# Patient Record
Sex: MALE | Race: WHITE | NOT HISPANIC OR LATINO | Employment: OTHER | URBAN - METROPOLITAN AREA
[De-identification: names, ages, dates, MRNs, and addresses within clinical notes are randomized per-mention and may not be internally consistent; named-entity substitution may affect disease eponyms.]

---

## 2017-03-08 ENCOUNTER — TRANSCRIBE ORDERS (OUTPATIENT)
Dept: ADMINISTRATIVE | Facility: HOSPITAL | Age: 72
End: 2017-03-08

## 2017-03-08 DIAGNOSIS — I20.9 ANGINA, CLASS I (HCC): Primary | ICD-10-CM

## 2017-03-15 ENCOUNTER — HOSPITAL ENCOUNTER (OUTPATIENT)
Dept: CARDIOLOGY | Facility: HOSPITAL | Age: 72
Discharge: HOME | End: 2017-03-15
Attending: FAMILY MEDICINE

## 2017-03-15 ENCOUNTER — HOSPITAL ENCOUNTER (OUTPATIENT)
Dept: NUCLEAR MEDICINE | Facility: HOSPITAL | Age: 72
Discharge: HOME | End: 2017-03-15
Attending: FAMILY MEDICINE

## 2017-03-15 VITALS
HEART RATE: 67 BPM | DIASTOLIC BLOOD PRESSURE: 72 MMHG | WEIGHT: 247 LBS | SYSTOLIC BLOOD PRESSURE: 143 MMHG | HEIGHT: 70 IN | RESPIRATION RATE: 18 BRPM | OXYGEN SATURATION: 99 % | BODY MASS INDEX: 35.36 KG/M2

## 2017-03-15 DIAGNOSIS — I20.9 ANGINA, CLASS I (HCC): ICD-10-CM

## 2017-03-15 LAB
BH CV NUCLEAR PRIOR STUDY: 3
BH CV STRESS BP STAGE 1: NORMAL
BH CV STRESS BP STAGE 2: NORMAL
BH CV STRESS BP STAGE 3: NORMAL
BH CV STRESS BP STAGE 4: NORMAL
BH CV STRESS COMMENTS STAGE 1: NORMAL
BH CV STRESS COMMENTS STAGE 2: NORMAL
BH CV STRESS DOSE REGADENOSON STAGE 1: 0.4
BH CV STRESS DURATION MIN STAGE 1: 0
BH CV STRESS DURATION MIN STAGE 2: 4
BH CV STRESS DURATION SEC STAGE 1: 15
BH CV STRESS DURATION SEC STAGE 2: 0
BH CV STRESS HR STAGE 1: 84
BH CV STRESS HR STAGE 2: 88
BH CV STRESS HR STAGE 3: 79
BH CV STRESS HR STAGE 4: 80
BH CV STRESS O2 STAGE 1: 95
BH CV STRESS O2 STAGE 2: 95
BH CV STRESS PROTOCOL 1: NORMAL
BH CV STRESS RECOVERY BP: NORMAL MMHG
BH CV STRESS RECOVERY HR: 80 BPM
BH CV STRESS STAGE 1: 1
BH CV STRESS STAGE 2: 2
BH CV STRESS STAGE 3: 3
BH CV STRESS STAGE 4: 4
LV EF NUC BP: 63 %
MAXIMAL PREDICTED HEART RATE: 149 BPM
PERCENT MAX PREDICTED HR: 61.74 %
STRESS BASELINE BP: NORMAL MMHG
STRESS BASELINE HR: 67 BPM
STRESS O2 SAT REST: 99 %
STRESS PERCENT HR: 73 %
STRESS POST ESTIMATED WORKLOAD: 1 METS
STRESS POST EXERCISE DUR SEC: 30 SEC
STRESS POST PEAK BP: NORMAL MMHG
STRESS POST PEAK HR: 92 BPM
STRESS TARGET HR: 127 BPM

## 2017-03-15 PROCEDURE — 93017 CV STRESS TEST TRACING ONLY: CPT

## 2017-03-15 PROCEDURE — A9500 TC99M SESTAMIBI: HCPCS | Performed by: FAMILY MEDICINE

## 2017-03-15 PROCEDURE — 93016 CV STRESS TEST SUPVJ ONLY: CPT | Performed by: INTERNAL MEDICINE

## 2017-03-15 PROCEDURE — 0 TECHNETIUM SESTAMIBI: Performed by: FAMILY MEDICINE

## 2017-03-15 PROCEDURE — 78452 HT MUSCLE IMAGE SPECT MULT: CPT

## 2017-03-15 PROCEDURE — 78452 HT MUSCLE IMAGE SPECT MULT: CPT | Performed by: INTERNAL MEDICINE

## 2017-03-15 PROCEDURE — 93018 CV STRESS TEST I&R ONLY: CPT | Performed by: INTERNAL MEDICINE

## 2017-03-15 PROCEDURE — 25010000002 REGADENOSON 0.4 MG/5ML SOLUTION: Performed by: FAMILY MEDICINE

## 2017-03-15 RX ADMIN — Medication 1 DOSE: at 09:45

## 2017-03-15 RX ADMIN — Medication 1 DOSE: at 11:15

## 2017-03-15 RX ADMIN — REGADENOSON 0.4 MG: 0.08 INJECTION, SOLUTION INTRAVENOUS at 11:15

## 2017-05-09 ENCOUNTER — OFFICE VISIT (OUTPATIENT)
Dept: GASTROENTEROLOGY | Facility: CLINIC | Age: 72
End: 2017-05-09

## 2017-05-09 VITALS
OXYGEN SATURATION: 93 % | HEIGHT: 70 IN | TEMPERATURE: 98 F | BODY MASS INDEX: 34.27 KG/M2 | DIASTOLIC BLOOD PRESSURE: 70 MMHG | WEIGHT: 239.4 LBS | HEART RATE: 69 BPM | SYSTOLIC BLOOD PRESSURE: 136 MMHG

## 2017-05-09 DIAGNOSIS — K21.00 GASTROESOPHAGEAL REFLUX DISEASE WITH ESOPHAGITIS: Primary | ICD-10-CM

## 2017-05-09 PROCEDURE — 99203 OFFICE O/P NEW LOW 30 MIN: CPT | Performed by: INTERNAL MEDICINE

## 2017-05-19 ENCOUNTER — ANESTHESIA EVENT (OUTPATIENT)
Dept: PERIOP | Facility: HOSPITAL | Age: 72
End: 2017-05-19

## 2017-05-22 ENCOUNTER — ANESTHESIA (OUTPATIENT)
Dept: PERIOP | Facility: HOSPITAL | Age: 72
End: 2017-05-22

## 2017-05-22 ENCOUNTER — HOSPITAL ENCOUNTER (OUTPATIENT)
Facility: HOSPITAL | Age: 72
Setting detail: HOSPITAL OUTPATIENT SURGERY
Discharge: HOME | End: 2017-05-22
Attending: INTERNAL MEDICINE | Admitting: INTERNAL MEDICINE

## 2017-05-22 VITALS
WEIGHT: 237.8 LBS | OXYGEN SATURATION: 97 % | DIASTOLIC BLOOD PRESSURE: 72 MMHG | TEMPERATURE: 98.7 F | HEART RATE: 79 BPM | RESPIRATION RATE: 18 BRPM | BODY MASS INDEX: 34.04 KG/M2 | HEIGHT: 70 IN | SYSTOLIC BLOOD PRESSURE: 129 MMHG

## 2017-05-22 DIAGNOSIS — K21.00 GASTROESOPHAGEAL REFLUX DISEASE WITH ESOPHAGITIS: ICD-10-CM

## 2017-05-22 LAB
GLUCOSE BLDC GLUCOMTR-MCNC: 137 MG/DL (ref 70–130)
POTASSIUM BLD-SCNC: 3.7 MMOL/L (ref 3.5–5.2)

## 2017-05-22 PROCEDURE — 43239 EGD BIOPSY SINGLE/MULTIPLE: CPT | Performed by: INTERNAL MEDICINE

## 2017-05-22 PROCEDURE — 25010000002 PROPOFOL 10 MG/ML EMULSION: Performed by: NURSE ANESTHETIST, CERTIFIED REGISTERED

## 2017-05-22 PROCEDURE — 82962 GLUCOSE BLOOD TEST: CPT

## 2017-05-22 PROCEDURE — 84132 ASSAY OF SERUM POTASSIUM: CPT | Performed by: NURSE ANESTHETIST, CERTIFIED REGISTERED

## 2017-05-22 RX ORDER — LIDOCAINE HYDROCHLORIDE 10 MG/ML
0.5 INJECTION, SOLUTION EPIDURAL; INFILTRATION; INTRACAUDAL; PERINEURAL ONCE AS NEEDED
Status: DISCONTINUED | OUTPATIENT
Start: 2017-05-22 | End: 2017-05-22 | Stop reason: HOSPADM

## 2017-05-22 RX ORDER — LIDOCAINE HYDROCHLORIDE 20 MG/ML
INJECTION, SOLUTION INFILTRATION; PERINEURAL AS NEEDED
Status: DISCONTINUED | OUTPATIENT
Start: 2017-05-22 | End: 2017-05-22 | Stop reason: SURG

## 2017-05-22 RX ORDER — PROPOFOL 10 MG/ML
VIAL (ML) INTRAVENOUS AS NEEDED
Status: DISCONTINUED | OUTPATIENT
Start: 2017-05-22 | End: 2017-05-22 | Stop reason: SURG

## 2017-05-22 RX ORDER — SODIUM CHLORIDE 0.9 % (FLUSH) 0.9 %
1-10 SYRINGE (ML) INJECTION AS NEEDED
Status: DISCONTINUED | OUTPATIENT
Start: 2017-05-22 | End: 2017-05-22 | Stop reason: HOSPADM

## 2017-05-22 RX ORDER — SODIUM CHLORIDE, SODIUM LACTATE, POTASSIUM CHLORIDE, CALCIUM CHLORIDE 600; 310; 30; 20 MG/100ML; MG/100ML; MG/100ML; MG/100ML
9 INJECTION, SOLUTION INTRAVENOUS CONTINUOUS
Status: DISCONTINUED | OUTPATIENT
Start: 2017-05-22 | End: 2017-05-22 | Stop reason: HOSPADM

## 2017-05-22 RX ADMIN — PROPOFOL 50 MG: 10 INJECTION, EMULSION INTRAVENOUS at 09:22

## 2017-05-22 RX ADMIN — PROPOFOL 30 MG: 10 INJECTION, EMULSION INTRAVENOUS at 09:28

## 2017-05-22 RX ADMIN — LIDOCAINE HYDROCHLORIDE 60 MG: 20 INJECTION, SOLUTION INFILTRATION; PERINEURAL at 09:22

## 2017-05-22 RX ADMIN — SODIUM CHLORIDE, POTASSIUM CHLORIDE, SODIUM LACTATE AND CALCIUM CHLORIDE 9 ML/HR: 600; 310; 30; 20 INJECTION, SOLUTION INTRAVENOUS at 08:45

## 2017-05-22 RX ADMIN — PROPOFOL 40 MG: 10 INJECTION, EMULSION INTRAVENOUS at 09:24

## 2017-05-22 RX ADMIN — SODIUM CHLORIDE, POTASSIUM CHLORIDE, SODIUM LACTATE AND CALCIUM CHLORIDE: 600; 310; 30; 20 INJECTION, SOLUTION INTRAVENOUS at 09:17

## 2017-05-26 LAB
LAB AP CASE REPORT: NORMAL
Lab: NORMAL
PATH REPORT.ADDENDUM SPEC: NORMAL
PATH REPORT.FINAL DX SPEC: NORMAL

## 2017-06-05 ENCOUNTER — TELEPHONE (OUTPATIENT)
Dept: GASTROENTEROLOGY | Facility: CLINIC | Age: 72
End: 2017-06-05

## 2017-06-07 RX ORDER — PANTOPRAZOLE SODIUM 40 MG/1
40 TABLET, DELAYED RELEASE ORAL DAILY
Qty: 90 TABLET | Refills: 3 | Status: SHIPPED | OUTPATIENT
Start: 2017-06-07 | End: 2018-06-17 | Stop reason: SDUPTHER

## 2017-06-07 NOTE — TELEPHONE ENCOUNTER
See what she has tried in the past.  We can do Protonix 40 mg daily.  A big issue with her will be diet and modifications that we talked about in the office.  The other option for her would be to continue Nexium at 40 mg end start over-the-counter om  eprazole once daily at night.  Just make sure she has a follow-up appointment also thinks

## 2018-06-14 RX ORDER — PANTOPRAZOLE SODIUM 40 MG/1
TABLET, DELAYED RELEASE ORAL
Qty: 90 TABLET | Refills: 2 | OUTPATIENT
Start: 2018-06-14

## 2018-06-18 RX ORDER — PANTOPRAZOLE SODIUM 40 MG/1
TABLET, DELAYED RELEASE ORAL
Qty: 90 TABLET | Refills: 2 | Status: SHIPPED | OUTPATIENT
Start: 2018-06-18 | End: 2019-03-13 | Stop reason: SDUPTHER

## 2019-03-13 RX ORDER — PANTOPRAZOLE SODIUM 40 MG/1
TABLET, DELAYED RELEASE ORAL
Qty: 90 TABLET | Refills: 1 | Status: SHIPPED | OUTPATIENT
Start: 2019-03-13 | End: 2022-10-26

## 2019-04-17 ENCOUNTER — TRANSCRIBE ORDERS (OUTPATIENT)
Dept: ADMINISTRATIVE | Facility: HOSPITAL | Age: 74
End: 2019-04-17

## 2019-04-17 DIAGNOSIS — I69.393 ATAXIA DUE TO RECENT CEREBROVASCULAR ACCIDENT (CVA): Primary | ICD-10-CM

## 2019-04-26 ENCOUNTER — HOSPITAL ENCOUNTER (OUTPATIENT)
Dept: MRI IMAGING | Facility: HOSPITAL | Age: 74
Discharge: HOME | End: 2019-04-26
Admitting: FAMILY MEDICINE

## 2019-04-26 DIAGNOSIS — I69.393 ATAXIA DUE TO RECENT CEREBROVASCULAR ACCIDENT (CVA): ICD-10-CM

## 2019-04-26 PROCEDURE — 70551 MRI BRAIN STEM W/O DYE: CPT

## 2019-04-29 ENCOUNTER — APPOINTMENT (OUTPATIENT)
Dept: MRI IMAGING | Facility: HOSPITAL | Age: 74
End: 2019-04-29

## 2019-07-17 ENCOUNTER — HOSPITAL ENCOUNTER (OUTPATIENT)
Dept: GENERAL RADIOLOGY | Facility: HOSPITAL | Age: 74
Discharge: HOME | End: 2019-07-17
Admitting: FAMILY MEDICINE

## 2019-07-17 ENCOUNTER — TRANSCRIBE ORDERS (OUTPATIENT)
Dept: ADMINISTRATIVE | Facility: HOSPITAL | Age: 74
End: 2019-07-17

## 2019-07-17 DIAGNOSIS — M51.36 DDD (DEGENERATIVE DISC DISEASE), LUMBAR: Primary | ICD-10-CM

## 2019-07-17 PROCEDURE — 72100 X-RAY EXAM L-S SPINE 2/3 VWS: CPT

## 2019-10-10 ENCOUNTER — TRANSCRIBE ORDERS (OUTPATIENT)
Dept: ADMINISTRATIVE | Facility: HOSPITAL | Age: 74
End: 2019-10-10

## 2019-10-10 DIAGNOSIS — R31.9 HEMATURIA, UNSPECIFIED TYPE: Primary | ICD-10-CM

## 2019-10-18 ENCOUNTER — HOSPITAL ENCOUNTER (OUTPATIENT)
Dept: ULTRASOUND IMAGING | Facility: HOSPITAL | Age: 74
Discharge: HOME | End: 2019-10-18
Admitting: FAMILY MEDICINE

## 2019-10-18 DIAGNOSIS — R31.9 HEMATURIA, UNSPECIFIED TYPE: ICD-10-CM

## 2019-10-18 PROCEDURE — 76775 US EXAM ABDO BACK WALL LIM: CPT

## 2020-02-12 ENCOUNTER — TRANSCRIBE ORDERS (OUTPATIENT)
Dept: ADMINISTRATIVE | Facility: HOSPITAL | Age: 75
End: 2020-02-12

## 2020-02-12 DIAGNOSIS — R00.2 PALPITATIONS: Primary | ICD-10-CM

## 2020-02-24 ENCOUNTER — APPOINTMENT (OUTPATIENT)
Dept: CARDIOLOGY | Facility: HOSPITAL | Age: 75
End: 2020-02-24

## 2020-12-11 ENCOUNTER — HOSPITAL ENCOUNTER (OUTPATIENT)
Dept: GENERAL RADIOLOGY | Facility: HOSPITAL | Age: 75
Discharge: HOME | End: 2020-12-11
Admitting: FAMILY MEDICINE

## 2020-12-11 ENCOUNTER — TRANSCRIBE ORDERS (OUTPATIENT)
Dept: ADMINISTRATIVE | Facility: HOSPITAL | Age: 75
End: 2020-12-11

## 2020-12-11 DIAGNOSIS — J42 CHRONIC BRONCHITIS, UNSPECIFIED CHRONIC BRONCHITIS TYPE (HCC): Primary | ICD-10-CM

## 2020-12-11 DIAGNOSIS — J42 CHRONIC BRONCHITIS, UNSPECIFIED CHRONIC BRONCHITIS TYPE (HCC): ICD-10-CM

## 2020-12-11 PROCEDURE — 71046 X-RAY EXAM CHEST 2 VIEWS: CPT

## 2021-01-19 ENCOUNTER — TRANSCRIBE ORDERS (OUTPATIENT)
Dept: ADMINISTRATIVE | Facility: HOSPITAL | Age: 76
End: 2021-01-19

## 2021-01-19 DIAGNOSIS — R41.3 MEMORY LOSS: Primary | ICD-10-CM

## 2021-01-26 ENCOUNTER — HOSPITAL ENCOUNTER (OUTPATIENT)
Dept: MRI IMAGING | Facility: HOSPITAL | Age: 76
Discharge: HOME | End: 2021-01-26
Admitting: PSYCHIATRY & NEUROLOGY

## 2021-01-26 DIAGNOSIS — R41.3 MEMORY LOSS: ICD-10-CM

## 2021-01-26 PROCEDURE — 70551 MRI BRAIN STEM W/O DYE: CPT

## 2021-02-01 ENCOUNTER — TRANSCRIBE ORDERS (OUTPATIENT)
Dept: ADMINISTRATIVE | Facility: HOSPITAL | Age: 76
End: 2021-02-01

## 2021-02-01 DIAGNOSIS — I72.9 ANEURYSM OF UNSPECIFIED SITE (HCC): Primary | ICD-10-CM

## 2021-02-05 ENCOUNTER — HOSPITAL ENCOUNTER (OUTPATIENT)
Dept: MRI IMAGING | Facility: HOSPITAL | Age: 76
End: 2021-02-05

## 2021-03-23 ENCOUNTER — TELEPHONE (OUTPATIENT)
Dept: GASTROENTEROLOGY | Facility: CLINIC | Age: 76
End: 2021-03-23

## 2021-03-25 ENCOUNTER — PREP FOR SURGERY (OUTPATIENT)
Dept: OTHER | Facility: HOSPITAL | Age: 76
End: 2021-03-25

## 2021-03-25 DIAGNOSIS — Z12.11 ENCOUNTER FOR SCREENING FOR MALIGNANT NEOPLASM OF COLON: Primary | ICD-10-CM

## 2021-03-26 NOTE — TELEPHONE ENCOUNTER
CALLED AND SPOKE WITH WIFE.  SCHEDULED AT [place] ON 07/28/2021 AT 12PM - ARRIVE 11AM.  E-MAIL INSTRUCTIONS [email or web address] TODAY.    COVID TEST ON 07/26/2021, WILL CALL WITH TIME.  NEED TO SELF QUARANTINE UNTIL AFTER PROCEDURE.  SHE UNDERSTANDS.

## 2021-03-29 PROBLEM — Z12.11 ENCOUNTER FOR SCREENING FOR MALIGNANT NEOPLASM OF COLON: Status: ACTIVE | Noted: 2021-03-29

## 2021-06-15 ENCOUNTER — HOSPITAL ENCOUNTER (OUTPATIENT)
Dept: MRI IMAGING | Facility: HOSPITAL | Age: 76
Discharge: HOME | End: 2021-06-15
Admitting: PSYCHIATRY & NEUROLOGY

## 2021-06-15 DIAGNOSIS — I72.9 ANEURYSM OF UNSPECIFIED SITE (HCC): ICD-10-CM

## 2021-06-15 PROCEDURE — 70544 MR ANGIOGRAPHY HEAD W/O DYE: CPT

## 2021-07-26 ENCOUNTER — TELEPHONE (OUTPATIENT)
Dept: GASTROENTEROLOGY | Facility: CLINIC | Age: 76
End: 2021-07-26

## 2021-07-26 NOTE — TELEPHONE ENCOUNTER
Wife called and left message on my voice mail to reschedule his colonoscopy on 07/28/2021. Please call.

## 2021-07-26 NOTE — TELEPHONE ENCOUNTER
Return call from wife.  Rescheduled to [place] on 11/10/2021 at 12pm - arrive 11am.  She corrected her copy of instructions.

## 2021-09-02 ENCOUNTER — APPOINTMENT (OUTPATIENT)
Dept: CT IMAGING | Facility: HOSPITAL | Age: 76
End: 2021-09-02

## 2021-09-02 ENCOUNTER — APPOINTMENT (OUTPATIENT)
Dept: GENERAL RADIOLOGY | Facility: HOSPITAL | Age: 76
End: 2021-09-02

## 2021-09-02 ENCOUNTER — HOSPITAL ENCOUNTER (INPATIENT)
Facility: HOSPITAL | Age: 76
LOS: 14 days | Discharge: NURSING, REHAB OR LONG-TERM CARE | End: 2021-09-16
Attending: EMERGENCY MEDICINE | Admitting: FAMILY MEDICINE

## 2021-09-02 DIAGNOSIS — R53.1 WEAKNESS: ICD-10-CM

## 2021-09-02 DIAGNOSIS — U07.1 COVID-19 VIRUS INFECTION: Primary | ICD-10-CM

## 2021-09-02 DIAGNOSIS — S00.83XA CONTUSION OF FACE, INITIAL ENCOUNTER: ICD-10-CM

## 2021-09-02 DIAGNOSIS — R77.8 ELEVATED TROPONIN: ICD-10-CM

## 2021-09-02 LAB
ALBUMIN SERPL-MCNC: 3.3 G/DL (ref 3.5–5.2)
ALBUMIN/GLOB SERPL: 1.3 G/DL
ALP SERPL-CCNC: 77 U/L (ref 39–117)
ALT SERPL W P-5'-P-CCNC: 34 U/L (ref 1–41)
ANION GAP SERPL CALCULATED.3IONS-SCNC: 10.6 MMOL/L (ref 5–15)
AST SERPL-CCNC: 41 U/L (ref 1–40)
BACTERIA UR QL AUTO: ABNORMAL /HPF
BASOPHILS # BLD AUTO: 0.01 10*3/MM3 (ref 0–0.2)
BASOPHILS NFR BLD AUTO: 0.2 % (ref 0–1.5)
BILIRUB SERPL-MCNC: 0.7 MG/DL (ref 0–1.2)
BILIRUB UR QL STRIP: ABNORMAL
BUN SERPL-MCNC: 16 MG/DL (ref 8–23)
BUN/CREAT SERPL: 22.5 (ref 7–25)
CALCIUM SPEC-SCNC: 8.5 MG/DL (ref 8.6–10.5)
CHLORIDE SERPL-SCNC: 101 MMOL/L (ref 98–107)
CK SERPL-CCNC: 765 U/L (ref 20–200)
CLARITY UR: ABNORMAL
CO2 SERPL-SCNC: 27.4 MMOL/L (ref 22–29)
COLOR UR: ABNORMAL
CREAT SERPL-MCNC: 0.71 MG/DL (ref 0.76–1.27)
D DIMER PPP FEU-MCNC: 1.14 MCGFEU/ML (ref 0–0.46)
D-LACTATE SERPL-SCNC: 2 MMOL/L (ref 0.5–2)
DEPRECATED RDW RBC AUTO: 47.7 FL (ref 37–54)
EOSINOPHIL # BLD AUTO: 0 10*3/MM3 (ref 0–0.4)
EOSINOPHIL NFR BLD AUTO: 0 % (ref 0.3–6.2)
ERYTHROCYTE [DISTWIDTH] IN BLOOD BY AUTOMATED COUNT: 14.4 % (ref 12.3–15.4)
FERRITIN SERPL-MCNC: 1110 NG/ML (ref 30–400)
FLUAV RNA RESP QL NAA+PROBE: NOT DETECTED
FLUBV RNA RESP QL NAA+PROBE: NOT DETECTED
GFR SERPL CREATININE-BSD FRML MDRD: 108 ML/MIN/1.73
GLOBULIN UR ELPH-MCNC: 2.6 GM/DL
GLUCOSE BLDC GLUCOMTR-MCNC: 147 MG/DL (ref 70–130)
GLUCOSE BLDC GLUCOMTR-MCNC: 201 MG/DL (ref 70–130)
GLUCOSE SERPL-MCNC: 206 MG/DL (ref 65–99)
GLUCOSE UR STRIP-MCNC: ABNORMAL MG/DL
HCT VFR BLD AUTO: 37.5 % (ref 37.5–51)
HGB BLD-MCNC: 12.6 G/DL (ref 13–17.7)
HGB UR QL STRIP.AUTO: ABNORMAL
HOLD SPECIMEN: NORMAL
HOLD SPECIMEN: NORMAL
HYALINE CASTS UR QL AUTO: ABNORMAL /LPF
IMM GRANULOCYTES # BLD AUTO: 0.08 10*3/MM3 (ref 0–0.05)
IMM GRANULOCYTES NFR BLD AUTO: 1.8 % (ref 0–0.5)
KETONES UR QL STRIP: ABNORMAL
LEUKOCYTE ESTERASE UR QL STRIP.AUTO: NEGATIVE
LYMPHOCYTES # BLD AUTO: 0.61 10*3/MM3 (ref 0.7–3.1)
LYMPHOCYTES NFR BLD AUTO: 13.8 % (ref 19.6–45.3)
MAGNESIUM SERPL-MCNC: 1.4 MG/DL (ref 1.6–2.4)
MCH RBC QN AUTO: 30.5 PG (ref 26.6–33)
MCHC RBC AUTO-ENTMCNC: 33.6 G/DL (ref 31.5–35.7)
MCV RBC AUTO: 90.8 FL (ref 79–97)
MONOCYTES # BLD AUTO: 0.48 10*3/MM3 (ref 0.1–0.9)
MONOCYTES NFR BLD AUTO: 10.9 % (ref 5–12)
MUCOUS THREADS URNS QL MICRO: ABNORMAL /HPF
NEUTROPHILS NFR BLD AUTO: 3.24 10*3/MM3 (ref 1.7–7)
NEUTROPHILS NFR BLD AUTO: 73.3 % (ref 42.7–76)
NITRITE UR QL STRIP: NEGATIVE
NRBC BLD AUTO-RTO: 0 /100 WBC (ref 0–0.2)
NT-PROBNP SERPL-MCNC: 2448 PG/ML (ref 0–1800)
PH UR STRIP.AUTO: 6 [PH] (ref 4.5–8)
PLATELET # BLD AUTO: 166 10*3/MM3 (ref 140–450)
PMV BLD AUTO: 9.5 FL (ref 6–12)
POTASSIUM SERPL-SCNC: 3 MMOL/L (ref 3.5–5.2)
PROCALCITONIN SERPL-MCNC: 0.08 NG/ML (ref 0–0.25)
PROT SERPL-MCNC: 5.9 G/DL (ref 6–8.5)
PROT UR QL STRIP: ABNORMAL
RBC # BLD AUTO: 4.13 10*6/MM3 (ref 4.14–5.8)
RBC # UR: ABNORMAL /HPF
REF LAB TEST METHOD: ABNORMAL
SARS-COV-2 RNA RESP QL NAA+PROBE: DETECTED
SODIUM SERPL-SCNC: 139 MMOL/L (ref 136–145)
SP GR UR STRIP: 1.02 (ref 1–1.03)
SQUAMOUS #/AREA URNS HPF: ABNORMAL /HPF
TROPONIN T SERPL-MCNC: 0.12 NG/ML (ref 0–0.03)
UROBILINOGEN UR QL STRIP: ABNORMAL
WBC # BLD AUTO: 4.42 10*3/MM3 (ref 3.4–10.8)
WBC UR QL AUTO: ABNORMAL /HPF
WHOLE BLOOD HOLD SPECIMEN: NORMAL
WHOLE BLOOD HOLD SPECIMEN: NORMAL

## 2021-09-02 PROCEDURE — 25010000002 FUROSEMIDE PER 20 MG

## 2021-09-02 PROCEDURE — 82962 GLUCOSE BLOOD TEST: CPT

## 2021-09-02 PROCEDURE — 81001 URINALYSIS AUTO W/SCOPE: CPT | Performed by: EMERGENCY MEDICINE

## 2021-09-02 PROCEDURE — 71045 X-RAY EXAM CHEST 1 VIEW: CPT

## 2021-09-02 PROCEDURE — 70450 CT HEAD/BRAIN W/O DYE: CPT

## 2021-09-02 PROCEDURE — 85025 COMPLETE CBC W/AUTO DIFF WBC: CPT | Performed by: EMERGENCY MEDICINE

## 2021-09-02 PROCEDURE — 94799 UNLISTED PULMONARY SVC/PX: CPT

## 2021-09-02 PROCEDURE — 0 IOPAMIDOL PER 1 ML: Performed by: EMERGENCY MEDICINE

## 2021-09-02 PROCEDURE — 84484 ASSAY OF TROPONIN QUANT: CPT | Performed by: EMERGENCY MEDICINE

## 2021-09-02 PROCEDURE — 71275 CT ANGIOGRAPHY CHEST: CPT

## 2021-09-02 PROCEDURE — 82728 ASSAY OF FERRITIN: CPT | Performed by: FAMILY MEDICINE

## 2021-09-02 PROCEDURE — 25010000002 HYDRALAZINE PER 20 MG: Performed by: FAMILY MEDICINE

## 2021-09-02 PROCEDURE — 25010000002 ENOXAPARIN PER 10 MG: Performed by: FAMILY MEDICINE

## 2021-09-02 PROCEDURE — 83605 ASSAY OF LACTIC ACID: CPT | Performed by: EMERGENCY MEDICINE

## 2021-09-02 PROCEDURE — 93010 ELECTROCARDIOGRAM REPORT: CPT | Performed by: INTERNAL MEDICINE

## 2021-09-02 PROCEDURE — 80053 COMPREHEN METABOLIC PANEL: CPT | Performed by: EMERGENCY MEDICINE

## 2021-09-02 PROCEDURE — 99285 EMERGENCY DEPT VISIT HI MDM: CPT

## 2021-09-02 PROCEDURE — 87636 SARSCOV2 & INF A&B AMP PRB: CPT | Performed by: EMERGENCY MEDICINE

## 2021-09-02 PROCEDURE — 93005 ELECTROCARDIOGRAM TRACING: CPT | Performed by: EMERGENCY MEDICINE

## 2021-09-02 PROCEDURE — 84145 PROCALCITONIN (PCT): CPT | Performed by: EMERGENCY MEDICINE

## 2021-09-02 PROCEDURE — 83880 ASSAY OF NATRIURETIC PEPTIDE: CPT | Performed by: EMERGENCY MEDICINE

## 2021-09-02 PROCEDURE — 87040 BLOOD CULTURE FOR BACTERIA: CPT | Performed by: EMERGENCY MEDICINE

## 2021-09-02 PROCEDURE — 83735 ASSAY OF MAGNESIUM: CPT | Performed by: EMERGENCY MEDICINE

## 2021-09-02 PROCEDURE — 85379 FIBRIN DEGRADATION QUANT: CPT | Performed by: FAMILY MEDICINE

## 2021-09-02 PROCEDURE — 25010000002 HALOPERIDOL LACTATE PER 5 MG

## 2021-09-02 PROCEDURE — 82550 ASSAY OF CK (CPK): CPT | Performed by: EMERGENCY MEDICINE

## 2021-09-02 PROCEDURE — 99285 EMERGENCY DEPT VISIT HI MDM: CPT | Performed by: EMERGENCY MEDICINE

## 2021-09-02 PROCEDURE — 94640 AIRWAY INHALATION TREATMENT: CPT

## 2021-09-02 RX ORDER — HYDRALAZINE HYDROCHLORIDE 20 MG/ML
10 INJECTION INTRAMUSCULAR; INTRAVENOUS EVERY 4 HOURS PRN
Status: DISCONTINUED | OUTPATIENT
Start: 2021-09-02 | End: 2021-09-16 | Stop reason: HOSPADM

## 2021-09-02 RX ORDER — ATORVASTATIN CALCIUM 40 MG/1
40 TABLET, FILM COATED ORAL DAILY
Status: DISCONTINUED | OUTPATIENT
Start: 2021-09-02 | End: 2021-09-16 | Stop reason: HOSPADM

## 2021-09-02 RX ORDER — ONDANSETRON 2 MG/ML
4 INJECTION INTRAMUSCULAR; INTRAVENOUS EVERY 6 HOURS PRN
Status: DISCONTINUED | OUTPATIENT
Start: 2021-09-02 | End: 2021-09-16 | Stop reason: HOSPADM

## 2021-09-02 RX ORDER — POTASSIUM CHLORIDE 1.5 G/1.77G
40 POWDER, FOR SOLUTION ORAL ONCE
Status: COMPLETED | OUTPATIENT
Start: 2021-09-02 | End: 2021-09-02

## 2021-09-02 RX ORDER — ASPIRIN 81 MG/1
81 TABLET ORAL DAILY
Status: DISCONTINUED | OUTPATIENT
Start: 2021-09-02 | End: 2021-09-16 | Stop reason: HOSPADM

## 2021-09-02 RX ORDER — SODIUM CHLORIDE 9 MG/ML
75 INJECTION, SOLUTION INTRAVENOUS CONTINUOUS
Status: DISCONTINUED | OUTPATIENT
Start: 2021-09-02 | End: 2021-09-03

## 2021-09-02 RX ORDER — HALOPERIDOL 5 MG/ML
1 INJECTION INTRAMUSCULAR EVERY 4 HOURS PRN
Status: DISCONTINUED | OUTPATIENT
Start: 2021-09-02 | End: 2021-09-16 | Stop reason: HOSPADM

## 2021-09-02 RX ORDER — HALOPERIDOL 5 MG/ML
INJECTION INTRAMUSCULAR
Status: COMPLETED
Start: 2021-09-02 | End: 2021-09-02

## 2021-09-02 RX ORDER — ALBUTEROL SULFATE 90 UG/1
2 AEROSOL, METERED RESPIRATORY (INHALATION)
Status: DISCONTINUED | OUTPATIENT
Start: 2021-09-02 | End: 2021-09-02

## 2021-09-02 RX ORDER — SODIUM CHLORIDE 0.9 % (FLUSH) 0.9 %
10 SYRINGE (ML) INJECTION AS NEEDED
Status: DISCONTINUED | OUTPATIENT
Start: 2021-09-02 | End: 2021-09-16 | Stop reason: HOSPADM

## 2021-09-02 RX ORDER — ACETAMINOPHEN 500 MG
1000 TABLET ORAL EVERY 8 HOURS PRN
Status: DISCONTINUED | OUTPATIENT
Start: 2021-09-02 | End: 2021-09-02

## 2021-09-02 RX ORDER — POLYETHYLENE GLYCOL 3350 17 G/17G
17 POWDER, FOR SOLUTION ORAL DAILY PRN
Status: DISCONTINUED | OUTPATIENT
Start: 2021-09-02 | End: 2021-09-16 | Stop reason: HOSPADM

## 2021-09-02 RX ORDER — HALOPERIDOL 5 MG/ML
1 INJECTION INTRAMUSCULAR AS NEEDED
Status: DISCONTINUED | OUTPATIENT
Start: 2021-09-02 | End: 2021-09-02

## 2021-09-02 RX ORDER — FUROSEMIDE 10 MG/ML
INJECTION INTRAMUSCULAR; INTRAVENOUS
Status: COMPLETED
Start: 2021-09-02 | End: 2021-09-02

## 2021-09-02 RX ORDER — BISACODYL 10 MG
10 SUPPOSITORY, RECTAL RECTAL DAILY PRN
Status: DISCONTINUED | OUTPATIENT
Start: 2021-09-02 | End: 2021-09-16 | Stop reason: HOSPADM

## 2021-09-02 RX ORDER — PANTOPRAZOLE SODIUM 40 MG/1
40 TABLET, DELAYED RELEASE ORAL DAILY
Status: DISCONTINUED | OUTPATIENT
Start: 2021-09-02 | End: 2021-09-16 | Stop reason: HOSPADM

## 2021-09-02 RX ORDER — BISACODYL 5 MG/1
5 TABLET, DELAYED RELEASE ORAL DAILY PRN
Status: DISCONTINUED | OUTPATIENT
Start: 2021-09-02 | End: 2021-09-16 | Stop reason: HOSPADM

## 2021-09-02 RX ORDER — SODIUM CHLORIDE 9 MG/ML
125 INJECTION, SOLUTION INTRAVENOUS CONTINUOUS
Status: DISCONTINUED | OUTPATIENT
Start: 2021-09-02 | End: 2021-09-02

## 2021-09-02 RX ORDER — ACETAMINOPHEN 325 MG/1
650 TABLET ORAL EVERY 8 HOURS PRN
Status: DISCONTINUED | OUTPATIENT
Start: 2021-09-02 | End: 2021-09-16 | Stop reason: HOSPADM

## 2021-09-02 RX ORDER — NEBIVOLOL 5 MG/1
10 TABLET ORAL DAILY
Status: DISCONTINUED | OUTPATIENT
Start: 2021-09-02 | End: 2021-09-16 | Stop reason: HOSPADM

## 2021-09-02 RX ORDER — FUROSEMIDE 10 MG/ML
40 INJECTION INTRAMUSCULAR; INTRAVENOUS ONCE
Status: DISCONTINUED | OUTPATIENT
Start: 2021-09-02 | End: 2021-09-02

## 2021-09-02 RX ORDER — AMOXICILLIN 250 MG
2 CAPSULE ORAL 2 TIMES DAILY
Status: DISCONTINUED | OUTPATIENT
Start: 2021-09-02 | End: 2021-09-16 | Stop reason: HOSPADM

## 2021-09-02 RX ORDER — ONDANSETRON 4 MG/1
4 TABLET, FILM COATED ORAL EVERY 6 HOURS PRN
Status: DISCONTINUED | OUTPATIENT
Start: 2021-09-02 | End: 2021-09-16 | Stop reason: HOSPADM

## 2021-09-02 RX ORDER — LIRAGLUTIDE 6 MG/ML
1.8 INJECTION SUBCUTANEOUS DAILY
COMMUNITY
End: 2022-10-26

## 2021-09-02 RX ORDER — POTASSIUM CHLORIDE 1.5 G/1.77G
40 POWDER, FOR SOLUTION ORAL ONCE
Status: DISCONTINUED | OUTPATIENT
Start: 2021-09-02 | End: 2021-09-02

## 2021-09-02 RX ORDER — METOCLOPRAMIDE 10 MG/1
10 TABLET ORAL
Status: DISCONTINUED | OUTPATIENT
Start: 2021-09-03 | End: 2021-09-02

## 2021-09-02 RX ORDER — ALBUTEROL SULFATE 90 UG/1
2 AEROSOL, METERED RESPIRATORY (INHALATION)
Status: DISCONTINUED | OUTPATIENT
Start: 2021-09-02 | End: 2021-09-03

## 2021-09-02 RX ORDER — LISINOPRIL 20 MG/1
20 TABLET ORAL EVERY 12 HOURS SCHEDULED
Status: DISCONTINUED | OUTPATIENT
Start: 2021-09-02 | End: 2021-09-16 | Stop reason: HOSPADM

## 2021-09-02 RX ADMIN — NEBIVOLOL HYDROCHLORIDE 10 MG: 5 TABLET ORAL at 20:36

## 2021-09-02 RX ADMIN — SENNOSIDES AND DOCUSATE SODIUM 2 TABLET: 50; 8.6 TABLET ORAL at 20:36

## 2021-09-02 RX ADMIN — ALBUTEROL SULFATE 2 PUFF: 90 AEROSOL, METERED RESPIRATORY (INHALATION) at 16:06

## 2021-09-02 RX ADMIN — FUROSEMIDE 40 MG: 10 INJECTION INTRAMUSCULAR; INTRAVENOUS at 20:36

## 2021-09-02 RX ADMIN — LISINOPRIL 20 MG: 20 TABLET ORAL at 20:35

## 2021-09-02 RX ADMIN — CARBIDOPA AND LEVODOPA 1.5 TABLET: 25; 100 TABLET ORAL at 20:36

## 2021-09-02 RX ADMIN — HYDRALAZINE HYDROCHLORIDE 10 MG: 20 INJECTION INTRAMUSCULAR; INTRAVENOUS at 21:33

## 2021-09-02 RX ADMIN — HALOPERIDOL 1 MG: 5 INJECTION INTRAMUSCULAR at 18:48

## 2021-09-02 RX ADMIN — PANTOPRAZOLE SODIUM 40 MG: 40 TABLET, DELAYED RELEASE ORAL at 20:35

## 2021-09-02 RX ADMIN — ASPIRIN 81 MG: 81 TABLET, COATED ORAL at 20:36

## 2021-09-02 RX ADMIN — ALBUTEROL SULFATE 2 PUFF: 90 AEROSOL, METERED RESPIRATORY (INHALATION) at 20:19

## 2021-09-02 RX ADMIN — ATORVASTATIN CALCIUM 40 MG: 40 TABLET, FILM COATED ORAL at 20:35

## 2021-09-02 RX ADMIN — POTASSIUM CHLORIDE 40 MEQ: 1.5 POWDER, FOR SOLUTION ORAL at 20:35

## 2021-09-02 RX ADMIN — ENOXAPARIN SODIUM 40 MG: 40 INJECTION SUBCUTANEOUS at 20:37

## 2021-09-02 RX ADMIN — IOPAMIDOL 24 ML: 755 INJECTION, SOLUTION INTRAVENOUS at 19:38

## 2021-09-02 RX ADMIN — HALOPERIDOL LACTATE 1 MG: 5 INJECTION, SOLUTION INTRAMUSCULAR at 18:48

## 2021-09-02 RX ADMIN — ALBUTEROL SULFATE 2 PUFF: 90 AEROSOL, METERED RESPIRATORY (INHALATION) at 23:14

## 2021-09-02 RX ADMIN — IOPAMIDOL 100 ML: 755 INJECTION, SOLUTION INTRAVENOUS at 19:38

## 2021-09-02 RX ADMIN — SODIUM CHLORIDE 125 ML/HR: 9 INJECTION, SOLUTION INTRAVENOUS at 15:54

## 2021-09-02 NOTE — ED NOTES
Called lab to alert to new order for BNP.  Cathie,  verified     Radha Davila, RN  09/02/21 4557

## 2021-09-02 NOTE — ED PROVIDER NOTES
EMERGENCY DEPARTMENT ENCOUNTER      Room Number: 10/10      HPI:    Chief complaint: Weakness and possible fall    Location: Episode occurred at home     Quality/Severity: Moderate     Timing/Duration: Incident just occurred    Modifying Factors: Patient's wife tested positive for COVID-19 6 days ago    Associated Symptoms: Unknown    Narrative: Pt is a 75 y.o. male who presents to the emergency department by ambulance as noted above.  Sketchy history from EMS shows that they were dispatched on a lift assist and found the patient on the floor in his own feces.  EMS reports that the patient does have some dementia and history from the patient is suspect.  A review of the patient's medications and past medical history shows that he has known COPD, CHF, dementia, hypertension and diabetes.      PMD: Reno Nassar MD    REVIEW OF SYSTEMS  Review of Systems   Unable to perform ROS: Dementia       PAST MEDICAL HISTORY  Active Ambulatory Problems     Diagnosis Date Noted   • Vitamin D deficiency 01/20/2016   • Diabetes mellitus (CMS/HCC) 01/20/2016   • Back pain 01/20/2016   • Benign prostatic hyperplasia 01/20/2016   • Gastroesophageal reflux disease with esophagitis 01/20/2016   • Hyperlipidemia 01/20/2016   • Hypertension 01/20/2016   • Hypogonadism in male 01/20/2016   • Sebaceous cyst 04/13/2016   • COPD (chronic obstructive pulmonary disease) (CMS/HCC) 05/09/2016   • Encounter for screening for malignant neoplasm of colon 03/29/2021     Resolved Ambulatory Problems     Diagnosis Date Noted   • No Resolved Ambulatory Problems     Past Medical History:   Diagnosis Date   • Alzheimer disease (CMS/HCC)    • CHF (congestive heart failure) (CMS/HCC)    • Diabetes (CMS/HCC)    • GERD (gastroesophageal reflux disease)    • Hyperlipemia        PAST SURGICAL HISTORY  Past Surgical History:   Procedure Laterality Date   • ENDOSCOPY N/A 5/22/2017    Procedure: ESOPHAGOGASTRODUODENOSCOPY, biopsy;  Surgeon: Natalia  MD Cesar;  Location: [facility] OR;  Service:        FAMILY HISTORY  Family History   Problem Relation Age of Onset   • Heart defect Mother    • Diabetes Mother        SOCIAL HISTORY  Social History     Socioeconomic History   • Marital status:      Spouse name: Not on file   • Number of children: Not on file   • Years of education: Not on file   • Highest education level: Not on file   Tobacco Use   • Smoking status: Former Smoker   • Smokeless tobacco: Never Used   • Tobacco comment: quit 25 years ago   Substance and Sexual Activity   • Alcohol use: No   • Drug use: No   • Sexual activity: Defer       ALLERGIES  Penicillins    PHYSICAL EXAM  ED Triage Vitals   Temp Heart Rate Resp BP SpO2   -- 09/02/21 1354 -- 09/02/21 1345 09/02/21 1354    68  (!) 182/85 95 %      Temp src Heart Rate Source Patient Position BP Location FiO2 (%)   -- -- -- -- --              Physical Exam  Vitals and nursing note reviewed.   Constitutional:       Comments: The patient is an obese, chronically ill-appearing, 75-year-old male who is noted to have some mild tachypnea and using his abdominal muscles with respiration.   HENT:      Head: Normocephalic.      Comments: The patient has a small, fresh, contusion noted to the lateral aspect of his right supraorbital ridge.  There is mild associated swelling in the area that does include the upper eyelid.  Eyes:      Extraocular Movements: Extraocular movements intact.      Conjunctiva/sclera: Conjunctivae normal.      Pupils: Pupils are equal, round, and reactive to light.   Cardiovascular:      Rate and Rhythm: Normal rate and regular rhythm.      Comments: Heart sounds very distant secondary to obese body habitus.  No gross murmurs appreciated.  Pulmonary:      Comments: Mild respiratory distress as the patient has tachypnea and is using accessory respiratory muscles.  Auscultation of the lungs is suboptimal as the patient cannot sit up.  No gross rhonchi.  Abdominal:      Palpations:  Abdomen is soft.      Tenderness: There is no abdominal tenderness.   Musculoskeletal:         General: Normal range of motion.      Right lower leg: Edema (3+ to the knee) present.      Left lower leg: Edema (3+ to the knee) present.   Skin:     General: Skin is warm and dry.      Findings: Rash (The patient does have a very distinct, erythematous, malar rash.) present.   Neurological:      General: No focal deficit present.      Comments: Patient is able to correctly did know the day of the week, but is unable to tell month or date.  Oriented to person and place.  Patient is able to correctly inform me that his PMD is Dr. Nassar.  Very difficult to understand the patient due to a Covid facemask and the patient does seem to be mumbling also.   Psychiatric:      Comments: Unable to assess         LAB RESULTS  Results for orders placed or performed during the hospital encounter of 09/02/21   COVID-19 and FLU A/B PCR - Swab, Nasopharynx    Specimen: Nasopharynx; Swab   Result Value Ref Range    COVID19 Detected (C) Not Detected - Ref. Range    Influenza A PCR Not Detected Not Detected    Influenza B PCR Not Detected Not Detected   Comprehensive Metabolic Panel    Specimen: Blood   Result Value Ref Range    Glucose 206 (H) 65 - 99 mg/dL    BUN 16 8 - 23 mg/dL    Creatinine 0.71 (L) 0.76 - 1.27 mg/dL    Sodium 139 136 - 145 mmol/L    Potassium 3.0 (L) 3.5 - 5.2 mmol/L    Chloride 101 98 - 107 mmol/L    CO2 27.4 22.0 - 29.0 mmol/L    Calcium 8.5 (L) 8.6 - 10.5 mg/dL    Total Protein 5.9 (L) 6.0 - 8.5 g/dL    Albumin 3.30 (L) 3.50 - 5.20 g/dL    ALT (SGPT) 34 1 - 41 U/L    AST (SGOT) 41 (H) 1 - 40 U/L    Alkaline Phosphatase 77 39 - 117 U/L    Total Bilirubin 0.7 0.0 - 1.2 mg/dL    eGFR Non African Amer 108 >60 mL/min/1.73    Globulin 2.6 gm/dL    A/G Ratio 1.3 g/dL    BUN/Creatinine Ratio 22.5 7.0 - 25.0    Anion Gap 10.6 5.0 - 15.0 mmol/L   Troponin    Specimen: Blood   Result Value Ref Range    Troponin T 0.122  (C) 0.000 - 0.030 ng/mL   Magnesium    Specimen: Blood   Result Value Ref Range    Magnesium 1.4 (L) 1.6 - 2.4 mg/dL   Urinalysis With Microscopic If Indicated (No Culture) - Urine, Clean Catch    Specimen: Urine, Clean Catch   Result Value Ref Range    Color, UA Dark Yellow (A) Yellow, Straw    Appearance, UA Hazy (A) Clear    pH, UA 6.0 4.5 - 8.0    Specific Gravity, UA 1.025 1.003 - 1.030    Glucose,  mg/dL (Trace) (A) Negative    Ketones, UA 15 mg/dL (1+) (A) Negative    Bilirubin, UA Small (1+) (A) Negative    Blood, UA Small (1+) (A) Negative    Protein,  mg/dL (2+) (A) Negative    Leuk Esterase, UA Negative Negative    Nitrite, UA Negative Negative    Urobilinogen, UA 4.0 E.U./dL (A) 0.2 - 1.0 E.U./dL   CBC Auto Differential    Specimen: Blood   Result Value Ref Range    WBC 4.42 3.40 - 10.80 10*3/mm3    RBC 4.13 (L) 4.14 - 5.80 10*6/mm3    Hemoglobin 12.6 (L) 13.0 - 17.7 g/dL    Hematocrit 37.5 37.5 - 51.0 %    MCV 90.8 79.0 - 97.0 fL    MCH 30.5 26.6 - 33.0 pg    MCHC 33.6 31.5 - 35.7 g/dL    RDW 14.4 12.3 - 15.4 %    RDW-SD 47.7 37.0 - 54.0 fl    MPV 9.5 6.0 - 12.0 fL    Platelets 166 140 - 450 10*3/mm3    Neutrophil % 73.3 42.7 - 76.0 %    Lymphocyte % 13.8 (L) 19.6 - 45.3 %    Monocyte % 10.9 5.0 - 12.0 %    Eosinophil % 0.0 (L) 0.3 - 6.2 %    Basophil % 0.2 0.0 - 1.5 %    Immature Grans % 1.8 (H) 0.0 - 0.5 %    Neutrophils, Absolute 3.24 1.70 - 7.00 10*3/mm3    Lymphocytes, Absolute 0.61 (L) 0.70 - 3.10 10*3/mm3    Monocytes, Absolute 0.48 0.10 - 0.90 10*3/mm3    Eosinophils, Absolute 0.00 0.00 - 0.40 10*3/mm3    Basophils, Absolute 0.01 0.00 - 0.20 10*3/mm3    Immature Grans, Absolute 0.08 (H) 0.00 - 0.05 10*3/mm3    nRBC 0.0 0.0 - 0.2 /100 WBC   Lactic Acid, Plasma    Specimen: Blood   Result Value Ref Range    Lactate 2.0 0.5 - 2.0 mmol/L   Procalcitonin    Specimen: Blood   Result Value Ref Range    Procalcitonin 0.08 0.00 - 0.25 ng/mL   CK    Specimen: Blood   Result Value Ref Range     Creatine Kinase 765 (H) 20 - 200 U/L   BNP    Specimen: Blood   Result Value Ref Range    proBNP 2,448.0 (H) 0.0-1,800.0 pg/mL   Urinalysis, Microscopic Only - Urine, Clean Catch    Specimen: Urine, Clean Catch   Result Value Ref Range    RBC, UA 13-20 (A) None Seen /HPF    WBC, UA 3-5 (A) None Seen /HPF    Bacteria, UA 1+ (A) None Seen /HPF    Squamous Epithelial Cells, UA 0-2 None Seen, 0-2 /HPF    Hyaline Casts, UA 0-2 None Seen /LPF    Mucus, UA Moderate/2+ (A) None Seen, Trace /HPF    Methodology Manual Light Microscopy    D-dimer, Quantitative    Specimen: Blood   Result Value Ref Range    D-Dimer, Quantitative 1.14 (H) 0.00 - 0.46 MCGFEU/mL   POC Glucose Once    Specimen: Blood   Result Value Ref Range    Glucose 201 (H) 70 - 130 mg/dL   ECG 12 Lead   Result Value Ref Range    QT Interval 467 ms   Green Top (Gel)   Result Value Ref Range    Extra Tube Hold for add-ons.    Lavender Top   Result Value Ref Range    Extra Tube hold for add-on    Gold Top - SST   Result Value Ref Range    Extra Tube Hold for add-ons.    Light Blue Top   Result Value Ref Range    Extra Tube hold for add-on          I ordered the above labs and reviewed the results    RADIOLOGY  CT Head Without Contrast    Result Date: 9/2/2021  Narrative: CT HEAD, NONCONTRAST, 09/20/2021  HISTORY: Fall this morning with trauma to back of head  COMPARISON:    TECHNIQUE: CT examination of the head was performed without IV contrast. Radiation dose reduction techniques included automated exposure control or exposure modulation based on body size. Radiation audit for CT and nuclear cardiology exams in the last12 months: 0.    FINDINGS: The examination is negative. No evidence of intracranial hemorrhage, mass, mass effect, acute cerebral edema, hydrocephalus or additional abnormality.       Impression: Negative noncontrast head CT examination.  This report was finalized on 9/2/2021 3:54 PM by Dr. Vikash Martin MD.      XR Chest 1 View    Result Date:  9/2/2021  Narrative: AP PORTABLE CHEST, 09/02/2021  HISTORY: Shortness of air today  COMPARISON: 12/11/2020  TECHNIQUE: AP portable chest x-ray.  FINDINGS: There are low lung volumes and there may be minimal basilar atelectasis. Otherwise lungs are clear. Heart size is normal.      Impression: Low lung wires volumes minimal basilar atelectasis otherwise no active disease  This report was finalized on 9/2/2021 3:14 PM by Dr. Vikash Martin MD.        I ordered the above radiologic testing and reviewed the results    PROCEDURES  Procedures      PROGRESS AND CONSULTS  ED Course as of Sep 02 1601   Thu Sep 02, 2021   1427 Full PPE attire was worn during all face-to-face contact with this patient.    [ML]   1519 EKG tracing was contemporaneously reviewed at 1354 hrs. and showed a normal sinus rhythm with a rate of 68 bpm nonspecific intraventricular conduction delay present.  Poor R wave progression.  No ectopy.    [ML]   1533 Case and findings discussed with the patient's primary care physician, Dr. Nassar, who felt that the patient's current condition warrants admission to the hospital on an observation basis.  Dr. Nassar will evaluate the patient in the emergency department.  Still awaiting final radiology report on the patient's head CT.    [ML]   1600 Head CT negative and it is felt that the patient's mildly elevated troponin is probably secondary to his chronic congestive heart failure.    [ML]      ED Course User Index  [ML] Rubin Hope MD           MEDICAL DECISION MAKING  Results were reviewed/discussed with the patient and they were also made aware of online access. Pt also made aware that some labs, such as cultures, will not be resulted during ER visit and follow up with PMD is necessary.     MDM       DIAGNOSIS  Final diagnoses:   COVID-19 virus infection   Weakness   Elevated troponin   Contusion of face, initial encounter       Latest Documented Vital Signs:  As of 16:01 EDT  BP- 173/72 HR-  70 Temp- 98.3 °F (36.8 °C) (Oral) O2 sat- 95%    DISPOSITION  Patient admitted on observation basis       Medication List      ASK your doctor about these medications    Victoza 18 MG/3ML solution pen-injector injection  Generic drug: Liraglutide  Ask about: Which instructions should I use?                   Rubin Hope MD  09/02/21 [id number]       Rubin Hope MD  09/02/21 4511

## 2021-09-02 NOTE — ED NOTES
Pt stated he needed to urinate.  Assisted with urinal.  Pt o/p about 100 ml dark urine.     Radha Davila RN  09/02/21 [id number]

## 2021-09-02 NOTE — ED NOTES
EMS reported wife called for lift assist where pt was laying in floor for an hour.  Pt was sitting in feces.  Pt is able to speak a little.  EMS stated wife told them pt has beginning of alzheimers.  Wife reportedly tested positive for covid 8/27/21      Radha Davila, RN  09/02/21 9210

## 2021-09-02 NOTE — H&P
HISTORY AND PHYSICAL      Patient Care Team:  Reno Nassar MD as PCP - General (Family Medicine)    CHIEF COMPLAINT: Generalized weakness and confusion    HISTORY OF PRESENT ILLNESS:    75-year-old white male with history of diabetes, Parkinson's, hypertension, hyperlipidemia, chronic lower extremity edema who was brought in the emergency room by EMS after a fall at home.  Patient could not get up and was found home with his own feces.  Wife apparently tested positive for Covid 6 days ago.  He is somewhat confused but able to give some history denies any fever chills no nausea vomiting.  States he is just weak and cannot get around at home.  Does not remember falling.        Past Medical History:   Diagnosis Date   • Alzheimer disease (CMS/HCC)    • CHF (congestive heart failure) (CMS/HCC)    • COPD (chronic obstructive pulmonary disease) (CMS/HCC)    • Diabetes (CMS/HCC)    • GERD (gastroesophageal reflux disease)    • Hyperlipemia    • Hypertension      Past Surgical History:   Procedure Laterality Date   • ENDOSCOPY N/A 5/22/2017    Procedure: ESOPHAGOGASTRODUODENOSCOPY, biopsy;  Surgeon: Natalia Guerrero MD;  Location: [facility];  Service:      Family History   Problem Relation Age of Onset   • Heart defect Mother    • Diabetes Mother      Social History     Tobacco Use   • Smoking status: Former Smoker   • Smokeless tobacco: Never Used   • Tobacco comment: quit 25 years ago   Substance Use Topics   • Alcohol use: No   • Drug use: No     (Not in a hospital admission)    Allergies:  Penicillins     Review of Systems   Reason unable to perform ROS: Parts of this review of system may be under reliable because patient is somewhat confused.   Constitutional: Positive for activity change, appetite change and fatigue.   HENT: Negative for congestion.    Respiratory: Positive for shortness of breath. Negative for cough, chest tightness and wheezing.    Cardiovascular: Positive for leg swelling. Negative  "for chest pain.   Gastrointestinal: Negative for abdominal distention, abdominal pain, diarrhea, nausea and vomiting.   Endocrine: Negative for polyphagia and polyuria.   Genitourinary: Negative for frequency.   Musculoskeletal: Positive for gait problem.   Skin: Negative for rash.   Neurological: Positive for tremors and weakness. Negative for light-headedness.   Hematological: Does not bruise/bleed easily.   Psychiatric/Behavioral: Positive for confusion. Negative for agitation and behavioral problems.       Vital Signs  Temp:  [98.3 °F (36.8 °C)] 98.3 °F (36.8 °C)  Heart Rate:  [67-70] 70  Resp:  [28] 28  BP: (173-182)/(72-85) 173/72  Oxygen Therapy  SpO2: 94 %}  Body mass index is 34.49 kg/m².  Flowsheet Rows      First Filed Value   Admission Height  182.9 cm (72\") Documented at 09/02/2021 1411   Admission Weight  115 kg (254 lb 4.8 oz) Documented at 09/02/2021 1502                 Physical Exam  Vitals and nursing note reviewed.   Constitutional:       General: He is not in acute distress.     Appearance: He is well-developed. He is morbidly obese. He is ill-appearing. He is not toxic-appearing or diaphoretic.   HENT:      Head: Normocephalic.   Eyes:      Conjunctiva/sclera: Conjunctivae normal.   Neck:      Thyroid: No thyromegaly.      Vascular: No JVD.   Cardiovascular:      Rate and Rhythm: Normal rate and regular rhythm.      Heart sounds: Normal heart sounds. No murmur heard.     Pulmonary:      Effort: Tachypnea and accessory muscle usage present. No respiratory distress.      Breath sounds: Examination of the right-lower field reveals decreased breath sounds. Examination of the left-lower field reveals decreased breath sounds. Decreased breath sounds present. No wheezing or rales.   Abdominal:      General: Abdomen is protuberant. Bowel sounds are normal. There is no distension.      Palpations: Abdomen is soft.      Tenderness: There is no abdominal tenderness. There is no guarding. "   Musculoskeletal:      Right lower le+ Edema present.      Left lower le+ Edema present.   Skin:     General: Skin is warm and dry.      Findings: No rash.   Neurological:      General: No focal deficit present.      Mental Status: He is alert. He is confused.      Motor: Motor function is intact.      Deep Tendon Reflexes: Reflexes are normal and symmetric.      Comments: Oriented to place and person          Debilities/Disabilities Identified: Generalized weakness    Emotional Behavior: Anxious    Results Review:    I reviewed the patient's new clinical results.  Lab Results (most recent)     Procedure Component Value Units Date/Time    Blood Culture - Blood, Arm, Left [[id number]] Collected: 21 151    Specimen: Blood from Arm, Left Updated: 21 151    BNP [[id number]] Collected: 21 151    Specimen: Blood Updated: 21 151    Urinalysis, Microscopic Only - Urine, Clean Catch [[id number]]  (Abnormal) Collected: 21 1359    Specimen: Urine, Clean Catch Updated: 21 1516     RBC, UA 13-20 /HPF      WBC, UA 3-5 /HPF      Bacteria, UA 1+ /HPF      Squamous Epithelial Cells, UA 0-2 /HPF      Hyaline Casts, UA 0-2 /LPF      Mucus, UA Moderate/2+ /HPF      Methodology Manual Light Microscopy    [place] Draw [[id number]] Collected: 21 1353    Specimen: Blood Updated: 21 1501    Narrative:      The following orders were created for panel order [place] Draw.  Procedure                               Abnormality         Status                     ---------                               -----------         ------                     Green Top (Gel)[[id number]]                                  Final result               Lavender Top[[id number]]                                     Final result               Gold Top - SST[[id number]]                                   Final result               Light Blue Top[[id number]]                                   Final result                  Please view results for these tests on the individual orders.    Green Top (Gel) [[id number]] Collected: 09/02/21 1353    Specimen: Blood Updated: 09/02/21 1501     Extra Tube Hold for add-ons.     Comment: Auto resulted.       Gold Top - SST [[id number]] Collected: 09/02/21 1353    Specimen: Blood Updated: 09/02/21 1501     Extra Tube Hold for add-ons.     Comment: Auto resulted.       Lavender Top [[id number]] Collected: 09/02/21 1353    Specimen: Blood Updated: 09/02/21 1501     Extra Tube hold for add-on     Comment: Auto resulted       Light Blue Top [[id number]] Collected: 09/02/21 1353    Specimen: Blood Updated: 09/02/21 1501     Extra Tube hold for add-on     Comment: Auto resulted       Urinalysis With Microscopic If Indicated (No Culture) - Urine, Clean Catch [[id number]]  (Abnormal) Collected: 09/02/21 1359    Specimen: Urine, Clean Catch Updated: 09/02/21 1449     Color, UA Dark Yellow     Appearance, UA Hazy     pH, UA 6.0     Specific Gravity, UA 1.025     Glucose,  mg/dL (Trace)     Ketones, UA 15 mg/dL (1+)     Bilirubin, UA Small (1+)     Blood, UA Small (1+)     Protein,  mg/dL (2+)     Leuk Esterase, UA Negative     Nitrite, UA Negative     Urobilinogen, UA 4.0 E.U./dL    Procalcitonin [[id number]]  (Normal) Collected: 09/02/21 1353    Specimen: Blood Updated: 09/02/21 1448     Procalcitonin 0.08 ng/mL     Narrative:      Results may be falsely decreased if patient taking Biotin.     CK [[id number]]  (Abnormal) Collected: 09/02/21 1353    Specimen: Blood Updated: 09/02/21 1435     Creatine Kinase 765 U/L     Lactic Acid, Plasma [[id number]]  (Normal) Collected: 09/02/21 1353    Specimen: Blood Updated: 09/02/21 1423     Lactate 2.0 mmol/L     Troponin [[id number]]  (Abnormal) Collected: 09/02/21 1353    Specimen: Blood Updated: 09/02/21 1423     Troponin T 0.122 ng/mL     Narrative:      Troponin T Reference Range:  <= 0.03 ng/mL-   Negative for AMI  >0.03 ng/mL-     Abnormal for  myocardial necrosis.  Clinicians would have to utilize clinical acumen, EKG, Troponin and serial changes to determine if it is an Acute Myocardial Infarction or myocardial injury due to an underlying chronic condition.       Results may be falsely decreased if patient taking Biotin.      COVID-19 and FLU A/B PCR - Swab, Nasopharynx [[id number]]  (Abnormal) Collected: 09/02/21 1341    Specimen: Swab from Nasopharynx Updated: 09/02/21 1421     COVID19 Detected     Influenza A PCR Not Detected     Influenza B PCR Not Detected    Narrative:      Fact sheet for providers: https://www.fda.gov/media/192222/download    Fact sheet for patients: https://www.fda.gov/media/043682/download    Test performed by PCR.  Influenza A and Influenza B negative results should be considered presumptive in samples that have a positive SARS-CoV-2 result.    Competitive inhibition studies showed that SARS-CoV-2 virus, when present at concentrations above 3.6E+04 copies/mL, can inhibit the detection and amplification of influenza A and influenza B virus RNA if present at or below 1.8E+02 copies/mL or 4.9E+02 copies/mL, respectively, and may lead to false negative influenza virus results. If co-infection with influenza A or influenza B virus is suspected in samples with a positive SARS-CoV-2 result, the sample should be re-tested with another FDA cleared, approved, or authorized influenza test, if influenza virus detection would change clinical management.    Blood Culture - Blood, Arm, Left [[id number]] Collected: 09/02/21 1350    Specimen: Blood from Arm, Left Updated: 09/02/21 1418    Comprehensive Metabolic Panel [[id number]]  (Abnormal) Collected: 09/02/21 1353    Specimen: Blood Updated: 09/02/21 1416     Glucose 206 mg/dL      BUN 16 mg/dL      Creatinine 0.71 mg/dL      Sodium 139 mmol/L      Potassium 3.0 mmol/L      Chloride 101 mmol/L      CO2 27.4 mmol/L      Calcium 8.5 mg/dL      Total Protein 5.9 g/dL      Albumin 3.30 g/dL       ALT (SGPT) 34 U/L      AST (SGOT) 41 U/L      Alkaline Phosphatase 77 U/L      Total Bilirubin 0.7 mg/dL      eGFR Non African Amer 108 mL/min/1.73      Globulin 2.6 gm/dL      A/G Ratio 1.3 g/dL      BUN/Creatinine Ratio 22.5     Anion Gap 10.6 mmol/L     Narrative:      GFR Normal >60  Chronic Kidney Disease <60  Kidney Failure <15      Magnesium [[id number]]  (Abnormal) Collected: 09/02/21 1353    Specimen: Blood Updated: 09/02/21 1416     Magnesium 1.4 mg/dL     CBC & Differential [[id number]]  (Abnormal) Collected: 09/02/21 1353    Specimen: Blood Updated: 09/02/21 1401    Narrative:      The following orders were created for panel order CBC & Differential.  Procedure                               Abnormality         Status                     ---------                               -----------         ------                     CBC Auto Differential[[id number]]        Abnormal            Final result                 Please view results for these tests on the individual orders.    CBC Auto Differential [[id number]]  (Abnormal) Collected: 09/02/21 1353    Specimen: Blood Updated: 09/02/21 1401     WBC 4.42 10*3/mm3      RBC 4.13 10*6/mm3      Hemoglobin 12.6 g/dL      Hematocrit 37.5 %      MCV 90.8 fL      MCH 30.5 pg      MCHC 33.6 g/dL      RDW 14.4 %      RDW-SD 47.7 fl      MPV 9.5 fL      Platelets 166 10*3/mm3      Neutrophil % 73.3 %      Lymphocyte % 13.8 %      Monocyte % 10.9 %      Eosinophil % 0.0 %      Basophil % 0.2 %      Immature Grans % 1.8 %      Neutrophils, Absolute 3.24 10*3/mm3      Lymphocytes, Absolute 0.61 10*3/mm3      Monocytes, Absolute 0.48 10*3/mm3      Eosinophils, Absolute 0.00 10*3/mm3      Basophils, Absolute 0.01 10*3/mm3      Immature Grans, Absolute 0.08 10*3/mm3      nRBC 0.0 /100 WBC     POC Glucose Once [[id number]]  (Abnormal) Collected: 09/02/21 1338    Specimen: Blood Updated: 09/02/21 1355     Glucose 201 mg/dL      Comment: Meter: [id number] : [id number] Giovanni  Radha DOMINGUEZ (Validator)             Imaging Results (Most Recent)     Procedure Component Value Units Date/Time    CT Head Without Contrast [[id number]] Resulted: 09/02/21 1540     Updated: 09/02/21 1542    XR Chest 1 View [[id number]] Collected: 09/02/21 1513     Updated: 09/02/21 1516    Narrative:      AP PORTABLE CHEST, 09/02/2021     HISTORY:  Shortness of air today     COMPARISON:  12/11/2020     TECHNIQUE:  AP portable chest x-ray.     FINDINGS:  There are low lung volumes and there may be minimal basilar atelectasis.  Otherwise lungs are clear. Heart size is normal.       Impression:      Low lung wires volumes minimal basilar atelectasis otherwise no active  disease     This report was finalized on 9/2/2021 3:14 PM by Dr. Vikash Martin MD.           reviewed    ECG/EMG Results (most recent)     Procedure Component Value Units Date/Time    ECG 12 Lead [[id number]] Collected: 09/02/21 1348     Updated: 09/02/21 1352     QT Interval 467 ms     Narrative:      HEART RATE= 68  bpm  RR Interval= 880  ms  AK Interval= 211  ms  P Horizontal Axis= 8  deg  P Front Axis= 52  deg  QRSD Interval= 130  ms  QT Interval= 467  ms  QRS Axis= -14  deg  T Wave Axis= -6  deg  - ABNORMAL ECG -  Sinus rhythm  Nonspecific intraventricular conduction delay  Lateral infarct, age indeterminate  Electronically Signed By:   Date and Time of Study: 2021-09-02 13:48:28        reviewed    Assessment/Plan       1.  COVID-19 pneumonia generalized weakness symptomatic care is not hypoxic significantly tachypneic will monitor his respiratory status carefully.  We will get a D-dimer and if elevated will get a CT angiogram chest rule out occult pulmonary embolus.  get PT and OT to evaluate the patient for generalized weakness    2.  Metabolic encephalopathy patient clearly is not at baseline.  He is very confused we will continue to monitor supportive care    3.  Mild rhabdomyolysis secondary to fall at home IV fluids and monitor    4.  Hypertension  patient blood pressure significant elevated.he has had his home medicines will treat with IV hydralazine resume home medicines.      5.  Adult-onset diabetes mellitus Accu-Chek sliding scale insulin    6.  COPD nothing acute.  He is tachypneic but no wheezing has good breath sounds but are diminished we will continue to monitor as needed albuterol    7.  Parkinson's disease nothing acute continue with home medications    8.  Moderate lower extremity dependent edema secondary to venous stasis at baseline elevation supportive care wraps    9.  Hypokalemia oral potassium has been ordered    10.  DVT prophylaxis Lovenox      I discussed the patients findings and my recommendations with patient and nursing staff    Reno Nassar MD  09/02/21  15:46 EDT      Much of this encounter note is an electronic transcription/translation of spoken language to printed text using Dragon Software

## 2021-09-02 NOTE — ED NOTES
"Pt appeared agitated, picking at cords and lines, pulling dressings, trying to get out of bed. \"I want a hot dog.  Put it in the microwave\".   Pt no longer oriented to situation and place.  Out call to Dr Nassar for orders.     Radha Davila, RN  09/02/21 8893    "

## 2021-09-02 NOTE — ED NOTES
"It was observed that pt had what appeared to be feces on feet, legs, perineum and back.  Provided bed bath, change into pt gown, change of linens.  Provided clean brief and chux.  Pt appeared able to turn self from side to side but appeared unable to assist in other ways.  Pt was able to say he had been falling lately and fell today and could not get up.  Evidence of generalized bruising in various stages of healing and skin tears on HANNAH elbows with aging dressings. Removed drgs.  Pt also appeared to have purulent exudate HANNAH eyes.  Pt stated his wife has COVID and had been \"coughing her head off\".     Radha Davila RN  09/02/21 1477    "

## 2021-09-02 NOTE — ED NOTES
Out call to Dr Nassar per verbal order to let him know pt has positive dimer.  New orders received     Radha Davila RN  09/02/21 0651

## 2021-09-02 NOTE — ED NOTES
Pt appears increasingly confused and agitated.  He appears to have taken off his blankets, pulled off his ekg leads and scooted down in bed.  Replaced these things and arranged pt more comfortably in bed.  Wrapped dressings and brought pt warm blanket     Radha Davila RN  09/02/21 1901

## 2021-09-03 ENCOUNTER — APPOINTMENT (OUTPATIENT)
Dept: GENERAL RADIOLOGY | Facility: HOSPITAL | Age: 76
End: 2021-09-03

## 2021-09-03 LAB
ALBUMIN SERPL-MCNC: 3.1 G/DL (ref 3.5–5.2)
ALBUMIN/GLOB SERPL: 1.1 G/DL
ALP SERPL-CCNC: 73 U/L (ref 39–117)
ALT SERPL W P-5'-P-CCNC: 7 U/L (ref 1–41)
ANION GAP SERPL CALCULATED.3IONS-SCNC: 10.8 MMOL/L (ref 5–15)
ARTERIAL PATENCY WRIST A: POSITIVE
AST SERPL-CCNC: 46 U/L (ref 1–40)
ATMOSPHERIC PRESS: 740 MMHG
BASE EXCESS BLDA CALC-SCNC: 6 MMOL/L (ref 0–2)
BASOPHILS # BLD AUTO: 0.02 10*3/MM3 (ref 0–0.2)
BASOPHILS NFR BLD AUTO: 0.4 % (ref 0–1.5)
BDY SITE: ABNORMAL
BILIRUB SERPL-MCNC: 0.7 MG/DL (ref 0–1.2)
BODY TEMPERATURE: 37 C
BUN SERPL-MCNC: 10 MG/DL (ref 8–23)
BUN/CREAT SERPL: 18.9 (ref 7–25)
CALCIUM SPEC-SCNC: 8.2 MG/DL (ref 8.6–10.5)
CHLORIDE SERPL-SCNC: 102 MMOL/L (ref 98–107)
CK SERPL-CCNC: 776 U/L (ref 20–200)
CO2 SERPL-SCNC: 26.2 MMOL/L (ref 22–29)
CREAT SERPL-MCNC: 0.53 MG/DL (ref 0.76–1.27)
CRP SERPL-MCNC: 33.31 MG/DL (ref 0–0.5)
D DIMER PPP FEU-MCNC: 0.67 MCGFEU/ML (ref 0–0.46)
DEPRECATED RDW RBC AUTO: 49.5 FL (ref 37–54)
EOSINOPHIL # BLD AUTO: 0.01 10*3/MM3 (ref 0–0.4)
EOSINOPHIL NFR BLD AUTO: 0.2 % (ref 0.3–6.2)
ERYTHROCYTE [DISTWIDTH] IN BLOOD BY AUTOMATED COUNT: 14.5 % (ref 12.3–15.4)
ERYTHROCYTE [SEDIMENTATION RATE] IN BLOOD: 24 MM/HR (ref 0–20)
FERRITIN SERPL-MCNC: 1117 NG/ML (ref 30–400)
GFR SERPL CREATININE-BSD FRML MDRD: >150 ML/MIN/1.73
GLOBULIN UR ELPH-MCNC: 2.8 GM/DL
GLUCOSE BLDC GLUCOMTR-MCNC: 157 MG/DL (ref 70–130)
GLUCOSE BLDC GLUCOMTR-MCNC: 189 MG/DL (ref 70–130)
GLUCOSE BLDC GLUCOMTR-MCNC: 201 MG/DL (ref 70–130)
GLUCOSE BLDC GLUCOMTR-MCNC: 245 MG/DL (ref 70–130)
GLUCOSE SERPL-MCNC: 170 MG/DL (ref 65–99)
HCO3 BLDA-SCNC: 28.6 MMOL/L (ref 20–26)
HCT VFR BLD AUTO: 39.9 % (ref 37.5–51)
HGB BLD-MCNC: 12.6 G/DL (ref 13–17.7)
HGB BLDA-MCNC: 12.3 G/DL (ref 14–18)
IMM GRANULOCYTES # BLD AUTO: 0.08 10*3/MM3 (ref 0–0.05)
IMM GRANULOCYTES NFR BLD AUTO: 1.5 % (ref 0–0.5)
LDH SERPL-CCNC: 409 U/L (ref 135–225)
LYMPHOCYTES # BLD AUTO: 0.82 10*3/MM3 (ref 0.7–3.1)
LYMPHOCYTES NFR BLD AUTO: 15.3 % (ref 19.6–45.3)
Lab: ABNORMAL
MCH RBC QN AUTO: 29.5 PG (ref 26.6–33)
MCHC RBC AUTO-ENTMCNC: 31.6 G/DL (ref 31.5–35.7)
MCV RBC AUTO: 93.4 FL (ref 79–97)
MODALITY: ABNORMAL
MONOCYTES # BLD AUTO: 0.52 10*3/MM3 (ref 0.1–0.9)
MONOCYTES NFR BLD AUTO: 9.7 % (ref 5–12)
NEUTROPHILS NFR BLD AUTO: 3.91 10*3/MM3 (ref 1.7–7)
NEUTROPHILS NFR BLD AUTO: 72.9 % (ref 42.7–76)
NRBC BLD AUTO-RTO: 0 /100 WBC (ref 0–0.2)
PCO2 BLDA: 33.9 MM HG (ref 35–45)
PCO2 TEMP ADJ BLD: 33.9 MM HG (ref 35–45)
PH BLDA: 7.54 PH UNITS (ref 7.35–7.45)
PH, TEMP CORRECTED: 7.54 PH UNITS (ref 7.35–7.45)
PLATELET # BLD AUTO: 173 10*3/MM3 (ref 140–450)
PMV BLD AUTO: 9.5 FL (ref 6–12)
PO2 BLDA: 60.7 MM HG (ref 83–108)
PO2 TEMP ADJ BLD: 60.7 MM HG (ref 83–108)
POTASSIUM SERPL-SCNC: 2.8 MMOL/L (ref 3.5–5.2)
PROT SERPL-MCNC: 5.9 G/DL (ref 6–8.5)
QT INTERVAL: 467 MS
RBC # BLD AUTO: 4.27 10*6/MM3 (ref 4.14–5.8)
SAO2 % BLDCOA: 93.2 % (ref 94–99)
SODIUM SERPL-SCNC: 139 MMOL/L (ref 136–145)
VENTILATOR MODE: ABNORMAL
WBC # BLD AUTO: 5.36 10*3/MM3 (ref 3.4–10.8)

## 2021-09-03 PROCEDURE — 25010000002 HALOPERIDOL LACTATE PER 5 MG: Performed by: FAMILY MEDICINE

## 2021-09-03 PROCEDURE — 80053 COMPREHEN METABOLIC PANEL: CPT | Performed by: FAMILY MEDICINE

## 2021-09-03 PROCEDURE — 82728 ASSAY OF FERRITIN: CPT | Performed by: FAMILY MEDICINE

## 2021-09-03 PROCEDURE — 97161 PT EVAL LOW COMPLEX 20 MIN: CPT

## 2021-09-03 PROCEDURE — 25010000002 HYDRALAZINE PER 20 MG: Performed by: FAMILY MEDICINE

## 2021-09-03 PROCEDURE — 85652 RBC SED RATE AUTOMATED: CPT | Performed by: FAMILY MEDICINE

## 2021-09-03 PROCEDURE — 63710000001 INSULIN ASPART PER 5 UNITS: Performed by: FAMILY MEDICINE

## 2021-09-03 PROCEDURE — 85379 FIBRIN DEGRADATION QUANT: CPT | Performed by: FAMILY MEDICINE

## 2021-09-03 PROCEDURE — 83615 LACTATE (LD) (LDH) ENZYME: CPT | Performed by: FAMILY MEDICINE

## 2021-09-03 PROCEDURE — 94799 UNLISTED PULMONARY SVC/PX: CPT

## 2021-09-03 PROCEDURE — 36600 WITHDRAWAL OF ARTERIAL BLOOD: CPT

## 2021-09-03 PROCEDURE — 82550 ASSAY OF CK (CPK): CPT | Performed by: FAMILY MEDICINE

## 2021-09-03 PROCEDURE — 82962 GLUCOSE BLOOD TEST: CPT

## 2021-09-03 PROCEDURE — 86140 C-REACTIVE PROTEIN: CPT | Performed by: FAMILY MEDICINE

## 2021-09-03 PROCEDURE — 71045 X-RAY EXAM CHEST 1 VIEW: CPT

## 2021-09-03 PROCEDURE — 82803 BLOOD GASES ANY COMBINATION: CPT

## 2021-09-03 PROCEDURE — 85025 COMPLETE CBC W/AUTO DIFF WBC: CPT | Performed by: FAMILY MEDICINE

## 2021-09-03 PROCEDURE — 25010000002 ENOXAPARIN PER 10 MG: Performed by: FAMILY MEDICINE

## 2021-09-03 PROCEDURE — 97166 OT EVAL MOD COMPLEX 45 MIN: CPT

## 2021-09-03 RX ORDER — ALBUTEROL SULFATE 90 UG/1
2 AEROSOL, METERED RESPIRATORY (INHALATION) EVERY 4 HOURS PRN
Status: DISCONTINUED | OUTPATIENT
Start: 2021-09-03 | End: 2021-09-16 | Stop reason: HOSPADM

## 2021-09-03 RX ORDER — ALBUTEROL SULFATE 90 UG/1
2 AEROSOL, METERED RESPIRATORY (INHALATION)
Status: DISCONTINUED | OUTPATIENT
Start: 2021-09-03 | End: 2021-09-09

## 2021-09-03 RX ORDER — POTASSIUM CHLORIDE 20 MEQ/1
40 TABLET, EXTENDED RELEASE ORAL ONCE
Status: COMPLETED | OUTPATIENT
Start: 2021-09-03 | End: 2021-09-03

## 2021-09-03 RX ORDER — HALOPERIDOL 5 MG/ML
1 INJECTION INTRAMUSCULAR ONCE
Status: DISCONTINUED | OUTPATIENT
Start: 2021-09-03 | End: 2021-09-16 | Stop reason: HOSPADM

## 2021-09-03 RX ADMIN — PANTOPRAZOLE SODIUM 40 MG: 40 TABLET, DELAYED RELEASE ORAL at 10:30

## 2021-09-03 RX ADMIN — ATORVASTATIN CALCIUM 40 MG: 40 TABLET, FILM COATED ORAL at 10:30

## 2021-09-03 RX ADMIN — ALBUTEROL SULFATE 2 PUFF: 90 AEROSOL, METERED RESPIRATORY (INHALATION) at 03:21

## 2021-09-03 RX ADMIN — LISINOPRIL 20 MG: 20 TABLET ORAL at 10:40

## 2021-09-03 RX ADMIN — ENOXAPARIN SODIUM 40 MG: 40 INJECTION SUBCUTANEOUS at 20:45

## 2021-09-03 RX ADMIN — ASPIRIN 81 MG: 81 TABLET, COATED ORAL at 10:30

## 2021-09-03 RX ADMIN — POTASSIUM CHLORIDE 40 MEQ: 1500 TABLET, EXTENDED RELEASE ORAL at 12:56

## 2021-09-03 RX ADMIN — INSULIN ASPART 4 UNITS: 100 INJECTION, SOLUTION INTRAVENOUS; SUBCUTANEOUS at 17:18

## 2021-09-03 RX ADMIN — ALBUTEROL SULFATE 2 PUFF: 90 AEROSOL, METERED RESPIRATORY (INHALATION) at 17:30

## 2021-09-03 RX ADMIN — CARBIDOPA AND LEVODOPA 1.5 TABLET: 25; 100 TABLET ORAL at 20:44

## 2021-09-03 RX ADMIN — LISINOPRIL 20 MG: 20 TABLET ORAL at 20:44

## 2021-09-03 RX ADMIN — CARBIDOPA AND LEVODOPA 1.5 TABLET: 25; 100 TABLET ORAL at 15:01

## 2021-09-03 RX ADMIN — INSULIN ASPART 2 UNITS: 100 INJECTION, SOLUTION INTRAVENOUS; SUBCUTANEOUS at 10:32

## 2021-09-03 RX ADMIN — HYDRALAZINE HYDROCHLORIDE 10 MG: 20 INJECTION INTRAMUSCULAR; INTRAVENOUS at 01:42

## 2021-09-03 RX ADMIN — NEBIVOLOL HYDROCHLORIDE 10 MG: 5 TABLET ORAL at 10:30

## 2021-09-03 RX ADMIN — INSULIN ASPART 4 UNITS: 100 INJECTION, SOLUTION INTRAVENOUS; SUBCUTANEOUS at 12:50

## 2021-09-03 RX ADMIN — ALBUTEROL SULFATE 2 PUFF: 90 AEROSOL, METERED RESPIRATORY (INHALATION) at 13:59

## 2021-09-03 RX ADMIN — ALBUTEROL SULFATE 2 PUFF: 90 AEROSOL, METERED RESPIRATORY (INHALATION) at 08:34

## 2021-09-03 RX ADMIN — SENNOSIDES AND DOCUSATE SODIUM 2 TABLET: 50; 8.6 TABLET ORAL at 10:30

## 2021-09-03 RX ADMIN — HALOPERIDOL LACTATE 1 MG: 5 INJECTION, SOLUTION INTRAMUSCULAR at 20:46

## 2021-09-03 RX ADMIN — HYDRALAZINE HYDROCHLORIDE 10 MG: 20 INJECTION INTRAMUSCULAR; INTRAVENOUS at 11:18

## 2021-09-03 RX ADMIN — CARBIDOPA AND LEVODOPA 1.5 TABLET: 25; 100 TABLET ORAL at 10:30

## 2021-09-03 RX ADMIN — ALBUTEROL SULFATE 2 PUFF: 90 AEROSOL, METERED RESPIRATORY (INHALATION) at 20:43

## 2021-09-03 RX ADMIN — POTASSIUM CHLORIDE 40 MEQ: 1500 TABLET, EXTENDED RELEASE ORAL at 17:18

## 2021-09-03 NOTE — CASE MANAGEMENT/SOCIAL WORK
Continued Stay Note  MIA Beyer     Patient Name: Nash So  MRN: [id number]  Today's Date: 9/3/2021    Admit Date: 9/2/2021    Discharge Plan     Row Name 09/03/21 1534       Plan    Plan Comments  I attempted to see the patient at bedside.  I was wearing appropriate PPE per protocol.  The patient was laying in bed with his eyes closed, mumbling.  I then attempted to contact the patients wife with no answer.  I left a message for her to return my call.  There are no other contacts listed for the patient. CM will continue to follow.        Discharge Codes    No documentation.             Montserrat Harris RN

## 2021-09-03 NOTE — PLAN OF CARE
Goal Outcome Evaluation:  Plan of Care Reviewed With: patient           Outcome Summary: OT evaluation. Pt is confused and has difficutly following commands to assist with bed mobilty and transfer activity. Pt did state that his spouse assisted with all adl activity, yet he was a poor historian and his actual prior functional status with adl/mobility is difficult to determine. Pt was dependent x 2  for bed mobility. He initially requried max assist to maintain static sitting balance at EOB, but it did improve to CGA after a few minutes with verbal/tactile cues. Pt required mod/max assist x 2 to stand from EOB (with bed surface elevated). Pt required assistance to place his hands on the walker for him to help support himself. Pt was dependent to get back into bed. Pt's spouse is currently ill and unable to provide care/support for the patient. Pt needs significant assistance for basic self care tasks due to his cognitive status and physical limitations. Anticipate need for SNF/LTC. Will follow in OT to increase safety with transfers/balance to allow for caregiver support for adl's.

## 2021-09-03 NOTE — PLAN OF CARE
Goal Outcome Evaluation:  Plan of Care Reviewed With: patient           Outcome Summary: PT Evaluation Complete: Patient is dependent x 2 for supine to/from sit transfer as well as scooting in bed. Patient performs sit to/from stand transfers with mod A x 2 and takes 4-5 side steps along EOB with mod/max A x 2 with use of FWW. Patient with significant L lateral lean in static sitting and in standing. Patient initially requires max A x 2 to maintain sitting balance however able to progress to CGA. Unable to initiate forward gait tranining or perform stand pivot due to significnat L lateral lean noted in standing. Patient with baseline dementia, oriented to person only. Limited history and prior level obtained due to cognition. Patient follows approximately 25% of all one step commands. Patient would benefit from physical therapy to maximize independence and safety with functional mobility. At this time patient would not be safe to return home. Recommend SNF with potentional to transition to LTC.

## 2021-09-03 NOTE — THERAPY EVALUATION
Patient Name: Nash So  : 1945    MRN: [id number]                              Today's Date: 9/3/2021       Admit Date: 2021    Visit Dx:     ICD-10-CM ICD-9-CM   1. COVID-19 virus infection  U07.1 079.89   2. Weakness  R53.1 780.79   3. Elevated troponin  R77.8 790.6   4. Contusion of face, initial encounter  S00.83XA 920     Patient Active Problem List   Diagnosis   • Vitamin D deficiency   • Diabetes mellitus (CMS/HCC)   • Back pain   • Benign prostatic hyperplasia   • Gastroesophageal reflux disease with esophagitis   • Hyperlipidemia   • Hypertension   • Hypogonadism in male   • Sebaceous cyst   • COPD (chronic obstructive pulmonary disease) (CMS/HCC)   • Encounter for screening for malignant neoplasm of colon   • COVID-19 virus infection     Past Medical History:   Diagnosis Date   • Alzheimer disease (CMS/HCC)    • CHF (congestive heart failure) (CMS/HCC)    • COPD (chronic obstructive pulmonary disease) (CMS/HCC)    • Diabetes (CMS/HCC)    • GERD (gastroesophageal reflux disease)    • Hyperlipemia    • Hypertension      Past Surgical History:   Procedure Laterality Date   • ENDOSCOPY N/A 2017    Procedure: ESOPHAGOGASTRODUODENOSCOPY, biopsy;  Surgeon: Natalia Guerrero MD;  Location: [facility];  Service:      General Information     Row Name 21 1020          Physical Therapy Time and Intention    Document Type  evaluation  -BP     Mode of Treatment  physical therapy  -BP     Row Name 21 1020          General Information    Patient Profile Reviewed  yes Patient found in feces, brought to ED. Patient diagnosed with Covid PNA. Patient with baseline dementia and Parkinson's.  -BP     Prior Level of Function  -- Pt's spouse provides care to pt. Limited history due to cognition. Pt reports spouse assists with ADL's. He reports he ambulates with a walker. Spouse currently has COVID and has been unable to provide care as she normally does from what patient reports.  -BP      "Existing Precautions/Restrictions  fall cognition  -BP     Barriers to Rehab  cognitive status;previous functional deficit  -BP     Row Name 09/03/21 1020          Living Environment    Lives With  spouse  -BP     Row Name 09/03/21 1020          Home Main Entrance    Number of Stairs, Main Entrance  -- Patient unable to recall due to cognition  -BP     Row Name 09/03/21 1020          Stairs Within Home, Primary    Stairs, Within Home, Primary  unknown  -BP     Row Name 09/03/21 1020          Cognition    Orientation Status (Cognition)  oriented to;person Patient reports he is an \"apartment building.\" Does not know month or year.  -BP     Row Name 09/03/21 1020          Safety Issues, Functional Mobility    Safety Issues Affecting Function (Mobility)  awareness of need for assistance;insight into deficits/self-awareness;problem-solving;ability to follow commands;at risk behavior observed;sequencing abilities;judgment;positioning of assistive device significant cues required for safety throughout  -BP     Impairments Affecting Function (Mobility)  balance  -BP     Comment, Safety Issues/Impairments (Mobility)  --  -BP       User Key  (r) = Recorded By, (t) = Taken By, (c) = Cosigned By    Initials Name Provider Type    BP Solomon Stack, PT Physical Therapist        Mobility     Row Name 09/03/21 1101          Bed Mobility    Bed Mobility  supine-sit;sit-supine;scooting/bridging  -BP     Scooting/Bridging [place] (Bed Mobility)  dependent (less than 25% patient effort);2 person assist  -BP     Supine-Sit [place] (Bed Mobility)  dependent (less than 25% patient effort);2 person assist  -BP     Sit-Supine [place] (Bed Mobility)  dependent (less than 25% patient effort);2 person assist  -BP     Assistive Device (Bed Mobility)  bed rails;draw sheet;head of bed elevated  -BP     Comment (Bed Mobility)  Patient requires step by step cues for sequencing and is not able to follow cues. Patient is dependent x " 2 for all bed mobility.  -     Row Name 09/03/21 1101          Transfers    Comment (Transfers)  Patient requires cues and assist for hand placement. Patient requires elevated bed surface. Patient requires mod A x 2 for sit to stand transfers. Unable to safely perform stand pivot transfer due to left lateral lean  -     Row Name 09/03/21 1101          Sit-Stand Transfer    Sit-Stand [place] (Transfers)  moderate assist (50% patient effort);2 person assist  -     Assistive Device (Sit-Stand Transfers)  walker, front-wheeled  -     Row Name 09/03/21 1101          Gait/Stairs (Locomotion)    Comment (Gait/Stairs)  Unable to perform forward gait. Patient took 4-5 side steps along EOB with walker with mod/max A x 2. Patient demonstrates significant L lateral lean. Unable to correct with cues.  -       User Key  (r) = Recorded By, (t) = Taken By, (c) = Cosigned By    Initials Name Provider Type    Solomon Arce, PT Physical Therapist        Obj/Interventions     Row Name 09/03/21 1126          Range of Motion Comprehensive    Comment, General Range of Motion  Unable to formerly assess. Patient does not follow commands consistently. No contractions or ROM limitations noted with mobility in B LE's  -     Row Name 09/03/21 1126          Strength Comprehensive (MMT)    Comment, General Manual Muscle Testing (MMT) Assessment  Unable to formerly assess  -     Row Name 09/03/21 1027          Balance    Comment, Balance  Patient initially requires max A for static sitting balance with significant L lateral lean. Able to progress to CGA. Patient requires varying amounts of assist for static standing balance from mod to max A with significant L lateral lean  -     Row Name 09/03/21 1126          Sensory Assessment (Somatosensory)    Sensory Assessment (Somatosensory)  -- unknown. Unable to assess accurately and patient unable to answer questions regarding sensation due to cognition  -       User Key   (r) = Recorded By, (t) = Taken By, (c) = Cosigned By    Initials Name Provider Type    Solomon Arce PT Physical Therapist        Goals/Plan     Row Name 09/03/21 1133          Bed Mobility Goal 1 (PT)    Activity/Assistive Device (Bed Mobility Goal 1, PT)  bed mobility activities, all  -BP     [place] Level/Cues Needed (Bed Mobility Goal 1, PT)  moderate assist (50-74% patient effort)  -BP     Time Frame (Bed Mobility Goal 1, PT)  5 days  -BP     Progress/Outcomes (Bed Mobility Goal 1, PT)  goal ongoing  -BP     Row Name 09/03/21 1133          Transfer Goal 1 (PT)    Activity/Assistive Device (Transfer Goal 1, PT)  sit-to-stand/stand-to-sit;bed-to-chair/chair-to-bed;walker, rolling  -BP     [place] Level/Cues Needed (Transfer Goal 1, PT)  moderate assist (50-74% patient effort)  -BP     Time Frame (Transfer Goal 1, PT)  5 days  -BP     Progress/Outcome (Transfer Goal 1, PT)  goal ongoing  -BP     Row Name 09/03/21 1133          Gait Training Goal 1 (PT)    Activity/Assistive Device (Gait Training Goal 1, PT)  gait (walking locomotion);walker, rolling  -BP     [place] Level (Gait Training Goal 1, PT)  moderate assist (50-74% patient effort)  -BP     Time Frame (Gait Training Goal 1, PT)  5 days  -BP     Progress/Outcome (Gait Training Goal 1, PT)  goal ongoing  -BP       User Key  (r) = Recorded By, (t) = Taken By, (c) = Cosigned By    Initials Name Provider Type    Solomon Arce PT Physical Therapist        Clinical Impression     Row Name 09/03/21 1128          Pain    Additional Documentation  Pain Scale: Numbers Pre/Post-Treatment (Group)  -BP     [facility] Name 09/03/21 1128          Pain Scale: Numbers Pre/Post-Treatment    Pre/Posttreatment Pain Comment  Patient complains of some L ankle patient with passive movement of L LE during bed mobility. No other complaints or reports of pain throughout evaluation  -BP     Pain Intervention(s)  Repositioned  -BP     Row Name 09/03/21 1126           Plan of Care Review    Plan of Care Reviewed With  patient  -BP     Outcome Summary  PT Evaluation Complete: Patient is dependent x 2 for supine to/from sit transfer as well as scooting in bed. Patient performs sit to/from stand transfers with mod A x 2 and takes 4-5 side steps along EOB with mod/max A x 2 with use of FWW. Patient with significant L lateral lean in static sitting and in standing. Patient initially requires max A x 2 to maintain sitting balance however able to progress to CGA. Unable to initiate forward gait tranining or perform stand pivot due to significnat L lateral lean noted in standing. Patient with baseline dementia, oriented to person only. Limited history and prior level obtained due to cognition. Patient follows approximately 25% of all one step commands. Patient would benefit from physical therapy to maximize independence and safety with functional mobility. At this time patient would not be safe to return home. Recommend SNF with potentional to transition to LTC.  -BP     Row Name 09/03/21 1128          Therapy Assessment/Plan (PT)    Rehab Potential (PT)  fair, will monitor progress closely  -BP     Criteria for Skilled Interventions Met (PT)  yes;skilled treatment is necessary  -BP     Predicted Duration of Therapy Intervention (PT)  5 days  -BP     Row Name 09/03/21 1128          Positioning and Restraints    Pre-Treatment Position  in bed  -BP     Post Treatment Position  bed  -BP     In Bed  notified nsg;supine;call light within reach;encouraged to call for assist;exit alarm on  -BP       User Key  (r) = Recorded By, (t) = Taken By, (c) = Cosigned By    Initials Name Provider Type    BP Solomon Stack, PT Physical Therapist        Outcome Measures     Row Name 09/03/21 1138          How much help from another person do you currently need...    Turning from your back to your side while in flat bed without using bedrails?  1  -BP     Moving from lying on back to sitting on the  side of a flat bed without bedrails?  1  -BP     Moving to and from a bed to a chair (including a wheelchair)?  1  -BP     Standing up from a chair using your arms (e.g., wheelchair, bedside chair)?  1  -BP     Climbing 3-5 steps with a railing?  1  -BP     To walk in hospital room?  1  -BP     AM-PAC 6 Clicks Score (PT)  6  -BP     Row Name 09/03/21 1138          Functional Assessment    Outcome Measure Options  AM-PAC 6 Clicks Basic Mobility (PT)  -BP       User Key  (r) = Recorded By, (t) = Taken By, (c) = Cosigned By    Initials Name Provider Type    BP Solomon Stack, PT Physical Therapist                       Physical Therapy Education                 Title: PT OT SLP Therapies (In Progress)     Topic: Physical Therapy (In Progress)     Point: Mobility training (In Progress)     Learning Progress Summary           Patient Acceptance, E,TB, NR by BP at 9/3/2021 1139                   Point: Home exercise program (Not Started)     Learner Progress:  Not documented in this visit.                      User Key     Initials Effective Dates Name Provider Type Discipline    BP 06/16/21 -  Solomon Stack, PT Physical Therapist PT              PT Recommendation and Plan  Planned Therapy Interventions (PT): bed mobility training, gait training, patient/family education, strengthening, transfer training  Plan of Care Reviewed With: patient  Outcome Summary: PT Evaluation Complete: Patient is dependent x 2 for supine to/from sit transfer as well as scooting in bed. Patient performs sit to/from stand transfers with mod A x 2 and takes 4-5 side steps along EOB with mod/max A x 2 with use of FWW. Patient with significant L lateral lean in static sitting and in standing. Patient initially requires max A x 2 to maintain sitting balance however able to progress to CGA. Unable to initiate forward gait tranining or perform stand pivot due to significnat L lateral lean noted in standing. Patient with baseline dementia,  oriented to person only. Limited history and prior level obtained due to cognition. Patient follows approximately 25% of all one step commands. Patient would benefit from physical therapy to maximize independence and safety with functional mobility. At this time patient would not be safe to return home. Recommend SNF with potentional to transition to LTC.     Time Calculation:   PT Charges     Row Name 09/03/21 1140             Time Calculation    Start Time  0905  -BP      PT Received On  09/03/21  -BP      PT - Next Appointment  09/04/21  -BP        User Key  (r) = Recorded By, (t) = Taken By, (c) = Cosigned By    Initials Name Provider Type    BP Solomon Stack, PT Physical Therapist        Therapy Charges for Today     Code Description Service Date Service Provider Modifiers Qty    52582195064 HC PT EVAL LOW COMPLEXITY 3 9/3/2021 Solomon Stack, PT GP 1          PT G-Codes  Outcome Measure Options: AM-PAC 6 Clicks Basic Mobility (PT)  AM-PAC 6 Clicks Score (PT): 6    Solomon Stack PT  9/3/2021

## 2021-09-03 NOTE — PROGRESS NOTES
"Daily Progress Note:      Chief complaint: Follow-up of COVID-19 infection, metabolic encephalopathy, rhabdomyolysis, hyperkalemia, diabetes, hypertension, Parkinson's disease    Subjective: No overnight events noted.  Patient continues to be very confused his blood pressure significant elevated throughout the day yesterday to be given several doses of IV hydralazine is trending down now.  Maintaining sats on room air.  He is no longer tachypneic     LOS: 1 day     Vital Signs  Temp:  [98.3 °F (36.8 °C)-98.6 °F (37 °C)] 98.6 °F (37 °C)  Heart Rate:  [] 67  Resp:  [18-40] 18  BP: (136-203)/() 165/78  Oxygen Therapy  SpO2: 94 %  Pulse Oximetry Type: Continuous  Device (Oxygen Therapy): room air}  Body mass index is 34.26 kg/m².  Flowsheet Rows      First Filed Value   Admission Height  182.9 cm (72\") Documented at 09/02/2021 1411   Admission Weight  115 kg (254 lb 4.8 oz) Documented at 09/02/2021 1502                   Documented weights    09/02/21 1502 09/02/21 2241   Weight: 115 kg (254 lb 4.8 oz) 108 kg (238 lb 12.8 oz)           Patient Vitals for the past 24 hrs:   BP Temp Temp src Pulse Resp SpO2 Height Weight   09/03/21 0623 165/78 -- -- 67 -- -- -- --   09/03/21 0536 165/78 98.6 °F (37 °C) Oral 67 18 94 % -- --   09/03/21 0324 -- -- -- 72 28 94 % -- --   09/03/21 0321 -- -- -- 68 28 94 % -- --   09/03/21 0311 136/73 98.6 °F (37 °C) Oral 112 19 92 % -- --   09/03/21 0142 (!) 197/78 -- -- 74 -- -- -- --   09/02/21 2318 -- -- -- 74 (!) 30 94 % -- --   09/02/21 2314 -- -- -- 74 (!) 30 94 % -- --   09/02/21 2241 (!) 197/78 98.4 °F (36.9 °C) Oral 84 18 95 % 177.8 cm (70\") 108 kg (238 lb 12.8 oz)   09/02/21 2130 (!) 198/101 -- -- 74 -- 90 % -- --   09/02/21 2100 (!) 187/94 -- -- 73 24 91 % -- --   09/02/21 2054 (!) 203/97 -- -- 70 (!) 36 92 % -- --   09/02/21 2032 (!) 201/107 -- -- 80 -- 92 % -- --   09/02/21 2023 -- -- -- 76 (!) 40 92 % -- --   09/02/21 2019 -- -- -- 75 (!) 40 93 % -- --   09/02/21 2000 " "(!) 147/117 -- -- 72 -- 95 % -- --   09/02/21 1906 -- -- -- 70 -- 95 % -- --   09/02/21 1900 (!) 197/98 -- -- 73 -- -- -- --   09/02/21 1830 (!) 178/120 -- -- -- -- -- -- --   09/02/21 1800 (!) 177/111 -- -- 74 -- (!) 84 % -- --   09/02/21 1754 -- -- -- 70 -- 92 % -- --   09/02/21 1736 -- -- -- 65 -- 94 % -- --   09/02/21 1730 158/69 -- -- -- -- -- -- --   09/02/21 1700 (!) 152/102 -- -- 70 -- 94 % -- --   09/02/21 1630 (!) 161/104 -- -- 70 -- 93 % -- --   09/02/21 1615 -- -- -- 70 (!) 32 95 % -- --   09/02/21 1607 -- -- -- 72 -- 96 % -- --   09/02/21 1606 -- -- -- 72 (!) 36 95 % -- --   09/02/21 1600 (!) 190/77 -- -- 69 -- 95 % -- --   09/02/21 1557 -- -- -- 70 -- 95 % -- --   09/02/21 1520 -- -- -- 70 -- 94 % -- --   09/02/21 1512 -- -- -- 70 -- 94 % -- --   09/02/21 1502 -- 98.3 °F (36.8 °C) Oral -- -- -- -- 115 kg (254 lb 4.8 oz)   09/02/21 1500 173/72 -- -- 70 -- -- -- --   09/02/21 1430 180/80 -- -- 67 -- -- -- --   09/02/21 1415 -- -- -- -- 28 -- -- --   09/02/21 1411 -- -- -- -- -- -- 182.9 cm (72\") --   09/02/21 1409 -- -- -- -- -- 98 % -- --   09/02/21 1407 -- -- -- 67 -- 94 % -- --   09/02/21 1406 -- -- -- 67 -- 94 % -- --   09/02/21 1400 175/77 -- -- -- -- 95 % -- --   09/02/21 1354 -- -- -- 68 -- 95 % -- --   09/02/21 1345 (!) 182/85 -- -- -- -- -- -- --       108 kg (238 lb 12.8 oz)    Intake/Output                 09/02/21 0701 - 09/03/21 0700     [id number] Total              Intake    Total Intake -- -- --       Output    Urine  100  -- 100    Total Output 100 -- 100           Intake/Output Summary (Last 24 hours) at 9/3/2021 0729  Last data filed at 9/2/2021 1900  Gross per 24 hour   Intake --   Output 100 ml   Net -100 ml        Intake/Output Summary (Last 24 hours) at 9/3/2021 0729  Last data filed at 9/2/2021 1900  Gross per 24 hour   Intake --   Output 100 ml   Net -100 ml        Review of Systems   Unable to perform ROS: Mental status change       Physical Exam  Vitals and nursing " note reviewed.   Constitutional:       General: He is not in acute distress.     Appearance: He is well-developed. He is morbidly obese.   HENT:      Head: Normocephalic.   Eyes:      Conjunctiva/sclera: Conjunctivae normal.   Neck:      Thyroid: No thyromegaly.      Vascular: No JVD.   Cardiovascular:      Rate and Rhythm: Normal rate and regular rhythm.      Heart sounds: Normal heart sounds. No murmur heard.     Pulmonary:      Effort: Pulmonary effort is normal. No tachypnea, accessory muscle usage or respiratory distress.      Breath sounds: Examination of the right-lower field reveals decreased breath sounds. Examination of the left-lower field reveals decreased breath sounds. Decreased breath sounds present. No wheezing or rales.   Abdominal:      General: Bowel sounds are normal. There is no distension.      Palpations: Abdomen is soft.      Tenderness: There is no abdominal tenderness. There is no guarding.   Musculoskeletal:      Right lower le+ Pitting Edema present.      Left lower le+ Pitting Edema present.   Skin:     General: Skin is warm and dry.      Findings: No rash.   Neurological:      General: No focal deficit present.      Mental Status: He is alert. He is disoriented and confused.      Comments: Oriented to person and place only         Medication Review:   I have reviewed the patient's current medication list  Scheduled Meds:albuterol sulfate HFA, 2 puff, Inhalation, Q4H - RT  aspirin, 81 mg, Oral, Daily  atorvastatin, 40 mg, Oral, Daily  carbidopa-levodopa, 1.5 tablet, Oral, TID  enoxaparin, 40 mg, Subcutaneous, Q24H  insulin aspart, 0-9 Units, Subcutaneous, TID AC  lisinopril, 20 mg, Oral, Q12H  nebivolol, 10 mg, Oral, Daily  pantoprazole, 40 mg, Oral, Daily  senna-docusate sodium, 2 tablet, Oral, BID      Continuous Infusions:sodium chloride, 75 mL/hr, Last Rate: 75 mL/hr (21)      PRN Meds:.•  acetaminophen  •  senna-docusate sodium **AND** polyethylene glycol **AND**  bisacodyl **AND** bisacodyl  •  haloperidol lactate  •  hydrALAZINE  •  ondansetron **OR** ondansetron  •  sodium chloride      Labs:  Results from last 7 days   Lab Units 09/03/21  0629 09/02/21  1353   WBC 10*3/mm3 5.36 4.42   HEMOGLOBIN g/dL 12.6* 12.6*   HEMATOCRIT % 39.9 37.5   PLATELETS 10*3/mm3 173 166     Results from last 7 days   Lab Units 09/02/21  1353   SODIUM mmol/L 139   POTASSIUM mmol/L 3.0*   CHLORIDE mmol/L 101   CO2 mmol/L 27.4   BUN mg/dL 16   CREATININE mg/dL 0.71*   CALCIUM mg/dL 8.5*   BILIRUBIN mg/dL 0.7   ALK PHOS U/L 77   ALT (SGPT) U/L 34   AST (SGOT) U/L 41*   GLUCOSE mg/dL 206*     Results from last 7 days   Lab Units 09/02/21  1353   MAGNESIUM mg/dL 1.4*       Results from last 7 days   Lab Units 09/03/21  0629 09/02/21  1353   AST (SGOT) U/L  --  41*   ALT (SGPT) U/L  --  34   PROCALCITONIN ng/mL  --  0.08   LACTATE mmol/L  --  2.0   FERRITIN ng/mL 1,117.00* 1,110.00*   D DIMER QUANT MCGFEU/mL  --  1.14*   PLATELETS 10*3/mm3 173 166         Lab Results (last 24 hours)     Procedure Component Value Units Date/Time    Ferritin [[id number]]  (Abnormal) Collected: 09/03/21 0629    Specimen: Blood Updated: 09/03/21 0716     Ferritin 1,117.00 ng/mL     Narrative:      Results may be falsely decreased if patient taking Biotin.      CK [[id number]]  (Abnormal) Collected: 09/03/21 0629    Specimen: Blood Updated: 09/03/21 0704     Creatine Kinase 776 U/L     Sedimentation Rate [[id number]]  (Abnormal) Collected: 09/03/21 0629    Specimen: Blood Updated: 09/03/21 0654     Sed Rate 24 mm/hr     CBC & Differential [[id number]]  (Abnormal) Collected: 09/03/21 0629    Specimen: Blood Updated: 09/03/21 0640    Narrative:      The following orders were created for panel order CBC & Differential.  Procedure                               Abnormality         Status                     ---------                               -----------         ------                     CBC Auto Differential[[id number]]         Abnormal            Final result                 Please view results for these tests on the individual orders.    CBC Auto Differential [[id number]]  (Abnormal) Collected: 09/03/21 0629    Specimen: Blood Updated: 09/03/21 0640     WBC 5.36 10*3/mm3      RBC 4.27 10*6/mm3      Hemoglobin 12.6 g/dL      Hematocrit 39.9 %      MCV 93.4 fL      MCH 29.5 pg      MCHC 31.6 g/dL      RDW 14.5 %      RDW-SD 49.5 fl      MPV 9.5 fL      Platelets 173 10*3/mm3      Neutrophil % 72.9 %      Lymphocyte % 15.3 %      Monocyte % 9.7 %      Eosinophil % 0.2 %      Basophil % 0.4 %      Immature Grans % 1.5 %      Neutrophils, Absolute 3.91 10*3/mm3      Lymphocytes, Absolute 0.82 10*3/mm3      Monocytes, Absolute 0.52 10*3/mm3      Eosinophils, Absolute 0.01 10*3/mm3      Basophils, Absolute 0.02 10*3/mm3      Immature Grans, Absolute 0.08 10*3/mm3      nRBC 0.0 /100 WBC     Comprehensive Metabolic Panel [[id number]] Collected: 09/03/21 0629    Specimen: Blood Updated: 09/03/21 0629    C-reactive Protein [[id number]] Collected: 09/03/21 0628    Specimen: Blood Updated: 09/03/21 0629    Lactate Dehydrogenase [[id number]] Collected: 09/03/21 0629    Specimen: Blood Updated: 09/03/21 0629    D-dimer, Quantitative [[id number]] Collected: 09/03/21 0628    Specimen: Blood Updated: 09/03/21 0629    POC Glucose Once [[id number]]  (Abnormal) Collected: 09/02/21 1728    Specimen: Blood Updated: 09/02/21 1738     Glucose 147 mg/dL      Comment: Meter: [id number] : [id number] Giovanni Garcia RN (Validator)       Ferritin [[id number]]  (Abnormal) Collected: 09/02/21 1353    Specimen: Blood Updated: 09/02/21 1613     Ferritin 1,110.00 ng/mL     Narrative:      Results may be falsely decreased if patient taking Biotin.      D-dimer, Quantitative [[id number]]  (Abnormal) Collected: 09/02/21 1353    Specimen: Blood Updated: 09/02/21 1559     D-Dimer, Quantitative 1.14 MCGFEU/mL     Narrative:      Can be elevated in, but is not diagnostic for deep  vein thrombosis (DVT) or pulmonary embolis (PE).  It is also elevated in other medical conditions.  Clinical correlation is required.  The negative cut-off value for the D-Dimer is 0.50 mcg FEU/mL for DVT and PE.      BNP [[id number]]  (Abnormal) Collected: 09/02/21 1512    Specimen: Blood Updated: 09/02/21 1547     proBNP 2,448.0 pg/mL     Narrative:      Among patients with dyspnea, NT-proBNP is highly sensitive for the detection of acute congestive heart failure. In addition NT-proBNP of <300 pg/ml effectively rules out acute congestive heart failure with 99% negative predictive value.    Results may be falsely decreased if patient taking Biotin.      Blood Culture - Blood, Arm, Left [[id number]] Collected: 09/02/21 1512    Specimen: Blood from Arm, Left Updated: 09/02/21 1519    Urinalysis, Microscopic Only - Urine, Clean Catch [[id number]]  (Abnormal) Collected: 09/02/21 1359    Specimen: Urine, Clean Catch Updated: 09/02/21 1516     RBC, UA 13-20 /HPF      WBC, UA 3-5 /HPF      Bacteria, UA 1+ /HPF      Squamous Epithelial Cells, UA 0-2 /HPF      Hyaline Casts, UA 0-2 /LPF      Mucus, UA Moderate/2+ /HPF      Methodology Manual Light Microscopy    [place] Draw [[id number]] Collected: 09/02/21 1353    Specimen: Blood Updated: 09/02/21 1501    Narrative:      The following orders were created for panel order [place] Draw.  Procedure                               Abnormality         Status                     ---------                               -----------         ------                     Green Top (Gel)[[id number]]                                  Final result               Lavender Top[[id number]]                                     Final result               Gold Top - SST[[id number]]                                   Final result               Light Blue Top[[id number]]                                   Final result                 Please view results for these tests on the individual orders.    Green Top (Gel)  [[id number]] Collected: 09/02/21 1353    Specimen: Blood Updated: 09/02/21 1501     Extra Tube Hold for add-ons.     Comment: Auto resulted.       Gold Top - SST [[id number]] Collected: 09/02/21 1353    Specimen: Blood Updated: 09/02/21 1501     Extra Tube Hold for add-ons.     Comment: Auto resulted.       Lavender Top [[id number]] Collected: 09/02/21 1353    Specimen: Blood Updated: 09/02/21 1501     Extra Tube hold for add-on     Comment: Auto resulted       Light Blue Top [[id number]] Collected: 09/02/21 1353    Specimen: Blood Updated: 09/02/21 1501     Extra Tube hold for add-on     Comment: Auto resulted       Urinalysis With Microscopic If Indicated (No Culture) - Urine, Clean Catch [[id number]]  (Abnormal) Collected: 09/02/21 1359    Specimen: Urine, Clean Catch Updated: 09/02/21 1449     Color, UA Dark Yellow     Appearance, UA Hazy     pH, UA 6.0     Specific Gravity, UA 1.025     Glucose,  mg/dL (Trace)     Ketones, UA 15 mg/dL (1+)     Bilirubin, UA Small (1+)     Blood, UA Small (1+)     Protein,  mg/dL (2+)     Leuk Esterase, UA Negative     Nitrite, UA Negative     Urobilinogen, UA 4.0 E.U./dL    Procalcitonin [[id number]]  (Normal) Collected: 09/02/21 1353    Specimen: Blood Updated: 09/02/21 1448     Procalcitonin 0.08 ng/mL     Narrative:      Results may be falsely decreased if patient taking Biotin.     CK [[id number]]  (Abnormal) Collected: 09/02/21 1353    Specimen: Blood Updated: 09/02/21 1435     Creatine Kinase 765 U/L     Lactic Acid, Plasma [[id number]]  (Normal) Collected: 09/02/21 1353    Specimen: Blood Updated: 09/02/21 1423     Lactate 2.0 mmol/L     Troponin [[id number]]  (Abnormal) Collected: 09/02/21 1353    Specimen: Blood Updated: 09/02/21 1423     Troponin T 0.122 ng/mL     Narrative:      Troponin T Reference Range:  <= 0.03 ng/mL-   Negative for AMI  >0.03 ng/mL-     Abnormal for myocardial necrosis.  Clinicians would have to utilize clinical acumen, EKG, Troponin and  serial changes to determine if it is an Acute Myocardial Infarction or myocardial injury due to an underlying chronic condition.       Results may be falsely decreased if patient taking Biotin.      COVID-19 and FLU A/B PCR - Swab, Nasopharynx [[id number]]  (Abnormal) Collected: 09/02/21 1341    Specimen: Swab from Nasopharynx Updated: 09/02/21 1421     COVID19 Detected     Influenza A PCR Not Detected     Influenza B PCR Not Detected    Narrative:      Fact sheet for providers: https://www.fda.gov/media/090031/download    Fact sheet for patients: https://www.fda.gov/media/079486/download    Test performed by PCR.  Influenza A and Influenza B negative results should be considered presumptive in samples that have a positive SARS-CoV-2 result.    Competitive inhibition studies showed that SARS-CoV-2 virus, when present at concentrations above 3.6E+04 copies/mL, can inhibit the detection and amplification of influenza A and influenza B virus RNA if present at or below 1.8E+02 copies/mL or 4.9E+02 copies/mL, respectively, and may lead to false negative influenza virus results. If co-infection with influenza A or influenza B virus is suspected in samples with a positive SARS-CoV-2 result, the sample should be re-tested with another FDA cleared, approved, or authorized influenza test, if influenza virus detection would change clinical management.    Blood Culture - Blood, Arm, Left [[id number]] Collected: 09/02/21 1350    Specimen: Blood from Arm, Left Updated: 09/02/21 1418    Comprehensive Metabolic Panel [[id number]]  (Abnormal) Collected: 09/02/21 1353    Specimen: Blood Updated: 09/02/21 1416     Glucose 206 mg/dL      BUN 16 mg/dL      Creatinine 0.71 mg/dL      Sodium 139 mmol/L      Potassium 3.0 mmol/L      Chloride 101 mmol/L      CO2 27.4 mmol/L      Calcium 8.5 mg/dL      Total Protein 5.9 g/dL      Albumin 3.30 g/dL      ALT (SGPT) 34 U/L      AST (SGOT) 41 U/L      Alkaline Phosphatase 77 U/L      Total  Bilirubin 0.7 mg/dL      eGFR Non African Amer 108 mL/min/1.73      Globulin 2.6 gm/dL      A/G Ratio 1.3 g/dL      BUN/Creatinine Ratio 22.5     Anion Gap 10.6 mmol/L     Narrative:      GFR Normal >60  Chronic Kidney Disease <60  Kidney Failure <15      Magnesium [[id number]]  (Abnormal) Collected: 09/02/21 1353    Specimen: Blood Updated: 09/02/21 1416     Magnesium 1.4 mg/dL     CBC & Differential [[id number]]  (Abnormal) Collected: 09/02/21 1353    Specimen: Blood Updated: 09/02/21 1401    Narrative:      The following orders were created for panel order CBC & Differential.  Procedure                               Abnormality         Status                     ---------                               -----------         ------                     CBC Auto Differential[[id number]]        Abnormal            Final result                 Please view results for these tests on the individual orders.    CBC Auto Differential [[id number]]  (Abnormal) Collected: 09/02/21 1353    Specimen: Blood Updated: 09/02/21 1401     WBC 4.42 10*3/mm3      RBC 4.13 10*6/mm3      Hemoglobin 12.6 g/dL      Hematocrit 37.5 %      MCV 90.8 fL      MCH 30.5 pg      MCHC 33.6 g/dL      RDW 14.4 %      RDW-SD 47.7 fl      MPV 9.5 fL      Platelets 166 10*3/mm3      Neutrophil % 73.3 %      Lymphocyte % 13.8 %      Monocyte % 10.9 %      Eosinophil % 0.0 %      Basophil % 0.2 %      Immature Grans % 1.8 %      Neutrophils, Absolute 3.24 10*3/mm3      Lymphocytes, Absolute 0.61 10*3/mm3      Monocytes, Absolute 0.48 10*3/mm3      Eosinophils, Absolute 0.00 10*3/mm3      Basophils, Absolute 0.01 10*3/mm3      Immature Grans, Absolute 0.08 10*3/mm3      nRBC 0.0 /100 WBC     POC Glucose Once [[id number]]  (Abnormal) Collected: 09/02/21 1338    Specimen: Blood Updated: 09/02/21 1355     Glucose 201 mg/dL      Comment: Meter: [id number] : [id number] Giovanni Garcia RN (Validator)           Results from last 7 days   Lab Units 09/03/21  5849  09/02/21  1353   CK TOTAL U/L 776* 765*   TROPONIN T ng/mL  --  0.122*   D DIMER QUANT MCGFEU/mL  --  1.14*         Results from last 7 days   Lab Units 09/02/21  1512   PROBNP pg/mL 2,448.0*         Results from last 7 days   Lab Units 09/02/21  1353   MAGNESIUM mg/dL 1.4*                 Glucose   Date/Time Value Ref Range Status   09/02/2021 1728 147 (H) 70 - 130 mg/dL Final     Comment:     Meter: [id number] : [id number] Giovanni Garcia RN (Validator)   09/02/2021 1338 201 (H) 70 - 130 mg/dL Final     Comment:     Meter: [id number] : [id number] Giovanni Garcia RN (Validator)     Results from last 7 days   Lab Units 09/02/21  1353   PROCALCITONIN ng/mL 0.08   LACTATE mmol/L 2.0         Results from last 7 days   Lab Units 09/02/21  1359   NITRITE UA  Negative   WBC UA /HPF 3-5*   BACTERIA UA /HPF 1+*   SQUAM EPITHEL UA /HPF 0-2     Results from last 7 days   Lab Units 09/02/21  1341   INFLUENZA B PCR  Not Detected   INFLUENZA A PCR  Not Detected         Radiology:  Imaging Results (Last 24 Hours)     Procedure Component Value Units Date/Time    CT Angiogram Chest With & Without Contrast [[id number]] Collected: 09/02/21 2039     Updated: 09/02/21 2041    Narrative:      CT CHEST WITH CONTRAST, PE PROTOCOL, 9/2/2021    HISTORY:  75-year-old male admitted to the ED after being found down on floor today. Weakness. Positive COVID-19 testing. Elevated d-dimer.    TECHNIQUE:  CT examination of the chest with IV contrast. CTA MIP multiplanar pulmonary artery images were reformatted with 3-D postprocessing. Radiation dose reduction techniques included automated exposure control. Radiation audit for CT and nuclear cardiology  exams in the last 12 months: 1.    FINDINGS:  No pulmonary embolism is demonstrated. Thoracic aorta is normal in caliber with no aneurysm or dissection. Mild cardiomegaly. No pericardial effusion. Coronary artery atheromatous calcification.    Mild infiltrate and atelectasis limited to the  posterior lung bases. Currently, there is no scattered pulmonary infiltrates typical for COVID-19 pneumonitis. Note: CT may be negative in the earliest stages of COVID-19.    No pleural effusion. No suspicious mass or adenopathy within the chest. No hiatal hernia or thoracic esophageal dilatation. Limited upper abdominal images show no active disease.      Impression:      1.  No evidence of pulmonary embolism or other acute vascular abnormality within the chest.  2.  Minimal infiltrate and atelectasis in the dependent posterior lung bases. See above.    Signer Name: Ronnie Rosales MD   Signed: 9/2/2021 8:39 PM   Workstation Name: [id number]    [facility]    CT Head Without Contrast [[id number]] Collected: 09/02/21 1553     Updated: 09/02/21 1556    Narrative:      CT HEAD, NONCONTRAST, 09/20/2021     HISTORY:  Fall this morning with trauma to back of head     COMPARISON:           TECHNIQUE:  CT examination of the head was performed without IV contrast. Radiation  dose reduction techniques included automated exposure control or  exposure modulation based on body size. Radiation audit for CT and  nuclear cardiology exams in the last12 months: 0.           FINDINGS:  The examination is negative. No evidence of intracranial hemorrhage,  mass, mass effect, acute cerebral edema, hydrocephalus or additional  abnormality.          Impression:      Negative noncontrast head CT examination.     This report was finalized on 9/2/2021 3:54 PM by Dr. Vikash Martin MD.       XR Chest 1 View [[id number]] Collected: 09/02/21 1513     Updated: 09/02/21 1516    Narrative:      AP PORTABLE CHEST, 09/02/2021     HISTORY:  Shortness of air today     COMPARISON:  12/11/2020     TECHNIQUE:  AP portable chest x-ray.     FINDINGS:  There are low lung volumes and there may be minimal basilar atelectasis.  Otherwise lungs are clear. Heart size is normal.       Impression:      Low lung wires volumes minimal basilar  atelectasis otherwise no active  disease     This report was finalized on 9/2/2021 3:14 PM by Dr. Vikash Martin MD.             Cardiology:  ECG/EMG Results (last 24 hours)     Procedure Component Value Units Date/Time    ECG 12 Lead [[id number]] Collected: 09/02/21 1348     Updated: 09/02/21 1352     QT Interval 467 ms     Narrative:      HEART RATE= 68  bpm  RR Interval= 880  ms  PA Interval= 211  ms  P Horizontal Axis= 8  deg  P Front Axis= 52  deg  QRSD Interval= 130  ms  QT Interval= 467  ms  QRS Axis= -14  deg  T Wave Axis= -6  deg  - ABNORMAL ECG -  Sinus rhythm  Nonspecific intraventricular conduction delay  Lateral infarct, age indeterminate  Electronically Signed By:   Date and Time of Study: 2021-09-02 13:48:28          I have reviewed recent labs results and consult notes.  Parts of this note may have been copied and pasted but patient was examined and interviewed by me today    Assessment and Plan:    1.  COVID-19 pneumonia generalized weakness continue supportive care PT OT evaluate the patient.  Tachypnea is improved not hypoxic    2.  Metabolic encephalopathy likely due to COVID-19 continue supportive care     3.  Mild rhabdomyolysis table supportive care fluids CPK still is elevated this morning     4.  Hypertension uncontrolled improving continue with present management IV hydralazine and home medications     5.  Adult-onset diabetes mellitus Accu-Chek sliding scale insulin     6.  COPD nothing acute.  He is tachypneic but no wheezing has good breath sounds but are diminished we will continue to monitor as needed albuterol     7.  Parkinson's disease nothing acute continue with home medications     8.  Moderate lower extremity dependent edema secondary to venous stasis at baseline elevation supportive care wraps     9.  Hypokalemia oral potassium has been ordered     10.  DVT prophylaxis Lovenox    Much of this encounter note is an electronic transcription/translation of spoken language to printed text  using Dragon Software

## 2021-09-03 NOTE — THERAPY EVALUATION
Patient Name: Nash So  : 1945    MRN: [id number]                              Today's Date: 9/3/2021       Admit Date: 2021    Visit Dx:     ICD-10-CM ICD-9-CM   1. COVID-19 virus infection  U07.1 079.89   2. Weakness  R53.1 780.79   3. Elevated troponin  R77.8 790.6   4. Contusion of face, initial encounter  S00.83XA 920     Patient Active Problem List   Diagnosis   • Vitamin D deficiency   • Diabetes mellitus (CMS/HCC)   • Back pain   • Benign prostatic hyperplasia   • Gastroesophageal reflux disease with esophagitis   • Hyperlipidemia   • Hypertension   • Hypogonadism in male   • Sebaceous cyst   • COPD (chronic obstructive pulmonary disease) (CMS/HCC)   • Encounter for screening for malignant neoplasm of colon   • COVID-19 virus infection     Past Medical History:   Diagnosis Date   • Alzheimer disease (CMS/HCC)    • CHF (congestive heart failure) (CMS/HCC)    • COPD (chronic obstructive pulmonary disease) (CMS/HCC)    • Diabetes (CMS/HCC)    • GERD (gastroesophageal reflux disease)    • Hyperlipemia    • Hypertension      Past Surgical History:   Procedure Laterality Date   • ENDOSCOPY N/A 2017    Procedure: ESOPHAGOGASTRODUODENOSCOPY, biopsy;  Surgeon: Natalia Guerrero MD;  Location: [facility];  Service:      General Information     Row Name 21 1323          OT Time and Intention    Document Type  evaluation  -SD     Mode of Treatment  occupational therapy  -SD     Row Name 21 1323          General Information    Patient Profile Reviewed  yes Patient fell at home and was covered in feces, brought to ED by EMS. Patient diagnosed with Covid PNA. Patient with baseline dementia and Parkinson's.  -SD     Prior Level of Function  -- per pt, spouse provided assistance for daily tasks. Pt is confused PLOF difficult to determine. Spouse is currenlty in hosptial due to Covid and is unable to provide care for pt.  -SD     Existing Precautions/Restrictions  fall confusion  -SD      Barriers to Rehab  cognitive status;previous functional deficit  -SD     Row Name 09/03/21 1323          Occupational Profile    Reason for Services/Referral (Occupational Profile)  decreased adl function  -SD     Successful Occupations (Occupational Profile)  pt normally receives assist  with adl's from spouse. Actual plof difficult to determine due to pt's cogntive status.  -SD     Environmental Supports and Barriers (Occupational Profile)  lives with spouse  -SD     Row Name 09/03/21 1323          Living Environment    Lives With  spouse  -SD     Row Name 09/03/21 1323          Home Main Entrance    Number of Stairs, Main Entrance  -- pt unable to provide information regarding home environment  -SD     Row Name 09/03/21 1323          Cognition    Orientation Status (Cognition)  oriented to;person  -SD     Row Name 09/03/21 1323          Safety Issues, Functional Mobility    Safety Issues Affecting Function (Mobility)  ability to follow commands;awareness of need for assistance;insight into deficits/self-awareness;judgment;sequencing abilities  -SD     Impairments Affecting Function (Mobility)  balance;cognition  -SD       User Key  (r) = Recorded By, (t) = Taken By, (c) = Cosigned By    Initials Name Provider Type    SD Wade Pastrana, OTR Occupational Therapist          Mobility/ADL's     Row Name 09/03/21 1328          Bed Mobility    Bed Mobility  sit-supine  -SD     Scooting/Bridging [place] (Bed Mobility)  dependent (less than 25% patient effort);2 person assist  -SD     Supine-Sit [place] (Bed Mobility)  dependent (less than 25% patient effort);2 person assist;verbal cues;nonverbal cues (demo/gesture)  -SD     Sit-Supine [place] (Bed Mobility)  dependent (less than 25% patient effort);2 person assist;verbal cues;nonverbal cues (demo/gesture)  -SD     Assistive Device (Bed Mobility)  bed rails;draw sheet;head of bed elevated  -SD     Comment (Bed Mobility)  Pt dependent for bed mobility. Pt  has difficulty following  -[facility] Name 09/03/21 1328          Transfers    Sit-Stand [place] (Transfers)  moderate assist (50% patient effort);2 person assist  -SD     Row Name 09/03/21 1328          Sit-Stand Transfer    Assistive Device (Sit-Stand Transfers)  walker, front-wheeled  -[facility] Name 09/03/21 1328          Functional Mobility    Functional Mobility- Comment  pt required mod/max A to maintain static standing balance and to take 2-3 side steps along EOB.  -SD     Row Name 09/03/21 1328          Activities of Daily Living    BADL Assessment/Intervention  -- pt need max assist for adl's  -SD       User Key  (r) = Recorded By, (t) = Taken By, (c) = Cosigned By    Initials Name Provider Type    Wade Wilkerson OTR Occupational Therapist        Obj/Interventions     Row Name 09/03/21 1333          Sensory Assessment (Somatosensory)    Sensory Assessment (Somatosensory)  -- unable to assess due to cognition  -SD     Row Name 09/03/21 1333          Range of Motion Comprehensive    Comment, General Range of Motion  Pt did not follow directions for rom/mmt. RUE rom was limited secondary to IV board.  -SD     Row Name 09/03/21 1333          Balance    Comment, Balance  Pt intially required max assist to maintain static sitting balance at EOB secondary to left lateral lean. Balance did improve to CGA after verbal/tactile cues. Pt requried mod/max assist for standing balance.  -SD       User Key  (r) = Recorded By, (t) = Taken By, (c) = Cosigned By    Initials Name Provider Type    Wade Wilkerson OTR Occupational Therapist        Goals/Plan     Row Name 09/03/21 1344          Transfer Goal 1 (OT)    Activity/Assistive Device (Transfer Goal 1, OT)  commode, 3-in-1;walker, rolling  -SD     [place] Level/Cues Needed (Transfer Goal 1, OT)  minimum assist (75% or more patient effort);tactile cues required;verbal cues required  -SD     Time Frame (Transfer Goal 1, OT)  by discharge  -SD      Strategies/Barriers (Transfers Goal 1, OT)  cognitive status  -SD     Progress/Outcome (Transfer Goal 1, OT)  other (see comments) new goal  -SD     Row Name 09/03/21 1344          ROM Goal 1 (OT)    ROM Goal 1 (OT)  Pt to perform BUE HEP with verbal/tactile cues to improve functional strength/activity tolerance for basic adl's/transfer activity.  -SD     Time Frame (ROM Goal 1, OT)  by discharge  -SD     Progress/Outcome (ROM Goal 1, OT)  other (see comments) new goal  -SD     Row Name 09/03/21 1344          Problem Specific Goal 1 (OT)    Problem Specific Goal 1 (OT)  Pt to maintain static standing balance with min assist using a walker for support to allow for caregiver assist with adl activity.  -SD     Time Frame (Problem Specific Goal 1, OT)  by discharge  -SD     Progress/Outcome (Problem Specific Goal 1, OT)  other (see comments) new goal  -SD     Row Name 09/03/21 1343          Therapy Assessment/Plan (OT)    Planned Therapy Interventions (OT)  transfer/mobility retraining;strengthening exercise;activity tolerance training;BADL retraining  -SD       User Key  (r) = Recorded By, (t) = Taken By, (c) = Cosigned By    Initials Name Provider Type    SD Wade Pastrana, OTR Occupational Therapist        Clinical Impression     Row Name 09/03/21 6174          Pain Assessment    Additional Documentation  -- no pain reported  -SD     Row Name 09/03/21 2631          Pain Scale: Numbers Pre/Post-Treatment    Pre/Posttreatment Pain Comment  Pt c/o ankle pain when being assited with bed mobility. Pt with no other c/o pain. Pt did not c/o ankle pain after he was repositioned in bed at end of evaluation.  -SD     Pain Intervention(s)  Repositioned  -SD     Row Name 09/03/21 3975          Plan of Care Review    Plan of Care Reviewed With  patient  -SD     Outcome Summary  OT evaluation. Pt is confused and has difficutly following commands to assist with bed mobilty and transfer activity. Pt did state that his spouse  assisted with all adl activity, yet he was a poor historian and his actual prior functional status with adl/mobility is difficult to determine. Pt was dependent x 2  for bed mobility. He initially requried max assist to maintain static sitting balance at EOB, but it did improve to CGA after a few minutes with verbal/tactile cues. Pt required mod/max assist x 2 to stand from EOB (with bed surface elevated). Pt requried assistance to place his hands on the walker for him to help support himself. Pt was dependent to get back into bed. Pt's spouse is currently ill and unable to provide care/support for the patient. Pt needs significant assistance for basic self care tasks due to his cognitive status and physical limitations. Anticipate need for SNF/LTC. Will follow in OT to increase safety with transfers/balance to allow for caregiver support for adl's.  -SD     Row Name 09/03/21 8716          Therapy Assessment/Plan (OT)    Rehab Potential (OT)  fair, will monitor progress closely  -SD     Criteria for Skilled Therapeutic Interventions Met (OT)  yes;skilled treatment is necessary  -SD     Therapy Frequency (OT)  3 times/wk  -SD     Predicted Duration of Therapy Intervention (OT)  anticipate discharge in 3-4 days from acute care  -SD     Row Name 09/03/21 5143          Therapy Plan Review/Discharge Plan (OT)    Anticipated Discharge Disposition (OT)  extended care facility;skilled nursing facility  -SD     Row Name 09/03/21 3768          Positioning and Restraints    Pre-Treatment Position  in bed  -SD     Post Treatment Position  bed  -SD     In Bed  notified nsg;supine;call light within reach;encouraged to call for assist;exit alarm on;heels elevated  -SD       User Key  (r) = Recorded By, (t) = Taken By, (c) = Cosigned By    Initials Name Provider Type    SD Wade Pastrana, OTR Occupational Therapist        Outcome Measures     Row Name 09/03/21 5153          How much help from another is currently needed...     Putting on and taking off regular lower body clothing?  1  -SD     Bathing (including washing, rinsing, and drying)  1  -SD     Toileting (which includes using toilet bed pan or urinal)  1  -SD     Putting on and taking off regular upper body clothing  1  -SD     Taking care of personal grooming (such as brushing teeth)  1  -SD     Eating meals  2  -SD     AM-PAC 6 Clicks Score (OT)  7  -SD     Row Name 09/03/21 1138          How much help from another person do you currently need...    Turning from your back to your side while in flat bed without using bedrails?  1  -BP     Moving from lying on back to sitting on the side of a flat bed without bedrails?  1  -BP     Moving to and from a bed to a chair (including a wheelchair)?  1  -BP     Standing up from a chair using your arms (e.g., wheelchair, bedside chair)?  1  -BP     Climbing 3-5 steps with a railing?  1  -BP     To walk in hospital room?  1  -BP     AM-PAC 6 Clicks Score (PT)  6  -BP     Row Name 09/03/21 1347 09/03/21 1138       Functional Assessment    Outcome Measure Options  AM-PAC 6 Clicks Daily Activity (OT)  -SD  AM-PAC 6 Clicks Basic Mobility (PT)  -BP      User Key  (r) = Recorded By, (t) = Taken By, (c) = Cosigned By    Initials Name Provider Type    Wade Wilkerson OTR Occupational Therapist    BP Solomon Stack, PT Physical Therapist          Occupational Therapy Education                 Title: PT OT SLP Therapies (In Progress)     Topic: Occupational Therapy (In Progress)     Point: ADL training (In Progress)     Description:   Instruct learner(s) on proper safety adaptation and remediation techniques during self care or transfers.   Instruct in proper use of assistive devices.              Learning Progress Summary           Patient Acceptance, E, NL by SD at 9/3/2021 1348    Comment: Education regarding OT services, benefits of activity and safety wtih bed mobility/transfers. Pt is confused (no evidence of learning).                    Point: Home exercise program (Not Started)     Description:   Instruct learner(s) on appropriate technique for monitoring, assisting and/or progressing therapeutic exercises/activities.              Learner Progress:  Not documented in this visit.                      User Key     Initials Effective Dates Name Provider Type Discipline    SD 06/16/21 -  Wade Pastrana, OTR Occupational Therapist OT              OT Recommendation and Plan  Planned Therapy Interventions (OT): transfer/mobility retraining, strengthening exercise, activity tolerance training, BADL retraining  Therapy Frequency (OT): 3 times/wk  Plan of Care Review  Plan of Care Reviewed With: patient  Outcome Summary: OT evaluation. Pt is confused and has difficutly following commands to assist with bed mobilty and transfer activity. Pt did state that his spouse assisted with all adl activity, yet he was a poor historian and his actual prior functional status with adl/mobility is difficult to determine. Pt was dependent x 2  for bed mobility. He initially requried max assist to maintain static sitting balance at EOB, but it did improve to CGA after a few minutes with verbal/tactile cues. Pt required mod/max assist x 2 to stand from EOB (with bed surface elevated). Pt requried assistance to place his hands on the walker for him to help support himself. Pt was dependent to get back into bed. Pt's spouse is currently ill and unable to provide care/support for the patient. Pt needs significant assistance for basic self care tasks due to his cognitive status and physical limitations. Anticipate need for SNF/LTC. Will follow in OT to increase safety with transfers/balance to allow for caregiver support for adl's.     Time Calculation:   Time Calculation- OT     Row Name 09/03/21 1322             Time Calculation- OT    OT Start Time  0855  -SD         Untimed Charges    OT Eval/Re-eval Minutes  40  -SD         Total Minutes    Untimed Charges Total  Minutes  40  -SD       Total Minutes  40  -SD        User Key  (r) = Recorded By, (t) = Taken By, (c) = Cosigned By    Initials Name Provider Type    Wade Wilkerson OTR Occupational Therapist        Therapy Charges for Today     Code Description Service Date Service Provider Modifiers Qty    66016084102  OT EVAL MOD COMPLEXITY 3 9/3/2021 Wade Pastrana OTR GO 1               DENIA Lui  9/3/2021

## 2021-09-03 NOTE — NURSING NOTE
Dr. Nassar called for update on patients condition and to inform staff that he had input orders for patient. See chart for details.

## 2021-09-03 NOTE — PLAN OF CARE
Goal Outcome Evaluation:  Plan of Care Reviewed With: patient      PT WILL RECOVER FROM COVID -19 INFECTION

## 2021-09-04 LAB
ALBUMIN SERPL-MCNC: 3.2 G/DL (ref 3.5–5.2)
ALBUMIN/GLOB SERPL: 1 G/DL
ALP SERPL-CCNC: 73 U/L (ref 39–117)
ALT SERPL W P-5'-P-CCNC: 35 U/L (ref 1–41)
ANION GAP SERPL CALCULATED.3IONS-SCNC: 15.1 MMOL/L (ref 5–15)
ARTERIAL PATENCY WRIST A: POSITIVE
AST SERPL-CCNC: 45 U/L (ref 1–40)
ATMOSPHERIC PRESS: 738 MMHG
BASE EXCESS BLDA CALC-SCNC: 3.9 MMOL/L (ref 0–2)
BDY SITE: ABNORMAL
BILIRUB SERPL-MCNC: 0.9 MG/DL (ref 0–1.2)
BODY TEMPERATURE: 37 C
BUN SERPL-MCNC: 13 MG/DL (ref 8–23)
BUN/CREAT SERPL: 21 (ref 7–25)
CALCIUM SPEC-SCNC: 8.8 MG/DL (ref 8.6–10.5)
CHLORIDE SERPL-SCNC: 104 MMOL/L (ref 98–107)
CO2 SERPL-SCNC: 22.9 MMOL/L (ref 22–29)
CREAT SERPL-MCNC: 0.62 MG/DL (ref 0.76–1.27)
CRP SERPL-MCNC: 12.76 MG/DL (ref 0–0.5)
GAS FLOW AIRWAY: 1 LPM
GFR SERPL CREATININE-BSD FRML MDRD: 126 ML/MIN/1.73
GLOBULIN UR ELPH-MCNC: 3.2 GM/DL
GLUCOSE BLDC GLUCOMTR-MCNC: 196 MG/DL (ref 70–130)
GLUCOSE BLDC GLUCOMTR-MCNC: 205 MG/DL (ref 70–130)
GLUCOSE BLDC GLUCOMTR-MCNC: 208 MG/DL (ref 70–130)
GLUCOSE BLDC GLUCOMTR-MCNC: 225 MG/DL (ref 70–130)
GLUCOSE SERPL-MCNC: 195 MG/DL (ref 65–99)
HBA1C MFR BLD: 5.5 % (ref 4.8–5.6)
HCO3 BLDA-SCNC: 25.7 MMOL/L (ref 20–26)
HGB BLDA-MCNC: 13 G/DL (ref 14–18)
Lab: ABNORMAL
MAGNESIUM SERPL-MCNC: 1.7 MG/DL (ref 1.6–2.4)
MODALITY: ABNORMAL
PCO2 BLDA: 29.7 MM HG (ref 35–45)
PCO2 TEMP ADJ BLD: 29.7 MM HG (ref 35–45)
PH BLDA: 7.55 PH UNITS (ref 7.35–7.45)
PH, TEMP CORRECTED: 7.55 PH UNITS (ref 7.35–7.45)
PO2 BLDA: 72.4 MM HG (ref 83–108)
PO2 TEMP ADJ BLD: 72.4 MM HG (ref 83–108)
POTASSIUM SERPL-SCNC: 3.4 MMOL/L (ref 3.5–5.2)
PROCALCITONIN SERPL-MCNC: 0.08 NG/ML (ref 0–0.25)
PROT SERPL-MCNC: 6.4 G/DL (ref 6–8.5)
SAO2 % BLDCOA: 96 % (ref 94–99)
SODIUM SERPL-SCNC: 142 MMOL/L (ref 136–145)
TROPONIN T SERPL-MCNC: 0.03 NG/ML (ref 0–0.03)
VENTILATOR MODE: ABNORMAL

## 2021-09-04 PROCEDURE — 86140 C-REACTIVE PROTEIN: CPT | Performed by: FAMILY MEDICINE

## 2021-09-04 PROCEDURE — 80053 COMPREHEN METABOLIC PANEL: CPT | Performed by: NURSE PRACTITIONER

## 2021-09-04 PROCEDURE — 82962 GLUCOSE BLOOD TEST: CPT

## 2021-09-04 PROCEDURE — 83735 ASSAY OF MAGNESIUM: CPT | Performed by: NURSE PRACTITIONER

## 2021-09-04 PROCEDURE — 25010000002 ENOXAPARIN PER 10 MG: Performed by: FAMILY MEDICINE

## 2021-09-04 PROCEDURE — 97110 THERAPEUTIC EXERCISES: CPT

## 2021-09-04 PROCEDURE — 82803 BLOOD GASES ANY COMBINATION: CPT

## 2021-09-04 PROCEDURE — 25010000002 HYDRALAZINE PER 20 MG: Performed by: FAMILY MEDICINE

## 2021-09-04 PROCEDURE — 84484 ASSAY OF TROPONIN QUANT: CPT | Performed by: NURSE PRACTITIONER

## 2021-09-04 PROCEDURE — 94799 UNLISTED PULMONARY SVC/PX: CPT

## 2021-09-04 PROCEDURE — 84145 PROCALCITONIN (PCT): CPT | Performed by: FAMILY MEDICINE

## 2021-09-04 PROCEDURE — 36600 WITHDRAWAL OF ARTERIAL BLOOD: CPT

## 2021-09-04 PROCEDURE — 83036 HEMOGLOBIN GLYCOSYLATED A1C: CPT | Performed by: NURSE PRACTITIONER

## 2021-09-04 PROCEDURE — 63710000001 INSULIN ASPART PER 5 UNITS: Performed by: FAMILY MEDICINE

## 2021-09-04 PROCEDURE — 99233 SBSQ HOSP IP/OBS HIGH 50: CPT | Performed by: NURSE PRACTITIONER

## 2021-09-04 PROCEDURE — 25010000002 DEXAMETHASONE PER 1 MG: Performed by: NURSE PRACTITIONER

## 2021-09-04 PROCEDURE — 25010000002 FUROSEMIDE PER 20 MG: Performed by: FAMILY MEDICINE

## 2021-09-04 RX ORDER — POTASSIUM CHLORIDE 1.5 G/1.77G
40 POWDER, FOR SOLUTION ORAL AS NEEDED
Status: DISCONTINUED | OUTPATIENT
Start: 2021-09-04 | End: 2021-09-05

## 2021-09-04 RX ORDER — GUAIFENESIN 600 MG/1
1200 TABLET, EXTENDED RELEASE ORAL 2 TIMES DAILY
Status: DISCONTINUED | OUTPATIENT
Start: 2021-09-04 | End: 2021-09-16 | Stop reason: HOSPADM

## 2021-09-04 RX ORDER — POTASSIUM CHLORIDE 20 MEQ/1
40 TABLET, EXTENDED RELEASE ORAL AS NEEDED
Status: DISCONTINUED | OUTPATIENT
Start: 2021-09-04 | End: 2021-09-05

## 2021-09-04 RX ORDER — MAGNESIUM SULFATE 1 G/100ML
1 INJECTION INTRAVENOUS AS NEEDED
Status: DISCONTINUED | OUTPATIENT
Start: 2021-09-04 | End: 2021-09-07

## 2021-09-04 RX ORDER — DEXAMETHASONE SODIUM PHOSPHATE 4 MG/ML
6 INJECTION, SOLUTION INTRA-ARTICULAR; INTRALESIONAL; INTRAMUSCULAR; INTRAVENOUS; SOFT TISSUE DAILY
Status: DISCONTINUED | OUTPATIENT
Start: 2021-09-04 | End: 2021-09-06

## 2021-09-04 RX ORDER — POTASSIUM CHLORIDE 7.45 MG/ML
10 INJECTION INTRAVENOUS
Status: DISCONTINUED | OUTPATIENT
Start: 2021-09-04 | End: 2021-09-05

## 2021-09-04 RX ORDER — FUROSEMIDE 10 MG/ML
40 INJECTION INTRAMUSCULAR; INTRAVENOUS ONCE
Status: COMPLETED | OUTPATIENT
Start: 2021-09-04 | End: 2021-09-04

## 2021-09-04 RX ORDER — MAGNESIUM SULFATE HEPTAHYDRATE 40 MG/ML
2 INJECTION, SOLUTION INTRAVENOUS AS NEEDED
Status: DISCONTINUED | OUTPATIENT
Start: 2021-09-04 | End: 2021-09-07

## 2021-09-04 RX ORDER — MAGNESIUM SULFATE HEPTAHYDRATE 40 MG/ML
4 INJECTION, SOLUTION INTRAVENOUS AS NEEDED
Status: DISCONTINUED | OUTPATIENT
Start: 2021-09-04 | End: 2021-09-07

## 2021-09-04 RX ADMIN — PANTOPRAZOLE SODIUM 40 MG: 40 TABLET, DELAYED RELEASE ORAL at 09:01

## 2021-09-04 RX ADMIN — ASPIRIN 81 MG: 81 TABLET, COATED ORAL at 09:01

## 2021-09-04 RX ADMIN — CARBIDOPA AND LEVODOPA 1.5 TABLET: 25; 100 TABLET ORAL at 18:18

## 2021-09-04 RX ADMIN — INSULIN ASPART 4 UNITS: 100 INJECTION, SOLUTION INTRAVENOUS; SUBCUTANEOUS at 12:26

## 2021-09-04 RX ADMIN — ATORVASTATIN CALCIUM 40 MG: 40 TABLET, FILM COATED ORAL at 09:01

## 2021-09-04 RX ADMIN — SENNOSIDES AND DOCUSATE SODIUM 2 TABLET: 50; 8.6 TABLET ORAL at 09:01

## 2021-09-04 RX ADMIN — ENOXAPARIN SODIUM 40 MG: 40 INJECTION SUBCUTANEOUS at 20:31

## 2021-09-04 RX ADMIN — INSULIN ASPART 2 UNITS: 100 INJECTION, SOLUTION INTRAVENOUS; SUBCUTANEOUS at 09:00

## 2021-09-04 RX ADMIN — LISINOPRIL 20 MG: 20 TABLET ORAL at 20:32

## 2021-09-04 RX ADMIN — CARBIDOPA AND LEVODOPA 1.5 TABLET: 25; 100 TABLET ORAL at 09:01

## 2021-09-04 RX ADMIN — REMDESIVIR 200 MG: 100 INJECTION, POWDER, LYOPHILIZED, FOR SOLUTION INTRAVENOUS at 10:32

## 2021-09-04 RX ADMIN — CARBIDOPA AND LEVODOPA 1.5 TABLET: 25; 100 TABLET ORAL at 20:32

## 2021-09-04 RX ADMIN — LISINOPRIL 20 MG: 20 TABLET ORAL at 09:00

## 2021-09-04 RX ADMIN — GUAIFENESIN 1200 MG: 600 TABLET, EXTENDED RELEASE ORAL at 10:32

## 2021-09-04 RX ADMIN — INSULIN ASPART 4 UNITS: 100 INJECTION, SOLUTION INTRAVENOUS; SUBCUTANEOUS at 18:18

## 2021-09-04 RX ADMIN — HYDRALAZINE HYDROCHLORIDE 10 MG: 20 INJECTION INTRAMUSCULAR; INTRAVENOUS at 18:34

## 2021-09-04 RX ADMIN — SENNOSIDES AND DOCUSATE SODIUM 2 TABLET: 50; 8.6 TABLET ORAL at 20:32

## 2021-09-04 RX ADMIN — NEBIVOLOL HYDROCHLORIDE 10 MG: 5 TABLET ORAL at 09:01

## 2021-09-04 RX ADMIN — FUROSEMIDE 40 MG: 10 INJECTION, SOLUTION INTRAMUSCULAR; INTRAVENOUS at 09:00

## 2021-09-04 RX ADMIN — ALBUTEROL SULFATE 2 PUFF: 90 AEROSOL, METERED RESPIRATORY (INHALATION) at 09:25

## 2021-09-04 RX ADMIN — HYDRALAZINE HYDROCHLORIDE 10 MG: 20 INJECTION INTRAMUSCULAR; INTRAVENOUS at 04:06

## 2021-09-04 RX ADMIN — ALBUTEROL SULFATE 2 PUFF: 90 AEROSOL, METERED RESPIRATORY (INHALATION) at 13:01

## 2021-09-04 RX ADMIN — GUAIFENESIN 1200 MG: 600 TABLET, EXTENDED RELEASE ORAL at 20:32

## 2021-09-04 RX ADMIN — DEXAMETHASONE SODIUM PHOSPHATE 6 MG: 4 INJECTION, SOLUTION INTRAMUSCULAR; INTRAVENOUS at 10:32

## 2021-09-04 RX ADMIN — ALBUTEROL SULFATE 2 PUFF: 90 AEROSOL, METERED RESPIRATORY (INHALATION) at 21:14

## 2021-09-04 RX ADMIN — ALBUTEROL SULFATE 2 PUFF: 90 AEROSOL, METERED RESPIRATORY (INHALATION) at 16:37

## 2021-09-04 NOTE — PLAN OF CARE
Goal Outcome Evaluation:  Plan of Care Reviewed With: patient        Progress: declining  Outcome Summary: PT:  Pt dependent x2 for all bed mobility including rolling for brief/linen change and supine to/from sit; held attempts for sit to stand transfers today due to pt unable to hold balance sitting EOB - max/dependent x2

## 2021-09-04 NOTE — THERAPY TREATMENT NOTE
Acute Care - Physical Therapy Treatment Note   Tori Simmons     Patient Name: Nash So  : 1945  MRN: [id number]  Today's Date: 2021      Visit Dx:     ICD-10-CM ICD-9-CM   1. COVID-19 virus infection  U07.1 079.89   2. Weakness  R53.1 780.79   3. Elevated troponin  R77.8 790.6   4. Contusion of face, initial encounter  S00.83XA 920     Patient Active Problem List   Diagnosis   • Vitamin D deficiency   • Diabetes mellitus (CMS/HCC)   • Back pain   • Benign prostatic hyperplasia   • Gastroesophageal reflux disease with esophagitis   • Hyperlipidemia   • Hypertension   • Hypogonadism in male   • Sebaceous cyst   • COPD (chronic obstructive pulmonary disease) (CMS/HCC)   • Encounter for screening for malignant neoplasm of colon   • COVID-19 virus infection     Past Medical History:   Diagnosis Date   • Alzheimer disease (CMS/HCC)    • CHF (congestive heart failure) (CMS/HCC)    • COPD (chronic obstructive pulmonary disease) (CMS/HCC)    • Diabetes (CMS/HCC)    • GERD (gastroesophageal reflux disease)    • Hyperlipemia    • Hypertension      Past Surgical History:   Procedure Laterality Date   • ENDOSCOPY N/A 2017    Procedure: ESOPHAGOGASTRODUODENOSCOPY, biopsy;  Surgeon: Natalia Guerrero MD;  Location: [facility];  Service:         PT Assessment (last 12 hours)      PT Evaluation and Treatment     Row Name 21 0942          Physical Therapy Time and Intention    Subjective Information  no complaints  -     Document Type  therapy note (daily note)  -     Mode of Treatment  physical therapy  -     Patient Effort  poor  -     Row Name 21          General Information    General Observations of Patient  Pt resting in bed - HOB elevated; requesting water  -     Existing Precautions/Restrictions  fall confusion  -     Row Name 2142          Cognition    Personal Safety Interventions  gait belt;nonskid shoes/slippers when out of bed;supervised activity  -     Row  Name 09/04/21 0942          Pain Scale: Word Pre/Post-Treatment    Pre/Posttreatment Pain Comment  No reports of pain  -     Row Name 09/04/21 0942          Bed Mobility    Supine-Sit [place] (Bed Mobility)  dependent (less than 25% patient effort);2 person assist  -     Sit-Supine [place] (Bed Mobility)  dependent (less than 25% patient effort);2 person assist  -     Assistive Device (Bed Mobility)  draw sheet  -     Comment (Bed Mobility)  Pt did not participate in transfer - assisted  nursing with brief and linen change - dependent for rolling in bed  -     Row Name 09/04/21 0942          Transfers    Comment (Transfers)  Sit to stand transfers deferred once pt unable to hold balance sitting EOB  -     Row Name             Wound 09/02/21 1452 Left elbow Skin Tear    Wound - Properties Group Placement Date: 09/02/21  -EF Placement Time: 1452  -EF Present on Hospital Admission: Y  -EF Side: Left  -EF Location: elbow  -EF Primary Wound Type: Skin tear  -EF    Retired Wound - Properties Group Date first assessed: 09/02/21  -EF Time first assessed: 1452  -EF Present on Hospital Admission: Y  -EF Side: Left  -EF Location: elbow  -EF Primary Wound Type: Skin tear  -EF    Row Name             Wound 09/02/21 1453 Right elbow Skin Tear    Wound - Properties Group Placement Date: 09/02/21  -EF Placement Time: 1453  -EF Present on Hospital Admission: Y  -EF Side: Right  -EF Location: elbow  -EF Primary Wound Type: Skin tear  -EF    Retired Wound - Properties Group Date first assessed: 09/02/21  -EF Time first assessed: 1453  -EF Present on Hospital Admission: Y  -EF Side: Right  -EF Location: elbow  -EF Primary Wound Type: Skin tear  -EF    Row Name 09/04/21 0942          Plan of Care Review    Plan of Care Reviewed With  patient  -     Progress  declining  -     Outcome Summary  PT:  Pt dependent x2 for all bed mobility including rolling for brief/linen change and supine to/from sit; held attempts  for sit to stand transfers today due to pt unable to hold balance sitting EOB - max/dependent x2  -MH     Row Name 09/04/21 0942          Positioning and Restraints    Pre-Treatment Position  in bed  -MH     Post Treatment Position  bed  -MH     In Bed  supine;with nsg;side rails up x2 nursing completing bath  -MH       User Key  (r) = Recorded By, (t) = Taken By, (c) = Cosigned By    Initials Name Provider Type     Carroll Lynch, KASSANDRA Physical Therapy Assistant    Radha Minor RN Registered Nurse        Physical Therapy Education                 Title: PT OT SLP Therapies (In Progress)     Topic: Physical Therapy (In Progress)     Point: Mobility training (In Progress)     Learning Progress Summary           Patient Acceptance, E,TB, NR by  at 9/3/2021 1139                   Point: Home exercise program (Not Started)     Learner Progress:  Not documented in this visit.                      User Key     Initials Effective Dates Name Provider Type [facility] 06/16/21 -  Solomon Stack, PT Physical Therapist PT              PT Recommendation and Plan     Plan of Care Reviewed With: patient  Progress: declining  Outcome Summary: PT:  Pt dependent x2 for all bed mobility including rolling for brief/linen change and supine to/from sit; held attempts for sit to stand transfers today due to pt unable to hold balance sitting EOB - max/dependent x2  Outcome Measures     Row Name 09/04/21 0942             How much help from another person do you currently need...    Turning from your back to your side while in flat bed without using bedrails?  1  -MH      Moving from lying on back to sitting on the side of a flat bed without bedrails?  1  -MH      Moving to and from a bed to a chair (including a wheelchair)?  1  -MH      Standing up from a chair using your arms (e.g., wheelchair, bedside chair)?  1  -MH      Climbing 3-5 steps with a railing?  1  -MH      To walk in hospital room?  1  -MH       AM-PAC 6 Clicks Score (PT)  6  -         Functional Assessment    Outcome Measure Options  AM-PAC 6 Clicks Basic Mobility (PT)  -        User Key  (r) = Recorded By, (t) = Taken By, (c) = Cosigned By    Initials Name Provider Type    Carroll Renteria PTA Physical Therapy Assistant           Time Calculation:   PT Charges     Row Name 09/04/21 1047             Time Calculation    Start Time  0942  -      Stop Time  0959  -      Time Calculation (min)  17 min  -        User Key  (r) = Recorded By, (t) = Taken By, (c) = Cosigned By    Initials Name Provider Type    Carroll Renteria PTA Physical Therapy Assistant        Therapy Charges for Today     Code Description Service Date Service Provider Modifiers Qty    06629332271 HC PT THER PROC EA 15 MIN 9/4/2021 Carroll Lynch PTA GP 1    95333494662 HC PT THER SUPP EA 15 MIN 9/4/2021 Carroll Lynch PTA GP 1          PT G-Codes  Outcome Measure Options: AM-PAC 6 Clicks Basic Mobility (PT)  AM-PAC 6 Clicks Score (PT): 6  AM-PAC 6 Clicks Score (OT): 7    Carroll Lynch PTA  9/4/2021

## 2021-09-04 NOTE — PROGRESS NOTES
Pt unable to do MDI's. Pt very tachypneic  RR 38-45. RN aware and states Dr Nassar is aware of increased RR.

## 2021-09-04 NOTE — CONSULTS
CONSULT NOTE    Patient Identification:  Nash So  75 y.o.  male  1945  [id number]            Requesting physician: Hospitalist    Reason for Consultation: Acute hypoxemic respiratory failure COVID-19 pneumonia    CC:     History of Present Illness:  75-year-old gentleman multiple medical problems including diabetes Parkinson's hyperlipidemia brought into the emergency room status post fall at home.  He was apparently positive for covert 6 days ago.  Currently transition to heated high flow requiring 35 L and 30% FiO2.  Patient poor historian currently resting comfortably.  He gets tachypneic with minimal activity.      Review of Systems  Unable to get with patient's current condition  Past Medical History:  Past Medical History:   Diagnosis Date   • Alzheimer disease (CMS/HCC)    • CHF (congestive heart failure) (CMS/HCC)    • COPD (chronic obstructive pulmonary disease) (CMS/HCC)    • Diabetes (CMS/HCC)    • GERD (gastroesophageal reflux disease)    • Hyperlipemia    • Hypertension        Past Surgical History:  Past Surgical History:   Procedure Laterality Date   • ENDOSCOPY N/A 5/22/2017    Procedure: ESOPHAGOGASTRODUODENOSCOPY, biopsy;  Surgeon: Natalia Guerrero MD;  Location: [facility];  Service:         Home Meds:  Medications Prior to Admission   Medication Sig Dispense Refill Last Dose   • carbidopa-levodopa (SINEMET)  MG per tablet Take 1.5 tablets by mouth 3 (Three) Times a Day.      • Liraglutide (Victoza) 18 MG/3ML solution pen-injector injection Inject 1.8 mg under the skin into the appropriate area as directed Daily.      • albuterol (VENTOLIN HFA) 108 (90 BASE) MCG/ACT inhaler Ventolin  (90 Base) MCG/ACT Inhalation Aerosol Solution; Patient Sig: Ventolin  (90 Base) MCG/ACT Inhalation Aerosol Solution INHALE PUFFS  PRN; 0; 01-Nov-2012; Active   Unknown at Unknown time   • aspirin 81 MG tablet Take  by mouth daily.   Unknown at Unknown time   • atorvastatin (LIPITOR) 40  MG tablet Take 1 tablet by mouth daily. 90 tablet 3 Unknown at Unknown time   • B-D ULTRAFINE III SHORT PEN 31G X 8 MM misc USE AS DIRECTED TWO TIMES A DAY AND AS NEEDED 100 each 10 Unknown at Unknown time   • furosemide (LASIX) 20 MG tablet Take 2 tablets po every morning (Patient taking differently: Take 40 mg by mouth Daily. Take 2 tablets po every morning) 180 tablet 1 Unknown at Unknown time   • glimepiride (AMARYL) 4 MG tablet Take 2 po daily (Patient taking differently: Take 4 mg by mouth 2 (Two) Times a Day. Take 2 po daily) 180 tablet 1 Unknown at Unknown time   • Glucose Blood (HEATHER CONTOUR TEST VI) Heather Contour Test In Vitro Strip; Patient Sig: Heather Contour Test In Vitro Strip test bid; 1; 6; 14-May-2013; Active   Unknown at Unknown time   • glucose blood test strip Heather Contour Test In Vitro Strip; Patient Sig: Heather Contour Test In Vitro Strip test bid; 1; 6; 14-May-2013; Active   Unknown at Unknown time   • Insulin Pen Needle (NOVOTWIST) 32G X 5 MM misc    Unknown at Unknown time   • Insulin Pen Needle 31G X 8 MM misc    Unknown at Unknown time   • lisinopril (PRINIVIL,ZESTRIL) 20 MG tablet Take 1 tablet by mouth daily. (Patient taking differently: Take 20 mg by mouth 2 (two) times a day.) 90 tablet 2 Unknown at Unknown time   • metoclopramide (REGLAN) 10 MG tablet Take i po tid 270 tablet 3 Unknown at Unknown time   • Misc Natural Products (NEURIVA PO) Take  by mouth.   Unknown at Unknown time   • nabumetone (RELAFEN) 500 MG tablet Take 1.5 tablets by mouth every 12 (twelve) hours. 180 tablet 3 Unknown at Unknown time   • nebivolol (BYSTOLIC) 10 MG tablet Take 1 tablet by mouth daily. 90 tablet 2 Unknown at Unknown time   • Nutritional Supplements (VITAMIN D BOOSTER PO) Take 1,250 mcg by mouth.   Unknown at Unknown time   • pantoprazole (PROTONIX) 40 MG EC tablet TAKE ONE TABLET BY MOUTH DAILY 90 tablet 1 Unknown at Unknown time   • pioglitazone-metFORMIN (ACTOPLUS MET)  MG per tablet Take  "one po bid 180 tablet 2 Unknown at Unknown time       Allergies:  Allergies   Allergen Reactions   • Penicillins        Social History:   Social History     Socioeconomic History   • Marital status:      Spouse name: Not on file   • Number of children: Not on file   • Years of education: Not on file   • Highest education level: Not on file   Tobacco Use   • Smoking status: Former Smoker   • Smokeless tobacco: Never Used   • Tobacco comment: quit 25 years ago   Substance and Sexual Activity   • Alcohol use: No   • Drug use: No   • Sexual activity: Defer       Family History:  Family History   Problem Relation Age of Onset   • Heart defect Mother    • Diabetes Mother        Physical Exam:  /73 (BP Location: Left arm, Patient Position: Lying)   Pulse 74   Temp 99 °F (37.2 °C) (Oral)   Resp (!) 32   Ht 177.8 cm (70\")   Wt 108 kg (238 lb 12.8 oz)   SpO2 95%   BMI 34.26 kg/m²  Body mass index is 34.26 kg/m². 95% 108 kg (238 lb 12.8 oz)  Physical Exam  Patient is examined using the personal protective equipment as per guidelines from infection control for this particular patient as enacted.  Hand hygiene was performed before and after patient interaction.  Well-developed normal body habitus  Eyes normal conjunctivae and pupils reactive to light  ENT Mallampati between 3 and 4 normal nasal exam  Neck midline trachea no thyromegaly  Chest diminished breath sounds tachypneic at times  CVS regular rate and rhythm 1-2+ lower extremity edema  Abdomen soft nontender no hepatosplenomegaly  CNS intact normal sensory exam  Skin no rashes no nodules  Psych oriented to time place and person normal memory  Musculoskeletal no cyanosis no clubbing normal range of motion        LABS:  Lab Results   Component Value Date    CALCIUM 8.8 09/04/2021     Results from last 7 days   Lab Units 09/04/21  0649 09/03/21  0629 09/03/21  0629 09/02/21  1512 09/02/21  1353 09/02/21  1353   MAGNESIUM mg/dL 1.7  --   --   --   --  1.4* "   SODIUM mmol/L 142  --  139  --   --  139   POTASSIUM mmol/L 3.4*  --  2.8*  --   --  3.0*   CHLORIDE mmol/L 104  --  102  --   --  101   CO2 mmol/L 22.9  --  26.2  --   --  27.4   BUN mg/dL 13  --  10  --   --  16   CREATININE mg/dL 0.62*  --  0.53*  --   --  0.71*   GLUCOSE mg/dL 195*   < > 170*  --    < > 206*   CALCIUM mg/dL 8.8  --  8.2*  --   --  8.5*   WBC 10*3/mm3  --   --  5.36  --   --  4.42   HEMOGLOBIN g/dL  --   --  12.6*  --   --  12.6*   PLATELETS 10*3/mm3  --   --  173  --   --  166   ALT (SGPT) U/L 35  --  7  --   --  34   AST (SGOT) U/L 45*  --  46*  --   --  41*   PROBNP pg/mL  --   --   --  2,448.0*  --   --    PROCALCITONIN ng/mL 0.08  --   --   --   --  0.08    < > = values in this interval not displayed.     Lab Results   Component Value Date    CKTOTAL 776 (H) 09/03/2021    TROPONINT 0.033 (C) 09/04/2021     Results from last 7 days   Lab Units 09/04/21  0649 09/03/21  0629 09/02/21  1353   CK TOTAL U/L  --  776* 765*   TROPONIN T ng/mL 0.033*  --  0.122*     Results from last 7 days   Lab Units 09/02/21  1512 09/02/21  1350   BLOODCX  No growth at 24 hours No growth at 24 hours     Results from last 7 days   Lab Units 09/04/21  0649 09/02/21  1353   PROCALCITONIN ng/mL 0.08 0.08   LACTATE mmol/L  --  2.0     Results from last 7 days   Lab Units 09/04/21  0450 09/03/21  1720   PH, ARTERIAL pH units 7.547* 7.535*   PCO2, ARTERIAL mm Hg 29.7* 33.9*   PO2 ART mm Hg 72.4* 60.7*   O2 SATURATION ART % 96.0 93.2*   FLOW RATE lpm 1.0  --    MODALITY  Nasal Cannula Room Air     Results from last 7 days   Lab Units 09/02/21  1341   INFLUENZA B PCR  Not Detected         Results from last 7 days   Lab Units 09/02/21  1512 09/02/21  1350   BLOODCX  No growth at 24 hours No growth at 24 hours     Lab Results   Component Value Date    TSH 2.260 05/02/2016     Estimated Creatinine Clearance: 98.2 mL/min (A) (by C-G formula based on SCr of 0.62 mg/dL (L)).  Results from last 7 days   Lab Units 09/02/21  2101    NITRITE UA  Negative   WBC UA /HPF 3-5*   BACTERIA UA /HPF 1+*   SQUAM EPITHEL UA /HPF 0-2        Imaging: I personally visualized the images of scans/x-rays performed within last 3 days.      Assessment:  Acu COVID-19 pneumonia  Acute hypoxemic respiratory failure  Metabolic encephalopathy  COPD  Parkinson's disease  Diabetes mellitus  Chronic nocturnal hypoxemia  Chronic lower extremity edema  Dementia          Recommendations:  Agree with current management with Decadron and remdesivir.  Oxygen to keep sats above 90%.  Wean down and may wean down to heated high flow to keep sats above 90%.  Mobilize and ambulate  Incentive spirometry  Weak and tachypneic at times with minimal activity.  Needs to be monitored closely.  Watch for aspiration.  Keep volume status on the dry side if possible  Discussed with hospitalist staff.  Continue to monitor clinical course closely.              Luis Zafar MD  9/4/2021  10:09 EDT      Much of this encounter note is an electronic transcription/translation of spoken language to printed text using Dragon Software.

## 2021-09-04 NOTE — CASE MANAGEMENT/SOCIAL WORK
Continued Stay Note  MIA Beyer     Patient Name: Nash So  MRN: [id number]  Today's Date: 9/4/2021    Admit Date: 9/2/2021    Discharge Plan     Row Name 09/04/21 1100       Plan    Plan Comments  Mr So's wife is a patient in ICU with Covid.  I spoke with her briefly about him and he is an Alzheimer's patient and she cares for him at home.  He does not drive and is not currently on oxygen at home.  He toll with need to be watched for oxygen need upon discharge. He is currently on HHF @ 30 L.  The plan for his discharge will very much depend upon his wife's course of care.  If he is discharged prior to her than STR may be necessary.  Will continue to follow        Discharge Codes    No documentation.             Inge Mary RN

## 2021-09-04 NOTE — PROGRESS NOTES
"SERVICE: [facility] HOSPITALIST    CONSULTANTS: Pulmonary    CHIEF COMPLAINT: Follow-up COVID-19 pneumonia    SUBJECTIVE: Patient reports he is feeling better despite breathing 40-50 times per minute on my exam.Staff notes confusion overnight, anxiety. On HHF now. No other new concerns, asking for water. Appears to be immobile in bed, weak.    OBJECTIVE:    /73 (BP Location: Left arm, Patient Position: Lying)   Pulse 74   Temp 99 °F (37.2 °C) (Oral)   Resp (!) 36   Ht 177.8 cm (70\")   Wt 108 kg (238 lb 12.8 oz)   SpO2 95%   BMI 34.26 kg/m²     MEDS/LABS REVIEWED AND ORDERED    albuterol sulfate HFA, 2 puff, Inhalation, 4x Daily - RT  aspirin, 81 mg, Oral, Daily  atorvastatin, 40 mg, Oral, Daily  carbidopa-levodopa, 1.5 tablet, Oral, TID  dexamethasone, 6 mg, Intravenous, Daily  enoxaparin, 40 mg, Subcutaneous, Q24H  guaiFENesin, 1,200 mg, Oral, BID  haloperidol lactate, 1 mg, Intravenous, Once  insulin aspart, 0-9 Units, Subcutaneous, TID AC  lisinopril, 20 mg, Oral, Q12H  nebivolol, 10 mg, Oral, Daily  pantoprazole, 40 mg, Oral, Daily  remdesivir, 200 mg, Intravenous, Q24H   Followed by  [START ON 9/5/2021] remdesivir, 100 mg, Intravenous, Q24H  senna-docusate sodium, 2 tablet, Oral, BID      Physical Exam  Vitals reviewed.   Constitutional:       General: He is in acute distress.      Appearance: He is obese. He is ill-appearing.      Comments: Elderly, appears older than stated age   HENT:      Head: Normocephalic and atraumatic.      Mouth/Throat:      Mouth: Mucous membranes are moist.      Comments: edentulous  Eyes:      Extraocular Movements: Extraocular movements intact.      Pupils: Pupils are equal, round, and reactive to light.   Cardiovascular:      Rate and Rhythm: Normal rate and regular rhythm.   Pulmonary:      Comments: Tachypneic, shallow breathing, diminished  Abdominal:      General: Abdomen is flat. Bowel sounds are normal. There is no distension.      Tenderness: " There is no abdominal tenderness. There is no guarding.   Musculoskeletal:         General: Swelling present.      Comments: 1+ bilateral extremity edema   Skin:     General: Skin is warm and dry.      Capillary Refill: Capillary refill takes less than 2 seconds.      Comments: Anterior shins bilaterally darkened   Neurological:      General: No focal deficit present.      Mental Status: He is alert.      Comments: Oriented to self, thinks he is in [place], states he is not    Psychiatric:      Comments: Calm       LAB/DIAGNOSTICS:    Lab Results (last 24 hours)     Procedure Component Value Units Date/Time    Troponin [[id number]]  (Abnormal) Collected: 09/04/21 0649    Specimen: Blood Updated: 09/04/21 0820     Troponin T 0.033 ng/mL     Narrative:      Troponin T Reference Range:  <= 0.03 ng/mL-   Negative for AMI  >0.03 ng/mL-     Abnormal for myocardial necrosis.  Clinicians would have to utilize clinical acumen, EKG, Troponin and serial changes to determine if it is an Acute Myocardial Infarction or myocardial injury due to an underlying chronic condition.       Results may be falsely decreased if patient taking Biotin.      POC Glucose Once [[id number]]  (Abnormal) Collected: 09/04/21 0747    Specimen: Blood Updated: 09/04/21 0754     Glucose 196 mg/dL      Comment: Meter: [id number] : [id number] Valdemar Chingica JENNIFER       Procalcitonin [[id number]]  (Normal) Collected: 09/04/21 0649    Specimen: Blood Updated: 09/04/21 0737     Procalcitonin 0.08 ng/mL     Narrative:      Results may be falsely decreased if patient taking Biotin.     C-reactive Protein [[id number]] Collected: 09/04/21 0604    Specimen: Blood Updated: 09/04/21 0704    Blood Gas, Arterial - [[id number]]  (Abnormal) Collected: 09/04/21 0450    Specimen: Arterial Blood Updated: 09/04/21 0456     Site Left Radial     Andrae's Test Positive     pH, Arterial 7.547 pH units      Comment: 83 Value above reference range        pCO2, Arterial  29.7 mm Hg      Comment: 84 Value below reference range        pO2, Arterial 72.4 mm Hg      Comment: 84 Value below reference range        HCO3, Arterial 25.7 mmol/L      Base Excess, Arterial 3.9 mmol/L      Comment: 83 Value above reference range        O2 Saturation, Arterial 96.0 %      Hemoglobin, Blood Gas 13.0 g/dL      Comment: 84 Value below reference range        Temperature 37.0 C      Barometric Pressure for Blood Gas 738 mmHg      Modality Nasal Cannula     Flow Rate 1.0 lpm      Ventilator Mode NA     Collected by [id number]     Comment: Meter: [id number]     :  [id number]        pCO2, Temperature Corrected 29.7 mm Hg      pH, Temp Corrected 7.547 pH Units      pO2, Temperature Corrected 72.4 mm Hg     POC Glucose Once [[id number]]  (Abnormal) Collected: 09/03/21 2104    Specimen: Blood Updated: 09/03/21 2110     Glucose 157 mg/dL      Comment: Meter: [id number] : [id number] Jose G Ying VICTORINO       C-reactive Protein [[id number]]  (Abnormal) Collected: 09/03/21 0628    Specimen: Blood Updated: 09/03/21 1814     C-Reactive Protein 33.31 mg/dL     Blood Gas, Arterial - [[id number]]  (Abnormal) Collected: 09/03/21 1720    Specimen: Arterial Blood Updated: 09/03/21 1728     Site Left Radial     Andrae's Test Positive     pH, Arterial 7.535 pH units      Comment: 83 Value above reference range        pCO2, Arterial 33.9 mm Hg      Comment: 84 Value below reference range        pO2, Arterial 60.7 mm Hg      Comment: 84 Value below reference range        HCO3, Arterial 28.6 mmol/L      Comment: 83 Value above reference range        Base Excess, Arterial 6.0 mmol/L      Comment: 83 Value above reference range        O2 Saturation, Arterial 93.2 %      Comment: 84 Value below reference range        Hemoglobin, Blood Gas 12.3 g/dL      Comment: 84 Value below reference range        Temperature 37.0 C      Barometric Pressure for Blood Gas 740 mmHg      Modality Room Air     Ventilator Mode NA      Collected by [id number]     Comment: Meter: [id number]     :  [id number]        pCO2, Temperature Corrected 33.9 mm Hg      pH, Temp Corrected 7.535 pH Units      pO2, Temperature Corrected 60.7 mm Hg     POC Glucose Once [[id number]]  (Abnormal) Collected: 09/03/21 1636    Specimen: Blood Updated: 09/03/21 1643     Glucose 201 mg/dL      Comment: Meter: [id number] : [id number] Cornelius Mendoza CNKOLTON       Blood Culture - Blood, Arm, Left [[id number]] Collected: 09/02/21 1512    Specimen: Blood from Arm, Left Updated: 09/03/21 1530     Blood Culture No growth at 24 hours    Blood Culture - Blood, Arm, Left [[id number]] Collected: 09/02/21 1350    Specimen: Blood from Arm, Left Updated: 09/03/21 1431     Blood Culture No growth at 24 hours        ECG 12 Lead   Final Result   HEART RATE= 68  bpm   RR Interval= 880  ms   ME Interval= 211  ms   P Horizontal Axis= 8  deg   P Front Axis= 52  deg   QRSD Interval= 130  ms   QT Interval= 467  ms   QRS Axis= -14  deg   T Wave Axis= -6  deg   - ABNORMAL ECG -   Poor quality tracing repeat   Electronically Signed By: Venu Maurice ([facility]) 03-Sep-2021 17:00:18   Date and Time of Study: 2021-09-02 13:48:28          CT Head Without Contrast    Result Date: 9/2/2021  Negative noncontrast head CT examination.  This report was finalized on 9/2/2021 3:54 PM by Dr. Vikash Martin MD.      CT Angiogram Chest With & Without Contrast    Result Date: 9/2/2021  1.  No evidence of pulmonary embolism or other acute vascular abnormality within the chest. 2.  Minimal infiltrate and atelectasis in the dependent posterior lung bases. See above. Signer Name: Ronnie Rosales MD  Signed: 9/2/2021 8:39 PM  Workstation Name: [email or web address]  [facility]    XR Chest 1 View    Result Date: 9/3/2021  Exam is significantly limited by low lung volumes. There appears to be bibasilar opacities, and it is uncertain if these may represent potential pneumonia or be artifactual.  "Signer Name: Ada Abebe MD  Signed: 9/3/2021 5:41 PM  Workstation Name: [id number]  [facility]    XR Chest 1 View    Result Date: 9/2/2021  Low lung wires volumes minimal basilar atelectasis otherwise no active disease  This report was finalized on 9/2/2021 3:14 PM by Dr. Vikash Martin MD.      ASSESSMENT/PLAN:  AHR F secondary to acute COVID-19 pneumonia:Pulmonary following  COPD:   Chronic nocturnal hypoxia:  Chronic LE edema:  Increased to heated high flow overnight  Procal negative, trend inflammatory markers  Received a dose of IV Lasix this morning, monitoring I&O, daily weight    Add remdesivir and Decadron  May qualify for baricitinib with increased oxygen requirement  Continue albuterol, add mucinex, acapella  Increase percent of heated high flow now  High risk for decompensation    Elevated troponin, r/o NSTEMI, 2/2 illness:   0.122 on admit, now 0.033  Recheck in a.m., check EKG in a.m.    S/P fall at home:   , unfortunately cannot give fluid as time    Electrolyte imbalance: Check magnesium, add replacement protocols    DM2 in obese: Check A1c  Continue low-dose SSI with Accu-Cheks AC/at bedtime    HTN:  BP elevated, continue lisinopril 20 mg every 12 hours, Bystolic 10 mg daily, aspirin 81 mg daily, as needed hydralazine    Acute metabolic encephalopathy on chronic Parkinson's dementia:  Secondary to illness, continue home Sinemet    HLD: Continue home Lipitor    GERD: Continue home Protonix, no current acute issues    Weakness/deconditioning:  PT/OT evaluation reveals need for possible SNF at discharge    Patient states he does not want to be intubated but is agreeable to cardiac resuscitation. With his confusion, this should be d/w wife/family    PLAN FOR DISPOSITION: TBD    ANTOINETTE Underwood  Hospitalist, [facility]  09/04/21  07:53 EDT    \"Dictated utilizing Dragon dictation\"    "

## 2021-09-04 NOTE — PLAN OF CARE
Goal Outcome Evaluation:  Plan of Care Reviewed With: patient        Progress: improving  Outcome Summary: vss. pt on heated hi flow o2. pt continuously removing his o2 and his o2 monitor from forehead. onl bites for dinner, however took in good amount of po fluids. alert to self. pt removed one iv. denies pain. no other complaints at this time.

## 2021-09-04 NOTE — NURSING NOTE
Patient remains tachypneic, MD notified. ABG's ordered. Patient placed on heated high flow o2 per RT.

## 2021-09-05 LAB
ALBUMIN SERPL-MCNC: 2.8 G/DL (ref 3.5–5.2)
ALBUMIN/GLOB SERPL: 0.9 G/DL
ALP SERPL-CCNC: 68 U/L (ref 39–117)
ALT SERPL W P-5'-P-CCNC: 11 U/L (ref 1–41)
ANION GAP SERPL CALCULATED.3IONS-SCNC: 9.4 MMOL/L (ref 5–15)
AST SERPL-CCNC: 34 U/L (ref 1–40)
BASOPHILS # BLD AUTO: 0.02 10*3/MM3 (ref 0–0.2)
BASOPHILS NFR BLD AUTO: 0.3 % (ref 0–1.5)
BILIRUB SERPL-MCNC: 0.9 MG/DL (ref 0–1.2)
BUN SERPL-MCNC: 17 MG/DL (ref 8–23)
BUN/CREAT SERPL: 32.1 (ref 7–25)
CALCIUM SPEC-SCNC: 8.7 MG/DL (ref 8.6–10.5)
CHLORIDE SERPL-SCNC: 105 MMOL/L (ref 98–107)
CO2 SERPL-SCNC: 27.6 MMOL/L (ref 22–29)
CREAT SERPL-MCNC: 0.53 MG/DL (ref 0.76–1.27)
CRP SERPL-MCNC: 14.44 MG/DL (ref 0–0.5)
D DIMER PPP FEU-MCNC: 0.75 MCGFEU/ML (ref 0–0.46)
DEPRECATED RDW RBC AUTO: 47.9 FL (ref 37–54)
EOSINOPHIL # BLD AUTO: 0 10*3/MM3 (ref 0–0.4)
EOSINOPHIL NFR BLD AUTO: 0 % (ref 0.3–6.2)
ERYTHROCYTE [DISTWIDTH] IN BLOOD BY AUTOMATED COUNT: 14.6 % (ref 12.3–15.4)
ERYTHROCYTE [SEDIMENTATION RATE] IN BLOOD: 50 MM/HR (ref 0–20)
FERRITIN SERPL-MCNC: 1649 NG/ML (ref 30–400)
GFR SERPL CREATININE-BSD FRML MDRD: >150 ML/MIN/1.73
GLOBULIN UR ELPH-MCNC: 3.2 GM/DL
GLUCOSE BLDC GLUCOMTR-MCNC: 209 MG/DL (ref 70–130)
GLUCOSE BLDC GLUCOMTR-MCNC: 230 MG/DL (ref 70–130)
GLUCOSE BLDC GLUCOMTR-MCNC: 273 MG/DL (ref 70–130)
GLUCOSE BLDC GLUCOMTR-MCNC: 289 MG/DL (ref 70–130)
GLUCOSE SERPL-MCNC: 237 MG/DL (ref 65–99)
HCT VFR BLD AUTO: 38.4 % (ref 37.5–51)
HGB BLD-MCNC: 13 G/DL (ref 13–17.7)
IMM GRANULOCYTES # BLD AUTO: 0.14 10*3/MM3 (ref 0–0.05)
IMM GRANULOCYTES NFR BLD AUTO: 1.8 % (ref 0–0.5)
LDH SERPL-CCNC: 433 U/L (ref 135–225)
LYMPHOCYTES # BLD AUTO: 0.52 10*3/MM3 (ref 0.7–3.1)
LYMPHOCYTES NFR BLD AUTO: 6.5 % (ref 19.6–45.3)
MCH RBC QN AUTO: 30.5 PG (ref 26.6–33)
MCHC RBC AUTO-ENTMCNC: 33.9 G/DL (ref 31.5–35.7)
MCV RBC AUTO: 90.1 FL (ref 79–97)
MONOCYTES # BLD AUTO: 0.49 10*3/MM3 (ref 0.1–0.9)
MONOCYTES NFR BLD AUTO: 6.2 % (ref 5–12)
NEUTROPHILS NFR BLD AUTO: 6.77 10*3/MM3 (ref 1.7–7)
NEUTROPHILS NFR BLD AUTO: 85.2 % (ref 42.7–76)
NRBC BLD AUTO-RTO: 0 /100 WBC (ref 0–0.2)
PLATELET # BLD AUTO: 202 10*3/MM3 (ref 140–450)
PMV BLD AUTO: 9.6 FL (ref 6–12)
POTASSIUM SERPL-SCNC: 3.3 MMOL/L (ref 3.5–5.2)
PROT SERPL-MCNC: 6 G/DL (ref 6–8.5)
RBC # BLD AUTO: 4.26 10*6/MM3 (ref 4.14–5.8)
SODIUM SERPL-SCNC: 142 MMOL/L (ref 136–145)
TROPONIN T SERPL-MCNC: 0.02 NG/ML (ref 0–0.03)
WBC # BLD AUTO: 7.94 10*3/MM3 (ref 3.4–10.8)

## 2021-09-05 PROCEDURE — 25010000002 HALOPERIDOL LACTATE PER 5 MG: Performed by: FAMILY MEDICINE

## 2021-09-05 PROCEDURE — 94799 UNLISTED PULMONARY SVC/PX: CPT

## 2021-09-05 PROCEDURE — 84484 ASSAY OF TROPONIN QUANT: CPT | Performed by: NURSE PRACTITIONER

## 2021-09-05 PROCEDURE — 85652 RBC SED RATE AUTOMATED: CPT | Performed by: FAMILY MEDICINE

## 2021-09-05 PROCEDURE — 85025 COMPLETE CBC W/AUTO DIFF WBC: CPT | Performed by: FAMILY MEDICINE

## 2021-09-05 PROCEDURE — 80053 COMPREHEN METABOLIC PANEL: CPT | Performed by: FAMILY MEDICINE

## 2021-09-05 PROCEDURE — 82962 GLUCOSE BLOOD TEST: CPT

## 2021-09-05 PROCEDURE — 93010 ELECTROCARDIOGRAM REPORT: CPT | Performed by: INTERNAL MEDICINE

## 2021-09-05 PROCEDURE — 25010000002 HYDRALAZINE PER 20 MG: Performed by: FAMILY MEDICINE

## 2021-09-05 PROCEDURE — 86140 C-REACTIVE PROTEIN: CPT | Performed by: FAMILY MEDICINE

## 2021-09-05 PROCEDURE — 25010000002 DEXAMETHASONE PER 1 MG: Performed by: NURSE PRACTITIONER

## 2021-09-05 PROCEDURE — 93005 ELECTROCARDIOGRAM TRACING: CPT | Performed by: NURSE PRACTITIONER

## 2021-09-05 PROCEDURE — 83615 LACTATE (LD) (LDH) ENZYME: CPT | Performed by: FAMILY MEDICINE

## 2021-09-05 PROCEDURE — 85379 FIBRIN DEGRADATION QUANT: CPT | Performed by: FAMILY MEDICINE

## 2021-09-05 PROCEDURE — 82728 ASSAY OF FERRITIN: CPT | Performed by: FAMILY MEDICINE

## 2021-09-05 PROCEDURE — XW033E5 INTRODUCTION OF REMDESIVIR ANTI-INFECTIVE INTO PERIPHERAL VEIN, PERCUTANEOUS APPROACH, NEW TECHNOLOGY GROUP 5: ICD-10-PCS | Performed by: FAMILY MEDICINE

## 2021-09-05 PROCEDURE — 25010000002 ENOXAPARIN PER 10 MG: Performed by: FAMILY MEDICINE

## 2021-09-05 PROCEDURE — 63710000001 INSULIN ASPART PER 5 UNITS: Performed by: FAMILY MEDICINE

## 2021-09-05 RX ORDER — POTASSIUM CHLORIDE 1.5 G/1.77G
40 POWDER, FOR SOLUTION ORAL ONCE
Status: COMPLETED | OUTPATIENT
Start: 2021-09-05 | End: 2021-09-05

## 2021-09-05 RX ORDER — POTASSIUM CHLORIDE 750 MG/1
10 TABLET, FILM COATED, EXTENDED RELEASE ORAL DAILY
Status: DISCONTINUED | OUTPATIENT
Start: 2021-09-05 | End: 2021-09-09

## 2021-09-05 RX ADMIN — DEXAMETHASONE SODIUM PHOSPHATE 6 MG: 4 INJECTION, SOLUTION INTRAMUSCULAR; INTRAVENOUS at 08:38

## 2021-09-05 RX ADMIN — SENNOSIDES AND DOCUSATE SODIUM 2 TABLET: 50; 8.6 TABLET ORAL at 08:38

## 2021-09-05 RX ADMIN — REMDESIVIR 100 MG: 100 INJECTION, POWDER, LYOPHILIZED, FOR SOLUTION INTRAVENOUS at 08:39

## 2021-09-05 RX ADMIN — HALOPERIDOL LACTATE 1 MG: 5 INJECTION, SOLUTION INTRAMUSCULAR at 18:37

## 2021-09-05 RX ADMIN — PANTOPRAZOLE SODIUM 40 MG: 40 TABLET, DELAYED RELEASE ORAL at 08:38

## 2021-09-05 RX ADMIN — ENOXAPARIN SODIUM 40 MG: 40 INJECTION SUBCUTANEOUS at 20:36

## 2021-09-05 RX ADMIN — SENNOSIDES AND DOCUSATE SODIUM 2 TABLET: 50; 8.6 TABLET ORAL at 20:35

## 2021-09-05 RX ADMIN — CARBIDOPA AND LEVODOPA 1.5 TABLET: 25; 100 TABLET ORAL at 16:49

## 2021-09-05 RX ADMIN — CARBIDOPA AND LEVODOPA 1.5 TABLET: 25; 100 TABLET ORAL at 20:35

## 2021-09-05 RX ADMIN — LISINOPRIL 20 MG: 20 TABLET ORAL at 20:35

## 2021-09-05 RX ADMIN — LISINOPRIL 20 MG: 20 TABLET ORAL at 08:37

## 2021-09-05 RX ADMIN — CARBIDOPA AND LEVODOPA 1.5 TABLET: 25; 100 TABLET ORAL at 08:37

## 2021-09-05 RX ADMIN — HYDRALAZINE HYDROCHLORIDE 10 MG: 20 INJECTION INTRAMUSCULAR; INTRAVENOUS at 16:49

## 2021-09-05 RX ADMIN — ASPIRIN 81 MG: 81 TABLET, COATED ORAL at 08:37

## 2021-09-05 RX ADMIN — ALBUTEROL SULFATE 2 PUFF: 90 AEROSOL, METERED RESPIRATORY (INHALATION) at 17:52

## 2021-09-05 RX ADMIN — ALBUTEROL SULFATE 2 PUFF: 90 AEROSOL, METERED RESPIRATORY (INHALATION) at 13:35

## 2021-09-05 RX ADMIN — INSULIN ASPART 4 UNITS: 100 INJECTION, SOLUTION INTRAVENOUS; SUBCUTANEOUS at 08:38

## 2021-09-05 RX ADMIN — HYDRALAZINE HYDROCHLORIDE 10 MG: 20 INJECTION INTRAMUSCULAR; INTRAVENOUS at 23:11

## 2021-09-05 RX ADMIN — GUAIFENESIN 1200 MG: 600 TABLET, EXTENDED RELEASE ORAL at 20:36

## 2021-09-05 RX ADMIN — INSULIN ASPART 4 UNITS: 100 INJECTION, SOLUTION INTRAVENOUS; SUBCUTANEOUS at 13:54

## 2021-09-05 RX ADMIN — GUAIFENESIN 1200 MG: 600 TABLET, EXTENDED RELEASE ORAL at 08:37

## 2021-09-05 RX ADMIN — INSULIN ASPART 6 UNITS: 100 INJECTION, SOLUTION INTRAVENOUS; SUBCUTANEOUS at 18:08

## 2021-09-05 RX ADMIN — POTASSIUM CHLORIDE 40 MEQ: 1.5 POWDER, FOR SOLUTION ORAL at 13:55

## 2021-09-05 RX ADMIN — ALBUTEROL SULFATE 2 PUFF: 90 AEROSOL, METERED RESPIRATORY (INHALATION) at 19:31

## 2021-09-05 RX ADMIN — SODIUM CHLORIDE, PRESERVATIVE FREE 10 ML: 5 INJECTION INTRAVENOUS at 08:38

## 2021-09-05 RX ADMIN — ATORVASTATIN CALCIUM 40 MG: 40 TABLET, FILM COATED ORAL at 08:38

## 2021-09-05 RX ADMIN — NEBIVOLOL HYDROCHLORIDE 10 MG: 5 TABLET ORAL at 08:37

## 2021-09-05 NOTE — PLAN OF CARE
Goal Outcome Evaluation:  Plan of Care Reviewed With: patient        Progress: improving  Outcome Summary: vss. Pt on room air. pt continuously removing his o2 and his o2 monitor from forehead. Pt pulled out his iv and continues to remove all monitoring devices. Dr. Nassar notified. Pt only took a few bites for dinner, however took in good amount of po fluids. alert to self. Haldol given after new iv access gained. denies pain. no other complaints at this time. Bed alarm in place.

## 2021-09-05 NOTE — PROGRESS NOTES
"Daily Progress Note:      Chief complaint: Follow-up of COVID-19 infection, metabolic encephalopathy, rhabdomyolysis, hyperkalemia, diabetes, hypertension, Parkinson's disease    Subjective: No overnight events noted.  He continues to be confused but is awake alert follows commands this morning.  He became very tachypneic and hypoxic 24 hours ago started on remdesivir and Decadron and was on heated high flow and now was transitioned back to room air     LOS: 3 days     Vital Signs  Temp:  [97.4 °F (36.3 °C)-98.9 °F (37.2 °C)] 98 °F (36.7 °C)  Heart Rate:  [57-64] 57  Resp:  [22-40] 24  BP: (142-185)/(65-80) 170/77  Oxygen Therapy  SpO2: 94 %  Pulse Oximetry Type: Continuous  Device (Oxygen Therapy): room air  $ High Flow Nasal Cannula Set-Up: yes  Flow (L/min): 40  Oxygen Concentration (%): 30  ETCO2 (mmHg): 27 mmHg}  Body mass index is 34.26 kg/m².  Flowsheet Rows      First Filed Value   Admission Height  182.9 cm (72\") Documented at 09/02/2021 1411   Admission Weight  115 kg (254 lb 4.8 oz) Documented at 09/02/2021 1502                   Documented weights    09/02/21 1502 09/02/21 2241   Weight: 115 kg (254 lb 4.8 oz) 108 kg (238 lb 12.8 oz)           Patient Vitals for the past 24 hrs:   BP Temp Temp src Pulse Resp SpO2   09/05/21 1018 170/77 98 °F (36.7 °C) Oral 57 24 94 %   09/05/21 0837 (!) 185/75 -- -- 60 -- --   09/05/21 0520 164/72 97.4 °F (36.3 °C) Oral 62 22 94 %   09/04/21 2118 -- -- -- 59 24 94 %   09/04/21 2114 -- -- -- 59 24 96 %   09/04/21 2035 142/80 97.9 °F (36.6 °C) Axillary 62 24 94 %   09/04/21 1830 178/65 -- -- -- -- --   09/04/21 1646 179/71 98.9 °F (37.2 °C) Axillary 62 28 95 %   09/04/21 1637 -- -- -- 62 (!) 40 94 %   09/04/21 1428 164/65 98.3 °F (36.8 °C) Oral 64 (!) 30 95 %   09/04/21 1305 -- -- -- 61 (!) 32 --   09/04/21 1301 -- -- -- 61 (!) 32 94 %       108 kg (238 lb 12.8 oz)    Intake/Output                             09/03/21 0701 - 09/04/21 0700 09/04/21 0701 - 09/05/21 0700 " 21 0701 - 21 0700     0732-3680 1100-5933 Total 7265-0041 1625-1873 Total 6942-8244 0470-7305 Total                    Intake    P.O.  410  -- 410  1200  -- 1200  480  -- 480    Total Intake 410 -- 410 1200 -- 1200 480 -- 480       Output    Total Output -- -- -- -- -- -- -- -- --           Intake/Output Summary (Last 24 hours) at 2021 1128  Last data filed at 2021 1017  Gross per 24 hour   Intake 1200 ml   Output --   Net 1200 ml        Intake/Output Summary (Last 24 hours) at 2021 1128  Last data filed at 2021 1017  Gross per 24 hour   Intake 1200 ml   Output --   Net 1200 ml        Review of Systems   Unable to perform ROS: Mental status change       Physical Exam  Vitals and nursing note reviewed.   Constitutional:       General: He is not in acute distress.     Appearance: He is well-developed. He is morbidly obese.   HENT:      Head: Normocephalic.   Eyes:      Conjunctiva/sclera: Conjunctivae normal.   Neck:      Thyroid: No thyromegaly.      Vascular: No JVD.   Cardiovascular:      Rate and Rhythm: Normal rate and regular rhythm.      Heart sounds: Normal heart sounds. No murmur heard.     Pulmonary:      Effort: Pulmonary effort is normal. No tachypnea, accessory muscle usage or respiratory distress.      Breath sounds: Examination of the right-lower field reveals decreased breath sounds. Examination of the left-lower field reveals decreased breath sounds. Decreased breath sounds present. No wheezing or rales.   Abdominal:      General: Bowel sounds are normal. There is no distension.      Palpations: Abdomen is soft.      Tenderness: There is no abdominal tenderness. There is no guarding.   Musculoskeletal:      Right lower le+ Pitting Edema present.      Left lower le+ Pitting Edema present.   Skin:     General: Skin is warm and dry.      Findings: No rash.   Neurological:      General: No focal deficit present.      Mental Status: He is alert. He is disoriented and  confused.      Comments: Oriented to person and place only         Medication Review:   I have reviewed the patient's current medication list  Scheduled Meds:albuterol sulfate HFA, 2 puff, Inhalation, 4x Daily - RT  aspirin, 81 mg, Oral, Daily  atorvastatin, 40 mg, Oral, Daily  carbidopa-levodopa, 1.5 tablet, Oral, TID  dexamethasone, 6 mg, Intravenous, Daily  enoxaparin, 40 mg, Subcutaneous, Q24H  guaiFENesin, 1,200 mg, Oral, BID  haloperidol lactate, 1 mg, Intravenous, Once  insulin aspart, 0-9 Units, Subcutaneous, TID AC  lisinopril, 20 mg, Oral, Q12H  nebivolol, 10 mg, Oral, Daily  pantoprazole, 40 mg, Oral, Daily  remdesivir, 100 mg, Intravenous, Q24H  senna-docusate sodium, 2 tablet, Oral, BID      Continuous Infusions:   PRN Meds:.•  acetaminophen  •  albuterol sulfate HFA  •  senna-docusate sodium **AND** polyethylene glycol **AND** bisacodyl **AND** bisacodyl  •  haloperidol lactate  •  hydrALAZINE  •  magnesium sulfate **OR** magnesium sulfate in D5W 1g/100mL (PREMIX) **OR** magnesium sulfate  •  ondansetron **OR** ondansetron  •  potassium chloride **OR** potassium chloride **OR** potassium chloride  •  sodium chloride      Labs:  Results from last 7 days   Lab Units 09/05/21  0705 09/03/21  0629 09/02/21  1353   WBC 10*3/mm3 7.94 5.36 4.42   HEMOGLOBIN g/dL 13.0 12.6* 12.6*   HEMATOCRIT % 38.4 39.9 37.5   PLATELETS 10*3/mm3 202 173 166     Results from last 7 days   Lab Units 09/05/21  0705 09/04/21  0649 09/03/21  0629 09/02/21  1353   SODIUM mmol/L 142 142 139 139   POTASSIUM mmol/L 3.3* 3.4* 2.8* 3.0*   CHLORIDE mmol/L 105 104 102 101   CO2 mmol/L 27.6 22.9 26.2 27.4   BUN mg/dL 17 13 10 16   CREATININE mg/dL 0.53* 0.62* 0.53* 0.71*   CALCIUM mg/dL 8.7 8.8 8.2* 8.5*   BILIRUBIN mg/dL 0.9 0.9 0.7 0.7   ALK PHOS U/L 68 73 73 77   ALT (SGPT) U/L 11 35 7 34   AST (SGOT) U/L 34 45* 46* 41*   GLUCOSE mg/dL 237* 195* 170* 206*     Results from last 7 days   Lab Units 09/04/21  0649 09/02/21  1353   MAGNESIUM  mg/dL 1.7 1.4*       Results from last 7 days   Lab Units 09/05/21  0705 09/04/21  0649 09/04/21  0604 09/03/21  0629 09/03/21  0628 09/02/21  1353 09/02/21  1353   LDH U/L 433*  --   --  409*  --   --   --    AST (SGOT) U/L 34 45*  --  46*  --    < > 41*   ALT (SGPT) U/L 11 35  --  7  --    < > 34   PROCALCITONIN ng/mL  --  0.08  --   --   --   --  0.08   LACTATE mmol/L  --   --   --   --   --   --  2.0   FERRITIN ng/mL 1,649.00*  --   --  1,117.00*  --   --  1,110.00*   D DIMER QUANT MCGFEU/mL 0.75*  --   --   --  0.67*  --  1.14*   CRP mg/dL  --   --  12.76*  --  33.31*  --   --    PLATELETS 10*3/mm3 202  --   --  173  --   --  166    < > = values in this interval not displayed.         Lab Results (last 24 hours)     Procedure Component Value Units Date/Time    Ferritin [[id number]]  (Abnormal) Collected: 09/05/21 0705    Specimen: Blood Updated: 09/05/21 0831     Ferritin 1,649.00 ng/mL     Narrative:      Results may be falsely decreased if patient taking Biotin.      POC Glucose Once [[id number]]  (Abnormal) Collected: 09/05/21 0742    Specimen: Blood Updated: 09/05/21 0748     Glucose 209 mg/dL      Comment: Meter: [id number] : [id number] Valdemar Luisana JENNIFER       Sedimentation Rate [[id number]]  (Abnormal) Collected: 09/05/21 0705    Specimen: Blood Updated: 09/05/21 0743     Sed Rate 50 mm/hr     Comprehensive Metabolic Panel [[id number]]  (Abnormal) Collected: 09/05/21 0705    Specimen: Blood Updated: 09/05/21 0734     Glucose 237 mg/dL      BUN 17 mg/dL      Creatinine 0.53 mg/dL      Sodium 142 mmol/L      Potassium 3.3 mmol/L      Comment: Slight hemolysis detected by analyzer. Results may be affected.        Chloride 105 mmol/L      CO2 27.6 mmol/L      Calcium 8.7 mg/dL      Total Protein 6.0 g/dL      Albumin 2.80 g/dL      ALT (SGPT) 11 U/L      AST (SGOT) 34 U/L      Alkaline Phosphatase 68 U/L      Total Bilirubin 0.9 mg/dL      eGFR Non African Amer >150 mL/min/1.73      Globulin 3.2 gm/dL       A/G Ratio 0.9 g/dL      BUN/Creatinine Ratio 32.1     Anion Gap 9.4 mmol/L     Narrative:      GFR Normal >60  Chronic Kidney Disease <60  Kidney Failure <15      Lactate Dehydrogenase [[id number]]  (Abnormal) Collected: 09/05/21 0705    Specimen: Blood Updated: 09/05/21 0734      U/L      Comment: Specimen hemolyzed.  Results may be affected.       Troponin [[id number]]  (Normal) Collected: 09/05/21 0705    Specimen: Blood Updated: 09/05/21 0730     Troponin T 0.022 ng/mL     Narrative:      Troponin T Reference Range:  <= 0.03 ng/mL-   Negative for AMI  >0.03 ng/mL-     Abnormal for myocardial necrosis.  Clinicians would have to utilize clinical acumen, EKG, Troponin and serial changes to determine if it is an Acute Myocardial Infarction or myocardial injury due to an underlying chronic condition.       Results may be falsely decreased if patient taking Biotin.      D-dimer, Quantitative [[id number]]  (Abnormal) Collected: 09/05/21 0705    Specimen: Blood Updated: 09/05/21 0729     D-Dimer, Quantitative 0.75 MCGFEU/mL     Narrative:      Can be elevated in, but is not diagnostic for deep vein thrombosis (DVT) or pulmonary embolis (PE).  It is also elevated in other medical conditions.  Clinical correlation is required.  The negative cut-off value for the D-Dimer is 0.50 mcg FEU/mL for DVT and PE.      CBC & Differential [[id number]]  (Abnormal) Collected: 09/05/21 0705    Specimen: Blood Updated: 09/05/21 0722    Narrative:      The following orders were created for panel order CBC & Differential.  Procedure                               Abnormality         Status                     ---------                               -----------         ------                     CBC Auto Differential[[id number]]        Abnormal            Final result                 Please view results for these tests on the individual orders.    CBC Auto Differential [[id number]]  (Abnormal) Collected: 09/05/21 0705    Specimen: Blood  Updated: 09/05/21 0722     WBC 7.94 10*3/mm3      RBC 4.26 10*6/mm3      Hemoglobin 13.0 g/dL      Hematocrit 38.4 %      MCV 90.1 fL      MCH 30.5 pg      MCHC 33.9 g/dL      RDW 14.6 %      RDW-SD 47.9 fl      MPV 9.6 fL      Platelets 202 10*3/mm3      Neutrophil % 85.2 %      Lymphocyte % 6.5 %      Monocyte % 6.2 %      Eosinophil % 0.0 %      Basophil % 0.3 %      Immature Grans % 1.8 %      Neutrophils, Absolute 6.77 10*3/mm3      Lymphocytes, Absolute 0.52 10*3/mm3      Monocytes, Absolute 0.49 10*3/mm3      Eosinophils, Absolute 0.00 10*3/mm3      Basophils, Absolute 0.02 10*3/mm3      Immature Grans, Absolute 0.14 10*3/mm3      nRBC 0.0 /100 WBC     C-reactive Protein [[id number]] Collected: 09/05/21 0705    Specimen: Blood Updated: 09/05/21 0708    POC Glucose Once [[id number]]  (Abnormal) Collected: 09/04/21 2211    Specimen: Blood Updated: 09/04/21 2217     Glucose 225 mg/dL      Comment: Meter: [id number] : [id number] Jose Johnston [facility]       POC Glucose Once [[id number]]  (Abnormal) Collected: 09/04/21 1636    Specimen: Blood Updated: 09/04/21 1642     Glucose 208 mg/dL      Comment: Meter: [id number] : [id number] Valdemar Lieberman CNA       Blood Culture - Blood, Arm, Left [[id number]] Collected: 09/02/21 1512    Specimen: Blood from Arm, Left Updated: 09/04/21 1532     Blood Culture No growth at 2 days        Results from last 7 days   Lab Units 09/05/21  0705 09/04/21  0649 09/03/21  0629 09/03/21  0628 09/02/21  1353   CK TOTAL U/L  --   --  776*  --  765*   TROPONIN T ng/mL 0.022 0.033*  --   --  0.122*   D DIMER QUANT MCGFEU/mL 0.75*  --   --  0.67* 1.14*         Results from last 7 days   Lab Units 09/02/21  1512   PROBNP pg/mL 2,448.0*         Results from last 7 days   Lab Units 09/04/21  0649 09/02/21  1353   MAGNESIUM mg/dL 1.7 1.4*         Results from last 7 days   Lab Units 09/04/21  0450 09/03/21  1720   PH, ARTERIAL pH units 7.547* 7.535*   PCO2, ARTERIAL mm Hg 29.7* 33.9*   PO2 ART mm  Hg 72.4* 60.7*   HCO3 ART mmol/L 25.7 28.6*     Results from last 7 days   Lab Units 09/04/21  0952   HEMOGLOBIN A1C % 5.50     Glucose   Date/Time Value Ref Range Status   09/05/2021 0742 209 (H) 70 - 130 mg/dL Final     Comment:     Meter: [id number] : [id number] Valdemar Lieberman CNA   09/04/2021 2211 225 (H) 70 - 130 mg/dL Final     Comment:     Meter: [id number] : [id number] Jose Preston PCA   09/04/2021 1636 208 (H) 70 - 130 mg/dL Final     Comment:     Meter: [id number] : [id number] Valdemar Lieberman CNA   09/04/2021 1108 205 (H) 70 - 130 mg/dL Final     Comment:     Meter: [id number] : [id number] Valdemar Lieberman CNA   09/04/2021 0747 196 (H) 70 - 130 mg/dL Final     Comment:     Meter: [id number] : [id number] Valdemar Lieberman CNA   09/03/2021 2104 157 (H) 70 - 130 mg/dL Final     Comment:     Meter: [id number] : [id number] Jose G Alvarengai NA   09/03/2021 1636 201 (H) 70 - 130 mg/dL Final     Comment:     Meter: [id number] : [id number] Cornelius Mendoza CNA   09/03/2021 1223 245 (H) 70 - 130 mg/dL Final     Comment:     Meter: [id number] : [id number] Cornelius Mendoza CNA     Results from last 7 days   Lab Units 09/04/21  0649 09/02/21  1353   PROCALCITONIN ng/mL 0.08 0.08   LACTATE mmol/L  --  2.0     Results from last 7 days   Lab Units 09/02/21  1512 09/02/21  1350   BLOODCX  No growth at 2 days No growth at 2 days     Results from last 7 days   Lab Units 09/02/21  1359   NITRITE UA  Negative   WBC UA /HPF 3-5*   BACTERIA UA /HPF 1+*   SQUAM EPITHEL UA /HPF 0-2     Results from last 7 days   Lab Units 09/02/21  1341   INFLUENZA B PCR  Not Detected   INFLUENZA A PCR  Not Detected         Radiology:  Imaging Results (Last 24 Hours)     ** No results found for the last 24 hours. **          Cardiology:  ECG/EMG Results (last 24 hours)     Procedure Component Value Units Date/Time    ECG 12 Lead [[id number]] Collected: 09/02/21 1348     Updated: 09/02/21 1352     QT Interval 467 ms      Narrative:      HEART RATE= 68  bpm  RR Interval= 880  ms  OK Interval= 211  ms  P Horizontal Axis= 8  deg  P Front Axis= 52  deg  QRSD Interval= 130  ms  QT Interval= 467  ms  QRS Axis= -14  deg  T Wave Axis= -6  deg  - ABNORMAL ECG -  Sinus rhythm  Nonspecific intraventricular conduction delay  Lateral infarct, age indeterminate  Electronically Signed By:   Date and Time of Study: 2021-09-02 13:48:28          I have reviewed recent labs results and consult notes.  Parts of this note may have been copied and pasted but patient was examined and interviewed by me today    Assessment and Plan:    1.  COVID-19 pneumonia generalized weakness .  Remdesivir and Decadron initiated on 9 /4 continue supportive care    2.  Metabolic encephalopathy likely due to COVID-19 slowly improving     3.  Mild rhabdomyolysis table supportive.  Reports he could not fluids because of fluid overload we will continue to monitor    4.  Hypertension improved but still under control we will adjust medications     5.  Adult-onset diabetes mellitus Accu-Chek sliding scale insulin     6.    Acute hypoxic respiratory failure with tachypnea resolved back on room air     7.  Parkinson's disease nothing acute continue with home medications     8.  Moderate lower extremity dependent edema secondary to venous stasis at baseline      9.  Hypokalemia oral potassium has been ordered     10.    COPD no evidence of exacerbation continue with as needed albuterol    11.  Elevated troponin today 0.22 no evidence of acute cardiac injury    12.  DVT prophylaxis Lovenox  Much of this encounter note is an electronic transcription/translation of spoken language to printed text using Dragon Software

## 2021-09-05 NOTE — PROGRESS NOTES
"      [facility]         Dr Smith Sayied   LOS: 3 days   Patient Care Team:  Reno Nassar MD as PCP - General (Family Medicine)    Chief Complaint: Acute COVID-19 pneumonia acute hypoxemic respiratory failure COPD Parkinson's dementia    Interval History: Patient has been transitioned to room air and is tolerating well.  Still remains weak    REVIEW OF SYSTEMS:   Unreliable    Ventilator/Non-Invasive Ventilation Settings (From admission, onward)    None            Vital Signs  Temp:  [97.4 °F (36.3 °C)-98.9 °F (37.2 °C)] 98 °F (36.7 °C)  Heart Rate:  [57-64] 57  Resp:  [22-40] 24  BP: (142-185)/(65-80) 170/77    Intake/Output Summary (Last 24 hours) at 9/5/2021 1023  Last data filed at 9/5/2021 1017  Gross per 24 hour   Intake 1200 ml   Output --   Net 1200 ml     Flowsheet Rows      First Filed Value   Admission Height  182.9 cm (72\") Documented at 09/02/2021 1411   Admission Weight  115 kg (254 lb 4.8 oz) Documented at 09/02/2021 1502          Physical Exam:  Patient is examined using the personal protective equipment as per guidelines from infection control for this particular patient as enacted.  Hand hygiene was performed before and after patient interaction.   General Appearance:    Alert, cooperative, in no acute distress.  Following simple commands  Neck midline trachea, no thyromegaly   Lungs:    Diminished breath sounds with rhonchi in the bases    Heart:    Regular rhythm and normal rate, normal S1 and S2, no            murmur, no gallop, no rub, no click   Chest Wall:    No abnormalities observed   Abdomen:     Normal bowel sounds, no masses, no organomegaly, soft        nontender, nondistended, no guarding, no rebound                tenderness   Extremities:   Moves all extremities well, no edema, no cyanosis, no             redness  CNS no focal neurological deficits normal sensory exam  Skin no rashes no nodules  Musculoskeletal no cyanosis no clubbing normal range of " motion     Results Review:        Results from last 7 days   Lab Units 09/05/21  0705 09/04/21  0649 09/04/21  0649 09/03/21  0629 09/03/21  0629   SODIUM mmol/L 142  --  142  --  139   POTASSIUM mmol/L 3.3*  --  3.4*  --  2.8*   CHLORIDE mmol/L 105  --  104  --  102   CO2 mmol/L 27.6  --  22.9  --  26.2   BUN mg/dL 17  --  13  --  10   CREATININE mg/dL 0.53*  --  0.62*  --  0.53*   GLUCOSE mg/dL 237*   < > 195*   < > 170*   CALCIUM mg/dL 8.7  --  8.8  --  8.2*    < > = values in this interval not displayed.     Results from last 7 days   Lab Units 09/05/21  0705 09/04/21  0649 09/03/21  0629 09/02/21  1353   CK TOTAL U/L  --   --  776* 765*   TROPONIN T ng/mL 0.022 0.033*  --  0.122*     Results from last 7 days   Lab Units 09/05/21  0705 09/03/21  0629 09/02/21  1353   WBC 10*3/mm3 7.94 5.36 4.42   HEMOGLOBIN g/dL 13.0 12.6* 12.6*   HEMATOCRIT % 38.4 39.9 37.5   PLATELETS 10*3/mm3 202 173 166             Results from last 7 days   Lab Units 09/04/21  0649   MAGNESIUM mg/dL 1.7         Results from last 7 days   Lab Units 09/04/21  0450   PH, ARTERIAL pH units 7.547*   PO2 ART mm Hg 72.4*   PCO2, ARTERIAL mm Hg 29.7*   HCO3 ART mmol/L 25.7       I reviewed the patient's new clinical results.  I personally viewed and interpreted the patient's chest x-ray.        Medication Review:   albuterol sulfate HFA, 2 puff, Inhalation, 4x Daily - RT  aspirin, 81 mg, Oral, Daily  atorvastatin, 40 mg, Oral, Daily  carbidopa-levodopa, 1.5 tablet, Oral, TID  dexamethasone, 6 mg, Intravenous, Daily  enoxaparin, 40 mg, Subcutaneous, Q24H  guaiFENesin, 1,200 mg, Oral, BID  haloperidol lactate, 1 mg, Intravenous, Once  insulin aspart, 0-9 Units, Subcutaneous, TID AC  lisinopril, 20 mg, Oral, Q12H  nebivolol, 10 mg, Oral, Daily  pantoprazole, 40 mg, Oral, Daily  remdesivir, 100 mg, Intravenous, Q24H  senna-docusate sodium, 2 tablet, Oral, BID             ASSESSMENT:   Acu COVID-19 pneumonia  Acute hypoxemic respiratory  failure  Metabolic encephalopathy  COPD  Parkinson's disease  Diabetes mellitus  Chronic nocturnal hypoxemia  Chronic lower extremity edema  Dementia    PLAN:  Patient has been transitioned to room air and tolerating well.  Continue Decadron 6 mg daily and complete remdesivir for 5 days  Ambulate and mobilize  Keep volume status on the dry side  Watch for aspiration  May need some rehab down the road for weakness  Nothing further to add  We will sign off.      Luis Zafar MD  09/05/21  10:23 EDT

## 2021-09-06 LAB
ALBUMIN SERPL-MCNC: 3 G/DL (ref 3.5–5.2)
ALBUMIN/GLOB SERPL: 1 G/DL
ALP SERPL-CCNC: 72 U/L (ref 39–117)
ALT SERPL W P-5'-P-CCNC: 20 U/L (ref 1–41)
ANION GAP SERPL CALCULATED.3IONS-SCNC: 12.2 MMOL/L (ref 5–15)
AST SERPL-CCNC: 30 U/L (ref 1–40)
BASOPHILS # BLD AUTO: 0.02 10*3/MM3 (ref 0–0.2)
BASOPHILS NFR BLD AUTO: 0.2 % (ref 0–1.5)
BILIRUB SERPL-MCNC: 1 MG/DL (ref 0–1.2)
BUN SERPL-MCNC: 18 MG/DL (ref 8–23)
BUN/CREAT SERPL: 36 (ref 7–25)
CALCIUM SPEC-SCNC: 8.7 MG/DL (ref 8.6–10.5)
CHLORIDE SERPL-SCNC: 106 MMOL/L (ref 98–107)
CO2 SERPL-SCNC: 23.8 MMOL/L (ref 22–29)
CREAT SERPL-MCNC: 0.5 MG/DL (ref 0.76–1.27)
CRP SERPL-MCNC: 7.76 MG/DL (ref 0–0.5)
DEPRECATED RDW RBC AUTO: 48.9 FL (ref 37–54)
EOSINOPHIL # BLD AUTO: 0 10*3/MM3 (ref 0–0.4)
EOSINOPHIL NFR BLD AUTO: 0 % (ref 0.3–6.2)
ERYTHROCYTE [DISTWIDTH] IN BLOOD BY AUTOMATED COUNT: 14.6 % (ref 12.3–15.4)
ERYTHROCYTE [SEDIMENTATION RATE] IN BLOOD: 36 MM/HR (ref 0–20)
FERRITIN SERPL-MCNC: 1984 NG/ML (ref 30–400)
GFR SERPL CREATININE-BSD FRML MDRD: >150 ML/MIN/1.73
GLOBULIN UR ELPH-MCNC: 2.9 GM/DL
GLUCOSE BLDC GLUCOMTR-MCNC: 233 MG/DL (ref 70–130)
GLUCOSE BLDC GLUCOMTR-MCNC: 246 MG/DL (ref 70–130)
GLUCOSE BLDC GLUCOMTR-MCNC: 284 MG/DL (ref 70–130)
GLUCOSE SERPL-MCNC: 274 MG/DL (ref 65–99)
HCT VFR BLD AUTO: 42.5 % (ref 37.5–51)
HGB BLD-MCNC: 13.7 G/DL (ref 13–17.7)
IMM GRANULOCYTES # BLD AUTO: 0.17 10*3/MM3 (ref 0–0.05)
IMM GRANULOCYTES NFR BLD AUTO: 1.7 % (ref 0–0.5)
LYMPHOCYTES # BLD AUTO: 0.77 10*3/MM3 (ref 0.7–3.1)
LYMPHOCYTES NFR BLD AUTO: 7.6 % (ref 19.6–45.3)
MCH RBC QN AUTO: 29.6 PG (ref 26.6–33)
MCHC RBC AUTO-ENTMCNC: 32.2 G/DL (ref 31.5–35.7)
MCV RBC AUTO: 91.8 FL (ref 79–97)
MONOCYTES # BLD AUTO: 0.7 10*3/MM3 (ref 0.1–0.9)
MONOCYTES NFR BLD AUTO: 6.9 % (ref 5–12)
NEUTROPHILS NFR BLD AUTO: 8.5 10*3/MM3 (ref 1.7–7)
NEUTROPHILS NFR BLD AUTO: 83.6 % (ref 42.7–76)
NRBC BLD AUTO-RTO: 0 /100 WBC (ref 0–0.2)
PLATELET # BLD AUTO: 269 10*3/MM3 (ref 140–450)
PMV BLD AUTO: 10 FL (ref 6–12)
POTASSIUM SERPL-SCNC: 4.3 MMOL/L (ref 3.5–5.2)
PROT SERPL-MCNC: 5.9 G/DL (ref 6–8.5)
QT INTERVAL: 473 MS
RBC # BLD AUTO: 4.63 10*6/MM3 (ref 4.14–5.8)
SODIUM SERPL-SCNC: 142 MMOL/L (ref 136–145)
WBC # BLD AUTO: 10.16 10*3/MM3 (ref 3.4–10.8)

## 2021-09-06 PROCEDURE — 85652 RBC SED RATE AUTOMATED: CPT | Performed by: FAMILY MEDICINE

## 2021-09-06 PROCEDURE — 94799 UNLISTED PULMONARY SVC/PX: CPT

## 2021-09-06 PROCEDURE — 97530 THERAPEUTIC ACTIVITIES: CPT

## 2021-09-06 PROCEDURE — 97110 THERAPEUTIC EXERCISES: CPT

## 2021-09-06 PROCEDURE — 82728 ASSAY OF FERRITIN: CPT | Performed by: FAMILY MEDICINE

## 2021-09-06 PROCEDURE — 86140 C-REACTIVE PROTEIN: CPT | Performed by: FAMILY MEDICINE

## 2021-09-06 PROCEDURE — 25010000002 DEXAMETHASONE PER 1 MG: Performed by: NURSE PRACTITIONER

## 2021-09-06 PROCEDURE — 82962 GLUCOSE BLOOD TEST: CPT

## 2021-09-06 PROCEDURE — 80053 COMPREHEN METABOLIC PANEL: CPT | Performed by: FAMILY MEDICINE

## 2021-09-06 PROCEDURE — 25010000002 HALOPERIDOL LACTATE PER 5 MG: Performed by: FAMILY MEDICINE

## 2021-09-06 PROCEDURE — 85025 COMPLETE CBC W/AUTO DIFF WBC: CPT | Performed by: FAMILY MEDICINE

## 2021-09-06 PROCEDURE — 63710000001 INSULIN ASPART PER 5 UNITS: Performed by: FAMILY MEDICINE

## 2021-09-06 PROCEDURE — 25010000002 ENOXAPARIN PER 10 MG: Performed by: FAMILY MEDICINE

## 2021-09-06 RX ADMIN — GUAIFENESIN 1200 MG: 600 TABLET, EXTENDED RELEASE ORAL at 20:38

## 2021-09-06 RX ADMIN — REMDESIVIR 100 MG: 100 INJECTION, POWDER, LYOPHILIZED, FOR SOLUTION INTRAVENOUS at 09:11

## 2021-09-06 RX ADMIN — CARBIDOPA AND LEVODOPA 1.5 TABLET: 25; 100 TABLET ORAL at 17:44

## 2021-09-06 RX ADMIN — ALBUTEROL SULFATE 2 PUFF: 90 AEROSOL, METERED RESPIRATORY (INHALATION) at 10:49

## 2021-09-06 RX ADMIN — CARBIDOPA AND LEVODOPA 1.5 TABLET: 25; 100 TABLET ORAL at 09:06

## 2021-09-06 RX ADMIN — INSULIN ASPART 6 UNITS: 100 INJECTION, SOLUTION INTRAVENOUS; SUBCUTANEOUS at 12:39

## 2021-09-06 RX ADMIN — ASPIRIN 81 MG: 81 TABLET, COATED ORAL at 09:06

## 2021-09-06 RX ADMIN — SENNOSIDES AND DOCUSATE SODIUM 2 TABLET: 50; 8.6 TABLET ORAL at 20:38

## 2021-09-06 RX ADMIN — INSULIN ASPART 4 UNITS: 100 INJECTION, SOLUTION INTRAVENOUS; SUBCUTANEOUS at 17:45

## 2021-09-06 RX ADMIN — HALOPERIDOL LACTATE 1 MG: 5 INJECTION, SOLUTION INTRAMUSCULAR at 00:06

## 2021-09-06 RX ADMIN — INSULIN ASPART 4 UNITS: 100 INJECTION, SOLUTION INTRAVENOUS; SUBCUTANEOUS at 09:05

## 2021-09-06 RX ADMIN — ENOXAPARIN SODIUM 40 MG: 40 INJECTION SUBCUTANEOUS at 20:38

## 2021-09-06 RX ADMIN — LISINOPRIL 20 MG: 20 TABLET ORAL at 20:38

## 2021-09-06 RX ADMIN — HALOPERIDOL LACTATE 1 MG: 5 INJECTION, SOLUTION INTRAMUSCULAR at 22:13

## 2021-09-06 RX ADMIN — ALBUTEROL SULFATE 2 PUFF: 90 AEROSOL, METERED RESPIRATORY (INHALATION) at 19:08

## 2021-09-06 RX ADMIN — POTASSIUM CHLORIDE 10 MEQ: 750 TABLET, EXTENDED RELEASE ORAL at 09:06

## 2021-09-06 RX ADMIN — ATORVASTATIN CALCIUM 40 MG: 40 TABLET, FILM COATED ORAL at 09:06

## 2021-09-06 RX ADMIN — HALOPERIDOL LACTATE 1 MG: 5 INJECTION, SOLUTION INTRAMUSCULAR at 04:37

## 2021-09-06 RX ADMIN — DEXAMETHASONE SODIUM PHOSPHATE 6 MG: 4 INJECTION, SOLUTION INTRAMUSCULAR; INTRAVENOUS at 09:05

## 2021-09-06 RX ADMIN — NEBIVOLOL HYDROCHLORIDE 10 MG: 5 TABLET ORAL at 09:06

## 2021-09-06 RX ADMIN — LISINOPRIL 20 MG: 20 TABLET ORAL at 09:06

## 2021-09-06 RX ADMIN — PANTOPRAZOLE SODIUM 40 MG: 40 TABLET, DELAYED RELEASE ORAL at 09:06

## 2021-09-06 RX ADMIN — SENNOSIDES AND DOCUSATE SODIUM 2 TABLET: 50; 8.6 TABLET ORAL at 09:06

## 2021-09-06 RX ADMIN — ALBUTEROL SULFATE 2 PUFF: 90 AEROSOL, METERED RESPIRATORY (INHALATION) at 08:30

## 2021-09-06 RX ADMIN — GUAIFENESIN 1200 MG: 600 TABLET, EXTENDED RELEASE ORAL at 09:06

## 2021-09-06 RX ADMIN — ALBUTEROL SULFATE 2 PUFF: 90 AEROSOL, METERED RESPIRATORY (INHALATION) at 16:24

## 2021-09-06 RX ADMIN — CARBIDOPA AND LEVODOPA 1.5 TABLET: 25; 100 TABLET ORAL at 20:38

## 2021-09-06 NOTE — PROGRESS NOTES
"Daily Progress Note:      Chief complaint: Follow-up of COVID-19 infection, metabolic encephalopathy, rhabdomyolysis, hyperkalemia, diabetes, hypertension, Parkinson's disease    Subjective: Remains on room air.  Nurses and staff report periods of agitation confusion pulling out IVs and being disoriented.  During my exam he is awake alert he knows who I am he is oriented to person and place cooperative to the exam not agitated.       LOS: 4 days     Vital Signs  Temp:  [97.5 °F (36.4 °C)-98.5 °F (36.9 °C)] 98.4 °F (36.9 °C)  Heart Rate:  [64-73] 69  Resp:  [16-28] 16  BP: (141-187)/(63-82) 157/70  Oxygen Therapy  SpO2: 94 %  Pulse Oximetry Type: Continuous  Device (Oxygen Therapy): room air  $ High Flow Nasal Cannula Set-Up: yes  Flow (L/min): 40  Oxygen Concentration (%): 30  ETCO2 (mmHg): 27 mmHg  Probe Placed On (Pulse Ox): Left:, finger}  Body mass index is 33.95 kg/m².  Flowsheet Rows      First Filed Value   Admission Height  182.9 cm (72\") Documented at 09/02/2021 1411   Admission Weight  115 kg (254 lb 4.8 oz) Documented at 09/02/2021 1502                   Documented weights    09/02/21 1502 09/02/21 2241 09/06/21 1208   Weight: 115 kg (254 lb 4.8 oz) 108 kg (238 lb 12.8 oz) 107 kg (236 lb 9.6 oz)           Patient Vitals for the past 24 hrs:   BP Temp Temp src Pulse Resp SpO2 Weight   09/06/21 1208 157/70 98.4 °F (36.9 °C) Oral 69 16 94 % 107 kg (236 lb 9.6 oz)   09/06/21 1052 -- -- -- 69 16 94 % --   09/06/21 1049 -- -- -- 69 16 93 % --   09/06/21 0700 141/63 98.5 °F (36.9 °C) Oral 70 20 92 % --   09/05/21 2253 (!) 187/82 97.5 °F (36.4 °C) Oral 67 16 96 % --   09/05/21 2031 177/78 97.7 °F (36.5 °C) Oral 73 20 93 % --   09/05/21 1934 -- -- -- 73 20 -- --   09/05/21 1931 -- -- -- 72 20 -- --   09/05/21 1929 -- -- -- -- -- 93 % --   09/05/21 1756 -- -- -- 71 20 -- --   09/05/21 1752 -- -- -- 68 20 94 % --   09/05/21 1512 168/74 97.6 °F (36.4 °C) Oral 65 22 94 % --   09/05/21 1342 -- -- -- 64 28 -- -- "   09/05/21 1338 -- -- -- 64 28 92 % --       107 kg (236 lb 9.6 oz)    Intake/Output                             09/04/21 0701 - 09/05/21 0700 09/05/21 0701 - 09/06/21 0700 09/06/21 0701 - 09/07/21 0700     5280-4523 7126-0465 Total 2956-9025 9723-9458 Total 8561-6955 4298-3258 Total                    Intake    P.O.  1200  -- 1200  1320  -- 1320  360  -- 360    Total Intake 1200 -- 1200 1320 -- 1320 360 -- 360       Output    Urine  --  -- --  --  200 200  150  -- 150    Total Output -- -- -- -- 200 200 150 -- 150           Intake/Output Summary (Last 24 hours) at 9/6/2021 1252  Last data filed at 9/6/2021 1208  Gross per 24 hour   Intake 1200 ml   Output 350 ml   Net 850 ml        Intake/Output Summary (Last 24 hours) at 9/6/2021 1252  Last data filed at 9/6/2021 1208  Gross per 24 hour   Intake 1200 ml   Output 350 ml   Net 850 ml        Review of Systems   Unable to perform ROS: Mental status change       Physical Exam  Vitals and nursing note reviewed.   Constitutional:       General: He is not in acute distress.     Appearance: He is well-developed. He is morbidly obese.   HENT:      Head: Normocephalic.   Eyes:      Conjunctiva/sclera: Conjunctivae normal.   Neck:      Thyroid: No thyromegaly.      Vascular: No JVD.   Cardiovascular:      Rate and Rhythm: Normal rate and regular rhythm.      Heart sounds: Normal heart sounds. No murmur heard.     Pulmonary:      Effort: Pulmonary effort is normal. No tachypnea, accessory muscle usage or respiratory distress.      Breath sounds: Examination of the right-lower field reveals decreased breath sounds. Examination of the left-lower field reveals decreased breath sounds. Decreased breath sounds present. No wheezing or rales.   Abdominal:      General: Bowel sounds are normal. There is no distension.      Palpations: Abdomen is soft.      Tenderness: There is no abdominal tenderness. There is no guarding.   Musculoskeletal:      Right lower leg: No edema.      Left  lower leg: No edema.   Skin:     General: Skin is warm and dry.      Findings: No rash.   Neurological:      General: No focal deficit present.      Mental Status: He is alert. He is disoriented and confused.      Comments: Oriented to person and place only cooperative exam follows commands         Medication Review:   I have reviewed the patient's current medication list  Scheduled Meds:albuterol sulfate HFA, 2 puff, Inhalation, 4x Daily - RT  aspirin, 81 mg, Oral, Daily  atorvastatin, 40 mg, Oral, Daily  carbidopa-levodopa, 1.5 tablet, Oral, TID  dexamethasone, 6 mg, Oral, Daily With Breakfast  enoxaparin, 40 mg, Subcutaneous, Q24H  guaiFENesin, 1,200 mg, Oral, BID  haloperidol lactate, 1 mg, Intravenous, Once  insulin aspart, 0-9 Units, Subcutaneous, TID AC  lisinopril, 20 mg, Oral, Q12H  nebivolol, 10 mg, Oral, Daily  pantoprazole, 40 mg, Oral, Daily  potassium chloride, 10 mEq, Oral, Daily  senna-docusate sodium, 2 tablet, Oral, BID      Continuous Infusions:   PRN Meds:.•  acetaminophen  •  albuterol sulfate HFA  •  senna-docusate sodium **AND** polyethylene glycol **AND** bisacodyl **AND** bisacodyl  •  haloperidol lactate  •  hydrALAZINE  •  magnesium sulfate **OR** magnesium sulfate in D5W 1g/100mL (PREMIX) **OR** magnesium sulfate  •  ondansetron **OR** ondansetron  •  sodium chloride      Labs:  Results from last 7 days   Lab Units 09/06/21  0640 09/05/21  0705 09/03/21  0629 09/02/21  1353 09/02/21  1353   WBC 10*3/mm3 10.16 7.94 5.36  --  4.42   HEMOGLOBIN g/dL 13.7 13.0 12.6*   < > 12.6*   HEMATOCRIT % 42.5 38.4 39.9  --  37.5   PLATELETS 10*3/mm3 269 202 173  --  166    < > = values in this interval not displayed.     Results from last 7 days   Lab Units 09/06/21  0640 09/05/21  0705 09/04/21  0649 09/03/21  0629 09/02/21  1353   SODIUM mmol/L 142 142 142 139 139   POTASSIUM mmol/L 4.3 3.3* 3.4* 2.8* 3.0*   CHLORIDE mmol/L 106 105 104 102 101   CO2 mmol/L 23.8 27.6 22.9 26.2 27.4   BUN mg/dL 18 17 13  10 16   CREATININE mg/dL 0.50* 0.53* 0.62* 0.53* 0.71*   CALCIUM mg/dL 8.7 8.7 8.8 8.2* 8.5*   BILIRUBIN mg/dL 1.0 0.9 0.9 0.7 0.7   ALK PHOS U/L 72 68 73 73 77   ALT (SGPT) U/L 20 11 35 7 34   AST (SGOT) U/L 30 34 45* 46* 41*   GLUCOSE mg/dL 274* 237* 195* 170* 206*     Results from last 7 days   Lab Units 09/04/21  0649 09/02/21  1353   MAGNESIUM mg/dL 1.7 1.4*       Results from last 7 days   Lab Units 09/06/21  0640 09/05/21  0705 09/04/21  0649 09/04/21  0604 09/03/21  0629 09/03/21  0629 09/03/21  0628 09/03/21  0628 09/02/21  1353 09/02/21  1353   LDH U/L  --  433*  --   --   --  409*  --   --   --   --    AST (SGOT) U/L 30 34 45*  --    < > 46*  --   --    < > 41*   ALT (SGPT) U/L 20 11 35  --    < > 7  --   --    < > 34   PROCALCITONIN ng/mL  --   --  0.08  --   --   --   --   --   --  0.08   LACTATE mmol/L  --   --   --   --   --   --   --   --   --  2.0   FERRITIN ng/mL 1,984.00* 1,649.00*  --   --   --  1,117.00*  --   --    < > 1,110.00*   D DIMER QUANT MCGFEU/mL  --  0.75*  --   --   --   --   --  0.67*  --  1.14*   CRP mg/dL 7.76* 14.44*  --  12.76*  --   --    < > 33.31*  --   --    PLATELETS 10*3/mm3 269 202  --   --   --  173  --   --    < > 166    < > = values in this interval not displayed.         Lab Results (last 24 hours)     Procedure Component Value Units Date/Time    POC Glucose Once [[id number]]  (Abnormal) Collected: 09/06/21 1213    Specimen: Blood Updated: 09/06/21 1221     Glucose 284 mg/dL      Comment: Meter: [id number] : [id number] Jaskaran GLEASON       C-reactive Protein [[id number]]  (Abnormal) Collected: 09/06/21 0640    Specimen: Blood Updated: 09/06/21 1218     C-Reactive Protein 7.76 mg/dL     POC Glucose Once [[id number]]  (Abnormal) Collected: 09/06/21 0803    Specimen: Blood Updated: 09/06/21 0808     Glucose 246 mg/dL      Comment: Meter: [id number] : [id number] Jaskaran GLEASON       Ferritin [[id number]]  (Abnormal) Collected: 09/06/21 0640     Specimen: Blood Updated: 09/06/21 0732     Ferritin 1,984.00 ng/mL     Narrative:      Results may be falsely decreased if patient taking Biotin.      Comprehensive Metabolic Panel [[id number]]  (Abnormal) Collected: 09/06/21 0640    Specimen: Blood Updated: 09/06/21 0722     Glucose 274 mg/dL      BUN 18 mg/dL      Creatinine 0.50 mg/dL      Sodium 142 mmol/L      Potassium 4.3 mmol/L      Chloride 106 mmol/L      CO2 23.8 mmol/L      Calcium 8.7 mg/dL      Total Protein 5.9 g/dL      Albumin 3.00 g/dL      ALT (SGPT) 20 U/L      AST (SGOT) 30 U/L      Alkaline Phosphatase 72 U/L      Total Bilirubin 1.0 mg/dL      eGFR Non African Amer >150 mL/min/1.73      Globulin 2.9 gm/dL      A/G Ratio 1.0 g/dL      BUN/Creatinine Ratio 36.0     Anion Gap 12.2 mmol/L     Narrative:      GFR Normal >60  Chronic Kidney Disease <60  Kidney Failure <15      Sedimentation Rate [[id number]]  (Abnormal) Collected: 09/06/21 0640    Specimen: Blood Updated: 09/06/21 0717     Sed Rate 36 mm/hr     CBC & Differential [[id number]]  (Abnormal) Collected: 09/06/21 0640    Specimen: Blood Updated: 09/06/21 0648    Narrative:      The following orders were created for panel order CBC & Differential.  Procedure                               Abnormality         Status                     ---------                               -----------         ------                     CBC Auto Differential[[id number]]        Abnormal            Final result                 Please view results for these tests on the individual orders.    CBC Auto Differential [[id number]]  (Abnormal) Collected: 09/06/21 0640    Specimen: Blood Updated: 09/06/21 0648     WBC 10.16 10*3/mm3      RBC 4.63 10*6/mm3      Hemoglobin 13.7 g/dL      Hematocrit 42.5 %      MCV 91.8 fL      MCH 29.6 pg      MCHC 32.2 g/dL      RDW 14.6 %      RDW-SD 48.9 fl      MPV 10.0 fL      Platelets 269 10*3/mm3      Neutrophil % 83.6 %      Lymphocyte % 7.6 %      Monocyte % 6.9 %      Eosinophil  % 0.0 %      Basophil % 0.2 %      Immature Grans % 1.7 %      Neutrophils, Absolute 8.50 10*3/mm3      Lymphocytes, Absolute 0.77 10*3/mm3      Monocytes, Absolute 0.70 10*3/mm3      Eosinophils, Absolute 0.00 10*3/mm3      Basophils, Absolute 0.02 10*3/mm3      Immature Grans, Absolute 0.17 10*3/mm3      nRBC 0.0 /100 WBC     POC Glucose Once [[id number]]  (Abnormal) Collected: 09/05/21 2115    Specimen: Blood Updated: 09/05/21 2121     Glucose 273 mg/dL      Comment: Meter: [id number] : [id number] Elier Beatty RN Charge(Validator)           Results from last 7 days   Lab Units 09/05/21  0705 09/04/21  0649 09/03/21  0629 09/03/21  0628 09/02/21  1353   CK TOTAL U/L  --   --  776*  --  765*   TROPONIN T ng/mL 0.022 0.033*  --   --  0.122*   D DIMER QUANT MCGFEU/mL 0.75*  --   --  0.67* 1.14*         Results from last 7 days   Lab Units 09/02/21  1512   PROBNP pg/mL 2,448.0*         Results from last 7 days   Lab Units 09/04/21  0649 09/02/21  1353   MAGNESIUM mg/dL 1.7 1.4*         Results from last 7 days   Lab Units 09/04/21  0450 09/03/21  1720   PH, ARTERIAL pH units 7.547* 7.535*   PCO2, ARTERIAL mm Hg 29.7* 33.9*   PO2 ART mm Hg 72.4* 60.7*   HCO3 ART mmol/L 25.7 28.6*     Results from last 7 days   Lab Units 09/04/21  0952   HEMOGLOBIN A1C % 5.50     Glucose   Date/Time Value Ref Range Status   09/06/2021 1213 284 (H) 70 - 130 mg/dL Final     Comment:     Meter: [id number] : [id number] Jaskaran Thompsondie Doculogyat NA   09/06/2021 0803 246 (H) 70 - 130 mg/dL Final     Comment:     Meter: [id number] : [id number] Jaskaran Dianelys Float NA   09/05/2021 2115 273 (H) 70 - 130 mg/dL Final     Comment:     Meter: [id number] : [id number] Elier Beatty RN Charge(Validator)   09/05/2021 1642 289 (H) 70 - 130 mg/dL Final     Comment:     Meter: [id number] : [id number] Valdemar Lieberman CNA   09/05/2021 1159 230 (H) 70 - 130 mg/dL Final     Comment:     Meter: [id number] : [id number] Valdemar  Luisana CNA   09/05/2021 0742 209 (H) 70 - 130 mg/dL Final     Comment:     Meter: [id number] : [id number] Valdemar Lieberman CNA   09/04/2021 2211 225 (H) 70 - 130 mg/dL Final     Comment:     Meter: [id number] : [id number] Jose Johnston PCA   09/04/2021 1636 208 (H) 70 - 130 mg/dL Final     Comment:     Meter: [id number] : [id number] Valdemar STERNA     Results from last 7 days   Lab Units 09/04/21  0649 09/02/21  1353   PROCALCITONIN ng/mL 0.08 0.08   LACTATE mmol/L  --  2.0     Results from last 7 days   Lab Units 09/02/21  1512 09/02/21  1350   BLOODCX  No growth at 3 days No growth at 3 days     Results from last 7 days   Lab Units 09/02/21  1359   NITRITE UA  Negative   WBC UA /HPF 3-5*   BACTERIA UA /HPF 1+*   SQUAM EPITHEL UA /HPF 0-2     Results from last 7 days   Lab Units 09/02/21  1341   INFLUENZA B PCR  Not Detected   INFLUENZA A PCR  Not Detected         Radiology:  Imaging Results (Last 24 Hours)     ** No results found for the last 24 hours. **          Cardiology:  ECG/EMG Results (last 24 hours)     Procedure Component Value Units Date/Time    ECG 12 Lead [[id number]] Collected: 09/02/21 1348     Updated: 09/02/21 1352     QT Interval 467 ms     Narrative:      HEART RATE= 68  bpm  RR Interval= 880  ms  OR Interval= 211  ms  P Horizontal Axis= 8  deg  P Front Axis= 52  deg  QRSD Interval= 130  ms  QT Interval= 467  ms  QRS Axis= -14  deg  T Wave Axis= -6  deg  - ABNORMAL ECG -  Sinus rhythm  Nonspecific intraventricular conduction delay  Lateral infarct, age indeterminate  Electronically Signed By:   Date and Time of Study: 2021-09-02 13:48:28          I have reviewed recent labs results and consult notes.  Parts of this note may have been copied and pasted but patient was examined and interviewed by me today    Assessment and Plan:    1.  COVID-19 pneumonia generalized weakness .  Remdesivir and Decadron initiated on 9 /4.  Patient pulled out his IVs since is on room air.  Remdesivir  changed to p.o. Decadron.  His inflammatory markers have improved somewhat but ferritin significantly elevated    2.  Metabolic encephalopathy likely due to COVID-19 and periods of agitation and confusion continue to monitor     3.    Adult-onset diabetes mellitus with significant hyperglycemia on sliding scale insulin optimize glycemic control    4.  Hypertension improved but elevated at times due to patient agitation we will continue to monitor    5.    Acute hypoxic respiratory failure resolved     6.  Parkinson's disease nothing acute continue with home medications     7.    COPD no evidence of exacerbation continue with as needed albuterol    8.  DVT prophylaxis Lovenox    9.  Generalized weakness secondary to COVID-19 pneumonia comorbidities placement has been sent [facility] to nursing facility.  Hopefully be discharged home if patient continues to be stable    Much of this encounter note is an electronic transcription/translation of spoken language to printed text using Dragon Software

## 2021-09-06 NOTE — PLAN OF CARE
Goal Outcome Evaluation:  Plan of Care Reviewed With: patient        Progress: improving  Outcome Summary: Pt has improved, not requiring any O2 and remains on room air; pt remains confused thinking he is in [place] and not understanding why he is here in the hospital, pt had a fall out of bed this evening.

## 2021-09-06 NOTE — NURSING NOTE
Pts bed alarm sounded and pt was found kneeling on the floor beside his bed; pt stated that nothing was painful. PCP notified, no new orders.

## 2021-09-06 NOTE — PLAN OF CARE
Goal Outcome Evaluation:  Plan of Care Reviewed With: patient           Outcome Summary: OT: pt found in bed with B LEs off side of bed and pt had pulled IV out of L arm and placed on bedside table. notified RN. pt dependent x 2 for rolling in bed and push up towards HOB. educated and assist pt with bending B LEs to assist therapists with pushing towards self up towards HOB. pt unable to assist 2 cognition. pt encouraged to use call light and exit alarm in place.

## 2021-09-06 NOTE — PLAN OF CARE
Goal Outcome Evaluation:              Outcome Summary: Patient VSS, remains on RA. Patient with increased restlessness, Haldol administered during shift. Remains alert but disoriented. Bed alarm in place.

## 2021-09-06 NOTE — THERAPY TREATMENT NOTE
Acute Care - Occupational Therapy Treatment Note   Tori Simmons     Patient Name: Nash So  : 1945  MRN: [id number]  Today's Date: 2021             Admit Date: 2021       ICD-10-CM ICD-9-CM   1. COVID-19 virus infection  U07.1 079.89   2. Weakness  R53.1 780.79   3. Elevated troponin  R77.8 790.6   4. Contusion of face, initial encounter  S00.83XA 920     Patient Active Problem List   Diagnosis   • Vitamin D deficiency   • Diabetes mellitus (CMS/HCC)   • Back pain   • Benign prostatic hyperplasia   • Gastroesophageal reflux disease with esophagitis   • Hyperlipidemia   • Hypertension   • Hypogonadism in male   • Sebaceous cyst   • COPD (chronic obstructive pulmonary disease) (CMS/HCC)   • Encounter for screening for malignant neoplasm of colon   • COVID-19 virus infection     Past Medical History:   Diagnosis Date   • Alzheimer disease (CMS/HCC)    • CHF (congestive heart failure) (CMS/HCC)    • COPD (chronic obstructive pulmonary disease) (CMS/HCC)    • Diabetes (CMS/HCC)    • GERD (gastroesophageal reflux disease)    • Hyperlipemia    • Hypertension      Past Surgical History:   Procedure Laterality Date   • ENDOSCOPY N/A 2017    Procedure: ESOPHAGOGASTRODUODENOSCOPY, biopsy;  Surgeon: Natalia Guerrero MD;  Location: [facility];  Service:             OT ASSESSMENT FLOWSHEET (last 12 hours)      OT Evaluation and Treatment     Row Name 21 1113 21 1112 21 1110             OT Time and Intention    Subjective Information  --  --  no complaints  -JJ      Document Type  --  --  therapy note (daily note)  -JJ      Mode of Treatment  --  --  occupational therapy  -JJ      Comment  --  --  pt supine in bed, with B LEs off side of bed. pt had pulled IV out of arm and placed it on side table  -JJ         General Information    Patient Profile Reviewed  --  --  yes  -JJ      Existing Precautions/Restrictions  --  --  fall  -JJ      Barriers to Rehab  --  --  previous functional  deficit;cognitive status  -         Cognition    Personal Safety Interventions  --  --  gait belt;nonskid shoes/slippers when out of bed  -         Pain Assessment    Additional Documentation  --  --  -- no co pain  -         Bed Mobility    Rolling Left [place] (Bed Mobility)  --  dependent (less than 25% patient effort);2 person assist  -JJ  --      Rolling Right [place] (Bed Mobility)  --  dependent (less than 25% patient effort);2 person assist  -JJ  --      Scooting/Bridging [place] (Bed Mobility)  --  dependent (less than 25% patient effort);2 person assist  -JJ  --      Bed Mobility, Safety Issues  --  cognitive deficits limit understanding  -  --      Assistive Device (Bed Mobility)  --  bed rails;draw sheet;head of bed elevated  -  --      Comment (Bed Mobility)  --  pt educated and assist with bending B LEs to assist with pushing up towards HOB. pt unable to complete.   -JJ  --         Functional Mobility    Functional Mobility- Comment  --  unable to assess, confusion  -  --         Transfer Assessment/Treatment    Comment (Transfers)  --  unable to assess 2 confusion  -JJ  --         Wound 09/02/21 1452 Left elbow Skin Tear    Wound - Properties Group Placement Date: 09/02/21  -EF Placement Time: 1452  -EF Present on Hospital Admission: Y  -EF Side: Left  -EF Location: elbow  -EF Primary Wound Type: Skin tear  -EF    Retired Wound - Properties Group Date first assessed: 09/02/21  -EF Time first assessed: 1452  -EF Present on Hospital Admission: Y  -EF Side: Left  -EF Location: elbow  -EF Primary Wound Type: Skin tear  -EF       Wound 09/02/21 1453 Right elbow Skin Tear    Wound - Properties Group Placement Date: 09/02/21  -EF Placement Time: 1453  -EF Present on Hospital Admission: Y  -EF Side: Right  -EF Location: elbow  -EF Primary Wound Type: Skin tear  -EF    Retired Wound - Properties Group Date first assessed: 09/02/21  -EF Time first assessed: 1453  -EF Present on  Hospital Admission: Y  -EF Side: Right  -EF Location: elbow  -EF Primary Wound Type: Skin tear  -EF       Plan of Care Review    Plan of Care Reviewed With  patient  -LEWIS  --  --      Outcome Summary  OT: pt found in bed with B LEs off side of bed and pt had pulled IV out of L arm and placed on bedside table. notified RN. pt dependent x 2 for rolling in bed and push up towards HOB. educated and assist pt with bending B LEs to assist therapists with pushing towards self up towards HOB. pt unable to assist 2 cognition. pt encouraged to use call light and exit alarm in place.   -J  --  --         Positioning and Restraints    Pre-Treatment Position  in bed  -  --  --      Post Treatment Position  bed  -J  --  --      In Bed  supine;call light within reach;encouraged to call for assist;exit alarm on;notified nsg  -J  --  --         Progress Summary (OT)    Progress Toward Functional Goals (OT)  unable to show any progress toward functional goals  -J  --  --      Daily Progress Summary (OT)  cont poc  -  --  --        User Key  (r) = Recorded By, (t) = Taken By, (c) = Cosigned By    Initials Name Effective Dates    Luisana Yin, OTR 06/16/21 -     EF Radha Davila, RN 09/27/16 -            Occupational Therapy Education                 Title: PT OT SLP Therapies (In Progress)     Topic: Occupational Therapy (In Progress)     Point: ADL training (Done)     Description:   Instruct learner(s) on proper safety adaptation and remediation techniques during self care or transfers.   Instruct in proper use of assistive devices.              Learning Progress Summary           Patient Acceptance, E,TB, VU,NL by LEWIS at 9/6/2021 1211    Comment: pt educated on bed mobility and benefits of activity    Acceptance, E, NL by SD at 9/3/2021 1348    Comment: Education regarding OT services, benefits of activity and safety wtih bed mobility/transfers. Pt is confused (no evidence of learning).                    Point: Home exercise program (Not Started)     Description:   Instruct learner(s) on appropriate technique for monitoring, assisting and/or progressing therapeutic exercises/activities.              Learner Progress:  Not documented in this visit.                      User Key     Initials Effective Dates Name Provider Type Discipline    SD 06/16/21 -  Wade Pastrana, OTR Occupational Therapist OT    JJ 06/16/21 -  Luisana Peterson, OTR Occupational Therapist OT                  OT Recommendation and Plan     Progress Toward Functional Goals (OT): unable to show any progress toward functional goals  Plan of Care Review  Plan of Care Reviewed With: patient  Outcome Summary: OT: pt found in bed with B LEs off side of bed and pt had pulled IV out of L arm and placed on bedside table. notified RN. pt dependent x 2 for rolling in bed and push up towards HOB. educated and assist pt with bending B LEs to assist therapists with pushing towards self up towards HOB. pt unable to assist 2 cognition. pt encouraged to use call light and exit alarm in place.   Plan of Care Reviewed With: patient  Outcome Summary: OT: pt found in bed with B LEs off side of bed and pt had pulled IV out of L arm and placed on bedside table. notified RN. pt dependent x 2 for rolling in bed and push up towards HOB. educated and assist pt with bending B LEs to assist therapists with pushing towards self up towards HOB. pt unable to assist 2 cognition. pt encouraged to use call light and exit alarm in place.     Outcome Measures     Row Name 09/06/21 1109 09/04/21 0942          How much help from another person do you currently need...    Turning from your back to your side while in flat bed without using bedrails?  --  1  -MH     Moving from lying on back to sitting on the side of a flat bed without bedrails?  --  1  -MH     Moving to and from a bed to a chair (including a wheelchair)?  --  1  -MH     Standing up from a chair using your arms  (e.g., wheelchair, bedside chair)?  --  1  -MH     Climbing 3-5 steps with a railing?  --  1  -MH     To walk in hospital room?  --  1  -     AM-PAC 6 Clicks Score (PT)  --  6  -        How much help from another is currently needed...    Putting on and taking off regular lower body clothing?  1  -JJ  --     Bathing (including washing, rinsing, and drying)  1  -JJ  --     Toileting (which includes using toilet bed pan or urinal)  1  -JJ  --     Putting on and taking off regular upper body clothing  1  -JJ  --     Taking care of personal grooming (such as brushing teeth)  1  -JJ  --     Eating meals  1  -JJ  --     AM-PAC 6 Clicks Score (OT)  6  -JJ  --        Functional Assessment    Outcome Measure Options  --  AM-PAC 6 Clicks Basic Mobility (PT)  -       User Key  (r) = Recorded By, (t) = Taken By, (c) = Cosigned By    Initials Name Provider Type     Carroll Lynch, PTA Physical Therapy Assistant    Luisana Yin OTR Occupational Therapist          Time Calculation:   Time Calculation- OT     Row Name 09/06/21 1212             Time Calculation- OT    OT Start Time  1109  -        User Key  (r) = Recorded By, (t) = Taken By, (c) = Cosigned By    Initials Name Provider Type    Luisana Yin OTR Occupational Therapist        Therapy Charges for Today     Code Description Service Date Service Provider Modifiers Qty    [id number]  OT THERAPEUTIC ACT EA 15 MIN 9/6/2021 Luisana Peterson OTR GO 1               DENIA Marvin  9/6/2021

## 2021-09-07 LAB
ALBUMIN SERPL-MCNC: 2.9 G/DL (ref 3.5–5.2)
ALBUMIN/GLOB SERPL: 1 G/DL
ALP SERPL-CCNC: 72 U/L (ref 39–117)
ALT SERPL W P-5'-P-CCNC: 6 U/L (ref 1–41)
ANION GAP SERPL CALCULATED.3IONS-SCNC: 10.2 MMOL/L (ref 5–15)
AST SERPL-CCNC: 33 U/L (ref 1–40)
BACTERIA SPEC AEROBE CULT: NORMAL
BACTERIA SPEC AEROBE CULT: NORMAL
BASOPHILS # BLD AUTO: 0.01 10*3/MM3 (ref 0–0.2)
BASOPHILS NFR BLD AUTO: 0.1 % (ref 0–1.5)
BILIRUB SERPL-MCNC: 0.9 MG/DL (ref 0–1.2)
BUN SERPL-MCNC: 19 MG/DL (ref 8–23)
BUN/CREAT SERPL: 35.2 (ref 7–25)
CALCIUM SPEC-SCNC: 8.5 MG/DL (ref 8.6–10.5)
CHLORIDE SERPL-SCNC: 106 MMOL/L (ref 98–107)
CO2 SERPL-SCNC: 26.8 MMOL/L (ref 22–29)
CREAT SERPL-MCNC: 0.54 MG/DL (ref 0.76–1.27)
CRP SERPL-MCNC: 3.73 MG/DL (ref 0–0.5)
D DIMER PPP FEU-MCNC: 0.76 MCGFEU/ML (ref 0–0.46)
DEPRECATED RDW RBC AUTO: 48.6 FL (ref 37–54)
EOSINOPHIL # BLD AUTO: 0 10*3/MM3 (ref 0–0.4)
EOSINOPHIL NFR BLD AUTO: 0 % (ref 0.3–6.2)
ERYTHROCYTE [DISTWIDTH] IN BLOOD BY AUTOMATED COUNT: 14.3 % (ref 12.3–15.4)
ERYTHROCYTE [SEDIMENTATION RATE] IN BLOOD: 23 MM/HR (ref 0–20)
FERRITIN SERPL-MCNC: 1398 NG/ML (ref 30–400)
GFR SERPL CREATININE-BSD FRML MDRD: 148 ML/MIN/1.73
GLOBULIN UR ELPH-MCNC: 2.9 GM/DL
GLUCOSE BLDC GLUCOMTR-MCNC: 176 MG/DL (ref 70–130)
GLUCOSE BLDC GLUCOMTR-MCNC: 213 MG/DL (ref 70–130)
GLUCOSE BLDC GLUCOMTR-MCNC: 235 MG/DL (ref 70–130)
GLUCOSE BLDC GLUCOMTR-MCNC: 286 MG/DL (ref 70–130)
GLUCOSE SERPL-MCNC: 233 MG/DL (ref 65–99)
HCT VFR BLD AUTO: 40.4 % (ref 37.5–51)
HGB BLD-MCNC: 13.4 G/DL (ref 13–17.7)
IMM GRANULOCYTES # BLD AUTO: 0.13 10*3/MM3 (ref 0–0.05)
IMM GRANULOCYTES NFR BLD AUTO: 1.6 % (ref 0–0.5)
LDH SERPL-CCNC: 425 U/L (ref 135–225)
LYMPHOCYTES # BLD AUTO: 0.57 10*3/MM3 (ref 0.7–3.1)
LYMPHOCYTES NFR BLD AUTO: 7 % (ref 19.6–45.3)
MCH RBC QN AUTO: 30.2 PG (ref 26.6–33)
MCHC RBC AUTO-ENTMCNC: 33.2 G/DL (ref 31.5–35.7)
MCV RBC AUTO: 91.2 FL (ref 79–97)
MONOCYTES # BLD AUTO: 0.84 10*3/MM3 (ref 0.1–0.9)
MONOCYTES NFR BLD AUTO: 10.4 % (ref 5–12)
NEUTROPHILS NFR BLD AUTO: 6.54 10*3/MM3 (ref 1.7–7)
NEUTROPHILS NFR BLD AUTO: 80.9 % (ref 42.7–76)
PLATELET # BLD AUTO: 308 10*3/MM3 (ref 140–450)
PMV BLD AUTO: 9.7 FL (ref 6–12)
POTASSIUM SERPL-SCNC: 3.5 MMOL/L (ref 3.5–5.2)
PROT SERPL-MCNC: 5.8 G/DL (ref 6–8.5)
RBC # BLD AUTO: 4.43 10*6/MM3 (ref 4.14–5.8)
SODIUM SERPL-SCNC: 143 MMOL/L (ref 136–145)
WBC # BLD AUTO: 8.09 10*3/MM3 (ref 3.4–10.8)

## 2021-09-07 PROCEDURE — 85025 COMPLETE CBC W/AUTO DIFF WBC: CPT | Performed by: FAMILY MEDICINE

## 2021-09-07 PROCEDURE — 80053 COMPREHEN METABOLIC PANEL: CPT | Performed by: FAMILY MEDICINE

## 2021-09-07 PROCEDURE — 86140 C-REACTIVE PROTEIN: CPT | Performed by: FAMILY MEDICINE

## 2021-09-07 PROCEDURE — 82962 GLUCOSE BLOOD TEST: CPT

## 2021-09-07 PROCEDURE — 25010000002 ENOXAPARIN PER 10 MG: Performed by: FAMILY MEDICINE

## 2021-09-07 PROCEDURE — 82728 ASSAY OF FERRITIN: CPT | Performed by: FAMILY MEDICINE

## 2021-09-07 PROCEDURE — 94799 UNLISTED PULMONARY SVC/PX: CPT

## 2021-09-07 PROCEDURE — 63710000001 DEXAMETHASONE PER 0.25 MG: Performed by: FAMILY MEDICINE

## 2021-09-07 PROCEDURE — 85652 RBC SED RATE AUTOMATED: CPT | Performed by: FAMILY MEDICINE

## 2021-09-07 PROCEDURE — 63710000001 INSULIN ASPART PER 5 UNITS: Performed by: FAMILY MEDICINE

## 2021-09-07 PROCEDURE — 85379 FIBRIN DEGRADATION QUANT: CPT | Performed by: FAMILY MEDICINE

## 2021-09-07 PROCEDURE — 83615 LACTATE (LD) (LDH) ENZYME: CPT | Performed by: FAMILY MEDICINE

## 2021-09-07 RX ORDER — OLANZAPINE 5 MG/1
2.5 TABLET ORAL 2 TIMES DAILY
Status: DISCONTINUED | OUTPATIENT
Start: 2021-09-07 | End: 2021-09-10

## 2021-09-07 RX ORDER — FUROSEMIDE 40 MG/1
40 TABLET ORAL DAILY
Status: DISCONTINUED | OUTPATIENT
Start: 2021-09-07 | End: 2021-09-10

## 2021-09-07 RX ADMIN — GUAIFENESIN 1200 MG: 600 TABLET, EXTENDED RELEASE ORAL at 09:03

## 2021-09-07 RX ADMIN — CARBIDOPA AND LEVODOPA 1.5 TABLET: 25; 100 TABLET ORAL at 20:33

## 2021-09-07 RX ADMIN — PIOGLITAZONE: 30 TABLET ORAL at 12:48

## 2021-09-07 RX ADMIN — INSULIN ASPART 8 UNITS: 100 INJECTION, SOLUTION INTRAVENOUS; SUBCUTANEOUS at 16:52

## 2021-09-07 RX ADMIN — LISINOPRIL 20 MG: 20 TABLET ORAL at 20:32

## 2021-09-07 RX ADMIN — ALBUTEROL SULFATE 2 PUFF: 90 AEROSOL, METERED RESPIRATORY (INHALATION) at 09:23

## 2021-09-07 RX ADMIN — INSULIN ASPART 12 UNITS: 100 INJECTION, SOLUTION INTRAVENOUS; SUBCUTANEOUS at 12:44

## 2021-09-07 RX ADMIN — POTASSIUM CHLORIDE 10 MEQ: 750 TABLET, EXTENDED RELEASE ORAL at 09:03

## 2021-09-07 RX ADMIN — ALBUTEROL SULFATE 2 PUFF: 90 AEROSOL, METERED RESPIRATORY (INHALATION) at 11:46

## 2021-09-07 RX ADMIN — DEXAMETHASONE 6 MG: 4 TABLET ORAL at 09:03

## 2021-09-07 RX ADMIN — ATORVASTATIN CALCIUM 40 MG: 40 TABLET, FILM COATED ORAL at 09:03

## 2021-09-07 RX ADMIN — PANTOPRAZOLE SODIUM 40 MG: 40 TABLET, DELAYED RELEASE ORAL at 09:02

## 2021-09-07 RX ADMIN — INSULIN ASPART 8 UNITS: 100 INJECTION, SOLUTION INTRAVENOUS; SUBCUTANEOUS at 09:01

## 2021-09-07 RX ADMIN — CARBIDOPA AND LEVODOPA 1.5 TABLET: 25; 100 TABLET ORAL at 09:04

## 2021-09-07 RX ADMIN — ALBUTEROL SULFATE 2 PUFF: 90 AEROSOL, METERED RESPIRATORY (INHALATION) at 17:06

## 2021-09-07 RX ADMIN — SENNOSIDES AND DOCUSATE SODIUM 2 TABLET: 50; 8.6 TABLET ORAL at 09:04

## 2021-09-07 RX ADMIN — ENOXAPARIN SODIUM 40 MG: 40 INJECTION SUBCUTANEOUS at 20:33

## 2021-09-07 RX ADMIN — OLANZAPINE 2.5 MG: 5 TABLET, FILM COATED ORAL at 20:32

## 2021-09-07 RX ADMIN — LISINOPRIL 20 MG: 20 TABLET ORAL at 09:02

## 2021-09-07 RX ADMIN — FUROSEMIDE 40 MG: 40 TABLET ORAL at 09:02

## 2021-09-07 RX ADMIN — NEBIVOLOL HYDROCHLORIDE 10 MG: 5 TABLET ORAL at 09:02

## 2021-09-07 RX ADMIN — GUAIFENESIN 1200 MG: 600 TABLET, EXTENDED RELEASE ORAL at 20:33

## 2021-09-07 RX ADMIN — ASPIRIN 81 MG: 81 TABLET, COATED ORAL at 09:02

## 2021-09-07 RX ADMIN — OLANZAPINE 2.5 MG: 5 TABLET, FILM COATED ORAL at 09:03

## 2021-09-07 RX ADMIN — ALBUTEROL SULFATE 2 PUFF: 90 AEROSOL, METERED RESPIRATORY (INHALATION) at 21:28

## 2021-09-07 NOTE — SIGNIFICANT NOTE
09/07/21 1409   OTHER   Discipline physical therapist   Rehab Time/Intention   Session Not Performed   (pt unable to follow directions to actively participate.  refuses to participate in exercises, will follow up in AM.)

## 2021-09-07 NOTE — PROGRESS NOTES
Adult Nutrition  Assessment/PES    Patient Name:  Nash So  YOB: 1945  MRN: [id number]  Admit Date:  9/2/2021    Assessment Date:  9/7/2021    Recommend: consider nutrition support pt appears unable to meet needs at this time, however uncertain long term care goals for pt     Comments:  Clarify nutrition care goals re: feeding tube for nutrition, 0% intake x 5 meals plus 1 refusal in past 3 days  Noted po supplement could be added however at this time RN says pt is refusing everything by mouth.     COnsider order for Boost GLucose Control with meals for additional source of nutrition.  Will cont to follow      Reason for Assessment     Row Name 09/07/21 1616          Reason for Assessment    Reason For Assessment  follow-up protocol     Diagnosis  pulmonary disease;infection/sepsis COVI 19 PNA weakness met enceph hx DM COPD         Nutrition/Diet History     Row Name 09/07/21 1616          Nutrition/Diet History    Typical Food/Fluid Intake  Pt is total care/feed. His wife in ICU sick. Disoriented x 3. Spoke w RN who reports he refused lunch, only bites at times. Uncertain if pt will accept supplement.         Anthropometrics     Row Name 09/07/21 1617          Anthropometrics    Weight  -- 240.1#        Body Mass Index (BMI)    BMI Assessment  BMI 30-34.9: obesity grade I           Physical Findings     Row Name 09/07/21 1625          Physical Findings    Skin  other (see comments) elbow skin tears noted         Estimated/Assessed Needs     Row Name 09/07/21 1630          Estimated/Assessed Needs    Additional Documentation  Calorie Requirements (Group);Fluid Requirements (Group);Protein Requirements (Group)        Calorie Requirements    Estimated Calorie Need Method  [place]     Estimated Calorie Requirement Comment  1833 kcal ( mifflin no factor )        Protein Requirements    Est Protein Requirement Amount (gms/kg)  0.8 gm protein 87 gm        Fluid Requirements    Estimated  Fluid Requirement Method  RDA Method 1833 ml         Nutrition Prescription Ordered     Row Name 09/07/21 1633          Nutrition Prescription PO    Current PO Diet  Pureed     Common Modifiers  Cardiac;Consistent Carbohydrate         Evaluation of Received Nutrient/Fluid Intake     Row Name 09/07/21 1634          Fluid Intake Evaluation    Oral Fluid (mL)  1080 ave x 3 59%        PO Evaluation    % PO Intake  0-50%, ave 17% over 9 meals including 0% for 5 and refusal               Problem/Interventions:  Problem 1     Row Name 09/07/21 1636          Nutrition Diagnoses Problem 1    Problem 1  Inadequate Nutrient Intake     Etiology (related to)  Medical Diagnosis;Functional Diagnosis     Signs/Symptoms (evidenced by)  Report/Observation               Intervention Goal     Row Name 09/07/21 1637          Intervention Goal    PO  Increase intake;PO intake (%)     PO Intake %  50 %     Weight  No significant weight loss         Nutrition Intervention     Row Name 09/07/21 1637          Nutrition Intervention    RD/Tech Action  Follow Tx progress           Education/Evaluation     Row Name 09/07/21 1637          Education    Education  Education not appropriate at this time        Monitor/Evaluation    Monitor  Per protocol;I&O;PO intake;Pertinent labs           Electronically signed by:  Nan García RD  09/07/21 16:38 EDT

## 2021-09-07 NOTE — PROGRESS NOTES
"Daily Progress Note:      Chief complaint: Follow-up of COVID-19 infection, metabolic encephalopathy, rhabdomyolysis, hyperkalemia, diabetes, hypertension, Parkinson's disease    Subjective: Patient fell and was found on the floor last night next to his bed.  No apparent injury he is more confused this morning.  Still with periods of agitation gets worse as the evening progresses.     LOS: 5 days     Vital Signs  Temp:  [97.6 °F (36.4 °C)-98.6 °F (37 °C)] 98.6 °F (37 °C)  Heart Rate:  [67-75] 67  Resp:  [16-20] 20  BP: (157-181)/(66-78) 162/66  Oxygen Therapy  SpO2: 93 %  Pulse Oximetry Type: Continuous  Device (Oxygen Therapy): room air  $ High Flow Nasal Cannula Set-Up: yes  Flow (L/min): 40  Oxygen Concentration (%): 30  ETCO2 (mmHg): 27 mmHg  Probe Placed On (Pulse Ox): Left:, finger}  Body mass index is 34.45 kg/m².  Flowsheet Rows      First Filed Value   Admission Height  182.9 cm (72\") Documented at 09/02/2021 1411   Admission Weight  115 kg (254 lb 4.8 oz) Documented at 09/02/2021 1502                   Documented weights    09/02/21 1502 09/02/21 2241 09/06/21 1208 09/07/21 0514   Weight: 115 kg (254 lb 4.8 oz) 108 kg (238 lb 12.8 oz) 107 kg (236 lb 9.6 oz) 109 kg (240 lb 1.6 oz)           Patient Vitals for the past 24 hrs:   BP Temp Temp src Pulse Resp SpO2 Weight   09/07/21 0514 162/66 98.6 °F (37 °C) Oral 67 20 93 % 109 kg (240 lb 1.6 oz)   09/06/21 2220 178/77 97.8 °F (36.6 °C) Oral 73 18 94 % --   09/06/21 1912 -- -- -- 75 20 -- --   09/06/21 1908 -- -- -- 73 18 93 % --   09/06/21 1629 -- -- -- 70 20 94 % --   09/06/21 1624 -- -- -- 67 20 94 % --   09/06/21 1603 (!) 181/78 97.6 °F (36.4 °C) Axillary 67 18 95 % --   09/06/21 1208 157/70 98.4 °F (36.9 °C) Oral 69 16 94 % 107 kg (236 lb 9.6 oz)   09/06/21 1052 -- -- -- 69 16 94 % --   09/06/21 1049 -- -- -- 69 16 93 % --       109 kg (240 lb 1.6 oz)    Intake/Output                       09/05/21 0701 - 09/06/21 0700 09/06/21 0701 - 09/07/21 0700     " [id number] 2636-9449 Total 5756-1138 1223-0112 Total                 Intake    P.O.  1320  -- 1320  720  -- 720    Total Intake 1320 -- 1320 720 -- 720       Output    Urine  --  200 200  150  -- 150    Total Output -- 200 200 150 -- 150           Intake/Output Summary (Last 24 hours) at 9/7/2021 0724  Last data filed at 9/6/2021 1800  Gross per 24 hour   Intake 720 ml   Output 150 ml   Net 570 ml        Intake/Output Summary (Last 24 hours) at 9/7/2021 0724  Last data filed at 9/6/2021 1800  Gross per 24 hour   Intake 720 ml   Output 150 ml   Net 570 ml        Review of Systems   Unable to perform ROS: Mental status change       Physical Exam  Vitals and nursing note reviewed.   Constitutional:       General: He is not in acute distress.     Appearance: He is well-developed. He is morbidly obese.   HENT:      Head: Normocephalic.   Eyes:      Conjunctiva/sclera: Conjunctivae normal.   Neck:      Thyroid: No thyromegaly.      Vascular: No JVD.   Cardiovascular:      Rate and Rhythm: Normal rate and regular rhythm.      Heart sounds: Normal heart sounds. No murmur heard.     Pulmonary:      Effort: Pulmonary effort is normal. No tachypnea, accessory muscle usage or respiratory distress.      Breath sounds: Examination of the right-lower field reveals decreased breath sounds. Examination of the left-lower field reveals decreased breath sounds. Decreased breath sounds present. No wheezing or rales.   Abdominal:      General: Bowel sounds are normal. There is no distension.      Palpations: Abdomen is soft.      Tenderness: There is no abdominal tenderness. There is no guarding.   Musculoskeletal:      Right lower leg: No edema.      Left lower leg: No edema.   Skin:     General: Skin is warm and dry.      Findings: No rash.   Neurological:      General: No focal deficit present.      Mental Status: He is alert. He is disoriented and confused.      Comments: Oriented to person and place only cooperative exam follows  commands         Medication Review:   I have reviewed the patient's current medication list  Scheduled Meds:albuterol sulfate HFA, 2 puff, Inhalation, 4x Daily - RT  aspirin, 81 mg, Oral, Daily  atorvastatin, 40 mg, Oral, Daily  carbidopa-levodopa, 1.5 tablet, Oral, TID  dexamethasone, 6 mg, Oral, Daily With Breakfast  enoxaparin, 40 mg, Subcutaneous, Q24H  guaiFENesin, 1,200 mg, Oral, BID  haloperidol lactate, 1 mg, Intravenous, Once  insulin aspart, 0-9 Units, Subcutaneous, TID AC  lisinopril, 20 mg, Oral, Q12H  nebivolol, 10 mg, Oral, Daily  pantoprazole, 40 mg, Oral, Daily  potassium chloride, 10 mEq, Oral, Daily  senna-docusate sodium, 2 tablet, Oral, BID      Continuous Infusions:   PRN Meds:.•  acetaminophen  •  albuterol sulfate HFA  •  senna-docusate sodium **AND** polyethylene glycol **AND** bisacodyl **AND** bisacodyl  •  haloperidol lactate  •  hydrALAZINE  •  magnesium sulfate **OR** magnesium sulfate in D5W 1g/100mL (PREMIX) **OR** magnesium sulfate  •  ondansetron **OR** ondansetron  •  sodium chloride      Labs:  Results from last 7 days   Lab Units 09/07/21  0334 09/06/21  0640 09/05/21  0705 09/03/21  0629 09/03/21  0629 09/02/21  1353 09/02/21  1353   WBC 10*3/mm3 8.09 10.16 7.94  --  5.36  --  4.42   HEMOGLOBIN g/dL 13.4 13.7 13.0   < > 12.6*   < > 12.6*   HEMATOCRIT % 40.4 42.5 38.4  --  39.9  --  37.5   PLATELETS 10*3/mm3 308 269 202  --  173  --  166    < > = values in this interval not displayed.     Results from last 7 days   Lab Units 09/07/21  0334 09/06/21  0640 09/05/21  0705 09/04/21  0649 09/03/21  0629 09/02/21  1353   SODIUM mmol/L 143 142 142 142 139 139   POTASSIUM mmol/L 3.5 4.3 3.3* 3.4* 2.8* 3.0*   CHLORIDE mmol/L 106 106 105 104 102 101   CO2 mmol/L 26.8 23.8 27.6 22.9 26.2 27.4   BUN mg/dL 19 18 17 13 10 16   CREATININE mg/dL 0.54* 0.50* 0.53* 0.62* 0.53* 0.71*   CALCIUM mg/dL 8.5* 8.7 8.7 8.8 8.2* 8.5*   BILIRUBIN mg/dL 0.9 1.0 0.9 0.9 0.7 0.7   ALK PHOS U/L 72 72 68 73 73 77    ALT (SGPT) U/L 6 20 11 35 7 34   AST (SGOT) U/L 33 30 34 45* 46* 41*   GLUCOSE mg/dL 233* 274* 237* 195* 170* 206*     Results from last 7 days   Lab Units 09/04/21  0649 09/02/21  1353   MAGNESIUM mg/dL 1.7 1.4*       Results from last 7 days   Lab Units 09/07/21  0334 09/06/21  0640 09/05/21  0705 09/04/21  0649 09/04/21  0604 09/03/21  0629 09/03/21  0629 09/03/21  0628 09/03/21  0628 09/02/21  1353 09/02/21  1353   LDH U/L 425*  --  433*  --   --   --  409*  --   --   --   --    AST (SGOT) U/L 33 30 34 45*  --    < > 46*  --   --    < > 41*   ALT (SGPT) U/L 6 20 11 35  --    < > 7  --   --    < > 34   PROCALCITONIN ng/mL  --   --   --  0.08  --   --   --   --   --   --  0.08   LACTATE mmol/L  --   --   --   --   --   --   --   --   --   --  2.0   FERRITIN ng/mL 1,398.00* 1,984.00* 1,649.00*  --   --    < > 1,117.00*  --   --    < > 1,110.00*   D DIMER QUANT MCGFEU/mL 0.76*  --  0.75*  --   --   --   --   --  0.67*   < > 1.14*   CRP mg/dL  --  7.76* 14.44*  --  12.76*  --   --    < > 33.31*  --   --    PLATELETS 10*3/mm3 308 269 202  --   --    < > 173  --   --    < > 166    < > = values in this interval not displayed.         Lab Results (last 24 hours)     Procedure Component Value Units Date/Time    D-dimer, Quantitative [[id number]]  (Abnormal) Collected: 09/07/21 0334    Specimen: Blood Updated: 09/07/21 0503     D-Dimer, Quantitative 0.76 MCGFEU/mL     Narrative:      Can be elevated in, but is not diagnostic for deep vein thrombosis (DVT) or pulmonary embolis (PE).  It is also elevated in other medical conditions.  Clinical correlation is required.  The negative cut-off value for the D-Dimer is 0.50 mcg FEU/mL for DVT and PE.      Ferritin [[id number]]  (Abnormal) Collected: 09/07/21 0334    Specimen: Blood Updated: 09/07/21 0458     Ferritin 1,398.00 ng/mL     Narrative:      Results may be falsely decreased if patient taking Biotin.      Comprehensive Metabolic Panel [[id number]]  (Abnormal) Collected:  09/07/21 0334    Specimen: Blood Updated: 09/07/21 0442     Glucose 233 mg/dL      BUN 19 mg/dL      Creatinine 0.54 mg/dL      Sodium 143 mmol/L      Potassium 3.5 mmol/L      Chloride 106 mmol/L      CO2 26.8 mmol/L      Calcium 8.5 mg/dL      Total Protein 5.8 g/dL      Albumin 2.90 g/dL      ALT (SGPT) 6 U/L      AST (SGOT) 33 U/L      Alkaline Phosphatase 72 U/L      Total Bilirubin 0.9 mg/dL      eGFR Non African Amer 148 mL/min/1.73      Globulin 2.9 gm/dL      A/G Ratio 1.0 g/dL      BUN/Creatinine Ratio 35.2     Anion Gap 10.2 mmol/L     Narrative:      GFR Normal >60  Chronic Kidney Disease <60  Kidney Failure <15      Lactate Dehydrogenase [[id number]]  (Abnormal) Collected: 09/07/21 0334    Specimen: Blood Updated: 09/07/21 0442      U/L     Sedimentation Rate [[id number]]  (Abnormal) Collected: 09/07/21 0334    Specimen: Blood Updated: 09/07/21 0432     Sed Rate 23 mm/hr     CBC & Differential [[id number]]  (Abnormal) Collected: 09/07/21 0334    Specimen: Blood Updated: 09/07/21 0415    Narrative:      The following orders were created for panel order CBC & Differential.  Procedure                               Abnormality         Status                     ---------                               -----------         ------                     CBC Auto Differential[[id number]]        Abnormal            Final result                 Please view results for these tests on the individual orders.    CBC Auto Differential [[id number]]  (Abnormal) Collected: 09/07/21 0334    Specimen: Blood Updated: 09/07/21 0415     WBC 8.09 10*3/mm3      RBC 4.43 10*6/mm3      Hemoglobin 13.4 g/dL      Hematocrit 40.4 %      MCV 91.2 fL      MCH 30.2 pg      MCHC 33.2 g/dL      RDW 14.3 %      RDW-SD 48.6 fl      MPV 9.7 fL      Platelets 308 10*3/mm3      Neutrophil % 80.9 %      Lymphocyte % 7.0 %      Monocyte % 10.4 %      Eosinophil % 0.0 %      Basophil % 0.1 %      Immature Grans % 1.6 %      Neutrophils, Absolute  6.54 10*3/mm3      Lymphocytes, Absolute 0.57 10*3/mm3      Monocytes, Absolute 0.84 10*3/mm3      Eosinophils, Absolute 0.00 10*3/mm3      Basophils, Absolute 0.01 10*3/mm3      Immature Grans, Absolute 0.13 10*3/mm3     C-reactive Protein [[id number]] Collected: 09/07/21 0334    Specimen: Blood Updated: 09/07/21 0407    POC Glucose Once [[id number]]  (Abnormal) Collected: 09/06/21 1639    Specimen: Blood Updated: 09/06/21 1645     Glucose 233 mg/dL      Comment: Meter: [id number] : [id number] Toribio Delvalle LPN       Blood Culture - Blood, Arm, Left [[id number]] Collected: 09/02/21 1512    Specimen: Blood from Arm, Left Updated: 09/06/21 1532     Blood Culture No growth at 4 days    Blood Culture - Blood, Arm, Left [[id number]] Collected: 09/02/21 1350    Specimen: Blood from Arm, Left Updated: 09/06/21 1431     Blood Culture No growth at 4 days    POC Glucose Once [[id number]]  (Abnormal) Collected: 09/06/21 1213    Specimen: Blood Updated: 09/06/21 1221     Glucose 284 mg/dL      Comment: Meter: [id number] : [id number] Jaskaran GLEASON       C-reactive Protein [[id number]]  (Abnormal) Collected: 09/06/21 0640    Specimen: Blood Updated: 09/06/21 1218     C-Reactive Protein 7.76 mg/dL         Results from last 7 days   Lab Units 09/07/21  0334 09/05/21  0705 09/04/21  0649 09/03/21  0629 09/03/21  0628 09/02/21  1353   CK TOTAL U/L  --   --   --  776*  --  765*   TROPONIN T ng/mL  --  0.022 0.033*  --   --  0.122*   D DIMER QUANT MCGFEU/mL 0.76* 0.75*  --   --  0.67* 1.14*         Results from last 7 days   Lab Units 09/02/21  1512   PROBNP pg/mL 2,448.0*         Results from last 7 days   Lab Units 09/04/21  0649 09/02/21  1353   MAGNESIUM mg/dL 1.7 1.4*         Results from last 7 days   Lab Units 09/04/21  0450 09/03/21  1720   PH, ARTERIAL pH units 7.547* 7.535*   PCO2, ARTERIAL mm Hg 29.7* 33.9*   PO2 ART mm Hg 72.4* 60.7*   HCO3 ART mmol/L 25.7 28.6*     Results from last 7 days   Lab Units  09/04/21  0952   HEMOGLOBIN A1C % 5.50     Glucose   Date/Time Value Ref Range Status   09/06/2021 1639 233 (H) 70 - 130 mg/dL Final     Comment:     Meter: [id number] : [id number] Toribio Delvalle LPN   09/06/2021 1213 284 (H) 70 - 130 mg/dL Final     Comment:     Meter: [id number] : [id number] Jaskaran Ivory Float NA   09/06/2021 0803 246 (H) 70 - 130 mg/dL Final     Comment:     Meter: [id number] : [id number] Jaskaran vIory Float NA   09/05/2021 2115 273 (H) 70 - 130 mg/dL Final     Comment:     Meter: [id number] : [id number] Elier Beatty RN Charge(Validator)   09/05/2021 1642 289 (H) 70 - 130 mg/dL Final     Comment:     Meter: [id number] : [id number] Valdemar Lieberman CNA   09/05/2021 1159 230 (H) 70 - 130 mg/dL Final     Comment:     Meter: [id number] : [id number] Valdemar Lieberman CNA   09/05/2021 0742 209 (H) 70 - 130 mg/dL Final     Comment:     Meter: [id number] : [id number] Valdemar Lieberman CNA   09/04/2021 2211 225 (H) 70 - 130 mg/dL Final     Comment:     Meter: [id number] : [id number] Jose Johnston PCA     Results from last 7 days   Lab Units 09/04/21  0649 09/02/21  1353   PROCALCITONIN ng/mL 0.08 0.08   LACTATE mmol/L  --  2.0     Results from last 7 days   Lab Units 09/02/21  1512 09/02/21  1350   BLOODCX  No growth at 4 days No growth at 4 days     Results from last 7 days   Lab Units 09/02/21  1359   NITRITE UA  Negative   WBC UA /HPF 3-5*   BACTERIA UA /HPF 1+*   SQUAM EPITHEL UA /HPF 0-2     Results from last 7 days   Lab Units 09/02/21  1341   INFLUENZA B PCR  Not Detected   INFLUENZA A PCR  Not Detected         Radiology:  Imaging Results (Last 24 Hours)     ** No results found for the last 24 hours. **          Cardiology:  ECG/EMG Results (last 24 hours)     Procedure Component Value Units Date/Time    ECG 12 Lead [[id number]] Collected: 09/02/21 1348     Updated: 09/02/21 1352     QT Interval 467 ms     Narrative:      HEART RATE= 68  bpm  RR Interval= 880  ms  CT  Interval= 211  ms  P Horizontal Axis= 8  deg  P Front Axis= 52  deg  QRSD Interval= 130  ms  QT Interval= 467  ms  QRS Axis= -14  deg  T Wave Axis= -6  deg  - ABNORMAL ECG -  Sinus rhythm  Nonspecific intraventricular conduction delay  Lateral infarct, age indeterminate  Electronically Signed By:   Date and Time of Study: 2021-09-02 13:48:28          I have reviewed recent labs results and consult notes.  Parts of this note may have been copied and pasted but patient was examined and interviewed by me today    Assessment and Plan:    1.  COVID-19 pneumonia generalized weakness .  Remdesivir and Decadron initiated on 9 /4.  Patient pulled out his IVs since is on room air.  Remdesivir changed to p.o. Decadron.  His inflammatory markers have improved somewhat but ferritin significantly elevated    2.  Metabolic encephalopathy likely due to COVID-19 with severe sundowning will start him on low-dose Zyprexa    3.    Adult-onset diabetes mellitus continues to be hyperglycemic but improving continue with sliding scale insulin optimize glycemic control we will resume home medications    4.  Hypertension poor control with labile likely due to agitation at times we will continue to monitor adjust medications    5.    Acute hypoxic respiratory failure resolved     6.  Parkinson's disease nothing acute continue with home medications     7.    COPD no evidence of exacerbation continue with as needed albuterol    8.  DVT prophylaxis Lovenox    9.  Generalized weakness secondary to COVID-19 pneumonia comorbidities placement has been sent found [facility] to nursing facility.  Poor participation with therapies secondary to confusion discussed with physical therapy    Much of this encounter note is an electronic transcription/translation of spoken language to printed text using Dragon Software

## 2021-09-07 NOTE — PLAN OF CARE
Problem: Adult Inpatient Plan of Care  Goal: Plan of Care Review  Outcome: Ongoing, Progressing  Flowsheets (Taken 9/7/2021 0019)  Progress: improving  Plan of Care Reviewed With: patient   Goal Outcome Evaluation:  Plan of Care Reviewed With: patient        Progress: improving

## 2021-09-07 NOTE — DISCHARGE PLACEMENT REQUEST
"Nash So (75 y.o. Male)     Date of Birth Social Security Number Address Home Phone MRN    1945  [id number]  [facility] [address] [phone] [id number]    Catholic Marital Status          Congregational        Admission Date Admission Type Admitting Provider Attending Provider Department, Room/Bed    9/2/21 Emergency Reno Nassar MD Kemparajurs, Plavakeerthi, MD [facility] MED SURG, 1421/1    Discharge Date Discharge Disposition Discharge Destination                       Attending Provider: Reno Nassar MD    Allergies: Penicillin G, Penicillins    Isolation: Enh Drop/Con   Infection: COVID (confirmed) (09/02/21)   Code Status: CPR    Ht: 177.8 cm (70\")   Wt: 109 kg (240 lb 1.6 oz)    Admission Cmt: None   Principal Problem: None                Active Insurance as of 9/2/2021     Primary Coverage     Payor Plan Insurance Group Employer/Plan Group    ANTH MEDICARE REPLACEMENT ANTHEM MEDICARE ADVANTAGE [id number]     Payor Plan Address Payor Plan Phone Number Payor Plan Fax Number Effective Dates    [address] [phone]  6/1/2021 - None Entered    [facility] [id number]       Subscriber Name Subscriber Birth Date Member ID       SO,NASH GREENBERG 1945 [id number]                 Emergency Contacts      (Rel.) Home Phone Work Phone Mobile Phone    Fátima So (Spouse) [phone] -- --    Leo Leyva (Daughter) -- -- [phone]    Nash So Jr. (Son) [phone] --              "

## 2021-09-07 NOTE — PLAN OF CARE
Goal Outcome Evaluation:              Outcome Summary: Patient VSS, remains on room air. Patient confused, agitated and attempting to get out of bed without assistance. Haldol administered x1 this shift and bed alarm utilized. Patient incontinent, brief in place.

## 2021-09-08 LAB
ALBUMIN SERPL-MCNC: 2.6 G/DL (ref 3.5–5.2)
ALBUMIN/GLOB SERPL: 0.9 G/DL
ALP SERPL-CCNC: 67 U/L (ref 39–117)
ALT SERPL W P-5'-P-CCNC: 14 U/L (ref 1–41)
ANION GAP SERPL CALCULATED.3IONS-SCNC: 12.5 MMOL/L (ref 5–15)
AST SERPL-CCNC: 32 U/L (ref 1–40)
BASOPHILS # BLD AUTO: 0.05 10*3/MM3 (ref 0–0.2)
BASOPHILS NFR BLD AUTO: 0.7 % (ref 0–1.5)
BILIRUB SERPL-MCNC: 0.8 MG/DL (ref 0–1.2)
BUN SERPL-MCNC: 18 MG/DL (ref 8–23)
BUN/CREAT SERPL: 32.1 (ref 7–25)
CALCIUM SPEC-SCNC: 8.1 MG/DL (ref 8.6–10.5)
CHLORIDE SERPL-SCNC: 106 MMOL/L (ref 98–107)
CO2 SERPL-SCNC: 23.5 MMOL/L (ref 22–29)
CREAT SERPL-MCNC: 0.56 MG/DL (ref 0.76–1.27)
DEPRECATED RDW RBC AUTO: 49.7 FL (ref 37–54)
EOSINOPHIL # BLD AUTO: 0.02 10*3/MM3 (ref 0–0.4)
EOSINOPHIL NFR BLD AUTO: 0.3 % (ref 0.3–6.2)
ERYTHROCYTE [DISTWIDTH] IN BLOOD BY AUTOMATED COUNT: 14.5 % (ref 12.3–15.4)
ERYTHROCYTE [SEDIMENTATION RATE] IN BLOOD: 10 MM/HR (ref 0–20)
FERRITIN SERPL-MCNC: 940 NG/ML (ref 30–400)
GFR SERPL CREATININE-BSD FRML MDRD: 142 ML/MIN/1.73
GLOBULIN UR ELPH-MCNC: 2.9 GM/DL
GLUCOSE BLDC GLUCOMTR-MCNC: 157 MG/DL (ref 70–130)
GLUCOSE BLDC GLUCOMTR-MCNC: 213 MG/DL (ref 70–130)
GLUCOSE BLDC GLUCOMTR-MCNC: 256 MG/DL (ref 70–130)
GLUCOSE BLDC GLUCOMTR-MCNC: 329 MG/DL (ref 70–130)
GLUCOSE SERPL-MCNC: 156 MG/DL (ref 65–99)
HCT VFR BLD AUTO: 41.3 % (ref 37.5–51)
HGB BLD-MCNC: 13.3 G/DL (ref 13–17.7)
IMM GRANULOCYTES # BLD AUTO: 0.34 10*3/MM3 (ref 0–0.05)
IMM GRANULOCYTES NFR BLD AUTO: 4.6 % (ref 0–0.5)
LYMPHOCYTES # BLD AUTO: 0.97 10*3/MM3 (ref 0.7–3.1)
LYMPHOCYTES NFR BLD AUTO: 13.1 % (ref 19.6–45.3)
MCH RBC QN AUTO: 30.2 PG (ref 26.6–33)
MCHC RBC AUTO-ENTMCNC: 32.2 G/DL (ref 31.5–35.7)
MCV RBC AUTO: 93.7 FL (ref 79–97)
MONOCYTES # BLD AUTO: 0.69 10*3/MM3 (ref 0.1–0.9)
MONOCYTES NFR BLD AUTO: 9.3 % (ref 5–12)
NEUTROPHILS NFR BLD AUTO: 5.32 10*3/MM3 (ref 1.7–7)
NEUTROPHILS NFR BLD AUTO: 72 % (ref 42.7–76)
NRBC BLD AUTO-RTO: 0.5 /100 WBC (ref 0–0.2)
PLATELET # BLD AUTO: 241 10*3/MM3 (ref 140–450)
PMV BLD AUTO: 10.5 FL (ref 6–12)
POTASSIUM SERPL-SCNC: 3.6 MMOL/L (ref 3.5–5.2)
PROT SERPL-MCNC: 5.5 G/DL (ref 6–8.5)
RBC # BLD AUTO: 4.41 10*6/MM3 (ref 4.14–5.8)
SODIUM SERPL-SCNC: 142 MMOL/L (ref 136–145)
WBC # BLD AUTO: 7.39 10*3/MM3 (ref 3.4–10.8)

## 2021-09-08 PROCEDURE — 82962 GLUCOSE BLOOD TEST: CPT

## 2021-09-08 PROCEDURE — 97530 THERAPEUTIC ACTIVITIES: CPT

## 2021-09-08 PROCEDURE — 85652 RBC SED RATE AUTOMATED: CPT | Performed by: FAMILY MEDICINE

## 2021-09-08 PROCEDURE — 94799 UNLISTED PULMONARY SVC/PX: CPT

## 2021-09-08 PROCEDURE — 63710000001 INSULIN ASPART PER 5 UNITS: Performed by: FAMILY MEDICINE

## 2021-09-08 PROCEDURE — 82728 ASSAY OF FERRITIN: CPT | Performed by: FAMILY MEDICINE

## 2021-09-08 PROCEDURE — 85025 COMPLETE CBC W/AUTO DIFF WBC: CPT | Performed by: FAMILY MEDICINE

## 2021-09-08 PROCEDURE — 80053 COMPREHEN METABOLIC PANEL: CPT | Performed by: FAMILY MEDICINE

## 2021-09-08 PROCEDURE — 63710000001 DEXAMETHASONE PER 0.25 MG: Performed by: FAMILY MEDICINE

## 2021-09-08 PROCEDURE — 97110 THERAPEUTIC EXERCISES: CPT

## 2021-09-08 PROCEDURE — 25010000002 ENOXAPARIN PER 10 MG: Performed by: FAMILY MEDICINE

## 2021-09-08 RX ADMIN — LISINOPRIL 20 MG: 20 TABLET ORAL at 20:58

## 2021-09-08 RX ADMIN — NEBIVOLOL HYDROCHLORIDE 10 MG: 5 TABLET ORAL at 08:52

## 2021-09-08 RX ADMIN — ENOXAPARIN SODIUM 40 MG: 40 INJECTION SUBCUTANEOUS at 20:57

## 2021-09-08 RX ADMIN — OLANZAPINE 2.5 MG: 5 TABLET, FILM COATED ORAL at 20:57

## 2021-09-08 RX ADMIN — INSULIN ASPART 16 UNITS: 100 INJECTION, SOLUTION INTRAVENOUS; SUBCUTANEOUS at 18:15

## 2021-09-08 RX ADMIN — PANTOPRAZOLE SODIUM 40 MG: 40 TABLET, DELAYED RELEASE ORAL at 08:51

## 2021-09-08 RX ADMIN — ASPIRIN 81 MG: 81 TABLET, COATED ORAL at 08:51

## 2021-09-08 RX ADMIN — POTASSIUM CHLORIDE 10 MEQ: 750 TABLET, EXTENDED RELEASE ORAL at 08:52

## 2021-09-08 RX ADMIN — SENNOSIDES AND DOCUSATE SODIUM 2 TABLET: 50; 8.6 TABLET ORAL at 08:51

## 2021-09-08 RX ADMIN — ALBUTEROL SULFATE 2 PUFF: 90 AEROSOL, METERED RESPIRATORY (INHALATION) at 16:00

## 2021-09-08 RX ADMIN — CARBIDOPA AND LEVODOPA 1.5 TABLET: 25; 100 TABLET ORAL at 08:52

## 2021-09-08 RX ADMIN — INSULIN ASPART 4 UNITS: 100 INJECTION, SOLUTION INTRAVENOUS; SUBCUTANEOUS at 08:51

## 2021-09-08 RX ADMIN — LISINOPRIL 20 MG: 20 TABLET ORAL at 08:53

## 2021-09-08 RX ADMIN — GUAIFENESIN 1200 MG: 600 TABLET, EXTENDED RELEASE ORAL at 08:52

## 2021-09-08 RX ADMIN — FUROSEMIDE 40 MG: 40 TABLET ORAL at 08:51

## 2021-09-08 RX ADMIN — OLANZAPINE 2.5 MG: 5 TABLET, FILM COATED ORAL at 08:53

## 2021-09-08 RX ADMIN — CARBIDOPA AND LEVODOPA 1.5 TABLET: 25; 100 TABLET ORAL at 18:15

## 2021-09-08 RX ADMIN — INSULIN ASPART 12 UNITS: 100 INJECTION, SOLUTION INTRAVENOUS; SUBCUTANEOUS at 12:44

## 2021-09-08 RX ADMIN — ALBUTEROL SULFATE 2 PUFF: 90 AEROSOL, METERED RESPIRATORY (INHALATION) at 20:13

## 2021-09-08 RX ADMIN — DEXAMETHASONE 6 MG: 4 TABLET ORAL at 08:52

## 2021-09-08 RX ADMIN — ATORVASTATIN CALCIUM 40 MG: 40 TABLET, FILM COATED ORAL at 08:53

## 2021-09-08 RX ADMIN — GUAIFENESIN 1200 MG: 600 TABLET, EXTENDED RELEASE ORAL at 20:57

## 2021-09-08 RX ADMIN — CARBIDOPA AND LEVODOPA 1.5 TABLET: 25; 100 TABLET ORAL at 20:58

## 2021-09-08 RX ADMIN — SENNOSIDES AND DOCUSATE SODIUM 2 TABLET: 50; 8.6 TABLET ORAL at 20:57

## 2021-09-08 RX ADMIN — ALBUTEROL SULFATE 2 PUFF: 90 AEROSOL, METERED RESPIRATORY (INHALATION) at 12:34

## 2021-09-08 NOTE — THERAPY TREATMENT NOTE
Acute Care - Physical Therapy Treatment Note   Tori Simmons     Patient Name: Nash So  : 1945  MRN: [id number]  Today's Date: 2021      Visit Dx:     ICD-10-CM ICD-9-CM   1. COVID-19 virus infection  U07.1 079.89   2. Weakness  R53.1 780.79   3. Elevated troponin  R77.8 790.6   4. Contusion of face, initial encounter  S00.83XA 920     Patient Active Problem List   Diagnosis   • Vitamin D deficiency   • Diabetes mellitus (CMS/HCC)   • Back pain   • Benign prostatic hyperplasia   • Gastroesophageal reflux disease with esophagitis   • Hyperlipidemia   • Hypertension   • Hypogonadism in male   • Sebaceous cyst   • COPD (chronic obstructive pulmonary disease) (CMS/HCC)   • Encounter for screening for malignant neoplasm of colon   • COVID-19 virus infection     Past Medical History:   Diagnosis Date   • Alzheimer disease (CMS/HCC)    • CHF (congestive heart failure) (CMS/HCC)    • COPD (chronic obstructive pulmonary disease) (CMS/HCC)    • Diabetes (CMS/HCC)    • GERD (gastroesophageal reflux disease)    • Hyperlipemia    • Hypertension      Past Surgical History:   Procedure Laterality Date   • ENDOSCOPY N/A 2017    Procedure: ESOPHAGOGASTRODUODENOSCOPY, biopsy;  Surgeon: Natalia Guerrero MD;  Location: [facility];  Service:         PT Assessment (last 12 hours)      PT Evaluation and Treatment     Row Name 21 1045          Physical Therapy Time and Intention    Subjective Information  no complaints  -BP     Document Type  therapy note (daily note)  -BP     Mode of Treatment  physical therapy  -BP     Patient Effort  adequate  -BP     Symptoms Noted During/After Treatment  fatigue  -BP     Comment  Patient pleasantly confused. Follows approximately 50% of one step commands. Cognition significantly limited due to baseline cognition. Patient more alert today vs previous sessions.   -BP     Row Name 21 1045          General Information    Patient/Family/Caregiver Comments/Observations   "Patient supine in bed with HOB elevated. Patient agreeable to PT treatment. Exit alarm on.   -BP     Existing Precautions/Restrictions  fall  -BP     Limitations/Impairments  safety/cognitive  -BP     Risks Reviewed  patient:;LOB;increased discomfort  -BP     Benefits Reviewed  patient:;improve function;increase independence;increase strength  -BP     Barriers to Rehab  cognitive status;previous functional deficit Patient with significant dementia, parkinsons   -BP     Row Name 09/08/21 1045          Cognition    Follows Commands (Cognition)  50-74% accuracy;delayed response/completion;increased processing time needed;initiation impaired;physical/tactile prompts required;repetition of directions required;verbal cues/prompting required overall 50% one step commands   -BP     Personal Safety Interventions  gait belt;nonskid shoes/slippers when out of bed  -BP     Row Name 09/08/21 1045          Pain Scale: Word Pre/Post-Treatment    Pre/Posttreatment Pain Comment  Does not complain of pain   -BP     Row Name 09/08/21 1045          Bed Mobility    Supine-Sit [place] (Bed Mobility)  maximum assist (25% patient effort);2 person assist;verbal cues  -BP     Sit-Supine [place] (Bed Mobility)  dependent (less than 25% patient effort);2 person assist;verbal cues  -BP     Bed Mobility, Safety Issues  cognitive deficits limit understanding  -BP     Assistive Device (Bed Mobility)  bed rails;draw sheet;head of bed elevated  -BP     Comment (Bed Mobility)  Patient with significant difficulty motor planning supine to sit transfer. Patient resistive to passive movement of LE's toward EOB. When give broad command of \"sit up\" patient able to grab therapist hand and initiate movement of LE's toward EOB. Requires max A x 2 to complete transfer upright.   -BP     Row Name 09/08/21 1045          Transfers    Transfers  sit-stand transfer;stand-sit transfer  -BP     Comment (Transfers)  verbal cues for hand placement required. " Patient able to initiate transfer to stand however requires max A x 2 to complete. Requires assist to place hands on AD. Unable to safely attempt stand pivot transfer due to posterior lean. Patient with left lateral lean in standing.   -BP     Sit-Stand [place] (Transfers)  maximum assist (25% patient effort);2 person assist;verbal cues  -BP     Stand-Sit [place] (Transfers)  maximum assist (25% patient effort);2 person assist;verbal cues  -BP     Row Name 09/08/21 1045          Sit-Stand Transfer    Assistive Device (Sit-Stand Transfers)  walker, front-wheeled  -BP     Row Name 09/08/21 1045          Stand-Sit Transfer    Assistive Device (Stand-Sit Transfers)  walker, front-wheeled  -     Row Name 09/08/21 1045          Gait/Stairs (Locomotion)    Comment (Gait/Stairs)  Patient with posterior and left lateral lean in standing, unable to safely initiate forward gait. Patient able to take 5-6 side steps along EOB with max A x 2 with one seated rest break.   -     Row Name 09/08/21 1045          Safety Issues, Functional Mobility    Safety Issues Affecting Function (Mobility)  insight into deficits/self-awareness;awareness of need for assistance;judgment;positioning of assistive device;problem-solving;sequencing abilities  -BP     Impairments Affecting Function (Mobility)  motor planning;balance;cognition  -BP     Comment, Safety Issues/Impairments (Mobility)  Patient demonstrates significant difficulty with motor planning. Requires significant cues throughout. Cognition limiting progress.   -     Row Name 09/08/21 1045          Balance    Comment, Balance  Patient initially requires min A/mod A for static sitting, able to progress to CGA with cues.   -     Row Name             Wound 09/02/21 1452 Left elbow Skin Tear    Wound - Properties Group Placement Date: 09/02/21  -EF Placement Time: 1452  -EF Present on Hospital Admission: Y  -EF Side: Left  -EF Location: elbow  -EF Primary Wound Type: Skin  tear  -EF    Retired Wound - Properties Group Date first assessed: 09/02/21  -EF Time first assessed: 1452  -EF Present on Hospital Admission: Y  -EF Side: Left  -EF Location: elbow  -EF Primary Wound Type: Skin tear  -EF    Row Name             Wound 09/02/21 1453 Right elbow Skin Tear    Wound - Properties Group Placement Date: 09/02/21  -EF Placement Time: 1453  -EF Present on Hospital Admission: Y  -EF Side: Right  -EF Location: elbow  -EF Primary Wound Type: Skin tear  -EF    Retired Wound - Properties Group Date first assessed: 09/02/21  -EF Time first assessed: 1453  -EF Present on Hospital Admission: Y  -EF Side: Right  -EF Location: elbow  -EF Primary Wound Type: Skin tear  -EF    Row Name 09/08/21 1045          Plan of Care Review    Plan of Care Reviewed With  patient  -BP     Outcome Summary  PT: Patient performs supine to sit transfer with max A x 2, sit to supine transfer dependently x 2, sit to/from stand from elevated bed surface with max A x 2 and able to take 5-6 side steps along EOB with one seated break with max A x 2 with use of FWW. Patient demonstrates posterior and left lateral lean in standing.  Patient more alert today, continues to be oriented to person only. He does recognize he is in a hospital. Follows approximately 50% of one step commands with significant cues. Patient demonstrates significant difficulty with motor planning. Recommend use of donal lift for out of bed transfers. Continue to recommend discharge to SNF/ECF.   -BP     Row Name 09/08/21 1045          Positioning and Restraints    Pre-Treatment Position  in bed  -BP     Post Treatment Position  bed  -BP     In Bed  notified nsg;supine;call light within reach;encouraged to call for assist;exit alarm on  -BP     Row Name 09/08/21 1040          Progress Summary (PT)    Progress Toward Functional Goals (PT)  progress toward functional goals is fair  -BP     Barriers to Overall Progress (PT)  cognitive status   -BP     Row Name  09/08/21 1045          Therapy Plan Review/Discharge Plan (PT)    Therapy Plan Review (PT)  patient;risks/benefits reviewed;participants included  -BP       User Key  (r) = Recorded By, (t) = Taken By, (c) = Cosigned By    Initials Name Provider Type    BP Solomon Stack, PT Physical Therapist    Radha Minor RN Registered Nurse        Physical Therapy Education                 Title: PT OT SLP Therapies (In Progress)     Topic: Physical Therapy (In Progress)     Point: Mobility training (In Progress)     Learning Progress Summary           Patient Acceptance, E,TB, NR by  at 9/8/2021 1225    Acceptance, E,TB, VU,NL by J at 9/6/2021 1117    Acceptance, E,TB, NR by  at 9/3/2021 1139                   Point: Home exercise program (Not Started)     Learner Progress:  Not documented in this visit.                      User Key     Initials Effective Dates Name Provider Type Discipline     06/16/21 -  Solomon Stack, PT Physical Therapist PT    JLARRY 06/16/21 -  Maria Luisa Rdz PT Physical Therapist PT              PT Recommendation and Plan  Anticipated Discharge Disposition (PT): skilled nursing facility, extended care facility  Planned Therapy Interventions (PT): bed mobility training, gait training, patient/family education, strengthening, transfer training  Therapy Frequency (PT): 6 times/wk  Progress Summary (PT)  Progress Toward Functional Goals (PT): progress toward functional goals is fair  Barriers to Overall Progress (PT): cognitive status   Plan of Care Reviewed With: patient  Outcome Summary: PT: Patient performs supine to sit transfer with max A x 2, sit to supine transfer dependently x 2, sit to/from stand from elevated bed surface with max A x 2 and able to take 5-6 side steps along EOB with one seated break with max A x 2 with use of FWW. Patient demonstrates posterior and left lateral lean in standing.  Patient more alert today, continues to be oriented to person only. He does  recognize he is in a hospital. Follows approximately 50% of one step commands with significant cues. Patient demonstrates significant difficulty with motor planning. Recommend use of donal lift for out of bed transfers. Continue to recommend discharge to SNF/ECF.   Outcome Measures     Row Name 09/08/21 1045 09/06/21 1109          How much help from another person do you currently need...    Turning from your back to your side while in flat bed without using bedrails?  1  -BP  --     Moving from lying on back to sitting on the side of a flat bed without bedrails?  1  -BP  --     Moving to and from a bed to a chair (including a wheelchair)?  1  -BP  --     Standing up from a chair using your arms (e.g., wheelchair, bedside chair)?  1  -BP  --     Climbing 3-5 steps with a railing?  1  -BP  --     To walk in hospital room?  1  -BP  --     AM-PAC 6 Clicks Score (PT)  6  -BP  --        How much help from another is currently needed...    Putting on and taking off regular lower body clothing?  --  1  -JJ     Bathing (including washing, rinsing, and drying)  --  1  -JJ     Toileting (which includes using toilet bed pan or urinal)  --  1  -JJ     Putting on and taking off regular upper body clothing  --  1  -JJ     Taking care of personal grooming (such as brushing teeth)  --  1  -JJ     Eating meals  --  1  -JJ     AM-PAC 6 Clicks Score (OT)  --  6  -JJ        Functional Assessment    Outcome Measure Options  AM-PAC 6 Clicks Basic Mobility (PT)  -BP  --       User Key  (r) = Recorded By, (t) = Taken By, (c) = Cosigned By    Initials Name Provider Type    Luisana Yin, OTR Occupational Therapist    Solomon Arce, PT Physical Therapist           Time Calculation:   PT Charges     Row Name 09/08/21 1225             Time Calculation    Start Time  1045  -BP      Stop Time  1108  -BP      Time Calculation (min)  23 min  -BP      PT Received On  09/08/21  -BP      PT - Next Appointment  09/09/21  -BP          Timed Charges    22162 - PT Therapeutic Exercise Minutes  23  -BP         Total Minutes    Timed Charges Total Minutes  23  -BP       Total Minutes  23  -BP        User Key  (r) = Recorded By, (t) = Taken By, (c) = Cosigned By    Initials Name Provider Type    BP Solomon Stack, PT Physical Therapist        Therapy Charges for Today     Code Description Service Date Service Provider Modifiers Qty    [id number] HC PT THER PROC EA 15 MIN 9/8/2021 Solomon Stack PT GP 2          PT G-Codes  Outcome Measure Options: AM-PAC 6 Clicks Basic Mobility (PT)  AM-PAC 6 Clicks Score (PT): 6  AM-PAC 6 Clicks Score (OT): 6    Solomon Stack PT  9/8/2021

## 2021-09-08 NOTE — THERAPY TREATMENT NOTE
Acute Care - Occupational Therapy Treatment Note   Tori Simmons     Patient Name: Nash So  : 1945  MRN: [id number]  Today's Date: 2021             Admit Date: 2021       ICD-10-CM ICD-9-CM   1. COVID-19 virus infection  U07.1 079.89   2. Weakness  R53.1 780.79   3. Elevated troponin  R77.8 790.6   4. Contusion of face, initial encounter  S00.83XA 920     Patient Active Problem List   Diagnosis   • Vitamin D deficiency   • Diabetes mellitus (CMS/HCC)   • Back pain   • Benign prostatic hyperplasia   • Gastroesophageal reflux disease with esophagitis   • Hyperlipidemia   • Hypertension   • Hypogonadism in male   • Sebaceous cyst   • COPD (chronic obstructive pulmonary disease) (CMS/HCC)   • Encounter for screening for malignant neoplasm of colon   • COVID-19 virus infection     Past Medical History:   Diagnosis Date   • Alzheimer disease (CMS/HCC)    • CHF (congestive heart failure) (CMS/HCC)    • COPD (chronic obstructive pulmonary disease) (CMS/HCC)    • Diabetes (CMS/HCC)    • GERD (gastroesophageal reflux disease)    • Hyperlipemia    • Hypertension      Past Surgical History:   Procedure Laterality Date   • ENDOSCOPY N/A 2017    Procedure: ESOPHAGOGASTRODUODENOSCOPY, biopsy;  Surgeon: Natalia Guerrero MD;  Location: [facility];  Service:             OT ASSESSMENT FLOWSHEET (last 12 hours)      OT Evaluation and Treatment     Row Name 21 1046                   OT Time and Intention    Subjective Information  no complaints  -JJ        Document Type  therapy note (daily note)  -JJ        Mode of Treatment  occupational therapy  -JJ        Patient Effort  adequate  -JJ        Symptoms Noted During/After Treatment  fatigue  -JJ        Comment  pt is pleasantly confused, difficulty with motor planning, following directions  -JJ           General Information    Patient/Family/Caregiver Comments/Observations  pt supine in bed, agreed to treatment  -JJ        Existing  Precautions/Restrictions  fall  -J        Limitations/Impairments  safety/cognitive  -        Barriers to Rehab  cognitive status;previous functional deficit pt with Parkinsons and dementia  -           Cognition    Follows Commands (Cognition)  follows one-step commands;50-74% accuracy;delayed response/completion;increased processing time needed;initiation impaired;physical/tactile prompts required;repetition of directions required;verbal cues/prompting required  -        Personal Safety Interventions  gait belt;nonskid shoes/slippers when out of bed  -           Pain Scale: Word Pre/Post-Treatment    Pre/Posttreatment Pain Comment  no co pain  -           Bed Mobility    Supine-Sit [place] (Bed Mobility)  maximum assist (25% patient effort);2 person assist;verbal cues  -J        Sit-Supine [place] (Bed Mobility)  dependent (less than 25% patient effort);2 person assist;verbal cues  -        Bed Mobility, Safety Issues  cognitive deficits limit understanding  -        Assistive Device (Bed Mobility)  bed rails;draw sheet;head of bed elevated  -        Comment (Bed Mobility)  pt with motor planning difficulties causing increased time and assistance for bed mobility  -           Functional Mobility    Functional Mobility- Comment  pt able to take 3-4 side steps towards HOB x 2 trials with Rw and max assist x 2.   -           Transfer Assessment/Treatment    Transfers  sit-stand transfer;stand-sit transfer  -        Comment (Transfers)  pt required verbal cues for hand placement. pt difficulty with motor planning nad following directions  -           Transfers    Sit-Stand [place] (Transfers)  maximum assist (25% patient effort);2 person assist;verbal cues  -J        Stand-Sit [place] (Transfers)  maximum assist (25% patient effort);2 person assist;verbal cues  -           Sit-Stand Transfer    Assistive Device (Sit-Stand Transfers)  walker, front-wheeled  -            Stand-Sit Transfer    Assistive Device (Stand-Sit Transfers)  walker, front-wheeled  -J           Safety Issues, Functional Mobility    Safety Issues Affecting Function (Mobility)  insight into deficits/self-awareness;awareness of need for assistance;judgment;positioning of assistive device;problem-solving;sequencing abilities  -JJ        Impairments Affecting Function (Mobility)  motor planning;balance;cognition  -        Comment, Safety Issues/Impairments (Mobility)  pt with cognitive and motor planning deficits  -           Balance    Comment, Balance  pt required min-mod asist for sitting balance initially, able to transition to CGA. pt required max assist x 2 for static standing balance with RW.   -JJ           Wound 09/02/21 1452 Left elbow Skin Tear    Wound - Properties Group Placement Date: 09/02/21  -EF Placement Time: 1452  -EF Present on Hospital Admission: Y  -EF Side: Left  -EF Location: elbow  -EF Primary Wound Type: Skin tear  -EF    Retired Wound - Properties Group Date first assessed: 09/02/21  -EF Time first assessed: 1452  -EF Present on Hospital Admission: Y  -EF Side: Left  -EF Location: elbow  -EF Primary Wound Type: Skin tear  -EF       Wound 09/02/21 1453 Right elbow Skin Tear    Wound - Properties Group Placement Date: 09/02/21  -EF Placement Time: 1453  -EF Present on Hospital Admission: Y  -EF Side: Right  -EF Location: elbow  -EF Primary Wound Type: Skin tear  -EF    Retired Wound - Properties Group Date first assessed: 09/02/21  -EF Time first assessed: 1453  -EF Present on Hospital Admission: Y  -EF Side: Right  -EF Location: elbow  -EF Primary Wound Type: Skin tear  -EF       Plan of Care Review    Plan of Care Reviewed With  patient  -JJ        Outcome Summary  OT: pt required max assist x 2 for supine to sit transfers and dependent x 2 for sit to supine transfers. pt  initially required mod-max assist for static sitting balance able to transiton to CGA. pt required max assist x  2 for functional transfers from EOB with RW. pt with posterior and left sided lean in standing requiring max assist for static standing balance with RW. pt able to take 3-4 side steps towards HOB x 2 trials with max assist x 2 and RW. pt with signficant cognitive and motor planning difficulties which impede patients participation with functional activites. pt is more alert today, able to stae he is in the hospital and states he has to go to the bathroom. pt dependent for using urinal.   -JJ           Positioning and Restraints    Pre-Treatment Position  in bed  -JJ        Post Treatment Position  bed  -JJ        In Bed  notified nsg;supine;call light within reach;encouraged to call for assist;exit alarm on  -JJ           Progress Summary (OT)    Progress Toward Functional Goals (OT)  progress toward functional goals is gradual  -JJ        Daily Progress Summary (OT)  cont poc  -JJ          User Key  (r) = Recorded By, (t) = Taken By, (c) = Cosigned By    Initials Name Effective Dates    Luisana Yin, OTR 06/16/21 -     Radha Minor, RN 09/27/16 -            Occupational Therapy Education                 Title: PT OT SLP Therapies (In Progress)     Topic: Occupational Therapy (In Progress)     Point: ADL training (Done)     Description:   Instruct learner(s) on proper safety adaptation and remediation techniques during self care or transfers.   Instruct in proper use of assistive devices.              Learning Progress Summary           Patient Acceptance, E,TB, VU,NR by LEWIS at 9/8/2021 1300    Comment: pt educated on safety with functional transfers and balance    Acceptance, E,TB, VU,NL by LEWIS at 9/6/2021 1211    Comment: pt educated on bed mobility and benefits of activity    Acceptance, E, NL by SD at 9/3/2021 1348    Comment: Education regarding OT services, benefits of activity and safety wtih bed mobility/transfers. Pt is confused (no evidence of learning).                   Point:  Home exercise program (Not Started)     Description:   Instruct learner(s) on appropriate technique for monitoring, assisting and/or progressing therapeutic exercises/activities.              Learner Progress:  Not documented in this visit.                      User Key     Initials Effective Dates Name Provider Type Discipline    SD 06/16/21 -  Wade Pastrana, OTR Occupational Therapist OT    JJOSIAH 06/16/21 -  Luisana Peterson OTR Occupational Therapist OT                  OT Recommendation and Plan     Progress Toward Functional Goals (OT): progress toward functional goals is gradual  Plan of Care Review  Plan of Care Reviewed With: patient  Outcome Summary: OT: pt required max assist x 2 for supine to sit transfers and dependent x 2 for sit to supine transfers. pt  initially required mod-max assist for static sitting balance able to transiton to CGA. pt required max assist x 2 for functional transfers from EOB with RW. pt with posterior and left sided lean in standing requiring max assist for static standing balance with RW. pt able to take 3-4 side steps towards HOB x 2 trials with max assist x 2 and RW. pt with signficant cognitive and motor planning difficulties which impede patients participation with functional activites. pt is more alert today, able to stae he is in the hospital and states he has to go to the bathroom. pt dependent for using urinal.   Plan of Care Reviewed With: patient  Outcome Summary: OT: pt required max assist x 2 for supine to sit transfers and dependent x 2 for sit to supine transfers. pt  initially required mod-max assist for static sitting balance able to transiton to CGA. pt required max assist x 2 for functional transfers from EOB with RW. pt with posterior and left sided lean in standing requiring max assist for static standing balance with RW. pt able to take 3-4 side steps towards HOB x 2 trials with max assist x 2 and RW. pt with signficant cognitive and motor planning  difficulties which impede patients participation with functional activites. pt is more alert today, able to stae he is in the hospital and states he has to go to the bathroom. pt dependent for using urinal.     Outcome Measures     Row Name 09/08/21 1046 09/08/21 1045 09/06/21 1109       How much help from another person do you currently need...    Turning from your back to your side while in flat bed without using bedrails?  --  1  -BP  --    Moving from lying on back to sitting on the side of a flat bed without bedrails?  --  1  -BP  --    Moving to and from a bed to a chair (including a wheelchair)?  --  1  -BP  --    Standing up from a chair using your arms (e.g., wheelchair, bedside chair)?  --  1  -BP  --    Climbing 3-5 steps with a railing?  --  1  -BP  --    To walk in hospital room?  --  1  -BP  --    AM-PAC 6 Clicks Score (PT)  --  6  -BP  --       How much help from another is currently needed...    Putting on and taking off regular lower body clothing?  1  -JJ  --  1  -JJ    Bathing (including washing, rinsing, and drying)  1  -JJ  --  1  -JJ    Toileting (which includes using toilet bed pan or urinal)  1  -JJ  --  1  -JJ    Putting on and taking off regular upper body clothing  1  -JJ  --  1  -JJ    Taking care of personal grooming (such as brushing teeth)  1  -JJ  --  1  -JJ    Eating meals  1  -JJ  --  1  -JJ    AM-PAC 6 Clicks Score (OT)  6  -JJ  --  6  -JJ       Functional Assessment    Outcome Measure Options  --  AM-PAC 6 Clicks Basic Mobility (PT)  -BP  --      User Key  (r) = Recorded By, (t) = Taken By, (c) = Cosigned By    Initials Name Provider Type    Luisana Yin, OTR Occupational Therapist    Solomon Arce, PT Physical Therapist          Time Calculation:   Time Calculation- OT     Row Name 09/08/21 1300             Time Calculation- OT    OT Start Time  1045  -JJ      OT Stop Time  1108  -JJ      OT Time Calculation (min)  23 min  -JJ        User Key  (r) = Recorded  By, (t) = Taken By, (c) = Cosigned By    Initials Name Provider Type    Luisana Yin, DENIA Occupational Therapist        Therapy Charges for Today     Code Description Service Date Service Provider Modifiers Qty    [id number]  OT THERAPEUTIC ACT EA 15 MIN 9/8/2021 Luisana Peterson OTR GO 2               DENIA Marvin  9/8/2021

## 2021-09-08 NOTE — CASE MANAGEMENT/SOCIAL WORK
Continued Stay Note  MIA Beyer     Patient Name: Nash So  MRN: [id number]  Today's Date: 9/8/2021    Admit Date: 9/2/2021    Discharge Plan     Row Name 09/08/21 1619       Plan    Plan Comments  I spoke with the patients wife, Fátima, who is in the ICU.  I advised her of the plan to find a STR with a covid unit for her .  She is agreeable to that plan.  CM will continue to follow.         Discharge Codes    No documentation.             Montserrat Harris RN

## 2021-09-08 NOTE — CASE MANAGEMENT/SOCIAL WORK
Continued Stay Note  MIA Beyer     Patient Name: Nash So  MRN: [id number]  Today's Date: 9/8/2021    Admit Date: 9/2/2021    Discharge Plan     Row Name 09/08/21 1331       Plan    Plan Comments  I spoke with Hailey with [facility] and made a referral for STR for the patient.  Awaiting call back. CM will continue to follow.         Discharge Codes    No documentation.             Montserrat Harris RN

## 2021-09-08 NOTE — PROGRESS NOTES
"Daily Progress Note:      Chief complaint: Follow-up of COVID-19 infection, metabolic encephalopathy, rhabdomyolysis, hyperkalemia, diabetes, hypertension, Parkinson's disease    Subjective: No significant overnight events noted.  Patient was started on Zyprexa yesterday he did sleep well last night.  He is awake alert disoriented confused his oral intake has been negligible.  He refused participate with therapies yesterday     LOS: 6 days     Vital Signs  Temp:  [97.7 °F (36.5 °C)-98.1 °F (36.7 °C)] 97.8 °F (36.6 °C)  Heart Rate:  [55-70] 55  Resp:  [18-32] 22  BP: (116-178)/(66-79) 167/72  Oxygen Therapy  SpO2: 93 %  Pulse Oximetry Type: Continuous  Device (Oxygen Therapy): room air  $ High Flow Nasal Cannula Set-Up: yes  Flow (L/min): 40  Oxygen Concentration (%): 30  ETCO2 (mmHg): 27 mmHg  Probe Placed On (Pulse Ox): Left:, finger}  Body mass index is 34.45 kg/m².  Flowsheet Rows      First Filed Value   Admission Height  182.9 cm (72\") Documented at 09/02/2021 1411   Admission Weight  115 kg (254 lb 4.8 oz) Documented at 09/02/2021 1502                   Documented weights    09/02/21 1502 09/02/21 2241 09/06/21 1208 09/07/21 0514   Weight: 115 kg (254 lb 4.8 oz) 108 kg (238 lb 12.8 oz) 107 kg (236 lb 9.6 oz) 109 kg (240 lb 1.6 oz)           Patient Vitals for the past 24 hrs:   BP Temp Temp src Pulse Resp SpO2   09/08/21 0534 167/72 97.8 °F (36.6 °C) Oral 55 22 93 %   09/07/21 2135 -- -- -- 56 28 94 %   09/07/21 2128 -- -- -- 57 28 95 %   09/07/21 2020 150/76 98.1 °F (36.7 °C) Axillary 59 20 95 %   09/07/21 1709 -- -- -- 61 20 --   09/07/21 1706 -- -- -- 63 20 96 %   09/07/21 1604 178/79 97.8 °F (36.6 °C) Axillary 61 20 96 %   09/07/21 1150 -- -- -- 68 28 --   09/07/21 1146 -- -- -- 69 28 94 %   09/07/21 1122 116/66 97.7 °F (36.5 °C) Axillary 66 18 93 %   09/07/21 0927 -- -- -- 69 (!) 32 --   09/07/21 0923 -- -- -- 70 (!) 32 93 %   09/07/21 0902 -- -- -- 70 -- --       109 kg (240 lb 1.6 oz)    Intake/Output  "                      09/06/21 0701 - 09/07/21 0700 09/07/21 0701 - 09/08/21 0700     [id number] 6173-7585 Total 7596-2544 5704-2055 Total                 Intake    P.O.  720  -- 720  50  -- 50    Total Intake 720 -- 720 50 -- 50       Output    Urine  150  -- 150  --  -- --    Total Output 150 -- 150 -- -- --           Intake/Output Summary (Last 24 hours) at 9/8/2021 0711  Last data filed at 9/7/2021 0843  Gross per 24 hour   Intake 50 ml   Output --   Net 50 ml        Intake/Output Summary (Last 24 hours) at 9/8/2021 0711  Last data filed at 9/7/2021 0843  Gross per 24 hour   Intake 50 ml   Output --   Net 50 ml        Review of Systems   Unable to perform ROS: Mental status change       Physical Exam  Vitals and nursing note reviewed.   Constitutional:       General: He is not in acute distress.     Appearance: He is well-developed. He is morbidly obese.   HENT:      Head: Normocephalic.   Eyes:      Conjunctiva/sclera: Conjunctivae normal.   Neck:      Thyroid: No thyromegaly.      Vascular: No JVD.   Cardiovascular:      Rate and Rhythm: Normal rate and regular rhythm.      Heart sounds: Normal heart sounds. No murmur heard.     Pulmonary:      Effort: Pulmonary effort is normal. No tachypnea, accessory muscle usage or respiratory distress.      Breath sounds: Examination of the right-lower field reveals decreased breath sounds. Examination of the left-lower field reveals decreased breath sounds. Decreased breath sounds present. No wheezing or rales.   Abdominal:      General: Bowel sounds are normal. There is no distension.      Palpations: Abdomen is soft.      Tenderness: There is no abdominal tenderness. There is no guarding.   Musculoskeletal:      Right lower leg: No edema.      Left lower leg: No edema.   Skin:     General: Skin is warm and dry.      Findings: No rash.   Neurological:      General: No focal deficit present.      Mental Status: He is alert. He is disoriented and confused.      Comments:  Oriented to person    Psychiatric:         Cognition and Memory: Cognition is impaired. Memory is impaired. He exhibits impaired remote memory.         Medication Review:   I have reviewed the patient's current medication list  Scheduled Meds:albuterol sulfate HFA, 2 puff, Inhalation, 4x Daily - RT  aspirin, 81 mg, Oral, Daily  atorvastatin, 40 mg, Oral, Daily  carbidopa-levodopa, 1.5 tablet, Oral, TID  dexamethasone, 6 mg, Oral, Daily With Breakfast  enoxaparin, 40 mg, Subcutaneous, Q24H  furosemide, 40 mg, Oral, Daily  guaiFENesin, 1,200 mg, Oral, BID  haloperidol lactate, 1 mg, Intravenous, Once  insulin aspart, 0-24 Units, Subcutaneous, TID AC  lisinopril, 20 mg, Oral, Q12H  nebivolol, 10 mg, Oral, Daily  OLANZapine, 2.5 mg, Oral, BID  pantoprazole, 40 mg, Oral, Daily  potassium chloride, 10 mEq, Oral, Daily  senna-docusate sodium, 2 tablet, Oral, BID      Continuous Infusions:   PRN Meds:.•  acetaminophen  •  albuterol sulfate HFA  •  senna-docusate sodium **AND** polyethylene glycol **AND** bisacodyl **AND** bisacodyl  •  haloperidol lactate  •  hydrALAZINE  •  ondansetron **OR** ondansetron  •  sodium chloride      Labs:  Results from last 7 days   Lab Units 09/08/21 0441 09/07/21 0334 09/06/21  0640 09/05/21  0705 09/05/21  0705 09/03/21  0629 09/03/21  0629 09/02/21  1353 09/02/21  1353   WBC 10*3/mm3 7.39 8.09 10.16  --  7.94  --  5.36  --  4.42   HEMOGLOBIN g/dL 13.3 13.4 13.7   < > 13.0   < > 12.6*   < > 12.6*   HEMATOCRIT % 41.3 40.4 42.5  --  38.4  --  39.9  --  37.5   PLATELETS 10*3/mm3 241 308 269  --  202  --  173  --  166    < > = values in this interval not displayed.     Results from last 7 days   Lab Units 09/08/21 0441 09/07/21 0334 09/06/21  0640 09/05/21  0705 09/04/21  0649 09/03/21  0629 09/02/21  1353   SODIUM mmol/L 142 143 142 142 142 139 139   POTASSIUM mmol/L 3.6 3.5 4.3 3.3* 3.4* 2.8* 3.0*   CHLORIDE mmol/L 106 106 106 105 104 102 101   CO2 mmol/L 23.5 26.8 23.8 27.6 22.9 26.2  27.4   BUN mg/dL 18 19 18 17 13 10 16   CREATININE mg/dL 0.56* 0.54* 0.50* 0.53* 0.62* 0.53* 0.71*   CALCIUM mg/dL 8.1* 8.5* 8.7 8.7 8.8 8.2* 8.5*   BILIRUBIN mg/dL 0.8 0.9 1.0 0.9 0.9 0.7 0.7   ALK PHOS U/L 67 72 72 68 73 73 77   ALT (SGPT) U/L 14 6 20 11 35 7 34   AST (SGOT) U/L 32 33 30 34 45* 46* 41*   GLUCOSE mg/dL 156* 233* 274* 237* 195* 170* 206*     Results from last 7 days   Lab Units 09/04/21  0649 09/02/21  1353   MAGNESIUM mg/dL 1.7 1.4*       Results from last 7 days   Lab Units 09/08/21  0441 09/07/21  0334 09/06/21  0640 09/05/21  0705 09/05/21  0705 09/04/21  0649 09/04/21  0604 09/03/21  0629 09/03/21  0629 09/03/21  0628 09/02/21  1353 09/02/21  1353   LDH U/L  --  425*  --   --  433*  --   --   --  409*  --   --   --    AST (SGOT) U/L 32 33 30   < > 34 45*  --    < > 46*  --    < > 41*   ALT (SGPT) U/L 14 6 20   < > 11 35  --    < > 7  --    < > 34   PROCALCITONIN ng/mL  --   --   --   --   --  0.08  --   --   --   --   --  0.08   LACTATE mmol/L  --   --   --   --   --   --   --   --   --   --   --  2.0   FERRITIN ng/mL 940.00* 1,398.00* 1,984.00*   < > 1,649.00*  --   --    < > 1,117.00*  --    < > 1,110.00*   D DIMER QUANT MCGFEU/mL  --  0.76*  --   --  0.75*  --   --   --   --  0.67*   < > 1.14*   CRP mg/dL  --  3.73* 7.76*  --  14.44*  --    < >   < >  --  33.31*  --   --    PLATELETS 10*3/mm3 241 308 269   < > 202  --   --    < > 173  --    < > 166    < > = values in this interval not displayed.         Lab Results (last 24 hours)     Procedure Component Value Units Date/Time    Comprehensive Metabolic Panel [[id number]]  (Abnormal) Collected: 09/08/21 0441    Specimen: Blood Updated: 09/08/21 0609     Glucose 156 mg/dL      BUN 18 mg/dL      Creatinine 0.56 mg/dL      Sodium 142 mmol/L      Potassium 3.6 mmol/L      Comment: Slight hemolysis detected by analyzer. Results may be affected.        Chloride 106 mmol/L      CO2 23.5 mmol/L      Calcium 8.1 mg/dL      Total Protein 5.5 g/dL       Albumin 2.60 g/dL      ALT (SGPT) 14 U/L      AST (SGOT) 32 U/L      Comment: Slight hemolysis detected by analyzer. Results may be affected.        Alkaline Phosphatase 67 U/L      Total Bilirubin 0.8 mg/dL      eGFR Non African Amer 142 mL/min/1.73      Globulin 2.9 gm/dL      A/G Ratio 0.9 g/dL      BUN/Creatinine Ratio 32.1     Anion Gap 12.5 mmol/L     Narrative:      GFR Normal >60  Chronic Kidney Disease <60  Kidney Failure <15      Ferritin [[id number]]  (Abnormal) Collected: 09/08/21 0441    Specimen: Blood Updated: 09/08/21 0606     Ferritin 940.00 ng/mL     Narrative:      Results may be falsely decreased if patient taking Biotin.      Sedimentation Rate [[id number]]  (Normal) Collected: 09/08/21 0441    Specimen: Blood Updated: 09/08/21 0539     Sed Rate 10 mm/hr     CBC & Differential [[id number]]  (Abnormal) Collected: 09/08/21 0441    Specimen: Blood Updated: 09/08/21 0523    Narrative:      The following orders were created for panel order CBC & Differential.  Procedure                               Abnormality         Status                     ---------                               -----------         ------                     CBC Auto Differential[[id number]]        Abnormal            Final result                 Please view results for these tests on the individual orders.    CBC Auto Differential [[id number]]  (Abnormal) Collected: 09/08/21 0441    Specimen: Blood Updated: 09/08/21 0523     WBC 7.39 10*3/mm3      RBC 4.41 10*6/mm3      Hemoglobin 13.3 g/dL      Hematocrit 41.3 %      MCV 93.7 fL      MCH 30.2 pg      MCHC 32.2 g/dL      RDW 14.5 %      RDW-SD 49.7 fl      MPV 10.5 fL      Platelets 241 10*3/mm3      Neutrophil % 72.0 %      Lymphocyte % 13.1 %      Monocyte % 9.3 %      Eosinophil % 0.3 %      Basophil % 0.7 %      Immature Grans % 4.6 %      Neutrophils, Absolute 5.32 10*3/mm3      Lymphocytes, Absolute 0.97 10*3/mm3      Monocytes, Absolute 0.69 10*3/mm3      Eosinophils,  Absolute 0.02 10*3/mm3      Basophils, Absolute 0.05 10*3/mm3      Immature Grans, Absolute 0.34 10*3/mm3      nRBC 0.5 /100 WBC     POC Glucose Once [[id number]]  (Abnormal) Collected: 09/07/21 2017    Specimen: Blood Updated: 09/07/21 2023     Glucose 176 mg/dL      Comment: RN Notified R and V Meter: [id number] : [id number] Luli Hamlin PCA       POC Glucose Once [[id number]]  (Abnormal) Collected: 09/07/21 1605    Specimen: Blood Updated: 09/07/21 1614     Glucose 235 mg/dL      Comment: Meter: [id number] : [id number] Ender GLEASON       Blood Culture - Blood, Arm, Left [[id number]] Collected: 09/02/21 1512    Specimen: Blood from Arm, Left Updated: 09/07/21 1530     Blood Culture No growth at 5 days    Blood Culture - Blood, Arm, Left [[id number]] Collected: 09/02/21 1350    Specimen: Blood from Arm, Left Updated: 09/07/21 1431     Blood Culture No growth at 5 days    POC Glucose Once [[id number]]  (Abnormal) Collected: 09/07/21 1120    Specimen: Blood Updated: 09/07/21 1127     Glucose 286 mg/dL      Comment: Meter: [id number] : [id number] Ender GLEASON           Results from last 7 days   Lab Units 09/07/21  0334 09/05/21  0705 09/04/21  0649 09/03/21  0629 09/03/21  0628 09/02/21  1353   CK TOTAL U/L  --   --   --  776*  --  765*   TROPONIN T ng/mL  --  0.022 0.033*  --   --  0.122*   D DIMER QUANT MCGFEU/mL 0.76* 0.75*  --   --  0.67* 1.14*         Results from last 7 days   Lab Units 09/02/21  1512   PROBNP pg/mL 2,448.0*         Results from last 7 days   Lab Units 09/04/21  0649 09/02/21  1353   MAGNESIUM mg/dL 1.7 1.4*         Results from last 7 days   Lab Units 09/04/21  0450 09/03/21  1720   PH, ARTERIAL pH units 7.547* 7.535*   PCO2, ARTERIAL mm Hg 29.7* 33.9*   PO2 ART mm Hg 72.4* 60.7*   HCO3 ART mmol/L 25.7 28.6*     Results from last 7 days   Lab Units 09/04/21  0952   HEMOGLOBIN A1C % 5.50     Glucose   Date/Time Value Ref Range Status   09/07/2021 2017 176 (H) 70 - 130 mg/dL  Final     Comment:     RN Notified R and V Meter: [id number] : [id number] Luli Hamlin PCA   09/07/2021 1605 235 (H) 70 - 130 mg/dL Final     Comment:     Meter: [id number] : [id number] Ender Gong NA   09/07/2021 1120 286 (H) 70 - 130 mg/dL Final     Comment:     Meter: [id number] : [id number] Ender Gong NA   09/07/2021 0808 213 (H) 70 - 130 mg/dL Final     Comment:     Meter: [id number] : [id number] Ender Gong NA   09/06/2021 1639 233 (H) 70 - 130 mg/dL Final     Comment:     Meter: [id number] : [id number] Toribio Delvalle LPN   09/06/2021 1213 284 (H) 70 - 130 mg/dL Final     Comment:     Meter: [id number] : [id number] Jaskaran Ivory Float NA   09/06/2021 0803 246 (H) 70 - 130 mg/dL Final     Comment:     Meter: [id number] : [id number] Jaskaran Thompsondie Float NA   09/05/2021 2115 273 (H) 70 - 130 mg/dL Final     Comment:     Meter: [id number] : [id number] Elier Beatty RN Charge(Validator)     Results from last 7 days   Lab Units 09/04/21  0649 09/02/21  1353   PROCALCITONIN ng/mL 0.08 0.08   LACTATE mmol/L  --  2.0     Results from last 7 days   Lab Units 09/02/21  1512 09/02/21  1350   BLOODCX  No growth at 5 days No growth at 5 days     Results from last 7 days   Lab Units 09/02/21  1359   NITRITE UA  Negative   WBC UA /HPF 3-5*   BACTERIA UA /HPF 1+*   SQUAM EPITHEL UA /HPF 0-2     Results from last 7 days   Lab Units 09/02/21  1341   INFLUENZA B PCR  Not Detected   INFLUENZA A PCR  Not Detected         Radiology:  Imaging Results (Last 24 Hours)     ** No results found for the last 24 hours. **          Cardiology:  ECG/EMG Results (last 24 hours)     Procedure Component Value Units Date/Time    ECG 12 Lead [[id number]] Collected: 09/02/21 1348     Updated: 09/02/21 1352     QT Interval 467 ms     Narrative:      HEART RATE= 68  bpm  RR Interval= 880  ms  VA Interval= 211  ms  P Horizontal Axis= 8  deg  P Front Axis= 52  deg  QRSD Interval= 130  ms  QT Interval= 467   ms  QRS Axis= -14  deg  T Wave Axis= -6  deg  - ABNORMAL ECG -  Sinus rhythm  Nonspecific intraventricular conduction delay  Lateral infarct, age indeterminate  Electronically Signed By:   Date and Time of Study: 2021-09-02 13:48:28          I have reviewed recent labs results and consult notes.  Parts of this note may have been copied and pasted but patient was examined and interviewed by me today    Assessment and Plan:    1.  COVID-19 pneumonia generalized weakness .  Remdesivir and Decadron initiated on 9 /4.  Patient pulled out his IVs since is on room air.  Remdesivir changed to p.o. Decadron.  Continue to trend inflammatory markers    2.  Metabolic encephalopathy likely due to COVID-19 with severe sundowning started on Zyprexa on 9/6/2021 show some improvement still confused significantly he is not at baseline    3.    Adult-onset diabetes mellitus continues to be hyperglycemic but improving continue with sliding scale insulin optimize glycemic control poor oral intake continue sliding scale insulin    4.  Hypertension poor control with labile likely due to agitation at times we will continue to monitor adjust medications    5.    Acute hypoxic respiratory failure resolved     6.  Parkinson's disease nothing acute continue with home medications     7.    COPD no evidence of exacerbation continue with as needed albuterol    8.  DVT prophylaxis Lovenox    9.  Generalized weakness secondary to COVID-19 pneumonia comorbidities poor participation with therapy secondary to underlying encephalopathy discussed with wife is an ICU bed 1 about long-term goals    Much of this encounter note is an electronic transcription/translation of spoken language to printed text using Dragon Software

## 2021-09-08 NOTE — PLAN OF CARE
Goal Outcome Evaluation:  Plan of Care Reviewed With: patient           Outcome Summary: PT: Patient performs supine to sit transfer with max A x 2, sit to supine transfer dependently x 2, sit to/from stand from elevated bed surface with max A x 2 and able to take 5-6 side steps along EOB with one seated break with max A x 2 with use of FWW. Patient demonstrates posterior and left lateral lean in standing.  Patient more alert today, continues to be oriented to person only. He does recognize he is in a hospital. Follows approximately 50% of one step commands with significant cues. Patient demonstrates significant difficulty with motor planning. Recommend use of donal lift for out of bed transfers. Continue to recommend discharge to SNF/ECF.

## 2021-09-08 NOTE — PLAN OF CARE
Goal Outcome Evaluation:  Plan of Care Reviewed With: patient        Progress: improving  Outcome Summary: Pt more alert and cooperative today. Tolerating diet, eating better than yesterday. Frequent repositioning. Incontinent, brief in place.

## 2021-09-08 NOTE — PLAN OF CARE
Goal Outcome Evaluation:  Plan of Care Reviewed With: patient           Outcome Summary: OT: pt required max assist x 2 for supine to sit transfers and dependent x 2 for sit to supine transfers. pt  initially required mod-max assist for static sitting balance able to transiton to CGA. pt required max assist x 2 for functional transfers from EOB with RW. pt with posterior and left sided lean in standing requiring max assist for static standing balance with RW. pt able to take 3-4 side steps towards HOB x 2 trials with max assist x 2 and RW. pt with signficant cognitive and motor planning difficulties which impede patients participation with functional activites. pt is more alert today, able to stae he is in the hospital and states he has to go to the bathroom. pt dependent for using urinal.

## 2021-09-08 NOTE — PLAN OF CARE
Goal Outcome Evaluation:              Outcome Summary: Patient VSS. Alert but remains disoriented, however patient did sleep overnight. Bed alarm in place, resting quietly at this time.

## 2021-09-09 LAB
ALBUMIN SERPL-MCNC: 3 G/DL (ref 3.5–5.2)
ALBUMIN/GLOB SERPL: 1.1 G/DL
ALP SERPL-CCNC: 80 U/L (ref 39–117)
ALT SERPL W P-5'-P-CCNC: 9 U/L (ref 1–41)
ANION GAP SERPL CALCULATED.3IONS-SCNC: 8.8 MMOL/L (ref 5–15)
AST SERPL-CCNC: 26 U/L (ref 1–40)
BASOPHILS # BLD AUTO: 0.06 10*3/MM3 (ref 0–0.2)
BASOPHILS NFR BLD AUTO: 0.6 % (ref 0–1.5)
BILIRUB SERPL-MCNC: 0.8 MG/DL (ref 0–1.2)
BUN SERPL-MCNC: 22 MG/DL (ref 8–23)
BUN/CREAT SERPL: 35.5 (ref 7–25)
CALCIUM SPEC-SCNC: 8.5 MG/DL (ref 8.6–10.5)
CHLORIDE SERPL-SCNC: 107 MMOL/L (ref 98–107)
CO2 SERPL-SCNC: 30.2 MMOL/L (ref 22–29)
CREAT SERPL-MCNC: 0.62 MG/DL (ref 0.76–1.27)
D DIMER PPP FEU-MCNC: 1.34 MCGFEU/ML (ref 0–0.46)
DEPRECATED RDW RBC AUTO: 46.8 FL (ref 37–54)
EOSINOPHIL # BLD AUTO: 0.07 10*3/MM3 (ref 0–0.4)
EOSINOPHIL NFR BLD AUTO: 0.7 % (ref 0.3–6.2)
ERYTHROCYTE [DISTWIDTH] IN BLOOD BY AUTOMATED COUNT: 14.6 % (ref 12.3–15.4)
ERYTHROCYTE [SEDIMENTATION RATE] IN BLOOD: 12 MM/HR (ref 0–20)
FERRITIN SERPL-MCNC: 803 NG/ML (ref 30–400)
GFR SERPL CREATININE-BSD FRML MDRD: 126 ML/MIN/1.73
GLOBULIN UR ELPH-MCNC: 2.7 GM/DL
GLUCOSE BLDC GLUCOMTR-MCNC: 116 MG/DL (ref 70–130)
GLUCOSE BLDC GLUCOMTR-MCNC: 144 MG/DL (ref 70–130)
GLUCOSE BLDC GLUCOMTR-MCNC: 160 MG/DL (ref 70–130)
GLUCOSE BLDC GLUCOMTR-MCNC: 252 MG/DL (ref 70–130)
GLUCOSE SERPL-MCNC: 120 MG/DL (ref 65–99)
HCT VFR BLD AUTO: 41.2 % (ref 37.5–51)
HGB BLD-MCNC: 13.8 G/DL (ref 13–17.7)
IMM GRANULOCYTES # BLD AUTO: 0.61 10*3/MM3 (ref 0–0.05)
IMM GRANULOCYTES NFR BLD AUTO: 6.2 % (ref 0–0.5)
LDH SERPL-CCNC: 379 U/L (ref 135–225)
LYMPHOCYTES # BLD AUTO: 1.35 10*3/MM3 (ref 0.7–3.1)
LYMPHOCYTES NFR BLD AUTO: 13.7 % (ref 19.6–45.3)
MCH RBC QN AUTO: 29.7 PG (ref 26.6–33)
MCHC RBC AUTO-ENTMCNC: 33.5 G/DL (ref 31.5–35.7)
MCV RBC AUTO: 88.8 FL (ref 79–97)
MONOCYTES # BLD AUTO: 0.76 10*3/MM3 (ref 0.1–0.9)
MONOCYTES NFR BLD AUTO: 7.7 % (ref 5–12)
NEUTROPHILS NFR BLD AUTO: 7.03 10*3/MM3 (ref 1.7–7)
NEUTROPHILS NFR BLD AUTO: 71.1 % (ref 42.7–76)
NRBC BLD AUTO-RTO: 0.3 /100 WBC (ref 0–0.2)
PLAT MORPH BLD: NORMAL
PLATELET # BLD AUTO: 313 10*3/MM3 (ref 140–450)
PMV BLD AUTO: 9.7 FL (ref 6–12)
POTASSIUM SERPL-SCNC: 2.8 MMOL/L (ref 3.5–5.2)
PROT SERPL-MCNC: 5.7 G/DL (ref 6–8.5)
RBC # BLD AUTO: 4.64 10*6/MM3 (ref 4.14–5.8)
RBC MORPH BLD: NORMAL
SODIUM SERPL-SCNC: 146 MMOL/L (ref 136–145)
WBC # BLD AUTO: 9.88 10*3/MM3 (ref 3.4–10.8)
WBC MORPH BLD: NORMAL

## 2021-09-09 PROCEDURE — 94799 UNLISTED PULMONARY SVC/PX: CPT

## 2021-09-09 PROCEDURE — 63710000001 INSULIN ASPART PER 5 UNITS: Performed by: FAMILY MEDICINE

## 2021-09-09 PROCEDURE — 82728 ASSAY OF FERRITIN: CPT | Performed by: FAMILY MEDICINE

## 2021-09-09 PROCEDURE — 85652 RBC SED RATE AUTOMATED: CPT | Performed by: FAMILY MEDICINE

## 2021-09-09 PROCEDURE — 85379 FIBRIN DEGRADATION QUANT: CPT | Performed by: FAMILY MEDICINE

## 2021-09-09 PROCEDURE — 82962 GLUCOSE BLOOD TEST: CPT

## 2021-09-09 PROCEDURE — 80053 COMPREHEN METABOLIC PANEL: CPT | Performed by: FAMILY MEDICINE

## 2021-09-09 PROCEDURE — 83615 LACTATE (LD) (LDH) ENZYME: CPT | Performed by: FAMILY MEDICINE

## 2021-09-09 PROCEDURE — 97110 THERAPEUTIC EXERCISES: CPT

## 2021-09-09 PROCEDURE — 85007 BL SMEAR W/DIFF WBC COUNT: CPT | Performed by: FAMILY MEDICINE

## 2021-09-09 PROCEDURE — 85025 COMPLETE CBC W/AUTO DIFF WBC: CPT | Performed by: FAMILY MEDICINE

## 2021-09-09 PROCEDURE — 97530 THERAPEUTIC ACTIVITIES: CPT

## 2021-09-09 PROCEDURE — 25010000002 ENOXAPARIN PER 10 MG: Performed by: FAMILY MEDICINE

## 2021-09-09 RX ORDER — POTASSIUM CHLORIDE 20 MEQ/1
20 TABLET, EXTENDED RELEASE ORAL DAILY
Status: DISCONTINUED | OUTPATIENT
Start: 2021-09-09 | End: 2021-09-16 | Stop reason: HOSPADM

## 2021-09-09 RX ORDER — GLIPIZIDE 5 MG/1
5 TABLET ORAL
Refills: 1 | Status: DISCONTINUED | OUTPATIENT
Start: 2021-09-09 | End: 2021-09-16 | Stop reason: HOSPADM

## 2021-09-09 RX ORDER — POTASSIUM CHLORIDE 1.5 G/1.77G
40 POWDER, FOR SOLUTION ORAL
Status: COMPLETED | OUTPATIENT
Start: 2021-09-09 | End: 2021-09-09

## 2021-09-09 RX ADMIN — NEBIVOLOL HYDROCHLORIDE 10 MG: 5 TABLET ORAL at 09:15

## 2021-09-09 RX ADMIN — POTASSIUM CHLORIDE 40 MEQ: 1.5 POWDER, FOR SOLUTION ORAL at 09:15

## 2021-09-09 RX ADMIN — GUAIFENESIN 1200 MG: 600 TABLET, EXTENDED RELEASE ORAL at 09:14

## 2021-09-09 RX ADMIN — POTASSIUM CHLORIDE 20 MEQ: 1500 TABLET, EXTENDED RELEASE ORAL at 09:15

## 2021-09-09 RX ADMIN — INSULIN ASPART 12 UNITS: 100 INJECTION, SOLUTION INTRAVENOUS; SUBCUTANEOUS at 12:56

## 2021-09-09 RX ADMIN — OLANZAPINE 2.5 MG: 5 TABLET, FILM COATED ORAL at 09:14

## 2021-09-09 RX ADMIN — SENNOSIDES AND DOCUSATE SODIUM 2 TABLET: 50; 8.6 TABLET ORAL at 09:14

## 2021-09-09 RX ADMIN — PIOGLITAZONE: 30 TABLET ORAL at 09:13

## 2021-09-09 RX ADMIN — LISINOPRIL 20 MG: 20 TABLET ORAL at 21:34

## 2021-09-09 RX ADMIN — SODIUM CHLORIDE, PRESERVATIVE FREE 10 ML: 5 INJECTION INTRAVENOUS at 09:16

## 2021-09-09 RX ADMIN — CARBIDOPA AND LEVODOPA 1.5 TABLET: 25; 100 TABLET ORAL at 18:00

## 2021-09-09 RX ADMIN — ALBUTEROL SULFATE 2 PUFF: 90 AEROSOL, METERED RESPIRATORY (INHALATION) at 11:49

## 2021-09-09 RX ADMIN — ATORVASTATIN CALCIUM 40 MG: 40 TABLET, FILM COATED ORAL at 09:14

## 2021-09-09 RX ADMIN — CARBIDOPA AND LEVODOPA 1.5 TABLET: 25; 100 TABLET ORAL at 09:14

## 2021-09-09 RX ADMIN — DEXAMETHASONE 6 MG: 4 TABLET ORAL at 09:13

## 2021-09-09 RX ADMIN — FUROSEMIDE 40 MG: 40 TABLET ORAL at 09:15

## 2021-09-09 RX ADMIN — PIOGLITAZONE: 30 TABLET ORAL at 18:00

## 2021-09-09 RX ADMIN — POTASSIUM CHLORIDE 40 MEQ: 1.5 POWDER, FOR SOLUTION ORAL at 12:56

## 2021-09-09 RX ADMIN — ASPIRIN 81 MG: 81 TABLET, COATED ORAL at 09:13

## 2021-09-09 RX ADMIN — INSULIN ASPART 4 UNITS: 100 INJECTION, SOLUTION INTRAVENOUS; SUBCUTANEOUS at 18:00

## 2021-09-09 RX ADMIN — GLIPIZIDE 5 MG: 5 TABLET ORAL at 09:15

## 2021-09-09 RX ADMIN — CARBIDOPA AND LEVODOPA 1.5 TABLET: 25; 100 TABLET ORAL at 21:33

## 2021-09-09 RX ADMIN — OLANZAPINE 2.5 MG: 5 TABLET, FILM COATED ORAL at 21:34

## 2021-09-09 RX ADMIN — PANTOPRAZOLE SODIUM 40 MG: 40 TABLET, DELAYED RELEASE ORAL at 09:15

## 2021-09-09 RX ADMIN — LISINOPRIL 20 MG: 20 TABLET ORAL at 09:15

## 2021-09-09 RX ADMIN — ENOXAPARIN SODIUM 40 MG: 40 INJECTION SUBCUTANEOUS at 21:37

## 2021-09-09 NOTE — THERAPY TREATMENT NOTE
Acute Care - Occupational Therapy Treatment Note   Tori Simmons     Patient Name: Nash So  : 1945  MRN: [id number]  Today's Date: 2021             Admit Date: 2021       ICD-10-CM ICD-9-CM   1. COVID-19 virus infection  U07.1 079.89   2. Weakness  R53.1 780.79   3. Elevated troponin  R77.8 790.6   4. Contusion of face, initial encounter  S00.83XA 920     Patient Active Problem List   Diagnosis   • Vitamin D deficiency   • Diabetes mellitus (CMS/HCC)   • Back pain   • Benign prostatic hyperplasia   • Gastroesophageal reflux disease with esophagitis   • Hyperlipidemia   • Hypertension   • Hypogonadism in male   • Sebaceous cyst   • COPD (chronic obstructive pulmonary disease) (CMS/HCC)   • Encounter for screening for malignant neoplasm of colon   • COVID-19 virus infection     Past Medical History:   Diagnosis Date   • Alzheimer disease (CMS/HCC)    • CHF (congestive heart failure) (CMS/HCC)    • COPD (chronic obstructive pulmonary disease) (CMS/HCC)    • Diabetes (CMS/HCC)    • GERD (gastroesophageal reflux disease)    • Hyperlipemia    • Hypertension      Past Surgical History:   Procedure Laterality Date   • ENDOSCOPY N/A 2017    Procedure: ESOPHAGOGASTRODUODENOSCOPY, biopsy;  Surgeon: Natalia Guerrero MD;  Location: [facility];  Service:             OT ASSESSMENT FLOWSHEET (last 12 hours)      OT Evaluation and Treatment     Row Name 21 1046                   OT Time and Intention    Subjective Information  no complaints  -JJ        Document Type  therapy note (daily note)  -JJ        Mode of Treatment  occupational therapy  -JJ        Patient Effort  adequate  -JJ        Comment  pt found with no gown on sideways in bed with B LEs off of bed. pt confused and agitated, uncooperative with soiled brief.   -JJ           General Information    Patient Profile Reviewed  yes  -JJ        Patient/Family/Caregiver Comments/Observations  pt supine across bed, with no gown on with B LEs out  of bed over the bed rail.   -JJ        Existing Precautions/Restrictions  fall  -JJ        Limitations/Impairments  safety/cognitive  -JJ        Risks Reviewed  patient:;LOB  -JJ        Benefits Reviewed  patient:;improve function  -JJ        Barriers to Rehab  cognitive status;previous functional deficit  -JJ           Cognition    Affect/Mental Status (Cognitive)  confused  -JJ        Behavioral Issues (Cognitive)  uncooperative  -JJ        Follows Commands (Cognition)  follows one-step commands;50-74% accuracy;delayed response/completion;increased processing time needed;initiation impaired;physical/tactile prompts required;repetition of directions required;verbal cues/prompting required  -JJ        Personal Safety Interventions  gait belt;nonskid shoes/slippers when out of bed  -JJ           Pain Assessment    Additional Documentation  -- no co pain  -JJ           Bed Mobility    Rolling Left [place] (Bed Mobility)  dependent (less than 25% patient effort);2 person assist;verbal cues  -JJ        Rolling Right [place] (Bed Mobility)  dependent (less than 25% patient effort);2 person assist;verbal cues  -JJ        Scooting/Bridging [place] (Bed Mobility)  contact guard;verbal cues  -JJ        Supine-Sit [place] (Bed Mobility)  moderate assist (50% patient effort);2 person assist;verbal cues  -JJ        Sit-Supine [place] (Bed Mobility)  maximum assist (25% patient effort);2 person assist;verbal cues  -JJ        Bed Mobility, Safety Issues  cognitive deficits limit understanding  -JJ        Assistive Device (Bed Mobility)  bed rails;draw sheet;head of bed elevated  -JJ           Functional Mobility    Functional Mobility- Comment  unable to illicit side steps towards HOB depite cues and extended time  -JJ           Transfer Assessment/Treatment    Comment (Transfers)  pt required min assist x 2 for sit to stand transfers from EOB. pt stood multiple times impulsively wihtout walker despite  "cues from therapists. pt uncooperative, and confused attempting to ambulate acorss the room without assistive device, wanting to \"go downstairs. \" with extended time pt sat at EOB with min assist.   -JJ           Transfers    Sit-Stand [place] (Transfers)  minimum assist (75% patient effort);2 person assist;verbal cues  -JJ        Stand-Sit [place] (Transfers)  minimum assist (75% patient effort);2 person assist;verbal cues  -JJ           Sit-Stand Transfer    Assistive Device (Sit-Stand Transfers)  walker, front-wheeled  -JJ           Stand-Sit Transfer    Assistive Device (Stand-Sit Transfers)  walker, front-wheeled  -JJ           Safety Issues, Functional Mobility    Safety Issues Affecting Function (Mobility)  at risk behavior observed;awareness of need for assistance;impulsivity;insight into deficits/self-awareness;judgment;positioning of assistive device;problem-solving;safety precaution awareness;safety precautions follow-through/compliance;sequencing abilities  -        Comment, Safety Issues/Impairments (Mobility)  pt impulsive, inconsistent with following commands and confused throughout treatment session  -           Balance    Comment, Balance  pt required CGA-min assist for static standing balance with RW at EOB. pt required signficant cues and encouragement to remain standing and not to try and ambulate across the room  -JJ           Wound 09/02/21 1452 Left elbow Skin Tear    Wound - Properties Group Placement Date: 09/02/21  -EF Placement Time: 1452  -EF Present on Hospital Admission: Y  -EF Side: Left  -EF Location: elbow  -EF Primary Wound Type: Skin tear  -EF    Retired Wound - Properties Group Date first assessed: 09/02/21  -EF Time first assessed: 1452  -EF Present on Hospital Admission: Y  -EF Side: Left  -EF Location: elbow  -EF Primary Wound Type: Skin tear  -EF       Wound 09/02/21 1453 Right elbow Skin Tear    Wound - Properties Group Placement Date: 09/02/21  -EF Placement " Time: 1453  -EF Present on Hospital Admission: Y  -EF Side: Right  -EF Location: elbow  -EF Primary Wound Type: Skin tear  -EF    Retired Wound - Properties Group Date first assessed: 09/02/21  -EF Time first assessed: 1453  -EF Present on Hospital Admission: Y  -EF Side: Right  -EF Location: elbow  -EF Primary Wound Type: Skin tear  -EF       Plan of Care Review    Plan of Care Reviewed With  patient  -JJ        Outcome Summary  OT: pt found supine  sideways in bed with B LEs over bed rail. pt confused, agitated, uncooperative and brief was soiled, incontinent of bowel and bladder. pt required mod assist for supine to sit transfer. pt required min assist for sit to stand transfers. pt impulsively stood from bed several times without assistive device despite signficant ues and redirection from therapists. pt refused to sit back at EOB and required min assist x 2 for static standing balance. pt able to sit EOB given extended time when B LEs fatigued. pt unable to illicit side steps towards HOB however required CGA for scooting seated along EOB. pt  dependent for rolling in bed, toilet hygeine and brief management. pt with increased confusion requiring signficant assist and cues for safety today.   -JJ           Positioning and Restraints    Pre-Treatment Position  in bed  -JJ        Post Treatment Position  bed  -JJ        In Bed  supine;call light within reach;encouraged to call for assist;notified nsg;exit alarm on  -JJ           Progress Summary (OT)    Progress Toward Functional Goals (OT)  unable to show any progress toward functional goals  -JJ        Daily Progress Summary (OT)  cont poc  -JJ          User Key  (r) = Recorded By, (t) = Taken By, (c) = Cosigned By    Initials Name Effective Dates    Luisana Yin, OTR 06/16/21 -     EF Radha Davila, RN 09/27/16 -            Occupational Therapy Education                 Title: PT OT SLP Therapies (In Progress)     Topic: Occupational  Therapy (In Progress)     Point: ADL training (Done)     Description:   Instruct learner(s) on proper safety adaptation and remediation techniques during self care or transfers.   Instruct in proper use of assistive devices.              Learning Progress Summary           Patient Acceptance, E,TB, VU,NL by LEWIS at 9/9/2021 1408    Comment: pt educated on safety with transfers, bed mobility and balance    Acceptance, E,TB, VU,NR by LEWIS at 9/8/2021 1300    Comment: pt educated on safety with functional transfers and balance    Acceptance, E,TB, VU,NL by LEWIS at 9/6/2021 1211    Comment: pt educated on bed mobility and benefits of activity    Acceptance, E, NL by SD at 9/3/2021 1348    Comment: Education regarding OT services, benefits of activity and safety wtih bed mobility/transfers. Pt is confused (no evidence of learning).                   Point: Home exercise program (Not Started)     Description:   Instruct learner(s) on appropriate technique for monitoring, assisting and/or progressing therapeutic exercises/activities.              Learner Progress:  Not documented in this visit.                      User Key     Initials Effective Dates Name Provider Type Discipline    SD 06/16/21 -  Wade Pastrana, OTR Occupational Therapist OT    LEWIS 06/16/21 -  Luisana Peterson OTR Occupational Therapist OT                  OT Recommendation and Plan     Progress Toward Functional Goals (OT): unable to show any progress toward functional goals  Plan of Care Review  Plan of Care Reviewed With: patient  Outcome Summary: OT: pt found supine  sideways in bed with B LEs over bed rail. pt confused, agitated, uncooperative and brief was soiled, incontinent of bowel and bladder. pt required mod assist for supine to sit transfer. pt required min assist for sit to stand transfers. pt impulsively stood from bed several times without assistive device despite signficant ues and redirection from therapists. pt refused to sit back  at EOB and required min assist x 2 for static standing balance. pt able to sit EOB given extended time when B LEs fatigued. pt unable to illicit side steps towards HOB however required CGA for scooting seated along EOB. pt  dependent for rolling in bed, toilet hygeine and brief management. pt with increased confusion requiring signficant assist and cues for safety today.   Plan of Care Reviewed With: patient  Outcome Summary: OT: pt found supine  sideways in bed with B LEs over bed rail. pt confused, agitated, uncooperative and brief was soiled, incontinent of bowel and bladder. pt required mod assist for supine to sit transfer. pt required min assist for sit to stand transfers. pt impulsively stood from bed several times without assistive device despite signficant ues and redirection from therapists. pt refused to sit back at EOB and required min assist x 2 for static standing balance. pt able to sit EOB given extended time when B LEs fatigued. pt unable to illicit side steps towards HOB however required CGA for scooting seated along EOB. pt  dependent for rolling in bed, toilet hygeine and brief management. pt with increased confusion requiring signficant assist and cues for safety today.     Outcome Measures     Row Name 09/09/21 1200 09/09/21 1046 09/08/21 1046       How much help from another person do you currently need...    Turning from your back to your side while in flat bed without using bedrails?  1  -LH  --  --    Moving from lying on back to sitting on the side of a flat bed without bedrails?  2  -LH  --  --    Moving to and from a bed to a chair (including a wheelchair)?  1  -LH  --  --    Standing up from a chair using your arms (e.g., wheelchair, bedside chair)?  2  -LH  --  --    Climbing 3-5 steps with a railing?  1  -LH  --  --    To walk in hospital room?  1  -LH  --  --    AM-PAC 6 Clicks Score (PT)  8  -LH  --  --       How much help from another is currently needed...    Putting on and taking  off regular lower body clothing?  --  1  -JJ  1  -JJ    Bathing (including washing, rinsing, and drying)  --  1  -JJ  1  -JJ    Toileting (which includes using toilet bed pan or urinal)  --  1  -JJ  1  -JJ    Putting on and taking off regular upper body clothing  --  1  -JJ  1  -JJ    Taking care of personal grooming (such as brushing teeth)  --  1  -JJ  1  -JJ    Eating meals  --  1  -JJ  1  -JJ    AM-PAC 6 Clicks Score (OT)  --  6  -JJ  6  -JJ       Functional Assessment    Outcome Measure Options  AM-PAC 6 Clicks Basic Mobility (PT)  -  --  --    Row Name 09/08/21 1045             How much help from another person do you currently need...    Turning from your back to your side while in flat bed without using bedrails?  1  -BP      Moving from lying on back to sitting on the side of a flat bed without bedrails?  1  -BP      Moving to and from a bed to a chair (including a wheelchair)?  1  -BP      Standing up from a chair using your arms (e.g., wheelchair, bedside chair)?  1  -BP      Climbing 3-5 steps with a railing?  1  -BP      To walk in hospital room?  1  -BP      AM-PAC 6 Clicks Score (PT)  6  -BP         Functional Assessment    Outcome Measure Options  AM-PAC 6 Clicks Basic Mobility (PT)  -BP        User Key  (r) = Recorded By, (t) = Taken By, (c) = Cosigned By    Initials Name Provider Type     Catrina Graham, PT Physical Therapist    Luisana Yin OTR Occupational Therapist    Solomon Arce, PT Physical Therapist          Time Calculation:   Time Calculation- OT     Row Name 09/09/21 1409             Time Calculation- OT    OT Start Time  1045  -JJ        User Key  (r) = Recorded By, (t) = Taken By, (c) = Cosigned By    Initials Name Provider Type    Luisana Yin OTR Occupational Therapist        Therapy Charges for Today     Code Description Service Date Service Provider Modifiers Qty    15759078949  OT THERAPEUTIC ACT EA 15 MIN 9/8/2021 Luisana Peterson  [place], OTR GO 2    [id number]  OT THERAPEUTIC ACT EA 15 MIN 9/9/2021 Luisana Peterson OTR GO 3               DENIA Marvin  9/9/2021

## 2021-09-09 NOTE — PLAN OF CARE
Goal Outcome Evaluation:  Plan of Care Reviewed With: patient           Outcome Summary: OT: pt found supine  sideways in bed with B LEs over bed rail. pt confused, agitated, uncooperative and brief was soiled, incontinent of bowel and bladder. pt required mod assist for supine to sit transfer. pt required min assist for sit to stand transfers. pt impulsively stood from bed several times without assistive device despite signficant ues and redirection from therapists. pt refused to sit back at EOB and required min assist x 2 for static standing balance. pt able to sit EOB given extended time when B LEs fatigued. pt unable to illicit side steps towards HOB however required CGA for scooting seated along EOB. pt  dependent for rolling in bed, toilet hygeine and brief management. pt with increased confusion requiring signficant assist and cues for safety today.

## 2021-09-09 NOTE — NURSING NOTE
Patient being very uncooperative and mistrustful, grabbing at staff's arms as he is being changed. Episodes of incontinence continue, and patient is stripping off blankets and gown consistently.

## 2021-09-09 NOTE — PLAN OF CARE
"Goal Outcome Evaluation:  Plan of Care Reviewed With: patient        Progress: improving  Outcome Summary: Pt very quiet and oriented only to self. Takes meds crushed with pudding, \"Pt tried to refuse meds again and I told hime there was only one little bit left and he took it.\" Pt continues on RA tolerating well, will continue to monitor.  "

## 2021-09-09 NOTE — THERAPY TREATMENT NOTE
Acute Care - Physical Therapy Treatment Note   Tori Simmons     Patient Name: Nash So  : 1945  MRN: [id number]  Today's Date: 2021      Visit Dx:     ICD-10-CM ICD-9-CM   1. COVID-19 virus infection  U07.1 079.89   2. Weakness  R53.1 780.79   3. Elevated troponin  R77.8 790.6   4. Contusion of face, initial encounter  S00.83XA 920     Patient Active Problem List   Diagnosis   • Vitamin D deficiency   • Diabetes mellitus (CMS/HCC)   • Back pain   • Benign prostatic hyperplasia   • Gastroesophageal reflux disease with esophagitis   • Hyperlipidemia   • Hypertension   • Hypogonadism in male   • Sebaceous cyst   • COPD (chronic obstructive pulmonary disease) (CMS/HCC)   • Encounter for screening for malignant neoplasm of colon   • COVID-19 virus infection     Past Medical History:   Diagnosis Date   • Alzheimer disease (CMS/HCC)    • CHF (congestive heart failure) (CMS/HCC)    • COPD (chronic obstructive pulmonary disease) (CMS/HCC)    • Diabetes (CMS/HCC)    • GERD (gastroesophageal reflux disease)    • Hyperlipemia    • Hypertension      Past Surgical History:   Procedure Laterality Date   • ENDOSCOPY N/A 2017    Procedure: ESOPHAGOGASTRODUODENOSCOPY, biopsy;  Surgeon: Natalia Guerrero MD;  Location: [facility];  Service:         PT Assessment (last 12 hours)      PT Evaluation and Treatment     Row Name 21 1045          Physical Therapy Time and Intention    Subjective Information  no complaints  -     Document Type  therapy note (daily note)  -     Mode of Treatment  co-treatment;physical therapy  -     Patient Effort  adequate  -     Symptoms Noted During/After Treatment  other (see comments) confusion, incontinent bowel and bladder  -     Row Name 21 1045          General Information    Patient Profile Reviewed  yes  -     Patient Observations  poorly cooperative  -     Patient/Family/Caregiver Comments/Observations  Patient naked with both legs hanging over side  "rail upon PT/OT arrival.  Bed alarm in place and safety mats on floor, both sides of bed.  -     Existing Precautions/Restrictions  fall COVID precautions  -     Limitations/Impairments  safety/cognitive  -     Risks Reviewed  patient:;LOB  -     Benefits Reviewed  patient:;improve function  -     Barriers to Rehab  cognitive status  -     Row Name 09/09/21 1045          Pain Scale: Word Pre/Post-Treatment    Pre/Posttreatment Pain Comment  no c/o pain but patient did state one time that he wanted to sit \"to rest my back.\"    -     Row Name 09/09/21 1045          Bed Mobility    Rolling Left [place] (Bed Mobility)  dependent (less than 25% patient effort)  -     Rolling Right [place] (Bed Mobility)  dependent (less than 25% patient effort)  -     Scooting/Bridging [place] (Bed Mobility)  verbal cues;contact guard  -     Supine-Sit [place] (Bed Mobility)  moderate assist (50% patient effort);2 person assist  -     Sit-Supine [place] (Bed Mobility)  maximum assist (25% patient effort);2 person assist  -     Bed Mobility, Safety Issues  cognitive deficits limit understanding;decreased use of arms for pushing/pulling;decreased use of legs for bridging/pushing;impaired trunk control for bed mobility  -     Assistive Device (Bed Mobility)  bed rails;draw sheet;head of bed elevated  -     Comment (Bed Mobility)  Patient incontinent of bowel and bladder, dependent for all hygiene.  -     Row Name 09/09/21 1045          Transfers    Comment (Transfers)  Patient stood several times from edge of bed impulsively, despite requests to return to sit.  Patient stating he wanted to walk in room, however incontinent of bowel and bladder, then unable to take side steps towards head of bed.  Patient was able to perform seated scooting toward head of bed with CGA.  Patient stood 3-4 times, several minutes each time while therapists performed hygiene/changing of brief.  -     " Sit-Stand [place] (Transfers)  verbal cues;minimum assist (75% patient effort);2 person assist  -     Stand-Sit [place] (Transfers)  verbal cues;minimum assist (75% patient effort);2 person assist  -     Row Name 09/09/21 1045          Sit-Stand Transfer    Assistive Device (Sit-Stand Transfers)  walker, front-wheeled  -     Row Name 09/09/21 1045          Stand-Sit Transfer    Assistive Device (Stand-Sit Transfers)  walker, front-wheeled  -     Row Name 09/09/21 1045          Gait/Stairs (Locomotion)    Comment (Gait/Stairs)  unsafe to attempt any additional mobility at this time due to patient confusion, impulsivity, in ability to follow directions, poor safety awareness.  -     Row Name 09/09/21 1045          Safety Issues, Functional Mobility    Safety Issues Affecting Function (Mobility)  ability to follow commands;at risk behavior observed;awareness of need for assistance;insight into deficits/self-awareness;judgment;problem-solving  -     Row Name             Wound 09/02/21 1452 Left elbow Skin Tear    Wound - Properties Group Placement Date: 09/02/21  -EF Placement Time: 1452  -EF Present on Hospital Admission: Y  -EF Side: Left  -EF Location: elbow  -EF Primary Wound Type: Skin tear  -EF    Retired Wound - Properties Group Date first assessed: 09/02/21  -EF Time first assessed: 1452  -EF Present on Hospital Admission: Y  -EF Side: Left  -EF Location: elbow  -EF Primary Wound Type: Skin tear  -EF    Row Name             Wound 09/02/21 1453 Right elbow Skin Tear    Wound - Properties Group Placement Date: 09/02/21  -EF Placement Time: 1453  -EF Present on Hospital Admission: Y  -EF Side: Right  -EF Location: elbow  -EF Primary Wound Type: Skin tear  -EF    Retired Wound - Properties Group Date first assessed: 09/02/21  -EF Time first assessed: 1453  -EF Present on Hospital Admission: Y  -EF Side: Right  -EF Location: elbow  -EF Primary Wound Type: Skin tear  -EF    Row Name 09/09/21 1045           Plan of Care Review    Plan of Care Reviewed With  patient  -     Outcome Summary  PT:  Patient with increased confusion, agitation today.  Patient incontinent of bowel and bladder, impulsive with mobility, difficulty following one step commands.  Patient required mod A x 2 for supine to sit, he performed sit to stand with min A x 2, however was unable to take any sidesteps towards head of bed.  Patient required max A x 2 for sit to supine and was dependent for all rolling in bed.  Progress limited by impaired cognition.  Continue to recommend long term care at discharge.  -     Row Name 09/09/21 1045          Positioning and Restraints    Pre-Treatment Position  in bed  -     Post Treatment Position  bed  -     In Bed  notified nsg;call light within reach;encouraged to call for assist;exit alarm on;side rails up x3  -       User Key  (r) = Recorded By, (t) = Taken By, (c) = Cosigned By    Initials Name Provider Type     Catrina Graham, PT Physical Therapist    Radha Minor, RN Registered Nurse        Physical Therapy Education                 Title: PT OT SLP Therapies (In Progress)     Topic: Physical Therapy (In Progress)     Point: Mobility training (In Progress)     Learning Progress Summary           Patient Acceptance, E,TB, NR by  at 9/9/2021 1243    Acceptance, E,TB, NR by  at 9/8/2021 1225    Acceptance, E,TB, VU,NL by  at 9/6/2021 1117    Acceptance, E,TB, NR by  at 9/3/2021 1139                   Point: Home exercise program (Not Started)     Learner Progress:  Not documented in this visit.                      User Key     Initials Effective Dates Name Provider Type Discipline     06/16/21 -  Catrina Graham, PT Physical Therapist PT    BP 06/16/21 -  Solomon Stack PT Physical Therapist PT    J 06/16/21 -  Maria Luisa Rdz PT Physical Therapist PT              PT Recommendation and Plan     Plan of Care Reviewed With: patient  Outcome Summary: PT:   Patient with increased confusion, agitation today.  Patient incontinent of bowel and bladder, impulsive with mobility, difficulty following one step commands.  Patient required mod A x 2 for supine to sit, he performed sit to stand with min A x 2, however was unable to take any sidesteps towards head of bed.  Patient required max A x 2 for sit to supine and was dependent for all rolling in bed.  Progress limited by impaired cognition.  Continue to recommend long term care at discharge.  Outcome Measures     Row Name 09/09/21 1200 09/08/21 1046 09/08/21 1045       How much help from another person do you currently need...    Turning from your back to your side while in flat bed without using bedrails?  1  -  --  1  -BP    Moving from lying on back to sitting on the side of a flat bed without bedrails?  2  -  --  1  -BP    Moving to and from a bed to a chair (including a wheelchair)?  1  -  --  1  -BP    Standing up from a chair using your arms (e.g., wheelchair, bedside chair)?  2  -  --  1  -BP    Climbing 3-5 steps with a railing?  1  -  --  1  -BP    To walk in hospital room?  1  -  --  1  -BP    AM-PAC 6 Clicks Score (PT)  8  -  --  6  -BP       How much help from another is currently needed...    Putting on and taking off regular lower body clothing?  --  1  -JJ  --    Bathing (including washing, rinsing, and drying)  --  1  -JJ  --    Toileting (which includes using toilet bed pan or urinal)  --  1  -JJ  --    Putting on and taking off regular upper body clothing  --  1  -JJ  --    Taking care of personal grooming (such as brushing teeth)  --  1  -JJ  --    Eating meals  --  1  -JJ  --    AM-PAC 6 Clicks Score (OT)  --  6  -JJ  --       Functional Assessment    Outcome Measure Options  AM-PAC 6 Clicks Basic Mobility (PT)  -  --  AM-PAC 6 Clicks Basic Mobility (PT)  -BP      User Key  (r) = Recorded By, (t) = Taken By, (c) = Cosigned By    Initials Name Provider Type     Catrina Graham, PT  Physical Therapist    Luisana Yin, OTR Occupational Therapist    Solomon Arce, PT Physical Therapist           Time Calculation:   PT Charges     Row Name 09/09/21 1244             Time Calculation    Start Time  1045  -      Stop Time  1126  -      Time Calculation (min)  41 min  -      PT Received On  09/09/21  -      PT - Next Appointment  09/10/21  -         Timed Charges    36106 - PT Therapeutic Exercise Minutes  41  -         Total Minutes    Timed Charges Total Minutes  41  -       Total Minutes  41  -        User Key  (r) = Recorded By, (t) = Taken By, (c) = Cosigned By    Initials Name Provider Type     Catrina Graham, PT Physical Therapist        Therapy Charges for Today     Code Description Service Date Service Provider Modifiers Qty    [id number] HC PT THER PROC EA 15 MIN 9/9/2021 Catrina Graham, PT GP 3          PT G-Codes  Outcome Measure Options: AM-PAC 6 Clicks Basic Mobility (PT)  AM-PAC 6 Clicks Score (PT): 8  AM-PAC 6 Clicks Score (OT): 6    Catrina Graham PT  9/9/2021

## 2021-09-09 NOTE — PLAN OF CARE
Goal Outcome Evaluation:           Progress: improving  Outcome Summary: patient vss. wiggles around in bed, but does not attempt to stand- bed alarm in place. incontinent of bowel and bladder this shift. meds crushed and patient took them with a lot of encouragement.  poor appetite noted

## 2021-09-09 NOTE — PROGRESS NOTES
"Daily Progress Note:      Chief complaint: Follow-up of COVID-19 infection, metabolic encephalopathy, rhabdomyolysis, hyperkalemia, diabetes, hypertension, Parkinson's disease    Subjective: No overnight events noted.  He is much improved for his confusion.  He slept again well last night he was cooperative with therapies and his oral intake improved yesterday.       LOS: 7 days     Vital Signs  Temp:  [96.1 °F (35.6 °C)-98 °F (36.7 °C)] 96.1 °F (35.6 °C)  Heart Rate:  [52-60] 55  Resp:  [20-22] 20  BP: (133-156)/(60-71) 154/70  Oxygen Therapy  SpO2: 92 %  Pulse Oximetry Type: Continuous  Device (Oxygen Therapy): room air  $ High Flow Nasal Cannula Set-Up: yes  Flow (L/min): 40  Oxygen Concentration (%): 30  ETCO2 (mmHg): 27 mmHg  Probe Placed On (Pulse Ox): Left:, finger}  Body mass index is 33.16 kg/m².  Flowsheet Rows      First Filed Value   Admission Height  182.9 cm (72\") Documented at 09/02/2021 1411   Admission Weight  115 kg (254 lb 4.8 oz) Documented at 09/02/2021 1502                   Documented weights    09/02/21 1502 09/02/21 2241 09/06/21 1208 09/07/21 0514   Weight: 115 kg (254 lb 4.8 oz) 108 kg (238 lb 12.8 oz) 107 kg (236 lb 9.6 oz) 109 kg (240 lb 1.6 oz)    09/08/21 0700 09/09/21 0600   Weight: 104 kg (228 lb 8 oz) 105 kg (231 lb 1.6 oz)           Patient Vitals for the past 24 hrs:   BP Temp Temp src Pulse Resp SpO2 Weight   09/09/21 0600 154/70 96.1 °F (35.6 °C) Oral 55 20 92 % 105 kg (231 lb 1.6 oz)   09/08/21 2017 -- -- -- 56 20 -- --   09/08/21 2013 -- -- -- 54 20 96 % --   09/08/21 1956 156/69 97.8 °F (36.6 °C) Oral 52 22 97 % --   09/08/21 1602 -- -- -- 55 20 94 % --   09/08/21 1600 -- -- -- 52 20 94 % --   09/08/21 1449 152/71 98 °F (36.7 °C) Oral 60 20 95 % --   09/08/21 1236 -- -- -- 55 22 93 % --   09/08/21 1234 -- -- -- 53 22 93 % --   09/08/21 1121 133/60 97.7 °F (36.5 °C) Oral 56 20 93 % --       105 kg (231 lb 1.6 oz)    Intake/Output                       09/07/21 0701 - 09/08/21 " 0700 09/08/21 0701 - 09/09/21 0700     [id number] Total [id number] 2616-2327 Total                 Intake    P.O.  50  -- 50  840  -- 840    Total Intake 50 -- 50 840 -- 840       Output    Urine  --  -- --  200  -- 200    Total Output -- -- -- 200 -- 200           Intake/Output Summary (Last 24 hours) at 9/9/2021 0707  Last data filed at 9/8/2021 1700  Gross per 24 hour   Intake 840 ml   Output 200 ml   Net 640 ml        Intake/Output Summary (Last 24 hours) at 9/9/2021 0707  Last data filed at 9/8/2021 1700  Gross per 24 hour   Intake 840 ml   Output 200 ml   Net 640 ml        Review of Systems   Unable to perform ROS: Mental status change       Physical Exam  Vitals and nursing note reviewed.   Constitutional:       General: He is not in acute distress.     Appearance: He is well-developed. He is morbidly obese.   HENT:      Head: Normocephalic.      Right Ear: Decreased hearing noted.      Left Ear: Decreased hearing noted.   Eyes:      Conjunctiva/sclera: Conjunctivae normal.   Neck:      Thyroid: No thyromegaly.      Vascular: No JVD.   Cardiovascular:      Rate and Rhythm: Normal rate and regular rhythm.      Heart sounds: Normal heart sounds. No murmur heard.     Pulmonary:      Effort: Pulmonary effort is normal. No tachypnea, accessory muscle usage or respiratory distress.      Breath sounds: Examination of the right-lower field reveals decreased breath sounds. Examination of the left-lower field reveals decreased breath sounds. Decreased breath sounds present. No wheezing or rales.   Abdominal:      General: Bowel sounds are normal. There is no distension.      Palpations: Abdomen is soft.      Tenderness: There is no abdominal tenderness. There is no guarding.   Musculoskeletal:      Right lower leg: No edema.      Left lower leg: No edema.   Skin:     General: Skin is warm and dry.      Findings: No rash.   Neurological:      General: No focal deficit present.      Mental Status: He is alert and  oriented to person, place, and time. He is confused.      Cranial Nerves: No cranial nerve deficit or facial asymmetry.      Motor: Motor function is intact.      Comments: Oriented to person    Psychiatric:         Cognition and Memory: Cognition is impaired. Memory is impaired. He exhibits impaired remote memory.         Medication Review:   I have reviewed the patient's current medication list  Scheduled Meds:albuterol sulfate HFA, 2 puff, Inhalation, 4x Daily - RT  aspirin, 81 mg, Oral, Daily  atorvastatin, 40 mg, Oral, Daily  carbidopa-levodopa, 1.5 tablet, Oral, TID  dexamethasone, 6 mg, Oral, Daily With Breakfast  enoxaparin, 40 mg, Subcutaneous, Q24H  furosemide, 40 mg, Oral, Daily  guaiFENesin, 1,200 mg, Oral, BID  haloperidol lactate, 1 mg, Intravenous, Once  insulin aspart, 0-24 Units, Subcutaneous, TID AC  lisinopril, 20 mg, Oral, Q12H  nebivolol, 10 mg, Oral, Daily  OLANZapine, 2.5 mg, Oral, BID  pantoprazole, 40 mg, Oral, Daily  potassium chloride, 10 mEq, Oral, Daily  senna-docusate sodium, 2 tablet, Oral, BID      Continuous Infusions:   PRN Meds:.•  acetaminophen  •  albuterol sulfate HFA  •  senna-docusate sodium **AND** polyethylene glycol **AND** bisacodyl **AND** bisacodyl  •  haloperidol lactate  •  hydrALAZINE  •  ondansetron **OR** ondansetron  •  sodium chloride      Labs:  Results from last 7 days   Lab Units 09/09/21  0504 09/08/21  0441 09/07/21  0334 09/06/21  0640 09/06/21  0640 09/05/21  0705 09/05/21  0705 09/03/21  0629 09/03/21  0629 09/02/21  1353 09/02/21  1353   WBC 10*3/mm3 9.88 7.39 8.09  --  10.16  --  7.94  --  5.36  --  4.42   HEMOGLOBIN g/dL 13.8 13.3 13.4   < > 13.7   < > 13.0   < > 12.6*   < > 12.6*   HEMATOCRIT % 41.2 41.3 40.4  --  42.5  --  38.4  --  39.9  --  37.5   PLATELETS 10*3/mm3 313 241 308  --  269  --  202  --  173  --  166    < > = values in this interval not displayed.     Results from last 7 days   Lab Units 09/09/21  0504 09/08/21  0441 09/07/21  0334  09/06/21  0640 09/05/21  0705 09/04/21  0649 09/03/21  0629   SODIUM mmol/L 146* 142 143 142 142 142 139   POTASSIUM mmol/L 2.8* 3.6 3.5 4.3 3.3* 3.4* 2.8*   CHLORIDE mmol/L 107 106 106 106 105 104 102   CO2 mmol/L 30.2* 23.5 26.8 23.8 27.6 22.9 26.2   BUN mg/dL 22 18 19 18 17 13 10   CREATININE mg/dL 0.62* 0.56* 0.54* 0.50* 0.53* 0.62* 0.53*   CALCIUM mg/dL 8.5* 8.1* 8.5* 8.7 8.7 8.8 8.2*   BILIRUBIN mg/dL 0.8 0.8 0.9 1.0 0.9 0.9 0.7   ALK PHOS U/L 80 67 72 72 68 73 73   ALT (SGPT) U/L 9 14 6 20 11 35 7   AST (SGOT) U/L 26 32 33 30 34 45* 46*   GLUCOSE mg/dL 120* 156* 233* 274* 237* 195* 170*     Results from last 7 days   Lab Units 09/04/21  0649 09/02/21  1353   MAGNESIUM mg/dL 1.7 1.4*       Results from last 7 days   Lab Units 09/09/21  0504 09/08/21  0441 09/07/21  0334 09/06/21  0640 09/06/21  0640 09/05/21  0705 09/05/21  0705 09/04/21  0649 09/03/21  0629 09/03/21  0628 09/02/21  1353   LDH U/L 379*  --  425*  --   --   --  433*  --    < >   < >  --    AST (SGOT) U/L 26 32 33   < > 30   < > 34 45*   < >   < > 41*   ALT (SGPT) U/L 9 14 6   < > 20   < > 11 35   < >   < > 34   PROCALCITONIN ng/mL  --   --   --   --   --   --   --  0.08  --   --  0.08   LACTATE mmol/L  --   --   --   --   --   --   --   --   --   --  2.0   FERRITIN ng/mL 803.00* 940.00* 1,398.00*   < > 1,984.00*   < > 1,649.00*  --    < >   < > 1,110.00*   D DIMER QUANT MCGFEU/mL 1.34*  --  0.76*  --   --   --  0.75*  --    < >   < > 1.14*   CRP mg/dL  --   --  3.73*  --  7.76*  --  14.44*  --    < >   < >  --    PLATELETS 10*3/mm3 313 241 308   < > 269   < > 202  --    < >   < > 166    < > = values in this interval not displayed.         Lab Results (last 24 hours)     Procedure Component Value Units Date/Time    Comprehensive Metabolic Panel [[id number]]  (Abnormal) Collected: 09/09/21 0504    Specimen: Blood Updated: 09/09/21 0616     Glucose 120 mg/dL      BUN 22 mg/dL      Creatinine 0.62 mg/dL      Sodium 146 mmol/L      Potassium 2.8  mmol/L      Chloride 107 mmol/L      CO2 30.2 mmol/L      Calcium 8.5 mg/dL      Total Protein 5.7 g/dL      Albumin 3.00 g/dL      ALT (SGPT) 9 U/L      AST (SGOT) 26 U/L      Alkaline Phosphatase 80 U/L      Total Bilirubin 0.8 mg/dL      eGFR Non African Amer 126 mL/min/1.73      Globulin 2.7 gm/dL      A/G Ratio 1.1 g/dL      BUN/Creatinine Ratio 35.5     Anion Gap 8.8 mmol/L     Narrative:      GFR Normal >60  Chronic Kidney Disease <60  Kidney Failure <15      Lactate Dehydrogenase [[id number]]  (Abnormal) Collected: 09/09/21 0504    Specimen: Blood Updated: 09/09/21 0616      U/L     Ferritin [[id number]]  (Abnormal) Collected: 09/09/21 0504    Specimen: Blood Updated: 09/09/21 0616     Ferritin 803.00 ng/mL     Narrative:      Results may be falsely decreased if patient taking Biotin.      CBC & Differential [[id number]]  (Abnormal) Collected: 09/09/21 0504    Specimen: Blood Updated: 09/09/21 0558    Narrative:      The following orders were created for panel order CBC & Differential.  Procedure                               Abnormality         Status                     ---------                               -----------         ------                     CBC Auto Differential[[id number]]        Abnormal            Final result               Scan Slide[[id number]]                   Normal              Final result                 Please view results for these tests on the individual orders.    Scan Slide [[id number]]  (Normal) Collected: 09/09/21 0504    Specimen: Blood Updated: 09/09/21 0558     RBC Morphology Normal     WBC Morphology Normal     Platelet Morphology Normal    CBC Auto Differential [[id number]]  (Abnormal) Collected: 09/09/21 0504    Specimen: Blood Updated: 09/09/21 0557     WBC 9.88 10*3/mm3      RBC 4.64 10*6/mm3      Hemoglobin 13.8 g/dL      Hematocrit 41.2 %      MCV 88.8 fL      MCH 29.7 pg      MCHC 33.5 g/dL      RDW 14.6 %      RDW-SD 46.8 fl      MPV 9.7 fL      Platelets 313  10*3/mm3      Neutrophil % 71.1 %      Lymphocyte % 13.7 %      Monocyte % 7.7 %      Eosinophil % 0.7 %      Basophil % 0.6 %      Immature Grans % 6.2 %      Neutrophils, Absolute 7.03 10*3/mm3      Lymphocytes, Absolute 1.35 10*3/mm3      Monocytes, Absolute 0.76 10*3/mm3      Eosinophils, Absolute 0.07 10*3/mm3      Basophils, Absolute 0.06 10*3/mm3      Immature Grans, Absolute 0.61 10*3/mm3      nRBC 0.3 /100 WBC     D-dimer, Quantitative [[id number]]  (Abnormal) Collected: 09/09/21 0504    Specimen: Blood Updated: 09/09/21 0553     D-Dimer, Quantitative 1.34 MCGFEU/mL     Narrative:      Can be elevated in, but is not diagnostic for deep vein thrombosis (DVT) or pulmonary embolis (PE).  It is also elevated in other medical conditions.  Clinical correlation is required.  The negative cut-off value for the D-Dimer is 0.50 mcg FEU/mL for DVT and PE.      Sedimentation Rate [[id number]]  (Normal) Collected: 09/09/21 0504    Specimen: Blood Updated: 09/09/21 0541     Sed Rate 12 mm/hr     POC Glucose Once [[id number]]  (Abnormal) Collected: 09/08/21 2109    Specimen: Blood Updated: 09/08/21 2114     Glucose 213 mg/dL      Comment: Meter: [id number] : [id number] Saúl MARROQUIN RN       POC Glucose Once [[id number]]  (Abnormal) Collected: 09/08/21 1630    Specimen: Blood Updated: 09/08/21 1637     Glucose 329 mg/dL      Comment: Meter: [id number] : [id number] Doyle Morocho CNA       POC Glucose Once [[id number]]  (Abnormal) Collected: 09/08/21 1125    Specimen: Blood Updated: 09/08/21 1135     Glucose 256 mg/dL      Comment: Meter: [id number] : [id number] Doyle Morocho CNA           Results from last 7 days   Lab Units 09/09/21  0504 09/07/21  0334 09/05/21  0705 09/04/21  0649 09/03/21  0629 09/03/21  0628 09/02/21  1353   CK TOTAL U/L  --   --   --   --  776*  --  765*   TROPONIN T ng/mL  --   --  0.022 0.033*  --   --  0.122*   D DIMER QUANT MCGFEU/mL 1.34* 0.76* 0.75*  --   --  0.67* 1.14*          Results from last 7 days   Lab Units 09/02/21  1512   PROBNP pg/mL 2,448.0*         Results from last 7 days   Lab Units 09/04/21  0649 09/02/21  1353   MAGNESIUM mg/dL 1.7 1.4*         Results from last 7 days   Lab Units 09/04/21  0450 09/03/21  1720   PH, ARTERIAL pH units 7.547* 7.535*   PCO2, ARTERIAL mm Hg 29.7* 33.9*   PO2 ART mm Hg 72.4* 60.7*   HCO3 ART mmol/L 25.7 28.6*     Results from last 7 days   Lab Units 09/04/21  0952   HEMOGLOBIN A1C % 5.50     Glucose   Date/Time Value Ref Range Status   09/08/2021 2109 213 (H) 70 - 130 mg/dL Final     Comment:     Meter: [id number] : [id number] Saúl MARROQUIN RN   09/08/2021 1630 329 (H) 70 - 130 mg/dL Final     Comment:     Meter: [id number] : [id number] Doyle Morocho CN   09/08/2021 1125 256 (H) 70 - 130 mg/dL Final     Comment:     Meter: [id number] : [id number] Doyle Morocho CNA   09/08/2021 0730 157 (H) 70 - 130 mg/dL Final     Comment:     Meter: [id number] : [id number] Doyle Morocho CNA   09/07/2021 2017 176 (H) 70 - 130 mg/dL Final     Comment:     RN Notified R and V Meter: [id number] : [id number] Bacci Yakelin PCA   09/07/2021 1605 235 (H) 70 - 130 mg/dL Final     Comment:     Meter: [id number] : [id number] Ender Pangn NA   09/07/2021 1120 286 (H) 70 - 130 mg/dL Final     Comment:     Meter: [id number] : [id number] Ender Farideh NA   09/07/2021 0808 213 (H) 70 - 130 mg/dL Final     Comment:     Meter: [id number] : [id number] Ender Pangn NA     Results from last 7 days   Lab Units 09/04/21  0649 09/02/21  1353   PROCALCITONIN ng/mL 0.08 0.08   LACTATE mmol/L  --  2.0     Results from last 7 days   Lab Units 09/02/21  1512 09/02/21  1350   BLOODCX  No growth at 5 days No growth at 5 days     Results from last 7 days   Lab Units 09/02/21  1359   NITRITE UA  Negative   WBC UA /HPF 3-5*   BACTERIA UA /HPF 1+*   SQUAM EPITHEL UA /HPF 0-2     Results from last 7 days   Lab Units  09/02/21  1341   INFLUENZA B PCR  Not Detected   INFLUENZA A PCR  Not Detected         Radiology:  Imaging Results (Last 24 Hours)     ** No results found for the last 24 hours. **          Cardiology:  ECG/EMG Results (last 24 hours)     Procedure Component Value Units Date/Time    ECG 12 Lead [[id number]] Collected: 09/02/21 1348     Updated: 09/02/21 1352     QT Interval 467 ms     Narrative:      HEART RATE= 68  bpm  RR Interval= 880  ms  IL Interval= 211  ms  P Horizontal Axis= 8  deg  P Front Axis= 52  deg  QRSD Interval= 130  ms  QT Interval= 467  ms  QRS Axis= -14  deg  T Wave Axis= -6  deg  - ABNORMAL ECG -  Sinus rhythm  Nonspecific intraventricular conduction delay  Lateral infarct, age indeterminate  Electronically Signed By:   Date and Time of Study: 2021-09-02 13:48:28          I have reviewed recent labs results and consult notes.  Parts of this note may have been copied and pasted but patient was examined and interviewed by me today    Assessment and Plan:    1.  COVID-19 pneumonia generalized weakness .  Gradually improving continue with present management.  Inflammatory markers continue to trend down      2.  Metabolic encephalopathy likely due to COVID-19 with severe sundowning started on Zyprexa on 9/6/2021 show some improvement more oriented today still not at baseline    3.    Adult-onset diabetes mellitus continues to be hyperglycemic but improving continue with sliding scale insulin optimize glycemic control poor oral intake continue sliding scale insulin    4.  Hypertension improving continue present medications    5.    Hypokalemia oral potassium has been ordered     6.  Parkinson's disease nothing acute continue with home medications     7.    COPD no evidence of exacerbation continue with as needed albuterol    8.  DVT prophylaxis Lovenox    9.  Generalized weakness secondary to COVID-19 pneumonia comorbidities continue to encourage participation with therapies    Much of this encounter  note is an electronic transcription/translation of spoken language to printed text using Dragon Software

## 2021-09-09 NOTE — PLAN OF CARE
Goal Outcome Evaluation:  Plan of Care Reviewed With: patient           Outcome Summary: PT:  Patient with increased confusion, agitation today.  Patient incontinent of bowel and bladder, impulsive with mobility, difficulty following one step commands.  Patient required mod A x 2 for supine to sit, he performed sit to stand with min A x 2, however was unable to take any sidesteps towards head of bed.  Patient required max A x 2 for sit to supine and was dependent for all rolling in bed.  Progress limited by impaired cognition.  Continue to recommend long term care at discharge.

## 2021-09-10 LAB
ANION GAP SERPL CALCULATED.3IONS-SCNC: 9.5 MMOL/L (ref 5–15)
BACTERIA UR QL AUTO: ABNORMAL /HPF
BILIRUB UR QL STRIP: ABNORMAL
BUN SERPL-MCNC: 21 MG/DL (ref 8–23)
BUN/CREAT SERPL: 34.4 (ref 7–25)
CALCIUM SPEC-SCNC: 8.3 MG/DL (ref 8.6–10.5)
CHLORIDE SERPL-SCNC: 106 MMOL/L (ref 98–107)
CLARITY UR: CLEAR
CO2 SERPL-SCNC: 26.5 MMOL/L (ref 22–29)
COARSE GRAN CASTS URNS QL MICRO: ABNORMAL /LPF
COLOR UR: ABNORMAL
CREAT SERPL-MCNC: 0.61 MG/DL (ref 0.76–1.27)
DEPRECATED RDW RBC AUTO: 46.6 FL (ref 37–54)
ERYTHROCYTE [DISTWIDTH] IN BLOOD BY AUTOMATED COUNT: 14.4 % (ref 12.3–15.4)
GFR SERPL CREATININE-BSD FRML MDRD: 129 ML/MIN/1.73
GLUCOSE BLDC GLUCOMTR-MCNC: 138 MG/DL (ref 70–130)
GLUCOSE BLDC GLUCOMTR-MCNC: 156 MG/DL (ref 70–130)
GLUCOSE BLDC GLUCOMTR-MCNC: 223 MG/DL (ref 70–130)
GLUCOSE BLDC GLUCOMTR-MCNC: 93 MG/DL (ref 70–130)
GLUCOSE SERPL-MCNC: 159 MG/DL (ref 65–99)
GLUCOSE UR STRIP-MCNC: NEGATIVE MG/DL
HCT VFR BLD AUTO: 40.5 % (ref 37.5–51)
HGB BLD-MCNC: 13.5 G/DL (ref 13–17.7)
HGB UR QL STRIP.AUTO: ABNORMAL
HYALINE CASTS UR QL AUTO: ABNORMAL /LPF
KETONES UR QL STRIP: ABNORMAL
LEUKOCYTE ESTERASE UR QL STRIP.AUTO: NEGATIVE
MCH RBC QN AUTO: 29.7 PG (ref 26.6–33)
MCHC RBC AUTO-ENTMCNC: 33.3 G/DL (ref 31.5–35.7)
MCV RBC AUTO: 89.2 FL (ref 79–97)
NITRITE UR QL STRIP: NEGATIVE
PH UR STRIP.AUTO: 6 [PH] (ref 4.5–8)
PLATELET # BLD AUTO: 305 10*3/MM3 (ref 140–450)
PMV BLD AUTO: 9.6 FL (ref 6–12)
POTASSIUM SERPL-SCNC: 3.3 MMOL/L (ref 3.5–5.2)
PROT UR QL STRIP: ABNORMAL
RBC # BLD AUTO: 4.54 10*6/MM3 (ref 4.14–5.8)
RBC # UR: ABNORMAL /HPF
REF LAB TEST METHOD: ABNORMAL
RENAL EPI CELLS #/AREA URNS HPF: ABNORMAL /HPF
SODIUM SERPL-SCNC: 142 MMOL/L (ref 136–145)
SP GR UR STRIP: 1.02 (ref 1–1.03)
SQUAMOUS #/AREA URNS HPF: ABNORMAL /HPF
UROBILINOGEN UR QL STRIP: ABNORMAL
WBC # BLD AUTO: 9.12 10*3/MM3 (ref 3.4–10.8)
WBC UR QL AUTO: ABNORMAL /HPF

## 2021-09-10 PROCEDURE — 81001 URINALYSIS AUTO W/SCOPE: CPT | Performed by: FAMILY MEDICINE

## 2021-09-10 PROCEDURE — 85027 COMPLETE CBC AUTOMATED: CPT | Performed by: FAMILY MEDICINE

## 2021-09-10 PROCEDURE — 80048 BASIC METABOLIC PNL TOTAL CA: CPT | Performed by: FAMILY MEDICINE

## 2021-09-10 PROCEDURE — 25010000002 HALOPERIDOL LACTATE PER 5 MG: Performed by: FAMILY MEDICINE

## 2021-09-10 PROCEDURE — 94799 UNLISTED PULMONARY SVC/PX: CPT

## 2021-09-10 PROCEDURE — 25010000002 ENOXAPARIN PER 10 MG: Performed by: FAMILY MEDICINE

## 2021-09-10 PROCEDURE — 63710000001 INSULIN ASPART PER 5 UNITS: Performed by: FAMILY MEDICINE

## 2021-09-10 PROCEDURE — 82962 GLUCOSE BLOOD TEST: CPT

## 2021-09-10 RX ORDER — MEGESTROL ACETATE 40 MG/ML
400 SUSPENSION ORAL DAILY
Status: DISCONTINUED | OUTPATIENT
Start: 2021-09-10 | End: 2021-09-16 | Stop reason: HOSPADM

## 2021-09-10 RX ORDER — OLANZAPINE 5 MG/1
2.5 TABLET ORAL NIGHTLY
Status: DISCONTINUED | OUTPATIENT
Start: 2021-09-10 | End: 2021-09-16 | Stop reason: HOSPADM

## 2021-09-10 RX ADMIN — ENOXAPARIN SODIUM 40 MG: 40 INJECTION SUBCUTANEOUS at 21:18

## 2021-09-10 RX ADMIN — DEXAMETHASONE 6 MG: 4 TABLET ORAL at 09:06

## 2021-09-10 RX ADMIN — ASPIRIN 81 MG: 81 TABLET, COATED ORAL at 09:06

## 2021-09-10 RX ADMIN — LISINOPRIL 20 MG: 20 TABLET ORAL at 21:16

## 2021-09-10 RX ADMIN — SENNOSIDES AND DOCUSATE SODIUM 2 TABLET: 50; 8.6 TABLET ORAL at 09:06

## 2021-09-10 RX ADMIN — GUAIFENESIN 1200 MG: 600 TABLET, EXTENDED RELEASE ORAL at 09:06

## 2021-09-10 RX ADMIN — INSULIN ASPART 8 UNITS: 100 INJECTION, SOLUTION INTRAVENOUS; SUBCUTANEOUS at 13:02

## 2021-09-10 RX ADMIN — GLIPIZIDE 5 MG: 5 TABLET ORAL at 09:06

## 2021-09-10 RX ADMIN — LISINOPRIL 20 MG: 20 TABLET ORAL at 09:06

## 2021-09-10 RX ADMIN — ATORVASTATIN CALCIUM 40 MG: 40 TABLET, FILM COATED ORAL at 09:05

## 2021-09-10 RX ADMIN — POTASSIUM CHLORIDE 20 MEQ: 1500 TABLET, EXTENDED RELEASE ORAL at 09:06

## 2021-09-10 RX ADMIN — PANTOPRAZOLE SODIUM 40 MG: 40 TABLET, DELAYED RELEASE ORAL at 09:06

## 2021-09-10 RX ADMIN — SODIUM CHLORIDE, PRESERVATIVE FREE 10 ML: 5 INJECTION INTRAVENOUS at 09:07

## 2021-09-10 RX ADMIN — INSULIN ASPART 4 UNITS: 100 INJECTION, SOLUTION INTRAVENOUS; SUBCUTANEOUS at 17:45

## 2021-09-10 RX ADMIN — OLANZAPINE 2.5 MG: 5 TABLET, FILM COATED ORAL at 21:16

## 2021-09-10 RX ADMIN — PIOGLITAZONE: 30 TABLET ORAL at 09:06

## 2021-09-10 RX ADMIN — HALOPERIDOL LACTATE 1 MG: 5 INJECTION, SOLUTION INTRAMUSCULAR at 00:20

## 2021-09-10 RX ADMIN — GUAIFENESIN 1200 MG: 600 TABLET, EXTENDED RELEASE ORAL at 21:17

## 2021-09-10 RX ADMIN — CARBIDOPA AND LEVODOPA 1.5 TABLET: 25; 100 TABLET ORAL at 09:06

## 2021-09-10 RX ADMIN — CARBIDOPA AND LEVODOPA 1.5 TABLET: 25; 100 TABLET ORAL at 21:16

## 2021-09-10 RX ADMIN — MEGESTROL ACETATE 400 MG: 40 SUSPENSION ORAL at 09:06

## 2021-09-10 RX ADMIN — SENNOSIDES AND DOCUSATE SODIUM 2 TABLET: 50; 8.6 TABLET ORAL at 21:15

## 2021-09-10 RX ADMIN — NEBIVOLOL HYDROCHLORIDE 10 MG: 5 TABLET ORAL at 09:06

## 2021-09-10 NOTE — PLAN OF CARE
Goal Outcome Evaluation:  Plan of Care Reviewed With: patient        Progress: improving  Outcome Summary: VSS, Pt turns himself in bed and hangs legs over bed but does not try to get up. increased bed checks to two per hour and bed alarm in place, became combative and stripping bed clothes, gown, and brief. Gave 1 ml Haldol. rested comfortably the rest of the night.

## 2021-09-10 NOTE — PLAN OF CARE
Goal Outcome Evaluation:           Progress: no change  Outcome Summary: patient vss, remains confused and agitated at times. bed alarm remains in place along with frequent safety checks. patient is incontinent this shift, and continues to strip off gown and covers.  requires a lot of encouragement to eat, drink, and take medications.

## 2021-09-10 NOTE — PROGRESS NOTES
"Daily Progress Note:      Chief complaint: Follow-up of COVID-19 infection, metabolic encephalopathy, rhabdomyolysis, hyperkalemia, diabetes, hypertension, Parkinson's disease    Subjective: No overnight events noted. Still confused but slowly improving. Nursing states is not eating refuses to eat     LOS: 8 days     Vital Signs  Temp:  [98.1 °F (36.7 °C)-98.3 °F (36.8 °C)] 98.1 °F (36.7 °C)  Heart Rate:  [65-70] 65  Resp:  [20-28] 20  BP: (156-160)/(71-75) 160/75  Oxygen Therapy  SpO2: 94 %  Pulse Oximetry Type: Continuous  Device (Oxygen Therapy): room air  $ High Flow Nasal Cannula Set-Up: yes  Flow (L/min): 40  Oxygen Concentration (%): 30  ETCO2 (mmHg): 27 mmHg  Probe Placed On (Pulse Ox): Left:, finger}  Body mass index is 33.16 kg/m².  Flowsheet Rows      First Filed Value   Admission Height  182.9 cm (72\") Documented at 09/02/2021 1411   Admission Weight  115 kg (254 lb 4.8 oz) Documented at 09/02/2021 1502                   Documented weights    09/02/21 1502 09/02/21 2241 09/06/21 1208 09/07/21 0514   Weight: 115 kg (254 lb 4.8 oz) 108 kg (238 lb 12.8 oz) 107 kg (236 lb 9.6 oz) 109 kg (240 lb 1.6 oz)    09/08/21 0700 09/09/21 0600   Weight: 104 kg (228 lb 8 oz) 105 kg (231 lb 1.6 oz)           Patient Vitals for the past 24 hrs:   BP Temp Temp src Pulse Resp SpO2   09/10/21 0552 -- -- -- -- -- 94 %   09/09/21 1946 160/75 98.1 °F (36.7 °C) Oral 65 20 95 %   09/09/21 1700 156/71 98.3 °F (36.8 °C) Oral 70 22 --   09/09/21 1149 -- -- -- 68 28 93 %       105 kg (231 lb 1.6 oz)    Intake/Output                       09/08/21 0701 - 09/09/21 0700 09/09/21 0701 - 09/10/21 0700     2192-3186 2575-9121 Total 9958-3848 7728-4873 Total                 Intake    P.O.  840  -- 840  --  -- --    Total Intake 840 -- 840 -- -- --       Output    Urine  200  -- 200  150  -- 150    Total Output 200 -- 200 150 -- 150           Intake/Output Summary (Last 24 hours) at 9/10/2021 0710  Last data filed at 9/9/2021 1200  Gross per " 24 hour   Intake --   Output 150 ml   Net -150 ml        Intake/Output Summary (Last 24 hours) at 9/10/2021 0710  Last data filed at 9/9/2021 1200  Gross per 24 hour   Intake --   Output 150 ml   Net -150 ml        Review of Systems   Unable to perform ROS: Mental status change       Physical Exam  Vitals and nursing note reviewed.   Constitutional:       General: He is not in acute distress.     Appearance: He is well-developed. He is morbidly obese.   HENT:      Head: Normocephalic.      Right Ear: Decreased hearing noted.      Left Ear: Decreased hearing noted.   Eyes:      Conjunctiva/sclera: Conjunctivae normal.   Neck:      Thyroid: No thyromegaly.      Vascular: No JVD.   Cardiovascular:      Rate and Rhythm: Normal rate and regular rhythm.      Heart sounds: Normal heart sounds. No murmur heard.     Pulmonary:      Effort: Pulmonary effort is normal. No tachypnea, accessory muscle usage or respiratory distress.      Breath sounds: Examination of the right-lower field reveals decreased breath sounds. Examination of the left-lower field reveals decreased breath sounds. Decreased breath sounds present. No wheezing or rales.   Abdominal:      General: Bowel sounds are normal. There is no distension.      Palpations: Abdomen is soft.      Tenderness: There is no abdominal tenderness. There is no guarding.   Musculoskeletal:      Right lower leg: No edema.      Left lower leg: No edema.   Skin:     General: Skin is warm and dry.      Findings: No rash.   Neurological:      General: No focal deficit present.      Mental Status: He is alert and oriented to person, place, and time. He is confused.      Cranial Nerves: No cranial nerve deficit or facial asymmetry.      Motor: Motor function is intact.      Comments: Oriented to person and place follows commands   Psychiatric:         Cognition and Memory: Cognition is impaired. Memory is impaired. He exhibits impaired remote memory.         Medication Review:   I have  reviewed the patient's current medication list  Scheduled Meds:aspirin, 81 mg, Oral, Daily  atorvastatin, 40 mg, Oral, Daily  carbidopa-levodopa, 1.5 tablet, Oral, TID  dexamethasone, 6 mg, Oral, Daily With Breakfast  enoxaparin, 40 mg, Subcutaneous, Q24H  furosemide, 40 mg, Oral, Daily  glipizide, 5 mg, Oral, QAM AC  guaiFENesin, 1,200 mg, Oral, BID  haloperidol lactate, 1 mg, Intravenous, Once  insulin aspart, 0-24 Units, Subcutaneous, TID AC  lisinopril, 20 mg, Oral, Q12H  pioglitazone-metFORMIN (ACTOPLUS MET)  mg combo dose, , Oral, BID With Meals  nebivolol, 10 mg, Oral, Daily  OLANZapine, 2.5 mg, Oral, BID  pantoprazole, 40 mg, Oral, Daily  potassium chloride, 20 mEq, Oral, Daily  senna-docusate sodium, 2 tablet, Oral, BID      Continuous Infusions:   PRN Meds:.•  acetaminophen  •  albuterol sulfate HFA  •  senna-docusate sodium **AND** polyethylene glycol **AND** bisacodyl **AND** bisacodyl  •  haloperidol lactate  •  hydrALAZINE  •  ondansetron **OR** ondansetron  •  sodium chloride      Labs:  Results from last 7 days   Lab Units 09/09/21  0504 09/08/21 0441 09/07/21  0334 09/06/21  0640 09/06/21  0640 09/05/21  0705 09/05/21  0705   WBC 10*3/mm3 9.88 7.39 8.09  --  10.16  --  7.94   HEMOGLOBIN g/dL 13.8 13.3 13.4   < > 13.7   < > 13.0   HEMATOCRIT % 41.2 41.3 40.4  --  42.5  --  38.4   PLATELETS 10*3/mm3 313 241 308  --  269  --  202    < > = values in this interval not displayed.     Results from last 7 days   Lab Units 09/09/21  0504 09/08/21  0441 09/07/21  0334 09/06/21  0640 09/05/21  0705 09/04/21  0649   SODIUM mmol/L 146* 142 143 142 142 142   POTASSIUM mmol/L 2.8* 3.6 3.5 4.3 3.3* 3.4*   CHLORIDE mmol/L 107 106 106 106 105 104   CO2 mmol/L 30.2* 23.5 26.8 23.8 27.6 22.9   BUN mg/dL 22 18 19 18 17 13   CREATININE mg/dL 0.62* 0.56* 0.54* 0.50* 0.53* 0.62*   CALCIUM mg/dL 8.5* 8.1* 8.5* 8.7 8.7 8.8   BILIRUBIN mg/dL 0.8 0.8 0.9 1.0 0.9 0.9   ALK PHOS U/L 80 67 72 72 68 73   ALT (SGPT) U/L 9 14  6 20 11 35   AST (SGOT) U/L 26 32 33 30 34 45*   GLUCOSE mg/dL 120* 156* 233* 274* 237* 195*     Results from last 7 days   Lab Units 09/04/21  0649   MAGNESIUM mg/dL 1.7       Results from last 7 days   Lab Units 09/09/21  0504 09/08/21  0441 09/07/21  0334 09/06/21  0640 09/06/21  0640 09/05/21  0705 09/05/21  0705 09/04/21  0649 09/04/21  0604   LDH U/L 379*  --  425*  --   --   --  433*  --   --    AST (SGOT) U/L 26 32 33   < > 30   < > 34 45*   < >   ALT (SGPT) U/L 9 14 6   < > 20   < > 11 35   < >   PROCALCITONIN ng/mL  --   --   --   --   --   --   --  0.08  --    FERRITIN ng/mL 803.00* 940.00* 1,398.00*   < > 1,984.00*   < > 1,649.00*  --   --    D DIMER QUANT MCGFEU/mL 1.34*  --  0.76*  --   --   --  0.75*  --   --    CRP mg/dL  --   --  3.73*  --  7.76*  --  14.44*  --    < >   PLATELETS 10*3/mm3 313 241 308   < > 269   < > 202  --   --     < > = values in this interval not displayed.         Lab Results (last 24 hours)     Procedure Component Value Units Date/Time    POC Glucose Once [[id number]]  (Normal) Collected: 09/09/21 2110    Specimen: Blood Updated: 09/09/21 2117     Glucose 116 mg/dL      Comment: Meter: [id number] : [id number] Lina GLEASON       POC Glucose Once [[id number]]  (Abnormal) Collected: 09/09/21 1647    Specimen: Blood Updated: 09/09/21 1703     Glucose 160 mg/dL      Comment: Meter: [id number] : [id number] [facility]       POC Glucose Once [[id number]]  (Abnormal) Collected: 09/09/21 1211    Specimen: Blood Updated: 09/09/21 1217     Glucose 252 mg/dL      Comment: Meter: [id number] : [id number] Comfort RICHARDS           Results from last 7 days   Lab Units 09/09/21  0504 09/07/21  0334 09/05/21  0705 09/04/21  0649   TROPONIN T ng/mL  --   --  0.022 0.033*   D DIMER QUANT MCGFEU/mL 1.34* 0.76* 0.75*  --                  Results from last 7 days   Lab Units 09/04/21  0649   MAGNESIUM mg/dL 1.7         Results from last 7 days   Lab Units  09/04/21  0450 09/03/21  1720   PH, ARTERIAL pH units 7.547* 7.535*   PCO2, ARTERIAL mm Hg 29.7* 33.9*   PO2 ART mm Hg 72.4* 60.7*   HCO3 ART mmol/L 25.7 28.6*     Results from last 7 days   Lab Units 09/04/21  0952   HEMOGLOBIN A1C % 5.50     Glucose   Date/Time Value Ref Range Status   09/09/2021 2110 116 70 - 130 mg/dL Final     Comment:     Meter: [id number] : [id number] [place] Mcgee VICTORINO   09/09/2021 1647 160 (H) 70 - 130 mg/dL Final     Comment:     Meter: [id number] : [id number] Vishal Marielena    09/09/2021 1211 252 (H) 70 - 130 mg/dL Final     Comment:     Meter: [id number] : [id number] Comfort RICHARDS   09/09/2021 0828 144 (H) 70 - 130 mg/dL Final     Comment:     Meter: [id number] : [id number] Aby Kraft RN   09/08/2021 2109 213 (H) 70 - 130 mg/dL Final     Comment:     Meter: [id number] : [id number] Saúl MARROQUIN RN   09/08/2021 1630 329 (H) 70 - 130 mg/dL Final     Comment:     Meter: [id number] : [id number] Doyle Chowis Dekimberleye CNA   09/08/2021 1125 256 (H) 70 - 130 mg/dL Final     Comment:     Meter: [id number] : [id number] Kwon Tirso Deinse CNA   09/08/2021 0730 157 (H) 70 - 130 mg/dL Final     Comment:     Meter: [id number] : [id number] Kwon Tirso Deinse CNA     Results from last 7 days   Lab Units 09/04/21  0649   PROCALCITONIN ng/mL 0.08                     Radiology:  Imaging Results (Last 24 Hours)     ** No results found for the last 24 hours. **          Cardiology:  ECG/EMG Results (last 24 hours)     Procedure Component Value Units Date/Time    ECG 12 Lead [[id number]] Collected: 09/02/21 1348     Updated: 09/02/21 1352     QT Interval 467 ms     Narrative:      HEART RATE= 68  bpm  RR Interval= 880  ms  VA Interval= 211  ms  P Horizontal Axis= 8  deg  P Front Axis= 52  deg  QRSD Interval= 130  ms  QT Interval= 467  ms  QRS Axis= -14  deg  T Wave Axis= -6  deg  - ABNORMAL ECG -  Sinus rhythm  Nonspecific intraventricular conduction  delay  Lateral infarct, age indeterminate  Electronically Signed By:   Date and Time of Study: 2021-09-02 13:48:28          I have reviewed recent labs results and consult notes.  Parts of this note may have been copied and pasted but patient was examined and interviewed by me today    Assessment and Plan:    1.  COVID-19 pneumonia generalized weakness . Stable.    2.  Metabolic encephalopathy likely due to COVID-19 with severe sundowning started on Zyprexa on 9/6/2021 still not at baseline continue present management    3.    Adult-onset diabetes mellitus continues to be hyperglycemic but improving continue with sliding scale insulin optimize glycemic control poor oral intake continue sliding scale insulin    4.  Hypertension not optimally controlled we will monitor    5.    Hypokalemia oral potassium has been ordered     6.  Parkinson's disease nothing acute continue with home medications     7.    COPD no evidence of exacerbation continue with as needed albuterol    8.  DVT prophylaxis Lovenox    9.  Generalized weakness secondary to COVID-19 pneumonia comorbidities continue to encourage participation with therapies    Much of this encounter note is an electronic transcription/translation of spoken language to printed text using Dragon Software

## 2021-09-10 NOTE — CASE MANAGEMENT/SOCIAL WORK
Continued Stay Note  MIA Beyer     Patient Name: Nash So  MRN: [id number]  Today's Date: 9/10/2021    Admit Date: 9/2/2021    Discharge Plan     Row Name 09/10/21 1413       Plan    Plan Comments  I spoke with the patients wife due to the patients dementia.  The patient will discharge to short term rehab and she is agreeable to that plan. I then contacted Britney at [facility] who advised that she can not look at him again until 9/15.  She advised that she would be willing to accept him and his wife after the 14 days.  I then contacted Ria with [facility] and made a referral.  Awaiting her response.  CM will continue to follow.        Discharge Codes    No documentation.             Montserrat Harris RN

## 2021-09-10 NOTE — PROGRESS NOTES
Adult Nutrition  Assessment/PES    Patient Name:  Nash So  YOB: 1945  MRN: [id number]  Admit Date:  9/2/2021    Assessment Date:  9/10/2021    Comments: Poor po recorded. Noted megace and Boost Glucose Control added to aid with po intake.  9/8 noted MD note says spoke w wife about long terms goals for pt  Expect pt wouldn't keep DHT in place, so PEG would be only option if goal is for nutrition support via feeding tube.     Cont to feed, encourage supplement, fluids.     If care goals include nutrition support could consider nocturnal feeds to allow for po during day    Nutren 2.0 20 ml/hr increase 20 ml every 6 hrs to goal 76 ml/hr x 12 hrs (7pm to 7am) would provide:   912 ml, 1824 kcal, 76 gm pro, 196 gm CHO, 629 ml free water, 121% DRI  Pt would need 240 ml free water x 5 per day by mouth or EN     Will cont to follow and monitor.       Reason for Assessment     Row Name 09/10/21 1506          Reason for Assessment    Reason For Assessment  follow-up protocol     Diagnosis  infection/sepsis;pulmonary disease COVID 19, met enceph, disoriented x 3, weakness         Nutrition/Diet History     Row Name 09/10/21 1508          Nutrition/Diet History    Typical Food/Fluid Intake  Pt noted with agitation overnight. Noted supplement added. pt disoriented.         Anthropometrics     Row Name 09/10/21 1508          Anthropometrics    Weight  -- 231.1#        Body Mass Index (BMI)    BMI Assessment  BMI 30-34.9: obesity grade I         Labs/Tests/Procedures/Meds     Row Name 09/10/21 1509          Labs/Procedures/Meds    Lab Results Reviewed  reviewed     Lab Results Comments  K 3.3 L, glu 159        Diagnostic Tests/Procedures    Diagnostic Test/Procedure Reviewed  reviewed        Medications    Pertinent Medications Reviewed  reviewed     Pertinent Medications Comments  megace, decadron, glucotrol, haldol, novolog, kcl         Physical Findings     Row Name 09/10/21 1510          Physical  Findings    Skin  other (see comments) R tear noted           Nutrition Prescription Ordered     Row Name 09/10/21 1510          Nutrition Prescription PO    Current PO Diet  Pureed     Supplement  Boost Glucose Control (Glucerna Shake)     Supplement Frequency  3 times a day     Common Modifiers  Cardiac;Consistent Carbohydrate         Evaluation of Received Nutrient/Fluid Intake     Row Name 09/10/21 1510          Fluid Intake Evaluation    Oral Fluid (mL)  377 ave x 3, 21%        PO Evaluation    Number of Meals  9     % PO Intake  22               Problem/Interventions:  Problem 1     Row Name 09/10/21 1512          Nutrition Diagnoses Problem 1    Problem 1  Inadequate Nutrient Intake     Etiology (related to)  Medical Diagnosis;Factors Affecting Nutrition;Functional Diagnosis     Functional Diagnosis  Cognitive deficit     Signs/Symptoms (evidenced by)  Report/Observation;Report of Mnimal PO Intake               Intervention Goal     Row Name 09/10/21 1512          Intervention Goal    PO  Increase intake;PO intake (%)     PO Intake %  50 % or greater     Weight  No significant weight loss         Nutrition Intervention     Row Name 09/10/21 1512          Nutrition Intervention    RD/Tech Action  Encourage intake;Follow Tx progress           Education/Evaluation     Row Name 09/10/21 1512          Education    Education  Education not appropriate at this time        Monitor/Evaluation    Monitor  Per protocol;I&O;PO intake;Supplement intake;Pertinent labs;Weight;Symptoms           Electronically signed by:  Nan García RD  09/10/21 15:13 EDT

## 2021-09-10 NOTE — SIGNIFICANT NOTE
09/10/21 1228   OTHER   Discipline physical therapist   Rehab Time/Intention   Session Not Performed other (see comments)  (PT: Attempted PT x 3. Patient unavailable during the first two attempts. Patient noted to be significantly confused, unable to follow commands on third attempt. Assisted CNA with obtaining vitals. Patient unable to actively and safely participate in PT today due to confusion/cognitive status. Will continue to see patient daily 5x/week.)

## 2021-09-11 LAB
ANION GAP SERPL CALCULATED.3IONS-SCNC: 15.3 MMOL/L (ref 5–15)
BUN SERPL-MCNC: 19 MG/DL (ref 8–23)
BUN/CREAT SERPL: 33.3 (ref 7–25)
CALCIUM SPEC-SCNC: 8.5 MG/DL (ref 8.6–10.5)
CHLORIDE SERPL-SCNC: 108 MMOL/L (ref 98–107)
CO2 SERPL-SCNC: 22.7 MMOL/L (ref 22–29)
CREAT SERPL-MCNC: 0.57 MG/DL (ref 0.76–1.27)
DEPRECATED RDW RBC AUTO: 49.9 FL (ref 37–54)
ERYTHROCYTE [DISTWIDTH] IN BLOOD BY AUTOMATED COUNT: 14.7 % (ref 12.3–15.4)
GFR SERPL CREATININE-BSD FRML MDRD: 139 ML/MIN/1.73
GLUCOSE BLDC GLUCOMTR-MCNC: 153 MG/DL (ref 70–130)
GLUCOSE BLDC GLUCOMTR-MCNC: 194 MG/DL (ref 70–130)
GLUCOSE BLDC GLUCOMTR-MCNC: 199 MG/DL (ref 70–130)
GLUCOSE BLDC GLUCOMTR-MCNC: 252 MG/DL (ref 70–130)
GLUCOSE SERPL-MCNC: 155 MG/DL (ref 65–99)
HCT VFR BLD AUTO: 41.3 % (ref 37.5–51)
HGB BLD-MCNC: 13.5 G/DL (ref 13–17.7)
MAGNESIUM SERPL-MCNC: 1.7 MG/DL (ref 1.6–2.4)
MCH RBC QN AUTO: 30.3 PG (ref 26.6–33)
MCHC RBC AUTO-ENTMCNC: 32.7 G/DL (ref 31.5–35.7)
MCV RBC AUTO: 92.6 FL (ref 79–97)
PLATELET # BLD AUTO: 294 10*3/MM3 (ref 140–450)
PMV BLD AUTO: 9.9 FL (ref 6–12)
POTASSIUM SERPL-SCNC: 3.2 MMOL/L (ref 3.5–5.2)
RBC # BLD AUTO: 4.46 10*6/MM3 (ref 4.14–5.8)
SODIUM SERPL-SCNC: 146 MMOL/L (ref 136–145)
WBC # BLD AUTO: 8.77 10*3/MM3 (ref 3.4–10.8)

## 2021-09-11 PROCEDURE — 85027 COMPLETE CBC AUTOMATED: CPT | Performed by: FAMILY MEDICINE

## 2021-09-11 PROCEDURE — 82962 GLUCOSE BLOOD TEST: CPT

## 2021-09-11 PROCEDURE — 83735 ASSAY OF MAGNESIUM: CPT | Performed by: FAMILY MEDICINE

## 2021-09-11 PROCEDURE — 63710000001 DEXAMETHASONE PER 0.25 MG: Performed by: FAMILY MEDICINE

## 2021-09-11 PROCEDURE — 80048 BASIC METABOLIC PNL TOTAL CA: CPT | Performed by: FAMILY MEDICINE

## 2021-09-11 PROCEDURE — 63710000001 INSULIN ASPART PER 5 UNITS: Performed by: FAMILY MEDICINE

## 2021-09-11 PROCEDURE — 25010000002 ENOXAPARIN PER 10 MG: Performed by: FAMILY MEDICINE

## 2021-09-11 RX ORDER — POTASSIUM CHLORIDE 1.5 G/1.77G
40 POWDER, FOR SOLUTION ORAL ONCE
Status: COMPLETED | OUTPATIENT
Start: 2021-09-11 | End: 2021-09-11

## 2021-09-11 RX ADMIN — GLIPIZIDE 5 MG: 5 TABLET ORAL at 09:09

## 2021-09-11 RX ADMIN — LISINOPRIL 20 MG: 20 TABLET ORAL at 09:09

## 2021-09-11 RX ADMIN — CARBIDOPA AND LEVODOPA 1.5 TABLET: 25; 100 TABLET ORAL at 22:10

## 2021-09-11 RX ADMIN — CARBIDOPA AND LEVODOPA 1.5 TABLET: 25; 100 TABLET ORAL at 18:19

## 2021-09-11 RX ADMIN — INSULIN ASPART 4 UNITS: 100 INJECTION, SOLUTION INTRAVENOUS; SUBCUTANEOUS at 18:19

## 2021-09-11 RX ADMIN — GUAIFENESIN 1200 MG: 600 TABLET, EXTENDED RELEASE ORAL at 22:11

## 2021-09-11 RX ADMIN — LISINOPRIL 20 MG: 20 TABLET ORAL at 22:11

## 2021-09-11 RX ADMIN — GUAIFENESIN 1200 MG: 600 TABLET, EXTENDED RELEASE ORAL at 09:09

## 2021-09-11 RX ADMIN — POTASSIUM CHLORIDE 20 MEQ: 1500 TABLET, EXTENDED RELEASE ORAL at 09:09

## 2021-09-11 RX ADMIN — ENOXAPARIN SODIUM 40 MG: 40 INJECTION SUBCUTANEOUS at 22:11

## 2021-09-11 RX ADMIN — POTASSIUM CHLORIDE 40 MEQ: 1.5 POWDER, FOR SOLUTION ORAL at 13:42

## 2021-09-11 RX ADMIN — ASPIRIN 81 MG: 81 TABLET, COATED ORAL at 09:08

## 2021-09-11 RX ADMIN — OLANZAPINE 2.5 MG: 5 TABLET, FILM COATED ORAL at 22:10

## 2021-09-11 RX ADMIN — MEGESTROL ACETATE 400 MG: 40 SUSPENSION ORAL at 09:08

## 2021-09-11 RX ADMIN — INSULIN ASPART 4 UNITS: 100 INJECTION, SOLUTION INTRAVENOUS; SUBCUTANEOUS at 13:42

## 2021-09-11 RX ADMIN — NEBIVOLOL HYDROCHLORIDE 10 MG: 5 TABLET ORAL at 09:08

## 2021-09-11 RX ADMIN — PANTOPRAZOLE SODIUM 40 MG: 40 TABLET, DELAYED RELEASE ORAL at 09:09

## 2021-09-11 RX ADMIN — ATORVASTATIN CALCIUM 40 MG: 40 TABLET, FILM COATED ORAL at 09:09

## 2021-09-11 RX ADMIN — CARBIDOPA AND LEVODOPA 1.5 TABLET: 25; 100 TABLET ORAL at 09:07

## 2021-09-11 RX ADMIN — DEXAMETHASONE 6 MG: 4 TABLET ORAL at 09:09

## 2021-09-11 RX ADMIN — INSULIN ASPART 4 UNITS: 100 INJECTION, SOLUTION INTRAVENOUS; SUBCUTANEOUS at 09:07

## 2021-09-11 RX ADMIN — SENNOSIDES AND DOCUSATE SODIUM 2 TABLET: 50; 8.6 TABLET ORAL at 22:10

## 2021-09-11 RX ADMIN — SENNOSIDES AND DOCUSATE SODIUM 2 TABLET: 50; 8.6 TABLET ORAL at 09:08

## 2021-09-11 NOTE — PROGRESS NOTES
"Daily Progress Note:      Chief complaint: Follow-up of COVID-19 infection, metabolic encephalopathy, rhabdomyolysis, hyperkalemia, diabetes, hypertension, Parkinson's disease    Subjective: No overnight events noted.  Is awake alert oriented x3 this morning.  Cooperative exam.  I asked him why he is not he states that food does not taste good.     LOS: 9 days     Vital Signs  Temp:  [98.1 °F (36.7 °C)-98.5 °F (36.9 °C)] 98.2 °F (36.8 °C)  Heart Rate:  [56-70] 68  Resp:  [16-18] 18  BP: (128-144)/(61-81) 144/77  Oxygen Therapy  SpO2: 94 %  Pulse Oximetry Type: Continuous  Device (Oxygen Therapy): room air  $ High Flow Nasal Cannula Set-Up: yes  Flow (L/min): 40  Oxygen Concentration (%): 30  ETCO2 (mmHg): 27 mmHg  Probe Placed On (Pulse Ox): Left:, finger}  Body mass index is 33.27 kg/m².  Flowsheet Rows      First Filed Value   Admission Height  182.9 cm (72\") Documented at 09/02/2021 1411   Admission Weight  115 kg (254 lb 4.8 oz) Documented at 09/02/2021 1502                   Documented weights    09/02/21 1502 09/02/21 2241 09/06/21 1208 09/07/21 0514   Weight: 115 kg (254 lb 4.8 oz) 108 kg (238 lb 12.8 oz) 107 kg (236 lb 9.6 oz) 109 kg (240 lb 1.6 oz)    09/08/21 0700 09/09/21 0600 09/11/21 0804   Weight: 104 kg (228 lb 8 oz) 105 kg (231 lb 1.6 oz) 105 kg (231 lb 14.4 oz)           Patient Vitals for the past 24 hrs:   BP Temp Temp src Pulse Resp SpO2 Weight   09/11/21 0804 -- -- -- -- -- -- 105 kg (231 lb 14.4 oz)   09/11/21 0524 144/77 98.2 °F (36.8 °C) Axillary 68 18 94 % --   09/10/21 2322 132/74 98.2 °F (36.8 °C) Axillary 58 16 94 % --   09/10/21 2314 -- -- -- 70 -- 92 % --   09/10/21 1940 141/61 98.1 °F (36.7 °C) Axillary 56 18 98 % --   09/10/21 1300 128/81 98.5 °F (36.9 °C) Axillary 62 18 93 % --       105 kg (231 lb 14.4 oz)    Intake/Output                             09/09/21 0701 - 09/10/21 0700 09/10/21 0701 - 09/11/21 0700 09/11/21 0701 - 09/12/21 0700     [id number] Total [id number] " 1901-0700 Total [id number] 1901-0700 Total                    Intake    P.O.  240  -- 240  360  -- 360  180  -- 180    Total Intake 240 -- 240 360 -- 360 180 -- 180       Output    Urine  150  -- 150  --  -- --  200  -- 200    Total Output 150 -- 150 -- -- -- 200 -- 200           Intake/Output Summary (Last 24 hours) at 9/11/2021 1235  Last data filed at 9/11/2021 0908  Gross per 24 hour   Intake 300 ml   Output 200 ml   Net 100 ml        Intake/Output Summary (Last 24 hours) at 9/11/2021 1235  Last data filed at 9/11/2021 0908  Gross per 24 hour   Intake 300 ml   Output 200 ml   Net 100 ml        Review of Systems   Constitutional: Positive for appetite change and fatigue. Negative for activity change.   HENT: Negative for congestion.    Respiratory: Negative for cough, chest tightness, shortness of breath and wheezing.    Cardiovascular: Negative for chest pain.   Gastrointestinal: Negative for abdominal distention, abdominal pain, diarrhea, nausea and vomiting.   Endocrine: Negative for polyphagia and polyuria.   Genitourinary: Negative for frequency.   Skin: Negative for rash.   Neurological: Negative for light-headedness.   Psychiatric/Behavioral: Positive for confusion (Improved). Negative for agitation and behavioral problems.       Physical Exam  Vitals and nursing note reviewed.   Constitutional:       General: He is not in acute distress.     Appearance: He is well-developed. He is morbidly obese.   HENT:      Head: Normocephalic.      Right Ear: Decreased hearing noted.      Left Ear: Decreased hearing noted.   Eyes:      Conjunctiva/sclera: Conjunctivae normal.   Neck:      Thyroid: No thyromegaly.      Vascular: No JVD.   Cardiovascular:      Rate and Rhythm: Normal rate and regular rhythm.      Heart sounds: Normal heart sounds. No murmur heard.     Pulmonary:      Effort: Pulmonary effort is normal. No tachypnea, accessory muscle usage or respiratory distress.      Breath sounds: Examination of the  right-lower field reveals decreased breath sounds. Examination of the left-lower field reveals decreased breath sounds. Decreased breath sounds present. No wheezing or rales.   Abdominal:      General: Bowel sounds are normal. There is no distension.      Palpations: Abdomen is soft.      Tenderness: There is no abdominal tenderness. There is no guarding.   Musculoskeletal:      Right lower leg: No edema.      Left lower leg: No edema.   Skin:     General: Skin is warm and dry.      Findings: No rash.   Neurological:      General: No focal deficit present.      Mental Status: He is alert and oriented to person, place, and time. He is confused.      Cranial Nerves: No cranial nerve deficit or facial asymmetry.      Motor: Motor function is intact.      Comments: Oriented person place and situation follows commands cooperative   Psychiatric:         Cognition and Memory: Cognition is impaired. Memory is impaired. He exhibits impaired remote memory.         Medication Review:   I have reviewed the patient's current medication list  Scheduled Meds:aspirin, 81 mg, Oral, Daily  atorvastatin, 40 mg, Oral, Daily  carbidopa-levodopa, 1.5 tablet, Oral, TID  dexamethasone, 6 mg, Oral, Daily With Breakfast  enoxaparin, 40 mg, Subcutaneous, Q24H  glipizide, 5 mg, Oral, QAM AC  guaiFENesin, 1,200 mg, Oral, BID  haloperidol lactate, 1 mg, Intravenous, Once  insulin aspart, 0-24 Units, Subcutaneous, TID AC  lisinopril, 20 mg, Oral, Q12H  megestrol, 400 mg, Oral, Daily  nebivolol, 10 mg, Oral, Daily  OLANZapine, 2.5 mg, Oral, Nightly  pantoprazole, 40 mg, Oral, Daily  potassium chloride, 20 mEq, Oral, Daily  potassium chloride, 40 mEq, Oral, Once  senna-docusate sodium, 2 tablet, Oral, BID      Continuous Infusions:   PRN Meds:.•  acetaminophen  •  albuterol sulfate HFA  •  senna-docusate sodium **AND** polyethylene glycol **AND** bisacodyl **AND** bisacodyl  •  haloperidol lactate  •  hydrALAZINE  •  ondansetron **OR**  ondansetron  •  sodium chloride      Labs:  Results from last 7 days   Lab Units 09/11/21  0719 09/10/21  0740 09/09/21  0504 09/08/21 0441 09/08/21 0441 09/07/21  0334 09/07/21  0334 09/06/21  0640 09/06/21  0640 09/05/21  0705 09/05/21  0705   WBC 10*3/mm3 8.77 9.12 9.88  --  7.39  --  8.09  --  10.16  --  7.94   HEMOGLOBIN g/dL 13.5 13.5 13.8   < > 13.3   < > 13.4   < > 13.7   < > 13.0   HEMATOCRIT % 41.3 40.5 41.2  --  41.3  --  40.4  --  42.5  --  38.4   PLATELETS 10*3/mm3 294 305 313  --  241  --  308  --  269  --  202    < > = values in this interval not displayed.     Results from last 7 days   Lab Units 09/11/21  0719 09/10/21  0740 09/09/21  0504 09/08/21 0441 09/07/21  0334 09/06/21  0640 09/05/21  0705   SODIUM mmol/L 146* 142 146* 142 143 142 142   POTASSIUM mmol/L 3.2* 3.3* 2.8* 3.6 3.5 4.3 3.3*   CHLORIDE mmol/L 108* 106 107 106 106 106 105   CO2 mmol/L 22.7 26.5 30.2* 23.5 26.8 23.8 27.6   BUN mg/dL 19 21 22 18 19 18 17   CREATININE mg/dL 0.57* 0.61* 0.62* 0.56* 0.54* 0.50* 0.53*   CALCIUM mg/dL 8.5* 8.3* 8.5* 8.1* 8.5* 8.7 8.7   BILIRUBIN mg/dL  --   --  0.8 0.8 0.9 1.0 0.9   ALK PHOS U/L  --   --  80 67 72 72 68   ALT (SGPT) U/L  --   --  9 14 6 20 11   AST (SGOT) U/L  --   --  26 32 33 30 34   GLUCOSE mg/dL 155* 159* 120* 156* 233* 274* 237*           Results from last 7 days   Lab Units 09/11/21  0719 09/10/21  0740 09/09/21  0504 09/08/21  0441 09/08/21  0441 09/07/21  0334 09/07/21  0334 09/06/21  0640 09/06/21  0640 09/05/21  0705 09/05/21  0705   LDH U/L  --   --  379*  --   --   --  425*  --   --   --  433*   AST (SGOT) U/L  --   --  26  --  32  --  33   < > 30   < > 34   ALT (SGPT) U/L  --   --  9  --  14  --  6   < > 20   < > 11   FERRITIN ng/mL  --   --  803.00*  --  940.00*  --  1,398.00*   < > 1,984.00*   < > 1,649.00*   D DIMER QUANT MCGFEU/mL  --   --  1.34*  --   --   --  0.76*  --   --   --  0.75*   CRP mg/dL  --   --   --   --   --   --  3.73*  --  7.76*  --  14.44*   PLATELETS  10*3/mm3 294 305 313   < > 241   < > 308   < > 269   < > 202    < > = values in this interval not displayed.         Lab Results (last 24 hours)     Procedure Component Value Units Date/Time    POC Glucose Once [[id number]]  (Abnormal) Collected: 09/11/21 1114    Specimen: Blood Updated: 09/11/21 1122     Glucose 194 mg/dL      Comment: Meter: [id number] : [id number] Mings Mariella NURSING ASSISTANT       Basic Metabolic Panel [[id number]]  (Abnormal) Collected: 09/11/21 0719    Specimen: Blood Updated: 09/11/21 0844     Glucose 155 mg/dL      BUN 19 mg/dL      Creatinine 0.57 mg/dL      Sodium 146 mmol/L      Potassium 3.2 mmol/L      Chloride 108 mmol/L      CO2 22.7 mmol/L      Calcium 8.5 mg/dL      eGFR Non African Amer 139 mL/min/1.73      BUN/Creatinine Ratio 33.3     Anion Gap 15.3 mmol/L     Narrative:      GFR Normal >60  Chronic Kidney Disease <60  Kidney Failure <15      CBC (No Diff) [[id number]]  (Normal) Collected: 09/11/21 0719    Specimen: Blood Updated: 09/11/21 0818     WBC 8.77 10*3/mm3      RBC 4.46 10*6/mm3      Hemoglobin 13.5 g/dL      Hematocrit 41.3 %      MCV 92.6 fL      MCH 30.3 pg      MCHC 32.7 g/dL      RDW 14.7 %      RDW-SD 49.9 fl      MPV 9.9 fL      Platelets 294 10*3/mm3     POC Glucose Once [[id number]]  (Abnormal) Collected: 09/11/21 0801    Specimen: Blood Updated: 09/11/21 0808     Glucose 153 mg/dL      Comment: Meter: [id number] : [id number] Mings Mariella NURSING ASSISTANT       POC Glucose Once [[id number]]  (Normal) Collected: 09/10/21 2014    Specimen: Blood Updated: 09/10/21 2020     Glucose 93 mg/dL      Comment: Meter: [id number] : [id number] Klever Main PCA           Results from last 7 days   Lab Units 09/09/21  0504 09/07/21  0334 09/05/21  0705   TROPONIN T ng/mL  --   --  0.022   D DIMER QUANT MCGFEU/mL 1.34* 0.76* 0.75*                                 Glucose   Date/Time Value Ref Range Status   09/11/2021 1114 194 (H) 70 - 130 mg/dL Final     Comment:      Meter: [id number] : [id number] Padmini Wolff NURSING ASSISTANT   09/11/2021 0801 153 (H) 70 - 130 mg/dL Final     Comment:     Meter: [id number] : [id number] Padmini Wolff NURSING ASSISTANT   09/10/2021 2014 93 70 - 130 mg/dL Final     Comment:     Meter: [id number] : [id number] Klever Main PCA   09/10/2021 1624 156 (H) 70 - 130 mg/dL Final     Comment:     Meter: [id number] : [id number] Lauryn Nobles CNA   09/10/2021 1146 223 (H) 70 - 130 mg/dL Final     Comment:     Meter: [id number] : [id number] Lauryn Nobles CNA   09/10/2021 0723 138 (H) 70 - 130 mg/dL Final     Comment:     Meter: [id number] : [id number] Lauryn Nobles CNA   09/09/2021 2110 116 70 - 130 mg/dL Final     Comment:     Meter: [id number] : [id number] Lina Englishh NA   09/09/2021 1647 160 (H) 70 - 130 mg/dL Final     Comment:     Meter: [id number] : [id number] Vishal Peguero              Results from last 7 days   Lab Units 09/10/21  1138   NITRITE UA  Negative   WBC UA /HPF None Seen   BACTERIA UA /HPF None Seen   SQUAM EPITHEL UA /HPF None Seen             Radiology:  Imaging Results (Last 24 Hours)     ** No results found for the last 24 hours. **          Cardiology:  ECG/EMG Results (last 24 hours)     Procedure Component Value Units Date/Time    ECG 12 Lead [[id number]] Collected: 09/02/21 1348     Updated: 09/02/21 1352     QT Interval 467 ms     Narrative:      HEART RATE= 68  bpm  RR Interval= 880  ms  [place] Interval= 211  ms  P Horizontal Axis= 8  deg  P Front Axis= 52  deg  QRSD Interval= 130  ms  QT Interval= 467  ms  QRS Axis= -14  deg  T Wave Axis= -6  deg  - ABNORMAL ECG -  Sinus rhythm  Nonspecific intraventricular conduction delay  Lateral infarct, age indeterminate  Electronically Signed By:   Date and Time of Study: 2021-09-02 13:48:28          I have reviewed recent labs results and consult notes.  Parts of this note may have been copied and pasted but patient was examined and  interviewed by me today    Assessment and Plan:    1.  COVID-19 pneumonia generalized weakness . Stable.    2.  Metabolic encephalopathy likely due to COVID-19 with severe sundowning started on Zyprexa on 9/6/2021 still not at baseline continue present management    3.    Adult-onset diabetes mellitus continues to be hyperglycemic but improving continue with sliding scale insulin optimize glycemic control poor oral intake continue sliding scale insulin    4.  Hypertension not optimally controlled we will monitor    5.    Hypokalemia persistent oral potassium ordered check magnesium today     6.  Parkinson's disease nothing acute continue with home medications     7.    COPD no evidence of exacerbation continue with as needed albuterol    8.  DVT prophylaxis Lovenox    9.  Generalized weakness secondary to COVID-19 pneumonia comorbidities continue to encourage participation with therapies    10.  Suboptimal p.o. intake.  Will liberalize diet Megace added yesterday.  Patient would not keep Dobbhoff down only options G-tube.      Much of this encounter note is an electronic transcription/translation of spoken language to printed text using Dragon Software

## 2021-09-11 NOTE — PLAN OF CARE
Goal Outcome Evaluation:  Plan of Care Reviewed With: patient           Outcome Summary: patient vital signs stable, oxygen on room air, sat 95%, bed alarm intact, pt turn and reposition as needed cont to monitor.

## 2021-09-12 LAB
ANION GAP SERPL CALCULATED.3IONS-SCNC: 11.1 MMOL/L (ref 5–15)
BUN SERPL-MCNC: 18 MG/DL (ref 8–23)
BUN/CREAT SERPL: 36.7 (ref 7–25)
CALCIUM SPEC-SCNC: 8.6 MG/DL (ref 8.6–10.5)
CHLORIDE SERPL-SCNC: 108 MMOL/L (ref 98–107)
CO2 SERPL-SCNC: 22.9 MMOL/L (ref 22–29)
CREAT SERPL-MCNC: 0.49 MG/DL (ref 0.76–1.27)
DEPRECATED RDW RBC AUTO: 47.8 FL (ref 37–54)
ERYTHROCYTE [DISTWIDTH] IN BLOOD BY AUTOMATED COUNT: 14.6 % (ref 12.3–15.4)
GFR SERPL CREATININE-BSD FRML MDRD: >150 ML/MIN/1.73
GLUCOSE BLDC GLUCOMTR-MCNC: 172 MG/DL (ref 70–130)
GLUCOSE BLDC GLUCOMTR-MCNC: 200 MG/DL (ref 70–130)
GLUCOSE BLDC GLUCOMTR-MCNC: 214 MG/DL (ref 70–130)
GLUCOSE BLDC GLUCOMTR-MCNC: 217 MG/DL (ref 70–130)
GLUCOSE SERPL-MCNC: 217 MG/DL (ref 65–99)
HCT VFR BLD AUTO: 38.7 % (ref 37.5–51)
HGB BLD-MCNC: 13.1 G/DL (ref 13–17.7)
MCH RBC QN AUTO: 30.5 PG (ref 26.6–33)
MCHC RBC AUTO-ENTMCNC: 33.9 G/DL (ref 31.5–35.7)
MCV RBC AUTO: 90 FL (ref 79–97)
PLATELET # BLD AUTO: 271 10*3/MM3 (ref 140–450)
PMV BLD AUTO: 9.7 FL (ref 6–12)
POTASSIUM SERPL-SCNC: 3.5 MMOL/L (ref 3.5–5.2)
RBC # BLD AUTO: 4.3 10*6/MM3 (ref 4.14–5.8)
SODIUM SERPL-SCNC: 142 MMOL/L (ref 136–145)
WBC # BLD AUTO: 9.78 10*3/MM3 (ref 3.4–10.8)

## 2021-09-12 PROCEDURE — 85027 COMPLETE CBC AUTOMATED: CPT | Performed by: FAMILY MEDICINE

## 2021-09-12 PROCEDURE — 63710000001 DEXAMETHASONE PER 0.25 MG: Performed by: FAMILY MEDICINE

## 2021-09-12 PROCEDURE — 80048 BASIC METABOLIC PNL TOTAL CA: CPT | Performed by: FAMILY MEDICINE

## 2021-09-12 PROCEDURE — 82962 GLUCOSE BLOOD TEST: CPT

## 2021-09-12 PROCEDURE — 63710000001 INSULIN ASPART PER 5 UNITS: Performed by: FAMILY MEDICINE

## 2021-09-12 PROCEDURE — 25010000002 ENOXAPARIN PER 10 MG: Performed by: FAMILY MEDICINE

## 2021-09-12 PROCEDURE — 94799 UNLISTED PULMONARY SVC/PX: CPT

## 2021-09-12 RX ORDER — POTASSIUM CHLORIDE 20 MEQ/1
40 TABLET, EXTENDED RELEASE ORAL ONCE
Status: COMPLETED | OUTPATIENT
Start: 2021-09-12 | End: 2021-09-12

## 2021-09-12 RX ADMIN — LISINOPRIL 20 MG: 20 TABLET ORAL at 09:39

## 2021-09-12 RX ADMIN — OLANZAPINE 2.5 MG: 5 TABLET, FILM COATED ORAL at 20:44

## 2021-09-12 RX ADMIN — DEXAMETHASONE 6 MG: 4 TABLET ORAL at 09:39

## 2021-09-12 RX ADMIN — GUAIFENESIN 1200 MG: 600 TABLET, EXTENDED RELEASE ORAL at 09:40

## 2021-09-12 RX ADMIN — CARBIDOPA AND LEVODOPA 1.5 TABLET: 25; 100 TABLET ORAL at 09:40

## 2021-09-12 RX ADMIN — MEGESTROL ACETATE 400 MG: 40 SUSPENSION ORAL at 09:39

## 2021-09-12 RX ADMIN — NEBIVOLOL HYDROCHLORIDE 10 MG: 5 TABLET ORAL at 09:40

## 2021-09-12 RX ADMIN — ATORVASTATIN CALCIUM 40 MG: 40 TABLET, FILM COATED ORAL at 09:39

## 2021-09-12 RX ADMIN — INSULIN ASPART 4 UNITS: 100 INJECTION, SOLUTION INTRAVENOUS; SUBCUTANEOUS at 09:40

## 2021-09-12 RX ADMIN — ENOXAPARIN SODIUM 40 MG: 40 INJECTION SUBCUTANEOUS at 20:43

## 2021-09-12 RX ADMIN — INSULIN ASPART 8 UNITS: 100 INJECTION, SOLUTION INTRAVENOUS; SUBCUTANEOUS at 12:28

## 2021-09-12 RX ADMIN — SENNOSIDES AND DOCUSATE SODIUM 2 TABLET: 50; 8.6 TABLET ORAL at 09:39

## 2021-09-12 RX ADMIN — LISINOPRIL 20 MG: 20 TABLET ORAL at 20:44

## 2021-09-12 RX ADMIN — INSULIN ASPART 8 UNITS: 100 INJECTION, SOLUTION INTRAVENOUS; SUBCUTANEOUS at 18:20

## 2021-09-12 RX ADMIN — POTASSIUM CHLORIDE 20 MEQ: 1500 TABLET, EXTENDED RELEASE ORAL at 09:39

## 2021-09-12 RX ADMIN — POTASSIUM CHLORIDE 40 MEQ: 1500 TABLET, EXTENDED RELEASE ORAL at 12:28

## 2021-09-12 RX ADMIN — CARBIDOPA AND LEVODOPA 1.5 TABLET: 25; 100 TABLET ORAL at 20:44

## 2021-09-12 RX ADMIN — SENNOSIDES AND DOCUSATE SODIUM 2 TABLET: 50; 8.6 TABLET ORAL at 20:44

## 2021-09-12 RX ADMIN — GUAIFENESIN 1200 MG: 600 TABLET, EXTENDED RELEASE ORAL at 20:44

## 2021-09-12 RX ADMIN — PANTOPRAZOLE SODIUM 40 MG: 40 TABLET, DELAYED RELEASE ORAL at 09:40

## 2021-09-12 RX ADMIN — CARBIDOPA AND LEVODOPA 1.5 TABLET: 25; 100 TABLET ORAL at 18:20

## 2021-09-12 RX ADMIN — GLIPIZIDE 5 MG: 5 TABLET ORAL at 09:39

## 2021-09-12 RX ADMIN — ASPIRIN 81 MG: 81 TABLET, COATED ORAL at 09:39

## 2021-09-12 NOTE — PROGRESS NOTES
Adult Nutrition  Assessment/PES    Patient Name:  Nash So  YOB: 1945  MRN: [id number]  Admit Date:  9/2/2021    Assessment Date:  9/12/2021    Comments:  Received order for calorie count. Pt with very poor intake during all of admit.   Will cont to follow and monitor as available.     Reason for Assessment     Row Name 09/12/21 1333          Reason for Assessment    Reason For Assessment  calorie count order;physician consult         Nutrition/Diet History     Row Name 09/12/21 1333          Nutrition/Diet History    Typical Food/Fluid Intake  notfiied RN/CNA calorie count to hang folder         Anthropometrics     Row Name 09/12/21 0757          Anthropometrics    Weight  105 kg (231 lb 12.8 oz)               Nutrition Prescription Ordered     Row Name 09/12/21 2383          Nutrition Prescription PO    Current PO Diet  Soft Texture     Texture  Chopped     Supplement  Boost Glucose Control (Glucerna Shake)     Supplement Frequency  3 times a day                 Diet: Soft Chopped Diet with Boost Glucose with meals    Estimated needs: 1833 kcal, 87 gm pro                        Electronically signed by:  Nan García RD  09/12/21 13:34 EDT

## 2021-09-12 NOTE — PROGRESS NOTES
"Daily Progress Note:      Chief complaint: Follow-up of COVID-19 infection, metabolic encephalopathy, rhabdomyolysis, hyperkalemia, diabetes, hypertension, Parkinson's disease    Subjective: No overnight events noted.  No overnight events noted.  Oral intake continues to be poor but somewhat improved his diet has been liberalized to regular diet and Megace has been added     LOS: 10 days     Vital Signs  Temp:  [97.4 °F (36.3 °C)-99 °F (37.2 °C)] 97.5 °F (36.4 °C)  Heart Rate:  [60-70] 60  Resp:  [16-18] 18  BP: (135-159)/(60-74) 135/60  Oxygen Therapy  SpO2: 94 %  Pulse Oximetry Type: Continuous  Device (Oxygen Therapy): room air  $ High Flow Nasal Cannula Set-Up: yes  Flow (L/min): 40  Oxygen Concentration (%): 30  ETCO2 (mmHg): 27 mmHg  Probe Placed On (Pulse Ox): Left:, finger}  Body mass index is 33.26 kg/m².  Flowsheet Rows      First Filed Value   Admission Height  182.9 cm (72\") Documented at 09/02/2021 1411   Admission Weight  115 kg (254 lb 4.8 oz) Documented at 09/02/2021 1502                   Documented weights    09/02/21 1502 09/02/21 2241 09/06/21 1208 09/07/21 0514   Weight: 115 kg (254 lb 4.8 oz) 108 kg (238 lb 12.8 oz) 107 kg (236 lb 9.6 oz) 109 kg (240 lb 1.6 oz)    09/08/21 0700 09/09/21 0600 09/11/21 0804 09/12/21 0757   Weight: 104 kg (228 lb 8 oz) 105 kg (231 lb 1.6 oz) 105 kg (231 lb 14.4 oz) 105 kg (231 lb 12.8 oz)           Patient Vitals for the past 24 hrs:   BP Temp Temp src Pulse Resp SpO2 Weight   09/12/21 0937 135/60 97.5 °F (36.4 °C) Axillary 60 18 -- --   09/12/21 0757 -- -- -- -- -- -- 105 kg (231 lb 12.8 oz)   09/12/21 0556 157/74 97.4 °F (36.3 °C) Oral 68 16 94 % --   09/11/21 2003 136/70 98.4 °F (36.9 °C) Oral 70 16 94 % --   09/11/21 1540 159/70 99 °F (37.2 °C) Oral 67 18 92 % --       105 kg (231 lb 12.8 oz)    Intake/Output                             09/10/21 0701 - 09/11/21 0700 09/11/21 0701 - 09/12/21 0700 09/12/21 0701 - 09/13/21 0700     [id number] Total " 0701-1900 1901-0700 Total 0701-1900 1901-0700 Total                    Intake    P.O.  360  -- 360  420  -- 420  360  -- 360    Total Intake 360 -- 360 420 -- 420 360 -- 360       Output    Urine  --  -- --  400  400 800  --  -- --    Total Output -- -- -- 400 400 800 -- -- --           Intake/Output Summary (Last 24 hours) at 9/12/2021 1359  Last data filed at 9/12/2021 0947  Gross per 24 hour   Intake 600 ml   Output 600 ml   Net 0 ml        Intake/Output Summary (Last 24 hours) at 9/12/2021 1359  Last data filed at 9/12/2021 0947  Gross per 24 hour   Intake 600 ml   Output 600 ml   Net 0 ml        Review of Systems   Constitutional: Positive for appetite change and fatigue. Negative for activity change.   HENT: Negative for congestion.    Respiratory: Negative for cough, chest tightness, shortness of breath and wheezing.    Cardiovascular: Negative for chest pain.   Gastrointestinal: Negative for abdominal distention, abdominal pain, diarrhea, nausea and vomiting.   Endocrine: Negative for polyphagia and polyuria.   Genitourinary: Negative for frequency.   Skin: Negative for rash.   Neurological: Negative for light-headedness.   Psychiatric/Behavioral: Positive for confusion (Improved). Negative for agitation and behavioral problems.       Physical Exam  Vitals and nursing note reviewed.   Constitutional:       General: He is not in acute distress.     Appearance: He is well-developed. He is morbidly obese.   HENT:      Head: Normocephalic.      Right Ear: Decreased hearing noted.      Left Ear: Decreased hearing noted.   Eyes:      Conjunctiva/sclera: Conjunctivae normal.   Neck:      Thyroid: No thyromegaly.      Vascular: No JVD.   Cardiovascular:      Rate and Rhythm: Normal rate and regular rhythm.      Heart sounds: Normal heart sounds. No murmur heard.     Pulmonary:      Effort: Pulmonary effort is normal. No tachypnea, accessory muscle usage or respiratory distress.      Breath sounds: Examination of the  right-lower field reveals decreased breath sounds. Examination of the left-lower field reveals decreased breath sounds. Decreased breath sounds present. No wheezing or rales.   Abdominal:      General: Bowel sounds are normal. There is no distension.      Palpations: Abdomen is soft.      Tenderness: There is no abdominal tenderness. There is no guarding.   Musculoskeletal:      Right lower leg: No edema.      Left lower leg: No edema.   Skin:     General: Skin is warm and dry.      Findings: No rash.   Neurological:      General: No focal deficit present.      Mental Status: He is alert and oriented to person, place, and time. He is confused.      Cranial Nerves: No cranial nerve deficit or facial asymmetry.      Motor: Motor function is intact.      Comments: Oriented person place and situation follows commands cooperative   Psychiatric:         Cognition and Memory: Cognition is impaired. Memory is impaired. He exhibits impaired remote memory.         Medication Review:   I have reviewed the patient's current medication list  Scheduled Meds:aspirin, 81 mg, Oral, Daily  atorvastatin, 40 mg, Oral, Daily  carbidopa-levodopa, 1.5 tablet, Oral, TID  dexamethasone, 6 mg, Oral, Daily With Breakfast  enoxaparin, 40 mg, Subcutaneous, Q24H  glipizide, 5 mg, Oral, QAM AC  guaiFENesin, 1,200 mg, Oral, BID  haloperidol lactate, 1 mg, Intravenous, Once  insulin aspart, 0-24 Units, Subcutaneous, TID AC  lisinopril, 20 mg, Oral, Q12H  megestrol, 400 mg, Oral, Daily  nebivolol, 10 mg, Oral, Daily  OLANZapine, 2.5 mg, Oral, Nightly  pantoprazole, 40 mg, Oral, Daily  potassium chloride, 20 mEq, Oral, Daily  senna-docusate sodium, 2 tablet, Oral, BID      Continuous Infusions:   PRN Meds:.•  acetaminophen  •  albuterol sulfate HFA  •  senna-docusate sodium **AND** polyethylene glycol **AND** bisacodyl **AND** bisacodyl  •  haloperidol lactate  •  hydrALAZINE  •  ondansetron **OR** ondansetron  •  sodium chloride      Labs:  Results  from last 7 days   Lab Units 09/12/21  0844 09/11/21  0719 09/10/21  0740 09/09/21  0504 09/09/21  0504 09/08/21 0441 09/08/21  0441 09/07/21  0334 09/07/21  0334 09/06/21  0640 09/06/21  0640   WBC 10*3/mm3 9.78 8.77 9.12  --  9.88  --  7.39  --  8.09  --  10.16   HEMOGLOBIN g/dL 13.1 13.5 13.5   < > 13.8   < > 13.3   < > 13.4   < > 13.7   HEMATOCRIT % 38.7 41.3 40.5  --  41.2  --  41.3  --  40.4  --  42.5   PLATELETS 10*3/mm3 271 294 305  --  313  --  241  --  308  --  269    < > = values in this interval not displayed.     Results from last 7 days   Lab Units 09/12/21  0845 09/11/21  0719 09/10/21  0740 09/09/21  0504 09/08/21 0441 09/07/21  0334 09/06/21  0640   SODIUM mmol/L 142 146* 142 146* 142 143 142   POTASSIUM mmol/L 3.5 3.2* 3.3* 2.8* 3.6 3.5 4.3   CHLORIDE mmol/L 108* 108* 106 107 106 106 106   CO2 mmol/L 22.9 22.7 26.5 30.2* 23.5 26.8 23.8   BUN mg/dL 18 19 21 22 18 19 18   CREATININE mg/dL 0.49* 0.57* 0.61* 0.62* 0.56* 0.54* 0.50*   CALCIUM mg/dL 8.6 8.5* 8.3* 8.5* 8.1* 8.5* 8.7   BILIRUBIN mg/dL  --   --   --  0.8 0.8 0.9 1.0   ALK PHOS U/L  --   --   --  80 67 72 72   ALT (SGPT) U/L  --   --   --  9 14 6 20   AST (SGOT) U/L  --   --   --  26 32 33 30   GLUCOSE mg/dL 217* 155* 159* 120* 156* 233* 274*     Results from last 7 days   Lab Units 09/11/21  0719   MAGNESIUM mg/dL 1.7       Results from last 7 days   Lab Units 09/12/21  0844 09/11/21  0719 09/10/21  0740 09/09/21  0504 09/09/21  0504 09/08/21  0441 09/08/21  0441 09/07/21  0334 09/07/21  0334 09/06/21  0640 09/06/21  0640   LDH U/L  --   --   --   --  379*  --   --   --  425*  --   --    AST (SGOT) U/L  --   --   --   --  26  --  32  --  33   < > 30   ALT (SGPT) U/L  --   --   --   --  9  --  14  --  6   < > 20   FERRITIN ng/mL  --   --   --   --  803.00*  --  940.00*  --  1,398.00*   < > 1,984.00*   D DIMER QUANT MCGFEU/mL  --   --   --   --  1.34*  --   --   --  0.76*  --   --    CRP mg/dL  --   --   --   --   --   --   --   --  3.73*   --  7.76*   PLATELETS 10*3/mm3 271 294 305   < > 313   < > 241   < > 308   < > 269    < > = values in this interval not displayed.         Lab Results (last 24 hours)     Procedure Component Value Units Date/Time    POC Glucose Once [[id number]]  (Abnormal) Collected: 09/12/21 1202    Specimen: Blood Updated: 09/12/21 1208     Glucose 214 mg/dL      Comment: Meter: [id number] : [id number] Padmini Wolff NURSING ASSISTANT       Basic Metabolic Panel [[id number]]  (Abnormal) Collected: 09/12/21 0845    Specimen: Blood Updated: 09/12/21 0906     Glucose 217 mg/dL      BUN 18 mg/dL      Creatinine 0.49 mg/dL      Sodium 142 mmol/L      Potassium 3.5 mmol/L      Chloride 108 mmol/L      CO2 22.9 mmol/L      Calcium 8.6 mg/dL      eGFR Non African Amer >150 mL/min/1.73      BUN/Creatinine Ratio 36.7     Anion Gap 11.1 mmol/L     Narrative:      GFR Normal >60  Chronic Kidney Disease <60  Kidney Failure <15      CBC (No Diff) [[id number]]  (Normal) Collected: 09/12/21 0844    Specimen: Blood Updated: 09/12/21 0847     WBC 9.78 10*3/mm3      RBC 4.30 10*6/mm3      Hemoglobin 13.1 g/dL      Hematocrit 38.7 %      MCV 90.0 fL      MCH 30.5 pg      MCHC 33.9 g/dL      RDW 14.6 %      RDW-SD 47.8 fl      MPV 9.7 fL      Platelets 271 10*3/mm3     POC Glucose Once [[id number]]  (Abnormal) Collected: 09/12/21 0755    Specimen: Blood Updated: 09/12/21 0801     Glucose 172 mg/dL      Comment: Meter: [id number] : [id number] Sebastian Pitts RN       POC Glucose Once [[id number]]  (Abnormal) Collected: 09/11/21 2004    Specimen: Blood Updated: 09/11/21 2010     Glucose 252 mg/dL      Comment: Meter: [id number] : [id number] Klever TURCIOS           Results from last 7 days   Lab Units 09/09/21  0504 09/07/21  0334   D DIMER QUANT MCGFEU/mL 1.34* 0.76*                 Results from last 7 days   Lab Units 09/11/21  0719   MAGNESIUM mg/dL 1.7                 Glucose   Date/Time Value Ref Range Status   09/12/2021 1202 214 (H) 70 -  130 mg/dL Final     Comment:     Meter: [id number] : [id number] Padmini Wolff NURSING ASSISTANT   09/12/2021 0755 172 (H) 70 - 130 mg/dL Final     Comment:     Meter: [id number] : [id number] Sebastian Pitts RN   09/11/2021 2004 252 (H) 70 - 130 mg/dL Final     Comment:     Meter: [id number] : [id number] Klever Main PCA   09/11/2021 1659 199 (H) 70 - 130 mg/dL Final     Comment:     Meter: [id number] : [id number] Padmini Wolff NURSING ASSISTANT   09/11/2021 1114 194 (H) 70 - 130 mg/dL Final     Comment:     Meter: [id number] : [id number] Padmini Wolff NURSING ASSISTANT   09/11/2021 0801 153 (H) 70 - 130 mg/dL Final     Comment:     Meter: [id number] : [id number] Padmini Wolff NURSING ASSISTANT   09/10/2021 2014 93 70 - 130 mg/dL Final     Comment:     Meter: [id number] : [id number] Klever Main [facility]   09/10/2021 1624 156 (H) 70 - 130 mg/dL Final     Comment:     Meter: [id number] : [id number] Lauryn Nobles CNA             Results from last 7 days   Lab Units 09/10/21  1138   NITRITE UA  Negative   WBC UA /HPF None Seen   BACTERIA UA /HPF None Seen   SQUAM EPITHEL UA /HPF None Seen             Radiology:  Imaging Results (Last 24 Hours)     ** No results found for the last 24 hours. **          Cardiology:  ECG/EMG Results (last 24 hours)     Procedure Component Value Units Date/Time    ECG 12 Lead [[id number]] Collected: 09/02/21 1348     Updated: 09/02/21 1352     QT Interval 467 ms     Narrative:      HEART RATE= 68  bpm  RR Interval= 880  ms  OR Interval= 211  ms  P Horizontal Axis= 8  deg  P Front Axis= 52  deg  QRSD Interval= 130  ms  QT Interval= 467  ms  QRS Axis= -14  deg  T Wave Axis= -6  deg  - ABNORMAL ECG -  Sinus rhythm  Nonspecific intraventricular conduction delay  Lateral infarct, age indeterminate  Electronically Signed By:   Date and Time of Study: 2021-09-02 13:48:28          I have reviewed recent labs results and consult notes.  Parts of this note may have been copied and  pasted but patient was examined and interviewed by me today    Assessment and Plan:    1.  COVID-19 pneumonia generalized weakness . Stable.    2.  Metabolic encephalopathy likely due to COVID-19 improving    3.    Adult-onset diabetes mellitus continues to be hyperglycemic but improving continue with sliding scale insulin optimize glycemic control poor oral intake continue sliding scale insulin    4.  Hypertension controlled continue present medications    5.    Hypokalemia persistent improved with potassium supplementation magnesium levels normal    6.  Parkinson's disease nothing acute continue with home medications     7.    COPD no evidence of exacerbation continue with as needed albuterol    8.  DVT prophylaxis Lovenox    9.  Generalized weakness secondary to COVID-19 pneumonia comorbidities continue to encourage participation with therapies    10.  Suboptimal p.o. intake.  Will liberalize diet Megace added oral intake somewhat improved but still not optimal      Much of this encounter note is an electronic transcription/translation of spoken language to printed text using Dragon Software

## 2021-09-12 NOTE — PLAN OF CARE
Goal Outcome Evaluation:  Plan of Care Reviewed With: patient        Progress: no change  Outcome Summary: VSS - room air - bed alarm and fall mats in place - turn and reposition - rested well through night

## 2021-09-13 LAB
ANION GAP SERPL CALCULATED.3IONS-SCNC: 8.6 MMOL/L (ref 5–15)
BUN SERPL-MCNC: 17 MG/DL (ref 8–23)
BUN/CREAT SERPL: 34.7 (ref 7–25)
CALCIUM SPEC-SCNC: 8.8 MG/DL (ref 8.6–10.5)
CHLORIDE SERPL-SCNC: 111 MMOL/L (ref 98–107)
CO2 SERPL-SCNC: 25.4 MMOL/L (ref 22–29)
CREAT SERPL-MCNC: 0.49 MG/DL (ref 0.76–1.27)
DEPRECATED RDW RBC AUTO: 49.1 FL (ref 37–54)
ERYTHROCYTE [DISTWIDTH] IN BLOOD BY AUTOMATED COUNT: 14.6 % (ref 12.3–15.4)
GFR SERPL CREATININE-BSD FRML MDRD: >150 ML/MIN/1.73
GLUCOSE BLDC GLUCOMTR-MCNC: 151 MG/DL (ref 70–130)
GLUCOSE BLDC GLUCOMTR-MCNC: 155 MG/DL (ref 70–130)
GLUCOSE BLDC GLUCOMTR-MCNC: 205 MG/DL (ref 70–130)
GLUCOSE BLDC GLUCOMTR-MCNC: 285 MG/DL (ref 70–130)
GLUCOSE SERPL-MCNC: 153 MG/DL (ref 65–99)
HCT VFR BLD AUTO: 38.6 % (ref 37.5–51)
HGB BLD-MCNC: 12.5 G/DL (ref 13–17.7)
MCH RBC QN AUTO: 29.8 PG (ref 26.6–33)
MCHC RBC AUTO-ENTMCNC: 32.4 G/DL (ref 31.5–35.7)
MCV RBC AUTO: 92.1 FL (ref 79–97)
PLATELET # BLD AUTO: 272 10*3/MM3 (ref 140–450)
PMV BLD AUTO: 10.2 FL (ref 6–12)
POTASSIUM SERPL-SCNC: 3.2 MMOL/L (ref 3.5–5.2)
RBC # BLD AUTO: 4.19 10*6/MM3 (ref 4.14–5.8)
SODIUM SERPL-SCNC: 145 MMOL/L (ref 136–145)
WBC # BLD AUTO: 9.82 10*3/MM3 (ref 3.4–10.8)

## 2021-09-13 PROCEDURE — 63710000001 INSULIN ASPART PER 5 UNITS: Performed by: FAMILY MEDICINE

## 2021-09-13 PROCEDURE — 25010000002 ENOXAPARIN PER 10 MG: Performed by: FAMILY MEDICINE

## 2021-09-13 PROCEDURE — 63710000001 DEXAMETHASONE PER 0.25 MG: Performed by: FAMILY MEDICINE

## 2021-09-13 PROCEDURE — 85027 COMPLETE CBC AUTOMATED: CPT | Performed by: FAMILY MEDICINE

## 2021-09-13 PROCEDURE — 97110 THERAPEUTIC EXERCISES: CPT

## 2021-09-13 PROCEDURE — 80048 BASIC METABOLIC PNL TOTAL CA: CPT | Performed by: FAMILY MEDICINE

## 2021-09-13 PROCEDURE — 97530 THERAPEUTIC ACTIVITIES: CPT

## 2021-09-13 PROCEDURE — 82962 GLUCOSE BLOOD TEST: CPT

## 2021-09-13 RX ORDER — POTASSIUM CHLORIDE 20 MEQ/1
40 TABLET, EXTENDED RELEASE ORAL ONCE
Status: COMPLETED | OUTPATIENT
Start: 2021-09-13 | End: 2021-09-13

## 2021-09-13 RX ADMIN — SENNOSIDES AND DOCUSATE SODIUM 2 TABLET: 50; 8.6 TABLET ORAL at 20:19

## 2021-09-13 RX ADMIN — ASPIRIN 81 MG: 81 TABLET, COATED ORAL at 09:10

## 2021-09-13 RX ADMIN — SENNOSIDES AND DOCUSATE SODIUM 2 TABLET: 50; 8.6 TABLET ORAL at 09:11

## 2021-09-13 RX ADMIN — POTASSIUM CHLORIDE 40 MEQ: 1500 TABLET, EXTENDED RELEASE ORAL at 09:11

## 2021-09-13 RX ADMIN — POTASSIUM CHLORIDE 20 MEQ: 1500 TABLET, EXTENDED RELEASE ORAL at 09:11

## 2021-09-13 RX ADMIN — GUAIFENESIN 1200 MG: 600 TABLET, EXTENDED RELEASE ORAL at 09:10

## 2021-09-13 RX ADMIN — DEXAMETHASONE 6 MG: 4 TABLET ORAL at 09:10

## 2021-09-13 RX ADMIN — ATORVASTATIN CALCIUM 40 MG: 40 TABLET, FILM COATED ORAL at 09:11

## 2021-09-13 RX ADMIN — CARBIDOPA AND LEVODOPA 1.5 TABLET: 25; 100 TABLET ORAL at 09:10

## 2021-09-13 RX ADMIN — OLANZAPINE 2.5 MG: 5 TABLET, FILM COATED ORAL at 20:20

## 2021-09-13 RX ADMIN — GLIPIZIDE 5 MG: 5 TABLET ORAL at 09:11

## 2021-09-13 RX ADMIN — CARBIDOPA AND LEVODOPA 1.5 TABLET: 25; 100 TABLET ORAL at 15:50

## 2021-09-13 RX ADMIN — ENOXAPARIN SODIUM 40 MG: 40 INJECTION SUBCUTANEOUS at 20:21

## 2021-09-13 RX ADMIN — INSULIN ASPART 8 UNITS: 100 INJECTION, SOLUTION INTRAVENOUS; SUBCUTANEOUS at 18:28

## 2021-09-13 RX ADMIN — NEBIVOLOL HYDROCHLORIDE 10 MG: 5 TABLET ORAL at 09:09

## 2021-09-13 RX ADMIN — LISINOPRIL 20 MG: 20 TABLET ORAL at 09:11

## 2021-09-13 RX ADMIN — MEGESTROL ACETATE 400 MG: 40 SUSPENSION ORAL at 09:10

## 2021-09-13 RX ADMIN — INSULIN ASPART 12 UNITS: 100 INJECTION, SOLUTION INTRAVENOUS; SUBCUTANEOUS at 12:40

## 2021-09-13 RX ADMIN — PANTOPRAZOLE SODIUM 40 MG: 40 TABLET, DELAYED RELEASE ORAL at 09:11

## 2021-09-13 RX ADMIN — GUAIFENESIN 1200 MG: 600 TABLET, EXTENDED RELEASE ORAL at 20:21

## 2021-09-13 RX ADMIN — CARBIDOPA AND LEVODOPA 1.5 TABLET: 25; 100 TABLET ORAL at 20:20

## 2021-09-13 RX ADMIN — LISINOPRIL 20 MG: 20 TABLET ORAL at 20:21

## 2021-09-13 NOTE — CASE MANAGEMENT/SOCIAL WORK
Continued Stay Note  MIA Beyer     Patient Name: Nash So  MRN: [id number]  Today's Date: 9/13/2021    Admit Date: 9/2/2021    Discharge Plan     Row Name 09/13/21 1138       Plan    Plan Comments  I spoke with Britney with [facility] and she will review patient today with a possible acceptance on Wednesday.  I then spoke with Ria from [facility] and she will review also.  CM will continue to follow.        Discharge Codes    No documentation.             Montserrat Harris RN

## 2021-09-13 NOTE — THERAPY TREATMENT NOTE
Acute Care - Occupational Therapy Treatment Note   Tori Simmons     Patient Name: Nash So  : 1945  MRN: [id number]  Today's Date: 2021             Admit Date: 2021       ICD-10-CM ICD-9-CM   1. COVID-19 virus infection  U07.1 079.89   2. Weakness  R53.1 780.79   3. Elevated troponin  R77.8 790.6   4. Contusion of face, initial encounter  S00.83XA 920     Patient Active Problem List   Diagnosis   • Vitamin D deficiency   • Diabetes mellitus (CMS/HCC)   • Back pain   • Benign prostatic hyperplasia   • Gastroesophageal reflux disease with esophagitis   • Hyperlipidemia   • Hypertension   • Hypogonadism in male   • Sebaceous cyst   • COPD (chronic obstructive pulmonary disease) (CMS/HCC)   • Encounter for screening for malignant neoplasm of colon   • COVID-19 virus infection     Past Medical History:   Diagnosis Date   • Alzheimer disease (CMS/HCC)    • CHF (congestive heart failure) (CMS/HCC)    • COPD (chronic obstructive pulmonary disease) (CMS/HCC)    • Diabetes (CMS/HCC)    • GERD (gastroesophageal reflux disease)    • Hyperlipemia    • Hypertension      Past Surgical History:   Procedure Laterality Date   • ENDOSCOPY N/A 2017    Procedure: ESOPHAGOGASTRODUODENOSCOPY, biopsy;  Surgeon: Natalia Guerrero MD;  Location: [facility];  Service:             OT ASSESSMENT FLOWSHEET (last 12 hours)      OT Evaluation and Treatment     Row Name 21 1337                   OT Time and Intention    Subjective Information  no complaints  -SD        Document Type  therapy note (daily note)  -SD        Mode of Treatment  occupational therapy  -SD        Patient Effort  good  -SD           General Information    Patient/Family/Caregiver Comments/Observations  Pt in bed. Pt agreeable to therapy.   -SD        Existing Precautions/Restrictions  fall  -SD        Limitations/Impairments  safety/cognitive  -SD        Barriers to Rehab  cognitive status;previous functional deficit  -SD           Cognition     Affect/Mental Status (Cognitive)  confused  -SD        Follows Commands (Cognition)  follows one-step commands;75-90% accuracy;verbal cues/prompting required;physical/tactile prompts required  -SD        Personal Safety Interventions  gait belt;fall prevention program maintained;nonskid shoes/slippers when out of bed;supervised activity  -SD           Pain Scale: Word Pre/Post-Treatment    Pre/Posttreatment Pain Comment  no c/o pain  -SD           Bed Mobility    Bed Mobility  supine-sit;sit-supine  -SD        Supine-Sit [place] (Bed Mobility)  moderate assist (50% patient effort);2 person assist;verbal cues;nonverbal cues (demo/gesture)  -SD        Sit-Supine [place] (Bed Mobility)  maximum assist (25% patient effort);2 person assist;verbal cues;nonverbal cues (demo/gesture)  -SD        Bed Mobility, Safety Issues  cognitive deficits limit understanding  -SD        Assistive Device (Bed Mobility)  bed rails;draw sheet;head of bed elevated  -SD           Functional Mobility    Functional Mobility- Comment  pt able take 2-3 small side steps toward his right while at EOB x 2.   -SD           Transfer Assessment/Treatment    Comment (Transfers)  Pt required physical assitance to bring his hands onto the walker after standing  -SD           Transfers    Sit-Stand [place] (Transfers)  minimum assist (75% patient effort);verbal cues  -SD        Stand-Sit [place] (Transfers)  minimum assist (75% patient effort);verbal cues  -SD           Sit-Stand Transfer    Assistive Device (Sit-Stand Transfers)  walker, front-wheeled  -SD           Stand-Sit Transfer    Assistive Device (Stand-Sit Transfers)  walker, front-wheeled  -SD           Balance    Comment, Balance  Pt maintained static standing balance with CGA to min assist. Pt with intermittent posterior lean.   -SD           Self-Feeding Assessment/Training    [place] Level (Feeding)  liquids to mouth;set up;supervision;verbal cues  -SD         Position (Self-Feeding)  edge of bed sitting  -SD           BADL Safety/Performance    Impairments, BADL Safety/Performance  cognition;balance;strength;endurance/activity tolerance  -SD           Wound 09/02/21 1452 Left elbow Skin Tear    Wound - Properties Group Placement Date: 09/02/21  -EF Placement Time: 1452  -EF Present on Hospital Admission: Y  -EF Side: Left  -EF Location: elbow  -EF Primary Wound Type: Skin tear  -EF    Retired Wound - Properties Group Date first assessed: 09/02/21  -EF Time first assessed: 1452  -EF Present on Hospital Admission: Y  -EF Side: Left  -EF Location: elbow  -EF Primary Wound Type: Skin tear  -EF       Wound 09/02/21 1453 Right elbow Skin Tear    Wound - Properties Group Placement Date: 09/02/21  -EF Placement Time: 1453  -EF Present on Hospital Admission: Y  -EF Side: Right  -EF Location: elbow  -EF Primary Wound Type: Skin tear  -EF    Retired Wound - Properties Group Date first assessed: 09/02/21  -EF Time first assessed: 1453  -EF Present on Hospital Admission: Y  -EF Side: Right  -EF Location: elbow  -EF Primary Wound Type: Skin tear  -EF       Plan of Care Review    Outcome Summary  OT: Pt pleasantly confused. He followed simple one step commands approx 75% of time with verbal/tactile cues. Pt demonstrated improvement with sit to stand transfers, yet he has significant motor planning deficits which impacts his safety with mobility/transfers. Pt needs significant assistance for basic self care activity due to his cognitive status. Anticipate transfer to SNF with transition to LTC.   -SD           Transfer Goal 1 (OT)    Activity/Assistive Device (Transfer Goal 1, OT)  commode, 3-in-1;walker, rolling  -SD        [place] Level/Cues Needed (Transfer Goal 1, OT)  minimum assist (75% or more patient effort);tactile cues required;verbal cues required  -SD        Time Frame (Transfer Goal 1, OT)  by discharge  -SD        Strategies/Barriers (Transfers Goal 1, OT)  cognitive  status  -SD        Progress/Outcome (Transfer Goal 1, OT)  goal ongoing  -SD           ROM Goal 1 (OT)    ROM Goal 1 (OT)  Pt to perform BUE HEP with verbal/tactile cues to improve functional strength/activity tolerance for basic adl's/transfer activity.  -SD        Time Frame (ROM Goal 1, OT)  by discharge  -SD        Progress/Outcome (ROM Goal 1, OT)  goal ongoing  -SD           Problem Specific Goal 1 (OT)    Problem Specific Goal 1 (OT)  Pt to maintain static standing balance with min assist using a walker for support to allow for caregiver assist with adl activity.  -SD        Time Frame (Problem Specific Goal 1, OT)  by discharge  -SD        Progress/Outcome (Problem Specific Goal 1, OT)  goal ongoing;good progress toward goal  -SD           Positioning and Restraints    Pre-Treatment Position  in bed  -SD        Post Treatment Position  bed  -SD        In Bed  supine;call light within reach;encouraged to call for assist;exit alarm on;heels elevated  -SD           Progress Summary (OT)    Progress Toward Functional Goals (OT)  progress toward functional goals is gradual  -SD        Daily Progress Summary (OT)  continue with plan  -SD           Therapy Plan Review/Discharge Plan (OT)    Anticipated Discharge Disposition (OT)  skilled nursing facility;extended care facility  -SD          User Key  (r) = Recorded By, (t) = Taken By, (c) = Cosigned By    Initials Name Effective Dates    Wade Wilkerson, OTR 06/16/21 -     Radha Minor, RN 09/27/16 -            Occupational Therapy Education                 Title: PT OT SLP Therapies (In Progress)     Topic: Occupational Therapy (In Progress)     Point: ADL training (In Progress)     Description:   Instruct learner(s) on proper safety adaptation and remediation techniques during self care or transfers.   Instruct in proper use of assistive devices.              Learning Progress Summary           Patient Acceptance, E,TB,D, NL by SD at 9/13/2021  1518    Comment: Education regarding OT services, benefits of activity and safety with bed mobility and transfers. Pt is confused. No evidence of learning/carryover of education from day to day.    Acceptance, E,TB, VU,NL by LEWIS at 9/9/2021 1408    Comment: pt educated on safety with transfers, bed mobility and balance    Acceptance, E,TB, VU,NR by LEWIS at 9/8/2021 1300    Comment: pt educated on safety with functional transfers and balance    Acceptance, E,TB, VU,NL by LEWIS at 9/6/2021 1211    Comment: pt educated on bed mobility and benefits of activity    Acceptance, E, NL by SD at 9/3/2021 1348    Comment: Education regarding OT services, benefits of activity and safety wtih bed mobility/transfers. Pt is confused (no evidence of learning).                   Point: Home exercise program (Not Started)     Description:   Instruct learner(s) on appropriate technique for monitoring, assisting and/or progressing therapeutic exercises/activities.              Learner Progress:  Not documented in this visit.                      User Key     Initials Effective Dates Name Provider Type Discipline    SD 06/16/21 -  Wade Pastrana, OTR Occupational Therapist OT     06/16/21 -  Luisana Peterson OTR Occupational Therapist OT                  OT Recommendation and Plan  Planned Therapy Interventions (OT): transfer/mobility retraining, strengthening exercise, activity tolerance training, BADL retraining  Therapy Frequency (OT): 3 times/wk  Progress Toward Functional Goals (OT): progress toward functional goals is gradual  Plan of Care Review  Plan of Care Reviewed With: patient  Outcome Summary: OT: Pt pleasantly confused. He followed simple one step commands approx 75% of time with verbal/tactile cues. Pt demonstrated improvement with sit to stand transfers, yet he has motor planning deficits which impacts his safety with mobility/transfers. Pt needs significant assistance for basic self care activity due to his  cognitive status. Anticipate transfer to SNF with transition to LTC.        Outcome Measures     Row Name 09/13/21 1337 09/13/21 1319          How much help from another person do you currently need...    Turning from your back to your side while in flat bed without using bedrails?  --  1  -JW     Moving from lying on back to sitting on the side of a flat bed without bedrails?  --  1  -JW     Moving to and from a bed to a chair (including a wheelchair)?  --  1  -JW     Standing up from a chair using your arms (e.g., wheelchair, bedside chair)?  --  2  -JW     Climbing 3-5 steps with a railing?  --  1  -JW     To walk in hospital room?  --  1  -JW     AM-PAC 6 Clicks Score (PT)  --  7  -JW        How much help from another is currently needed...    Putting on and taking off regular lower body clothing?  1  -SD  --     Bathing (including washing, rinsing, and drying)  1  -SD  --     Toileting (which includes using toilet bed pan or urinal)  1  -SD  --     Putting on and taking off regular upper body clothing  1  -SD  --     Taking care of personal grooming (such as brushing teeth)  2  -SD  --     Eating meals  2  -SD  --     AM-PAC 6 Clicks Score (OT)  8  -SD  --        Functional Assessment    Outcome Measure Options  AM-PAC 6 Clicks Daily Activity (OT)  -SD  AM-[facility] 6 Clicks Basic Mobility (PT)  -       User Key  (r) = Recorded By, (t) = Taken By, (c) = Cosigned By    Initials Name Provider Type    Wade Wilkerson OTR Occupational Therapist    Patricia Gonzalez, PT Physical Therapist          Time Calculation:   Time Calculation- OT     Row Name 09/13/21 1517             Time Calculation- OT    OT Start Time  1319  -SD         Timed Charges    26576 - OT Therapeutic Activity Minutes  18  -SD         Total Minutes    Timed Charges Total Minutes  18  -SD       Total Minutes  18  -SD        User Key  (r) = Recorded By, (t) = Taken By, (c) = Cosigned By    Initials Name Provider Type    Wade Wilkerson OTR  Occupational Therapist        Therapy Charges for Today     Code Description Service Date Service Provider Modifiers Qty    [id number]  OT THERAPEUTIC ACT EA 15 MIN 9/13/2021 Wade Pastrana, OTR GO 1               DENIA Lui  9/13/2021

## 2021-09-13 NOTE — PLAN OF CARE
Goal Outcome Evaluation:  Plan of Care Reviewed With: patient           Outcome Summary: OT: Pt pleasantly confused. He followed simple one step commands approx 75% of time with verbal/tactile cues. Pt demonstrated improvement with sit to stand transfers, yet he has motor planning deficits which impacts his safety with mobility/transfers. Pt needs significant assistance for basic self care activity due to his cognitive status. Anticipate transfer to SNF with transition to LTC.

## 2021-09-13 NOTE — PROGRESS NOTES
Adult Nutrition  Assessment/PES    Patient Name:  Nash So  YOB: 1945  MRN: [id number]  Admit Date:  9/2/2021    Assessment Date:  9/13/2021    Comments:  Will cont to follow with results as available.     Reason for Assessment     Row Name 09/13/21 1516          Reason for Assessment    Reason For Assessment  calorie count order             Diet: Soft Chopped, Boost Glucose Control with meals  Estimated needs: 1833 kcal, 87 gm pro    Day 1: no data from last night or today at this time  *enevelope placed by Rn today for data collection                  Electronically signed by:  Nan García RD  09/13/21 15:18 EDT

## 2021-09-13 NOTE — PROGRESS NOTES
"Daily Progress Note:      Chief complaint: Follow-up of COVID-19 infection, metabolic encephalopathy, rhabdomyolysis, hyperkalemia, diabetes, hypertension, Parkinson's disease    Subjective: No overnight events noted.  Intermittent periods of confusion noted.  Oral intake has been poor but slowly improving     LOS: 11 days     Vital Signs  Temp:  [96.4 °F (35.8 °C)-98.3 °F (36.8 °C)] 98.3 °F (36.8 °C)  Heart Rate:  [55-65] 55  Resp:  [16-18] 16  BP: (122-158)/(60-70) 158/62  Oxygen Therapy  SpO2: 93 %  Pulse Oximetry Type: Continuous  Device (Oxygen Therapy): room air  $ High Flow Nasal Cannula Set-Up: yes  Flow (L/min): 40  Oxygen Concentration (%): 30  ETCO2 (mmHg): 27 mmHg  Probe Placed On (Pulse Ox): Left:, finger}  Body mass index is 33.17 kg/m².  Flowsheet Rows      First Filed Value   Admission Height  182.9 cm (72\") Documented at 09/02/2021 1411   Admission Weight  115 kg (254 lb 4.8 oz) Documented at 09/02/2021 1502                   Documented weights    09/02/21 1502 09/02/21 2241 09/06/21 1208 09/07/21 0514   Weight: 115 kg (254 lb 4.8 oz) 108 kg (238 lb 12.8 oz) 107 kg (236 lb 9.6 oz) 109 kg (240 lb 1.6 oz)    09/08/21 0700 09/09/21 0600 09/11/21 0804 09/12/21 0757   Weight: 104 kg (228 lb 8 oz) 105 kg (231 lb 1.6 oz) 105 kg (231 lb 14.4 oz) 105 kg (231 lb 12.8 oz)    09/13/21 0600   Weight: 105 kg (231 lb 3.2 oz)           Patient Vitals for the past 24 hrs:   BP Temp Temp src Pulse Resp SpO2 Weight   09/13/21 0600 -- -- -- -- -- -- 105 kg (231 lb 3.2 oz)   09/13/21 0500 158/62 98.3 °F (36.8 °C) Axillary 55 16 93 % --   09/12/21 2037 122/70 96.8 °F (36 °C) Axillary 65 18 92 % --   09/12/21 1552 151/61 96.4 °F (35.8 °C) Axillary 57 18 97 % --   09/12/21 1430 -- -- -- -- -- 92 % --   09/12/21 0937 135/60 97.5 °F (36.4 °C) Axillary 60 18 -- --   09/12/21 0757 -- -- -- -- -- -- 105 kg (231 lb 12.8 oz)       105 kg (231 lb 3.2 oz)    Intake/Output                       09/11/21 0701 - 09/12/21 0700 09/12/21 " 0701 - 09/13/21 0700     [id number] 2813-0669 Total 7101-4901 9910-2047 Total                 Intake    P.O.  420  -- 420  780  -- 780    Total Intake 420 -- 420 780 -- 780       Output    Urine  400  400 800  200  -- 200    Total Output 400 400 800 200 -- 200           Intake/Output Summary (Last 24 hours) at 9/13/2021 0727  Last data filed at 9/12/2021 1740  Gross per 24 hour   Intake 780 ml   Output 200 ml   Net 580 ml        Intake/Output Summary (Last 24 hours) at 9/13/2021 0727  Last data filed at 9/12/2021 1740  Gross per 24 hour   Intake 780 ml   Output 200 ml   Net 580 ml        Review of Systems   Constitutional: Positive for appetite change and fatigue. Negative for activity change.   HENT: Negative for congestion.    Respiratory: Negative for cough, chest tightness, shortness of breath and wheezing.    Cardiovascular: Negative for chest pain.   Gastrointestinal: Negative for abdominal distention, abdominal pain, diarrhea, nausea and vomiting.   Endocrine: Negative for polyphagia and polyuria.   Genitourinary: Negative for frequency.   Skin: Negative for rash.   Neurological: Negative for light-headedness.   Psychiatric/Behavioral: Positive for confusion (Improved). Negative for agitation and behavioral problems.       Physical Exam  Vitals and nursing note reviewed.   Constitutional:       General: He is not in acute distress.     Appearance: He is well-developed. He is morbidly obese.   HENT:      Head: Normocephalic.      Right Ear: Decreased hearing noted.      Left Ear: Decreased hearing noted.   Eyes:      Conjunctiva/sclera: Conjunctivae normal.   Neck:      Thyroid: No thyromegaly.      Vascular: No JVD.   Cardiovascular:      Rate and Rhythm: Normal rate and regular rhythm.      Heart sounds: Normal heart sounds. No murmur heard.     Pulmonary:      Effort: Pulmonary effort is normal. No tachypnea, accessory muscle usage or respiratory distress.      Breath sounds: Examination of the right-lower  field reveals decreased breath sounds. Examination of the left-lower field reveals decreased breath sounds. Decreased breath sounds present. No wheezing or rales.   Abdominal:      General: Bowel sounds are normal. There is no distension.      Palpations: Abdomen is soft.      Tenderness: There is no abdominal tenderness. There is no guarding.   Musculoskeletal:      Right lower leg: No edema.      Left lower leg: No edema.   Skin:     General: Skin is warm and dry.      Findings: No rash.   Neurological:      General: No focal deficit present.      Mental Status: He is alert and oriented to person, place, and time. He is confused.      Cranial Nerves: No cranial nerve deficit or facial asymmetry.      Motor: Motor function is intact.      Comments: Oriented person place and situation follows commands cooperative   Psychiatric:         Cognition and Memory: Cognition is impaired. Memory is impaired. He exhibits impaired remote memory.         Medication Review:   I have reviewed the patient's current medication list  Scheduled Meds:aspirin, 81 mg, Oral, Daily  atorvastatin, 40 mg, Oral, Daily  carbidopa-levodopa, 1.5 tablet, Oral, TID  dexamethasone, 6 mg, Oral, Daily With Breakfast  enoxaparin, 40 mg, Subcutaneous, Q24H  glipizide, 5 mg, Oral, QAM AC  guaiFENesin, 1,200 mg, Oral, BID  haloperidol lactate, 1 mg, Intravenous, Once  insulin aspart, 0-24 Units, Subcutaneous, TID AC  lisinopril, 20 mg, Oral, Q12H  megestrol, 400 mg, Oral, Daily  nebivolol, 10 mg, Oral, Daily  OLANZapine, 2.5 mg, Oral, Nightly  pantoprazole, 40 mg, Oral, Daily  potassium chloride, 20 mEq, Oral, Daily  potassium chloride, 40 mEq, Oral, Once  senna-docusate sodium, 2 tablet, Oral, BID      Continuous Infusions:   PRN Meds:.•  acetaminophen  •  albuterol sulfate HFA  •  senna-docusate sodium **AND** polyethylene glycol **AND** bisacodyl **AND** bisacodyl  •  haloperidol lactate  •  hydrALAZINE  •  ondansetron **OR** ondansetron  •  sodium  chloride      Labs:  Results from last 7 days   Lab Units 09/13/21  0343 09/12/21  0844 09/11/21  0719 09/10/21  0740 09/10/21  0740 09/09/21  0504 09/09/21  0504 09/08/21  0441 09/08/21  0441 09/07/21  0334 09/07/21  0334   WBC 10*3/mm3 9.82 9.78 8.77  --  9.12  --  9.88  --  7.39  --  8.09   HEMOGLOBIN g/dL 12.5* 13.1 13.5   < > 13.5   < > 13.8   < > 13.3   < > 13.4   HEMATOCRIT % 38.6 38.7 41.3  --  40.5  --  41.2  --  41.3  --  40.4   PLATELETS 10*3/mm3 272 271 294  --  305  --  313  --  241  --  308    < > = values in this interval not displayed.     Results from last 7 days   Lab Units 09/13/21  0343 09/12/21  0845 09/11/21  0719 09/10/21  0740 09/09/21  0504 09/08/21  0441 09/07/21  0334   SODIUM mmol/L 145 142 146* 142 146* 142 143   POTASSIUM mmol/L 3.2* 3.5 3.2* 3.3* 2.8* 3.6 3.5   CHLORIDE mmol/L 111* 108* 108* 106 107 106 106   CO2 mmol/L 25.4 22.9 22.7 26.5 30.2* 23.5 26.8   BUN mg/dL 17 18 19 21 22 18 19   CREATININE mg/dL 0.49* 0.49* 0.57* 0.61* 0.62* 0.56* 0.54*   CALCIUM mg/dL 8.8 8.6 8.5* 8.3* 8.5* 8.1* 8.5*   BILIRUBIN mg/dL  --   --   --   --  0.8 0.8 0.9   ALK PHOS U/L  --   --   --   --  80 67 72   ALT (SGPT) U/L  --   --   --   --  9 14 6   AST (SGOT) U/L  --   --   --   --  26 32 33   GLUCOSE mg/dL 153* 217* 155* 159* 120* 156* 233*     Results from last 7 days   Lab Units 09/11/21  0719   MAGNESIUM mg/dL 1.7       Results from last 7 days   Lab Units 09/13/21  0343 09/12/21  0844 09/11/21  0719 09/10/21  0740 09/09/21  0504 09/08/21  0441 09/08/21  0441 09/07/21  0334 09/07/21 0334   LDH U/L  --   --   --   --  379*  --   --   --  425*   AST (SGOT) U/L  --   --   --   --  26  --  32  --  33   ALT (SGPT) U/L  --   --   --   --  9  --  14  --  6   FERRITIN ng/mL  --   --   --   --  803.00*  --  940.00*  --  1,398.00*   D DIMER QUANT MCGFEU/mL  --   --   --   --  1.34*  --   --   --  0.76*   CRP mg/dL  --   --   --   --   --   --   --   --  3.73*   PLATELETS 10*3/mm3 272 271 294   < > 313   <  > 241   < > 308    < > = values in this interval not displayed.         Lab Results (last 24 hours)     Procedure Component Value Units Date/Time    POC Glucose Once [[id number]]  (Abnormal) Collected: 09/13/21 0632    Specimen: Blood Updated: 09/13/21 0639     Glucose 151 mg/dL      Comment: Meter: [id number] : [id number] Titi Gonzalez RN (Validator)       Basic Metabolic Panel [[id number]]  (Abnormal) Collected: 09/13/21 0343    Specimen: Blood Updated: 09/13/21 0508     Glucose 153 mg/dL      BUN 17 mg/dL      Creatinine 0.49 mg/dL      Sodium 145 mmol/L      Potassium 3.2 mmol/L      Chloride 111 mmol/L      CO2 25.4 mmol/L      Calcium 8.8 mg/dL      eGFR Non African Amer >150 mL/min/1.73      BUN/Creatinine Ratio 34.7     Anion Gap 8.6 mmol/L     Narrative:      GFR Normal >60  Chronic Kidney Disease <60  Kidney Failure <15      CBC (No Diff) [[id number]]  (Abnormal) Collected: 09/13/21 0343    Specimen: Blood Updated: 09/13/21 0427     WBC 9.82 10*3/mm3      RBC 4.19 10*6/mm3      Hemoglobin 12.5 g/dL      Hematocrit 38.6 %      MCV 92.1 fL      MCH 29.8 pg      MCHC 32.4 g/dL      RDW 14.6 %      RDW-SD 49.1 fl      MPV 10.2 fL      Platelets 272 10*3/mm3     POC Glucose Once [[id number]]  (Abnormal) Collected: 09/12/21 2129    Specimen: Blood Updated: 09/12/21 2140     Glucose 217 mg/dL      Comment: NURSE NOTIFIED Meter: [id number] : [id number] Titi Gonzalez RN (Validator)       POC Glucose Once [[id number]]  (Abnormal) Collected: 09/12/21 1714    Specimen: Blood Updated: 09/12/21 1720     Glucose 200 mg/dL      Comment: Meter: [id number] : [id number] Sebastian Pitts RN       POC Glucose Once [[id number]]  (Abnormal) Collected: 09/12/21 1202    Specimen: Blood Updated: 09/12/21 1208     Glucose 214 mg/dL      Comment: Meter: [id number] : [id number] Padmini Wolff NURSING ASSISTANT           Results from last 7 days   Lab Units 09/09/21  0504 09/07/21  0334   D DIMER QUANT MCGFEU/mL 1.34* 0.76*                  Results from last 7 days   Lab Units 09/11/21  0719   MAGNESIUM mg/dL 1.7                 Glucose   Date/Time Value Ref Range Status   09/13/2021 0632 151 (H) 70 - 130 mg/dL Final     Comment:     Meter: [id number] : [id number] Titi Gonzalez RN (Validator)   09/12/2021 2129 217 (H) 70 - 130 mg/dL Final     Comment:     NURSE NOTIFIED Meter: [id number] : [id number] Titi Gonzalez RN (Validator)   09/12/2021 1714 200 (H) 70 - 130 mg/dL Final     Comment:     Meter: [id number] : [id number] Sebastian Pitts RN   09/12/2021 1202 214 (H) 70 - 130 mg/dL Final     Comment:     Meter: [id number] : [id number] Padmini Wolff NURSING ASSISTANT   09/12/2021 0755 172 (H) 70 - 130 mg/dL Final     Comment:     Meter: [id number] : [id number] Sebastian Pitts RN   09/11/2021 2004 252 (H) 70 - 130 mg/dL Final     Comment:     Meter: [id number] : [id number] Klever Main PCA   09/11/2021 1659 199 (H) 70 - 130 mg/dL Final     Comment:     Meter: [id number] : [id number] Padmini Wolff NURSING ASSISTANT   09/11/2021 1114 194 (H) 70 - 130 mg/dL Final     Comment:     Meter: [id number] : [id number] Padmini Wolff NURSING ASSISTANT             Results from last 7 days   Lab Units 09/10/21  1138   NITRITE UA  Negative   WBC UA /HPF None Seen   BACTERIA UA /HPF None Seen   SQUAM EPITHEL UA /HPF None Seen             Radiology:  Imaging Results (Last 24 Hours)     ** No results found for the last 24 hours. **          Cardiology:  ECG/EMG Results (last 24 hours)     Procedure Component Value Units Date/Time    ECG 12 Lead [[id number]] Collected: 09/02/21 1348     Updated: 09/02/21 1352     QT Interval 467 ms     Narrative:      HEART RATE= 68  bpm  RR Interval= 880  ms  ME Interval= 211  ms  P Horizontal Axis= 8  deg  P Front Axis= 52  deg  QRSD Interval= 130  ms  QT Interval= 467  ms  QRS Axis= -14  deg  T Wave Axis= -6  deg  - ABNORMAL ECG -  Sinus rhythm  Nonspecific intraventricular conduction delay  Lateral  infarct, age indeterminate  Electronically Signed By:   Date and Time of Study: 2021-09-02 13:48:28          I have reviewed recent labs results and consult notes.  Parts of this note may have been copied and pasted but patient was examined and interviewed by me today    Assessment and Plan:    1.  COVID-19 pneumonia generalized weakness . Stable.    2.  Metabolic encephalopathy likely due to COVID-19 improving    3.    Adult-onset diabetes mellitus continues to be hyperglycemic but improving continue with sliding scale insulin optimize glycemic control poor oral intake continue sliding scale insulin    4.  Hypertension controlled continue present medications    5.    Hypokalemia persistent continue potassium replacement    6.  Parkinson's disease nothing acute continue with home medications     7.    COPD no evidence of exacerbation continue with as needed albuterol    8.  DVT prophylaxis Lovenox    9.  Generalized weakness secondary to COVID-19 pneumonia comorbidities continue to encourage participation with therapies    10.  Suboptimal p.o. intake.  Will liberalize diet Megace added oral intake somewhat improved but still not optimal      Much of this encounter note is an electronic transcription/translation of spoken language to printed text using Dragon Software

## 2021-09-13 NOTE — PLAN OF CARE
Goal Outcome Evaluation:  Plan of Care Reviewed With: patient        Progress: no change  Outcome Summary: VSS - room air - confused - bed alarm and fall mats - turn and reposition - rested well through night

## 2021-09-13 NOTE — CASE MANAGEMENT/SOCIAL WORK
Continued Stay Note  MIA Beyer     Patient Name: Nash So  MRN: [id number]  Today's Date: 9/13/2021    Admit Date: 9/2/2021    Discharge Plan     Row Name 09/13/21 1552       Plan    Plan  Discharge to [facility]    Plan Comments  I received notification that the patient has been accepted to both [facility] on Thursday or [facility] on Wednesday.  I spoke with the patients wife, wearing appropriate PPE per protocol, and advised her of same.  She stated that [facility] is her preference.  CM will continue to follow.        Discharge Codes    No documentation.             Montserrat Harris RN

## 2021-09-13 NOTE — PLAN OF CARE
Goal Outcome Evaluation:              Outcome Summary: PT: Patient performs supine to sit with mod assist x2, able to perform sit to stand with min assist from elevated bed surface.  Patient able to take side steps along EOB with min assist and use of rolling walker.  Patient requires max assist x2 for sit to supine transfer.  Patient with improved cooperation and command following during session today, will continue to follow.  Anticipate SNF at discharge.

## 2021-09-13 NOTE — THERAPY TREATMENT NOTE
Acute Care - Physical Therapy Treatment Note   Tori Simmons     Patient Name: Nash So  : 1945  MRN: [id number]  Today's Date: 2021      Visit Dx:     ICD-10-CM ICD-9-CM   1. COVID-19 virus infection  U07.1 079.89   2. Weakness  R53.1 780.79   3. Elevated troponin  R77.8 790.6   4. Contusion of face, initial encounter  S00.83XA 920     Patient Active Problem List   Diagnosis   • Vitamin D deficiency   • Diabetes mellitus (CMS/HCC)   • Back pain   • Benign prostatic hyperplasia   • Gastroesophageal reflux disease with esophagitis   • Hyperlipidemia   • Hypertension   • Hypogonadism in male   • Sebaceous cyst   • COPD (chronic obstructive pulmonary disease) (CMS/HCC)   • Encounter for screening for malignant neoplasm of colon   • COVID-19 virus infection     Past Medical History:   Diagnosis Date   • Alzheimer disease (CMS/HCC)    • CHF (congestive heart failure) (CMS/HCC)    • COPD (chronic obstructive pulmonary disease) (CMS/HCC)    • Diabetes (CMS/HCC)    • GERD (gastroesophageal reflux disease)    • Hyperlipemia    • Hypertension      Past Surgical History:   Procedure Laterality Date   • ENDOSCOPY N/A 2017    Procedure: ESOPHAGOGASTRODUODENOSCOPY, biopsy;  Surgeon: Natalia Guerrero MD;  Location: [facility];  Service:         PT Assessment (last 12 hours)      PT Evaluation and Treatment     Row Name 21 1319          Physical Therapy Time and Intention    Subjective Information  no complaints  -JW     Document Type  therapy note (daily note)  -JW     Mode of Treatment  physical therapy  -JW     Patient Effort  good  -JW     Row Name 21 1319          General Information    Patient Observations  alert;cooperative;agree to therapy  -JW     Patient/Family/Caregiver Comments/Observations  pt supine, agrees to therapy  -JW     Existing Precautions/Restrictions  fall  -JW     Limitations/Impairments  safety/cognitive  -     Barriers to Rehab  cognitive status  -JW     Row Name  09/13/21 1319          Cognition    Affect/Mental Status (Cognitive)  confused  -     Follows Commands (Cognition)  follows one-step commands;75-90% accuracy  -     Personal Safety Interventions  nonskid shoes/slippers when out of bed;gait belt  -     Row Name 09/13/21 1319          Pain Scale: Word Pre/Post-Treatment    Pre/Posttreatment Pain Comment  no c/o pain  -     Row Name 09/13/21 1319          Bed Mobility    Bed Mobility  supine-sit;sit-supine  -     Supine-Sit [place] (Bed Mobility)  moderate assist (50% patient effort);2 person assist;verbal cues  -     Sit-Supine [place] (Bed Mobility)  maximum assist (25% patient effort);2 person assist;verbal cues  -     Bed Mobility, Safety Issues  cognitive deficits limit understanding  -     Assistive Device (Bed Mobility)  bed rails;draw sheet;head of bed elevated  -     Comment (Bed Mobility)  pt requires verbal cues for sequencing  -     Row Name 09/13/21 1319          Transfers    Transfers  sit-stand transfer;stand-sit transfer  -     Sit-Stand [place] (Transfers)  minimum assist (75% patient effort);verbal cues  -     Stand-Sit [place] (Transfers)  minimum assist (75% patient effort);verbal cues  -     Row Name 09/13/21 1319          Sit-Stand Transfer    Assistive Device (Sit-Stand Transfers)  walker, front-wheeled  -     Row Name 09/13/21 1319          Stand-Sit Transfer    Assistive Device (Stand-Sit Transfers)  walker, front-wheeled  -     Row Name 09/13/21 1319          Gait/Stairs (Locomotion)    Comment (Gait/Stairs)  pt able to take side steps along EOB with walker with min assist and verbal cues  -     Row Name             Wound 09/02/21 1452 Left elbow Skin Tear    Wound - Properties Group Placement Date: 09/02/21  -EF Placement Time: 1452  -EF Present on Hospital Admission: Y  -EF Side: Left  -EF Location: elbow  -EF Primary Wound Type: Skin tear  -EF    Retired Wound - Properties Group Date first  assessed: 09/02/21  -EF Time first assessed: 1452  -EF Present on Hospital Admission: Y  -EF Side: Left  -EF Location: elbow  -EF Primary Wound Type: Skin tear  -EF    Row Name             Wound 09/02/21 1453 Right elbow Skin Tear    Wound - Properties Group Placement Date: 09/02/21  -EF Placement Time: 1453  -EF Present on Hospital Admission: Y  -EF Side: Right  -EF Location: elbow  -EF Primary Wound Type: Skin tear  -EF    Retired Wound - Properties Group Date first assessed: 09/02/21  -EF Time first assessed: 1453  -EF Present on Hospital Admission: Y  -EF Side: Right  -EF Location: elbow  -EF Primary Wound Type: Skin tear  -EF    Row Name 09/13/21 1319          Plan of Care Review    Outcome Summary  PT: Patient performs supine to sit with mod assist x2, able to perform sit to stand with min assist from elevated bed surface.  Patient able to take side steps along EOB with min assist and use of rolling walker.  Patient requires max assist x2 for sit to supine transfer.  Patient with improved cooperation and command following during session today, will continue to follow.  Anticipate SNF at discharge.  -     Row Name 09/13/21 1319          Positioning and Restraints    Pre-Treatment Position  in bed  -JW     Post Treatment Position  bed  -JW     In Bed  supine;call light within reach;encouraged to call for assist;exit alarm on  -     Row Name 09/13/21 1319          Progress Summary (PT)    Progress Toward Functional Goals (PT)  progress toward functional goals is fair  -JW     Barriers to Overall Progress (PT)  cognition  -JW       User Key  (r) = Recorded By, (t) = Taken By, (c) = Cosigned By    Initials Name Provider Type    Patricia Gonzalez, PT Physical Therapist    Radha Minor, RN Registered Nurse        Physical Therapy Education                 Title: PT OT SLP Therapies (In Progress)     Topic: Physical Therapy (In Progress)     Point: Mobility training (In Progress)     Learning  Progress Summary           Patient Acceptance, E,TB, NR by  at 9/13/2021 1425    Acceptance, E,TB, NR by  at 9/9/2021 1243    Acceptance, E,TB, NR by BP at 9/8/2021 1225    Acceptance, E,TB, VU,NL by JV at 9/6/2021 1117    Acceptance, E,TB, NR by BP at 9/3/2021 1139                   Point: Home exercise program (Not Started)     Learner Progress:  Not documented in this visit.                      User Key     Initials Effective Dates Name Provider Type Discipline     06/16/21 -  Catrina Graham, PT Physical Therapist PT    BP 06/16/21 -  Geovanna Stack-Mario, PT Physical Therapist PT    JW 06/16/21 -  Patricia Dodson, PT Physical Therapist PT    JV 06/16/21 -  Maria Luisa Rdz, PT Physical Therapist PT              PT Recommendation and Plan  Anticipated Discharge Disposition (PT): skilled nursing facility, extended care facility  Progress Summary (PT)  Progress Toward Functional Goals (PT): progress toward functional goals is fair  Barriers to Overall Progress (PT): cognition  Outcome Summary: PT: Patient performs supine to sit with mod assist x2, able to perform sit to stand with min assist from elevated bed surface.  Patient able to take side steps along EOB with min assist and use of rolling walker.  Patient requires max assist x2 for sit to supine transfer.  Patient with improved cooperation and command following during session today, will continue to follow.  Anticipate SNF at discharge.  Outcome Measures     Row Name 09/13/21 1319             How much help from another person do you currently need...    Turning from your back to your side while in flat bed without using bedrails?  1  -JW      Moving from lying on back to sitting on the side of a flat bed without bedrails?  1  -JW      Moving to and from a bed to a chair (including a wheelchair)?  1  -JW      Standing up from a chair using your arms (e.g., wheelchair, bedside chair)?  2  -JW      Climbing 3-5 steps with a railing?  1  -JW      To walk in  hospital room?  1  -JW      AM-PAC 6 Clicks Score (PT)  7  -JW         Functional Assessment    Outcome Measure Options  AM-PAC 6 Clicks Basic Mobility (PT)  -JW        User Key  (r) = Recorded By, (t) = Taken By, (c) = Cosigned By    Initials Name Provider Type    Patricia Gonzalez PT Physical Therapist           Time Calculation:   PT Charges     Row Name 09/13/21 1427             Time Calculation    Start Time  1319  -JW      Stop Time  1337  -      Time Calculation (min)  18 min  -JW      PT Received On  09/13/21  -JW      PT - Next Appointment  09/14/21  -         Timed Charges    60784 - PT Therapeutic Exercise Minutes  18  -         Total Minutes    Timed Charges Total Minutes  18  -JW       Total Minutes  18  -JW        User Key  (r) = Recorded By, (t) = Taken By, (c) = Cosigned By    Initials Name Provider Type    Patricia Gonzalez, LESIA Physical Therapist        Therapy Charges for Today     Code Description Service Date Service Provider Modifiers Qty    51169440645 HC PT THER PROC EA 15 MIN 9/13/2021 Patricia Dodson, PT GP 1          PT G-Codes  Outcome Measure Options: AM-PAC 6 Clicks Basic Mobility (PT)  AM-PAC 6 Clicks Score (PT): 7  AM-PAC 6 Clicks Score (OT): 6    Patricia Dodson PT  9/13/2021

## 2021-09-13 NOTE — PLAN OF CARE
Goal Outcome Evaluation:  Plan of Care Reviewed With: patient        Progress: no change  Outcome Summary: Patietn up to chair via lift today. Bed and chair alarm on. Maintaining sats on room air. Calorie count initiated. Refusing to eat more than a few bites. No complaints of pain, nausea, or shortness of breath.

## 2021-09-14 LAB
ANION GAP SERPL CALCULATED.3IONS-SCNC: 8.7 MMOL/L (ref 5–15)
BUN SERPL-MCNC: 17 MG/DL (ref 8–23)
BUN/CREAT SERPL: 37 (ref 7–25)
CALCIUM SPEC-SCNC: 8.3 MG/DL (ref 8.6–10.5)
CHLORIDE SERPL-SCNC: 109 MMOL/L (ref 98–107)
CO2 SERPL-SCNC: 25.3 MMOL/L (ref 22–29)
CREAT SERPL-MCNC: 0.46 MG/DL (ref 0.76–1.27)
DEPRECATED RDW RBC AUTO: 48.9 FL (ref 37–54)
ERYTHROCYTE [DISTWIDTH] IN BLOOD BY AUTOMATED COUNT: 14.7 % (ref 12.3–15.4)
GFR SERPL CREATININE-BSD FRML MDRD: >150 ML/MIN/1.73
GLUCOSE BLDC GLUCOMTR-MCNC: 126 MG/DL (ref 70–130)
GLUCOSE BLDC GLUCOMTR-MCNC: 185 MG/DL (ref 70–130)
GLUCOSE BLDC GLUCOMTR-MCNC: 185 MG/DL (ref 70–130)
GLUCOSE BLDC GLUCOMTR-MCNC: 198 MG/DL (ref 70–130)
GLUCOSE SERPL-MCNC: 126 MG/DL (ref 65–99)
HCT VFR BLD AUTO: 37.5 % (ref 37.5–51)
HGB BLD-MCNC: 12.4 G/DL (ref 13–17.7)
MCH RBC QN AUTO: 30.7 PG (ref 26.6–33)
MCHC RBC AUTO-ENTMCNC: 33.1 G/DL (ref 31.5–35.7)
MCV RBC AUTO: 92.8 FL (ref 79–97)
PLATELET # BLD AUTO: 243 10*3/MM3 (ref 140–450)
PMV BLD AUTO: 10.5 FL (ref 6–12)
POTASSIUM SERPL-SCNC: 3.7 MMOL/L (ref 3.5–5.2)
RBC # BLD AUTO: 4.04 10*6/MM3 (ref 4.14–5.8)
SODIUM SERPL-SCNC: 143 MMOL/L (ref 136–145)
WBC # BLD AUTO: 8.75 10*3/MM3 (ref 3.4–10.8)

## 2021-09-14 PROCEDURE — 25010000002 ENOXAPARIN PER 10 MG: Performed by: FAMILY MEDICINE

## 2021-09-14 PROCEDURE — 80048 BASIC METABOLIC PNL TOTAL CA: CPT | Performed by: FAMILY MEDICINE

## 2021-09-14 PROCEDURE — 63710000001 DEXAMETHASONE PER 0.25 MG: Performed by: FAMILY MEDICINE

## 2021-09-14 PROCEDURE — 85027 COMPLETE CBC AUTOMATED: CPT | Performed by: FAMILY MEDICINE

## 2021-09-14 PROCEDURE — 94799 UNLISTED PULMONARY SVC/PX: CPT

## 2021-09-14 PROCEDURE — 97110 THERAPEUTIC EXERCISES: CPT

## 2021-09-14 PROCEDURE — 63710000001 INSULIN ASPART PER 5 UNITS: Performed by: FAMILY MEDICINE

## 2021-09-14 PROCEDURE — 97530 THERAPEUTIC ACTIVITIES: CPT

## 2021-09-14 PROCEDURE — 82962 GLUCOSE BLOOD TEST: CPT

## 2021-09-14 RX ADMIN — GUAIFENESIN 1200 MG: 600 TABLET, EXTENDED RELEASE ORAL at 09:08

## 2021-09-14 RX ADMIN — ASPIRIN 81 MG: 81 TABLET, COATED ORAL at 09:09

## 2021-09-14 RX ADMIN — INSULIN ASPART 4 UNITS: 100 INJECTION, SOLUTION INTRAVENOUS; SUBCUTANEOUS at 12:37

## 2021-09-14 RX ADMIN — SODIUM CHLORIDE, PRESERVATIVE FREE 10 ML: 5 INJECTION INTRAVENOUS at 09:10

## 2021-09-14 RX ADMIN — SENNOSIDES AND DOCUSATE SODIUM 2 TABLET: 50; 8.6 TABLET ORAL at 22:12

## 2021-09-14 RX ADMIN — SENNOSIDES AND DOCUSATE SODIUM 2 TABLET: 50; 8.6 TABLET ORAL at 09:09

## 2021-09-14 RX ADMIN — CARBIDOPA AND LEVODOPA 1.5 TABLET: 25; 100 TABLET ORAL at 16:13

## 2021-09-14 RX ADMIN — OLANZAPINE 2.5 MG: 5 TABLET, FILM COATED ORAL at 22:12

## 2021-09-14 RX ADMIN — CARBIDOPA AND LEVODOPA 1.5 TABLET: 25; 100 TABLET ORAL at 22:11

## 2021-09-14 RX ADMIN — GLIPIZIDE 5 MG: 5 TABLET ORAL at 09:08

## 2021-09-14 RX ADMIN — PIOGLITAZONE: 30 TABLET ORAL at 17:34

## 2021-09-14 RX ADMIN — ATORVASTATIN CALCIUM 40 MG: 40 TABLET, FILM COATED ORAL at 09:08

## 2021-09-14 RX ADMIN — PANTOPRAZOLE SODIUM 40 MG: 40 TABLET, DELAYED RELEASE ORAL at 09:09

## 2021-09-14 RX ADMIN — CARBIDOPA AND LEVODOPA 1.5 TABLET: 25; 100 TABLET ORAL at 09:09

## 2021-09-14 RX ADMIN — LISINOPRIL 20 MG: 20 TABLET ORAL at 09:09

## 2021-09-14 RX ADMIN — DEXAMETHASONE 6 MG: 4 TABLET ORAL at 09:09

## 2021-09-14 RX ADMIN — NEBIVOLOL HYDROCHLORIDE 10 MG: 5 TABLET ORAL at 09:09

## 2021-09-14 RX ADMIN — ENOXAPARIN SODIUM 40 MG: 40 INJECTION SUBCUTANEOUS at 22:12

## 2021-09-14 RX ADMIN — INSULIN ASPART 4 UNITS: 100 INJECTION, SOLUTION INTRAVENOUS; SUBCUTANEOUS at 17:34

## 2021-09-14 RX ADMIN — GUAIFENESIN 1200 MG: 600 TABLET, EXTENDED RELEASE ORAL at 22:12

## 2021-09-14 RX ADMIN — POTASSIUM CHLORIDE 20 MEQ: 1500 TABLET, EXTENDED RELEASE ORAL at 09:09

## 2021-09-14 RX ADMIN — MEGESTROL ACETATE 400 MG: 40 SUSPENSION ORAL at 09:08

## 2021-09-14 RX ADMIN — LISINOPRIL 20 MG: 20 TABLET ORAL at 22:11

## 2021-09-14 RX ADMIN — PIOGLITAZONE: 30 TABLET ORAL at 09:09

## 2021-09-14 NOTE — PLAN OF CARE
Goal Outcome Evaluation:  Plan of Care Reviewed With: patient        Progress: no change  Outcome Summary: Patient up to chair for most of the day. Returned to bed using the donal lift. Continue calorie count. Encouraged to eat more and asked what his preferences are. Communicated with dietition about preferences. Will continue to encourage more oral intake. No complaints of pain, nausea, or shortnes of breath. Maintaining sats on room air.

## 2021-09-14 NOTE — PROGRESS NOTES
"Daily Progress Note:      Chief complaint: Follow-up of COVID-19 infection, metabolic encephalopathy, rhabdomyolysis, hyperkalemia, diabetes, hypertension, Parkinson's disease    Subjective: No overnight events noted.  Remains awake alert cooperative.  His oral intake is somewhat improving he is participating a bit more with therapies     LOS: 12 days     Vital Signs  Temp:  [97 °F (36.1 °C)-98.5 °F (36.9 °C)] 98.5 °F (36.9 °C)  Heart Rate:  [54-61] 54  Resp:  [18-20] 18  BP: (119-185)/(53-75) 155/73  Oxygen Therapy  SpO2: 93 %  Pulse Oximetry Type: Intermittent  Device (Oxygen Therapy): room air  $ High Flow Nasal Cannula Set-Up: yes  Flow (L/min): 40  Oxygen Concentration (%): 30  ETCO2 (mmHg): 27 mmHg  Probe Placed On (Pulse Ox): Left:, finger}  Body mass index is 33.37 kg/m².  Flowsheet Rows      First Filed Value   Admission Height  182.9 cm (72\") Documented at 09/02/2021 1411   Admission Weight  115 kg (254 lb 4.8 oz) Documented at 09/02/2021 1502                   Documented weights    09/02/21 1502 09/02/21 2241 09/06/21 1208 09/07/21 0514   Weight: 115 kg (254 lb 4.8 oz) 108 kg (238 lb 12.8 oz) 107 kg (236 lb 9.6 oz) 109 kg (240 lb 1.6 oz)    09/08/21 0700 09/09/21 0600 09/11/21 0804 09/12/21 0757   Weight: 104 kg (228 lb 8 oz) 105 kg (231 lb 1.6 oz) 105 kg (231 lb 14.4 oz) 105 kg (231 lb 12.8 oz)    09/13/21 0600 09/14/21 0632   Weight: 105 kg (231 lb 3.2 oz) 106 kg (232 lb 9.6 oz)           Patient Vitals for the past 24 hrs:   BP Temp Temp src Pulse Resp SpO2 Weight   09/14/21 0632 -- -- -- -- -- -- 106 kg (232 lb 9.6 oz)   09/14/21 0630 155/73 98.5 °F (36.9 °C) Oral 54 18 93 % --   09/13/21 2019 161/72 -- -- -- -- -- --   09/13/21 2014 (!) 185/75 97.7 °F (36.5 °C) Oral 61 20 93 % --   09/13/21 1502 146/66 97.4 °F (36.3 °C) Axillary 58 18 97 % --   09/13/21 1050 119/53 97 °F (36.1 °C) Axillary 60 18 95 % --       106 kg (232 lb 9.6 oz)    Intake/Output                       09/12/21 0701 - 09/13/21 0700 " 09/13/21 0701 - 09/14/21 0700     1280-0722 9802-1025 Total 3820-8347 0568-7397 Total                 Intake    P.O.  780  -- 780  360  -- 360    Total Intake 780 -- 780 360 -- 360       Output    Urine  200  -- 200  200  -- 200    Total Output 200 -- 200 200 -- 200           Intake/Output Summary (Last 24 hours) at 9/14/2021 0721  Last data filed at 9/13/2021 1700  Gross per 24 hour   Intake 360 ml   Output 200 ml   Net 160 ml        Intake/Output Summary (Last 24 hours) at 9/14/2021 0721  Last data filed at 9/13/2021 1700  Gross per 24 hour   Intake 360 ml   Output 200 ml   Net 160 ml        Review of Systems   Constitutional: Positive for appetite change and fatigue. Negative for activity change.   HENT: Negative for congestion.    Respiratory: Negative for cough, chest tightness, shortness of breath and wheezing.    Cardiovascular: Negative for chest pain.   Gastrointestinal: Negative for abdominal distention, abdominal pain, diarrhea, nausea and vomiting.   Endocrine: Negative for polyphagia and polyuria.   Genitourinary: Negative for frequency.   Skin: Negative for rash.   Neurological: Negative for light-headedness.   Psychiatric/Behavioral: Positive for confusion (Improved). Negative for agitation and behavioral problems.       Physical Exam  Vitals and nursing note reviewed.   Constitutional:       General: He is not in acute distress.     Appearance: He is well-developed. He is morbidly obese.   HENT:      Head: Normocephalic.      Right Ear: Decreased hearing noted.      Left Ear: Decreased hearing noted.   Eyes:      Conjunctiva/sclera: Conjunctivae normal.   Neck:      Thyroid: No thyromegaly.      Vascular: No JVD.   Cardiovascular:      Rate and Rhythm: Normal rate and regular rhythm.      Heart sounds: Normal heart sounds. No murmur heard.     Pulmonary:      Effort: Pulmonary effort is normal. No tachypnea, accessory muscle usage or respiratory distress.      Breath sounds: Examination of the  right-lower field reveals decreased breath sounds. Examination of the left-lower field reveals decreased breath sounds. Decreased breath sounds present. No wheezing or rales.   Abdominal:      General: Bowel sounds are normal. There is no distension.      Palpations: Abdomen is soft.      Tenderness: There is no abdominal tenderness. There is no guarding.   Musculoskeletal:      Right lower leg: No edema.      Left lower leg: No edema.   Skin:     General: Skin is warm and dry.      Findings: No rash.   Neurological:      General: No focal deficit present.      Mental Status: He is alert and oriented to person, place, and time. He is confused.      Cranial Nerves: No cranial nerve deficit or facial asymmetry.      Motor: Motor function is intact.      Comments: Oriented person place and situation follows commands cooperative   Psychiatric:         Cognition and Memory: Cognition is impaired. Memory is impaired. He exhibits impaired remote memory.         Medication Review:   I have reviewed the patient's current medication list  Scheduled Meds:aspirin, 81 mg, Oral, Daily  atorvastatin, 40 mg, Oral, Daily  carbidopa-levodopa, 1.5 tablet, Oral, TID  dexamethasone, 6 mg, Oral, Daily With Breakfast  enoxaparin, 40 mg, Subcutaneous, Q24H  glipizide, 5 mg, Oral, QAM AC  guaiFENesin, 1,200 mg, Oral, BID  haloperidol lactate, 1 mg, Intravenous, Once  insulin aspart, 0-24 Units, Subcutaneous, TID AC  lisinopril, 20 mg, Oral, Q12H  megestrol, 400 mg, Oral, Daily  pioglitazone-metFORMIN (ACTOPLUS MET)  mg combo dose, , Oral, BID With Meals  nebivolol, 10 mg, Oral, Daily  OLANZapine, 2.5 mg, Oral, Nightly  pantoprazole, 40 mg, Oral, Daily  potassium chloride, 20 mEq, Oral, Daily  senna-docusate sodium, 2 tablet, Oral, BID      Continuous Infusions:   PRN Meds:.•  acetaminophen  •  albuterol sulfate HFA  •  senna-docusate sodium **AND** polyethylene glycol **AND** bisacodyl **AND** bisacodyl  •  haloperidol lactate  •   hydrALAZINE  •  ondansetron **OR** ondansetron  •  sodium chloride      Labs:  Results from last 7 days   Lab Units 09/14/21 0320 09/13/21 0343 09/12/21  0844 09/11/21  0719 09/11/21  0719 09/10/21  0740 09/10/21  0740 09/09/21  0504 09/09/21  0504 09/08/21  0441 09/08/21  0441   WBC 10*3/mm3 8.75 9.82 9.78  --  8.77  --  9.12  --  9.88  --  7.39   HEMOGLOBIN g/dL 12.4* 12.5* 13.1   < > 13.5   < > 13.5   < > 13.8   < > 13.3   HEMATOCRIT % 37.5 38.6 38.7  --  41.3  --  40.5  --  41.2  --  41.3   PLATELETS 10*3/mm3 243 272 271  --  294  --  305  --  313  --  241    < > = values in this interval not displayed.     Results from last 7 days   Lab Units 09/14/21 0320 09/13/21 0343 09/12/21  0845 09/11/21  0719 09/10/21  0740 09/09/21  0504 09/08/21 0441   SODIUM mmol/L 143 145 142 146* 142 146* 142   POTASSIUM mmol/L 3.7 3.2* 3.5 3.2* 3.3* 2.8* 3.6   CHLORIDE mmol/L 109* 111* 108* 108* 106 107 106   CO2 mmol/L 25.3 25.4 22.9 22.7 26.5 30.2* 23.5   BUN mg/dL 17 17 18 19 21 22 18   CREATININE mg/dL 0.46* 0.49* 0.49* 0.57* 0.61* 0.62* 0.56*   CALCIUM mg/dL 8.3* 8.8 8.6 8.5* 8.3* 8.5* 8.1*   BILIRUBIN mg/dL  --   --   --   --   --  0.8 0.8   ALK PHOS U/L  --   --   --   --   --  80 67   ALT (SGPT) U/L  --   --   --   --   --  9 14   AST (SGOT) U/L  --   --   --   --   --  26 32   GLUCOSE mg/dL 126* 153* 217* 155* 159* 120* 156*     Results from last 7 days   Lab Units 09/11/21  0719   MAGNESIUM mg/dL 1.7       Results from last 7 days   Lab Units 09/14/21  0320 09/13/21  0343 09/12/21  0844 09/10/21  0740 09/09/21  0504 09/08/21  0441 09/08/21  0441   LDH U/L  --   --   --   --  379*  --   --    AST (SGOT) U/L  --   --   --   --  26  --  32   ALT (SGPT) U/L  --   --   --   --  9  --  14   FERRITIN ng/mL  --   --   --   --  803.00*  --  940.00*   D DIMER QUANT MCGFEU/mL  --   --   --   --  1.34*  --   --    PLATELETS 10*3/mm3 243 272 271   < > 313   < > 241    < > = values in this interval not displayed.         Lab  Results (last 24 hours)     Procedure Component Value Units Date/Time    Basic Metabolic Panel [[id number]]  (Abnormal) Collected: 09/14/21 0320    Specimen: Blood Updated: 09/14/21 0506     Glucose 126 mg/dL      BUN 17 mg/dL      Creatinine 0.46 mg/dL      Sodium 143 mmol/L      Potassium 3.7 mmol/L      Chloride 109 mmol/L      CO2 25.3 mmol/L      Calcium 8.3 mg/dL      eGFR Non African Amer >150 mL/min/1.73      BUN/Creatinine Ratio 37.0     Anion Gap 8.7 mmol/L     Narrative:      GFR Normal >60  Chronic Kidney Disease <60  Kidney Failure <15      CBC (No Diff) [[id number]]  (Abnormal) Collected: 09/14/21 0320    Specimen: Blood Updated: 09/14/21 0431     WBC 8.75 10*3/mm3      RBC 4.04 10*6/mm3      Hemoglobin 12.4 g/dL      Hematocrit 37.5 %      MCV 92.8 fL      MCH 30.7 pg      MCHC 33.1 g/dL      RDW 14.7 %      RDW-SD 48.9 fl      MPV 10.5 fL      Platelets 243 10*3/mm3     POC Glucose Once [[id number]]  (Abnormal) Collected: 09/13/21 2033    Specimen: Blood Updated: 09/13/21 2039     Glucose 155 mg/dL      Comment: Meter: [id number] : [id number] Titi Gonzalez RN (Validator)       POC Glucose Once [[id number]]  (Abnormal) Collected: 09/13/21 1631    Specimen: Blood Updated: 09/13/21 1638     Glucose 205 mg/dL      Comment: Meter: [id number] : [id number] Doyle Morocho CNA       POC Glucose Once [[id number]]  (Abnormal) Collected: 09/13/21 1117    Specimen: Blood Updated: 09/13/21 1123     Glucose 285 mg/dL      Comment: Meter: [id number] : [id number] Doyle Morocho CNA           Results from last 7 days   Lab Units 09/09/21  0504   D DIMER QUANT MCGFEU/mL 1.34*                 Results from last 7 days   Lab Units 09/11/21  0719   MAGNESIUM mg/dL 1.7                 Glucose   Date/Time Value Ref Range Status   09/13/2021 2033 155 (H) 70 - 130 mg/dL Final     Comment:     Meter: [id number] : [id number] Titi Gonzalez RN (Validator)   09/13/2021 1631 205 (H) 70 - 130 mg/dL Final      Comment:     Meter: [id number] : [id number] Doyle Morocho CNA   09/13/2021 1117 285 (H) 70 - 130 mg/dL Final     Comment:     Meter: [id number] : [id number] Doyle Morocho CNA   09/13/2021 0632 151 (H) 70 - 130 mg/dL Final     Comment:     Meter: [id number] : [id number] Titi Gonzalez RN (Validator)   09/12/2021 2129 217 (H) 70 - 130 mg/dL Final     Comment:     NURSE NOTIFIED Meter: [id number] : [id number] Titi Gonzalez RN (Validator)   09/12/2021 1714 200 (H) 70 - 130 mg/dL Final     Comment:     Meter: [id number] : [id number] Sebastian Pitts RN   09/12/2021 1202 214 (H) 70 - 130 mg/dL Final     Comment:     Meter: [id number] : [id number] Padmini Wolff NURSING ASSISTANT   09/12/2021 0755 172 (H) 70 - 130 mg/dL Final     Comment:     Meter: [id number] : [id number] Pitts RN             Results from last 7 days   Lab Units 09/10/21  1138   NITRITE UA  Negative   WBC UA /HPF None Seen   BACTERIA UA /HPF None Seen   SQUAM EPITHEL UA /HPF None Seen             Radiology:  Imaging Results (Last 24 Hours)     ** No results found for the last 24 hours. **          Cardiology:  ECG/EMG Results (last 24 hours)     Procedure Component Value Units Date/Time    ECG 12 Lead [[id number]] Collected: 09/02/21 1348     Updated: 09/02/21 1352     QT Interval 467 ms     Narrative:      HEART RATE= 68  bpm  RR Interval= 880  ms  UT Interval= 211  ms  P Horizontal Axis= 8  deg  P Front Axis= 52  deg  QRSD Interval= 130  ms  QT Interval= 467  ms  QRS Axis= -14  deg  T Wave Axis= -6  deg  - ABNORMAL ECG -  Sinus rhythm  Nonspecific intraventricular conduction delay  Lateral infarct, age indeterminate  Electronically Signed By:   Date and Time of Study: 2021-09-02 13:48:28          I have reviewed recent labs results and consult notes.  Parts of this note may have been copied and pasted but patient was examined and interviewed by me today    Assessment and Plan:    1.  COVID-19 pneumonia generalized  weakness . Stable.    2.  Metabolic encephalopathy likely due to COVID-19 improving    3.    Adult-onset diabetes mellitus continues to be hyperglycemic but improving continue with sliding scale insulin optimize glycemic control continue sliding scale insulin    4.  Hypertension controlled continue present medications    5.    Hypokalemia persistent continue potassium replacement    6.  Parkinson's disease nothing acute continue with home medications     7.    COPD no evidence of exacerbation continue with as needed albuterol    8.  DVT prophylaxis Lovenox    9.  Generalized weakness secondary to COVID-19 pneumonia comorbidities continue to encourage participation with therapies.  Placement been found at [facility] transfer tomorrow    10.  Suboptimal p.o. intake.  Slowly improving continue calorie count       Much of this encounter note is an electronic transcription/translation of spoken language to printed text using Dragon Software

## 2021-09-14 NOTE — PROGRESS NOTES
Adult Nutrition  Assessment/PES    Patient Name:  Nash So  YOB: 1945  MRN: [id number]  Admit Date:  9/2/2021    Assessment Date:  9/14/2021    Comments:  Preferences obtained via RN from pt. Will honor as able.   Recommend: referral to SLP to eval if pt able safely eat pt chicken tenders ( he likes fried chicken)     Reason for Assessment     Row Name 09/14/21 1327          Reason for Assessment    Reason For Assessment  calorie count order         Nutrition/Diet History     Row Name 09/14/21 1327          Nutrition/Diet History    Typical Food/Fluid Intake  Spoke w RN who gives pt prefs, he doesn't like merissa, like fried chicken, doesn't want ground or pureed. Likes fruit.         Anthropometrics     Row Name 09/14/21 0632          Anthropometrics    Weight  106 kg (232 lb 9.6 oz)             Diet: Soft Chopped, Boost Glucose Control with meals  Estimated needs: 1833 kcal, 87 gm pro       Day 1:  300 kcal , 9 gm pro ( 2 meals recorded)   Day 2: 0% x 1 meal today                          Electronically signed by:  Nan García RD  09/14/21 13:28 EDT

## 2021-09-14 NOTE — THERAPY TREATMENT NOTE
Acute Care - Physical Therapy Treatment Note   Tori Simmons     Patient Name: Nash So  : 1945  MRN: [id number]  Today's Date: 2021      Visit Dx:     ICD-10-CM ICD-9-CM   1. COVID-19 virus infection  U07.1 079.89   2. Weakness  R53.1 780.79   3. Elevated troponin  R77.8 790.6   4. Contusion of face, initial encounter  S00.83XA 920     Patient Active Problem List   Diagnosis   • Vitamin D deficiency   • Diabetes mellitus (CMS/HCC)   • Back pain   • Benign prostatic hyperplasia   • Gastroesophageal reflux disease with esophagitis   • Hyperlipidemia   • Hypertension   • Hypogonadism in male   • Sebaceous cyst   • COPD (chronic obstructive pulmonary disease) (CMS/HCC)   • Encounter for screening for malignant neoplasm of colon   • COVID-19 virus infection     Past Medical History:   Diagnosis Date   • Alzheimer disease (CMS/HCC)    • CHF (congestive heart failure) (CMS/HCC)    • COPD (chronic obstructive pulmonary disease) (CMS/HCC)    • Diabetes (CMS/HCC)    • GERD (gastroesophageal reflux disease)    • Hyperlipemia    • Hypertension      Past Surgical History:   Procedure Laterality Date   • ENDOSCOPY N/A 2017    Procedure: ESOPHAGOGASTRODUODENOSCOPY, biopsy;  Surgeon: Natalia Guerrero MD;  Location: [facility];  Service:         PT Assessment (last 12 hours)      PT Evaluation and Treatment     Row Name 21 0858          Physical Therapy Time and Intention    Subjective Information  no complaints  -BP     Document Type  therapy note (daily note)  -BP     Mode of Treatment  physical therapy  -BP     Patient Effort  good  -BP     Symptoms Noted During/After Treatment  fatigue  -BP     Row Name 21 0858          General Information    Patient/Family/Caregiver Comments/Observations  Patient supine in bed with HOB elevated. [place] pads and fall bads in place. Exit alarm on. RN presented during session.   -BP     Existing Precautions/Restrictions  fall confusion   -BP      Limitations/Impairments  safety/cognitive  -BP     Risks Reviewed  patient:;LOB;increased discomfort  -BP     Benefits Reviewed  patient:;improve function;increase independence;increase strength  -BP     Barriers to Rehab  cognitive status;previous functional deficit  -BP     Row Name 09/14/21 0858          Cognition    Personal Safety Interventions  gait belt;nonskid shoes/slippers when out of bed  -BP     Row Name 09/14/21 0858          Pain Scale: Word Pre/Post-Treatment    Pre/Posttreatment Pain Comment  Patient does not complain of pain   -BP     Row Name 09/14/21 0858          Bed Mobility    Bed Mobility  supine-sit  -BP     Supine-Sit [place] (Bed Mobility)  maximum assist (25% patient effort);2 person assist;verbal cues  -BP     Bed Mobility, Safety Issues  cognitive deficits limit understanding  -BP     Assistive Device (Bed Mobility)  bed rails;draw sheet;head of bed elevated  -BP     Comment (Bed Mobility)  Patient demonstrates difficulty sequencing transfer.   -BP     Row Name 09/14/21 0858          Transfers    Transfers  sit-stand transfer;stand-sit transfer;stand pivot/stand step transfer  -BP     Comment (Transfers)  Verbal cues required for hand placement. Patient requires mod A x 2 for sit to stand transfers. Performed stand pivot transfer bed to chair. Patient with significant difficulty advancing L LE to the left toward the chair.   -BP     Sit-Stand [place] (Transfers)  moderate assist (50% patient effort);2 person assist;verbal cues  -BP     Stand-Sit [place] (Transfers)  moderate assist (50% patient effort);2 person assist;verbal cues  -BP     Row Name 09/14/21 0858          Sit-Stand Transfer    Assistive Device (Sit-Stand Transfers)  walker, front-wheeled  -BP     Row Name 09/14/21 0858          Stand-Sit Transfer    Assistive Device (Stand-Sit Transfers)  walker, front-wheeled  -BP     Row Name 09/14/21 0858          Stand Pivot/Stand Step Transfer    Stand Pivot/Stand Step  [place] (Transfers)  maximum assist (25% patient effort);2 person assist;verbal cues  -     Assistive Device (Stand Pivot Stand Step Transfer)  walker, front-wheeled  -     Row Name 09/14/21 0858          Gait/Stairs (Locomotion)    Comment (Gait/Stairs)  transfers only   -     Row Name 09/14/21 0858          Safety Issues, Functional Mobility    Safety Issues Affecting Function (Mobility)  insight into deficits/self-awareness;judgment;positioning of assistive device;problem-solving;safety precaution awareness;sequencing abilities;awareness of need for assistance  -     Comment, Safety Issues/Impairments (Mobility)  Patient requires significant cues for sequencing all transfers   -     Row Name 09/14/21 0858          Balance    Comment, Balance  CGA for static sitting balance at EOB  -     Row Name             Wound 09/02/21 1452 Left elbow Skin Tear    Wound - Properties Group Placement Date: 09/02/21  -EF Placement Time: 1452  -EF Present on Hospital Admission: Y  -EF Side: Left  -EF Location: elbow  -EF Primary Wound Type: Skin tear  -EF    Retired Wound - Properties Group Date first assessed: 09/02/21  -EF Time first assessed: 1452  -EF Present on Hospital Admission: Y  -EF Side: Left  -EF Location: elbow  -EF Primary Wound Type: Skin tear  -EF    Row Name             Wound 09/02/21 1453 Right elbow Skin Tear    Wound - Properties Group Placement Date: 09/02/21  -EF Placement Time: 1453  -EF Present on Hospital Admission: Y  -EF Side: Right  -EF Location: elbow  -EF Primary Wound Type: Skin tear  -EF    Retired Wound - Properties Group Date first assessed: 09/02/21  -EF Time first assessed: 1453  -EF Present on Hospital Admission: Y  -EF Side: Right  -EF Location: elbow  -EF Primary Wound Type: Skin tear  -EF    Row Name 09/14/21 0858          Plan of Care Review    Plan of Care Reviewed With  patient  -BP     Outcome Summary  PT: Patient performs supine to sit transfer with max A x 2, sit  to/from stand transfers from elevated bed with mod A x 2 and stand pivot transfer from bed to chair with max/dependent x 2 with use of FWW.  Patient with significant difficulty sequencing transfers. Patient with significant L lateral lean in standing with difficulty advancing L LE to the left to complete stand pivot transfer. Patient more alert and cooperative today. Continue to recommend SNF/ECF  -BP     Row Name 09/14/21 0858          Positioning and Restraints    Pre-Treatment Position  in bed  -BP     Post Treatment Position  chair  -BP     In Chair  notified nsg;reclined;call light within reach;encouraged to call for assist;exit alarm on  -BP     Row Name 09/14/21 0858          Progress Summary (PT)    Progress Toward Functional Goals (PT)  progress toward functional goals is fair  -BP     Barriers to Overall Progress (PT)  cognition   -BP     Row Name 09/14/21 0858          Therapy Plan Review/Discharge Plan (PT)    Therapy Plan Review (PT)  patient;current/potential barriers reviewed;risks/benefits reviewed;participants included  -BP       User Key  (r) = Recorded By, (t) = Taken By, (c) = Cosigned By    Initials Name Provider Type    Solomon Arce, PT Physical Therapist    Radha Minor, RN Registered Nurse        Physical Therapy Education                 Title: PT OT SLP Therapies (In Progress)     Topic: Physical Therapy (In Progress)     Point: Mobility training (In Progress)     Learning Progress Summary           Patient Acceptance, E,TB, NR by BP at 9/14/2021 1257    Acceptance, E,TB, NR by JW at 9/13/2021 1425    Acceptance, E,TB, NR by  at 9/9/2021 1243    Acceptance, E,TB, NR by BP at 9/8/2021 1225    Acceptance, E,TB, VU,NL by JV at 9/6/2021 1117    Acceptance, E,TB, NR by BP at 9/3/2021 1139                   Point: Home exercise program (Not Started)     Learner Progress:  Not documented in this visit.                      User Key     Initials Effective Dates Name Provider  Type Discipline     06/16/21 -  Catrina Graham, PT Physical Therapist PT    BP 06/16/21 -  Solomon Stack, PT Physical Therapist PT    JW 06/16/21 -  Patricia Dodson, PT Physical Therapist PT    JV 06/16/21 -  Maria Luisa Rdz, PT Physical Therapist PT              PT Recommendation and Plan  Anticipated Discharge Disposition (PT): skilled nursing facility, extended care facility  Planned Therapy Interventions (PT): bed mobility training, gait training, patient/family education, strengthening, transfer training  Therapy Frequency (PT): 5 times/wk  Progress Summary (PT)  Progress Toward Functional Goals (PT): progress toward functional goals is fair  Barriers to Overall Progress (PT): cognition   Plan of Care Reviewed With: patient  Outcome Summary: PT: Patient performs supine to sit transfer with max A x 2, sit to/from stand transfers from elevated bed with mod A x 2 and stand pivot transfer from bed to chair with max/dependent x 2 with use of FWW.  Patient with significant difficulty sequencing transfers. Patient with significant L lateral lean in standing with difficulty advancing L LE to the left to complete stand pivot transfer. Patient more alert and cooperative today. Continue to recommend SNF/ECF  Outcome Measures     Row Name 09/14/21 1100 09/14/21 0858 09/13/21 6987       How much help from another person do you currently need...    Turning from your back to your side while in flat bed without using bedrails?  --  1  -BP  --    Moving from lying on back to sitting on the side of a flat bed without bedrails?  --  1  -BP  --    Moving to and from a bed to a chair (including a wheelchair)?  --  1  -BP  --    Standing up from a chair using your arms (e.g., wheelchair, bedside chair)?  --  1  -BP  --    Climbing 3-5 steps with a railing?  --  1  -BP  --    To walk in hospital room?  --  1  -BP  --    AM-PAC 6 Clicks Score (PT)  --  6  -BP  --       How much help from another is currently needed...     Putting on and taking off regular lower body clothing?  1  -SD  --  1  -SD    Bathing (including washing, rinsing, and drying)  1  -SD  --  1  -SD    Toileting (which includes using toilet bed pan or urinal)  1  -SD  --  1  -SD    Putting on and taking off regular upper body clothing  1  -SD  --  1  -SD    Taking care of personal grooming (such as brushing teeth)  2  -SD  --  2  -SD    Eating meals  2  -SD  --  2  -SD    AM-PAC 6 Clicks Score (OT)  8  -SD  --  8  -SD       Functional Assessment    Outcome Measure Options  AM-PAC 6 Clicks Daily Activity (OT)  -SD  AM-PAC 6 Clicks Basic Mobility (PT)  -BP  AM-[facility] 6 Clicks Daily Activity (OT)  -SD    Row Name 09/13/21 1319             How much help from another person do you currently need...    Turning from your back to your side while in flat bed without using bedrails?  1  -JW      Moving from lying on back to sitting on the side of a flat bed without bedrails?  1  -JW      Moving to and from a bed to a chair (including a wheelchair)?  1  -JW      Standing up from a chair using your arms (e.g., wheelchair, bedside chair)?  2  -JW      Climbing 3-5 steps with a railing?  1  -JW      To walk in hospital room?  1  -JW      AM-[facility] 6 Clicks Score (PT)  7  -         Functional Assessment    Outcome Measure Options  [facility] 6 Clicks Basic Mobility (PT)  -        User Key  (r) = Recorded By, (t) = Taken By, (c) = Cosigned By    Initials Name Provider Type    Wade Wilkerson, OTR Occupational Therapist    Solomon Arce, PT Physical Therapist    Patricia Gonzalez, PT Physical Therapist           Time Calculation:   PT Charges     Row Name 09/14/21 2748             Time Calculation    Start Time  0858  -BP      Stop Time  0912  -BP      Time Calculation (min)  14 min  -BP      PT Received On  09/14/21  -BP      PT - Next Appointment  09/15/21  -BP         Timed Charges    56177 - PT Therapeutic Exercise Minutes  14  -BP         Total Minutes    Timed Charges  Total Minutes  14  -BP       Total Minutes  14  -BP        User Key  (r) = Recorded By, (t) = Taken By, (c) = Cosigned By    Initials Name Provider Type    BP Solomon Stack, PT Physical Therapist        Therapy Charges for Today     Code Description Service Date Service Provider Modifiers Qty    [id number]  PT THER PROC EA 15 MIN 9/14/2021 Solomon Stack, PT GP 1          PT G-Codes  Outcome Measure Options: AM-PAC 6 Clicks Daily Activity (OT)  AM-PAC 6 Clicks Score (PT): 6  AM-PAC 6 Clicks Score (OT): 8    Solomon Stack PT  9/14/2021

## 2021-09-14 NOTE — PLAN OF CARE
Goal Outcome Evaluation:  Plan of Care Reviewed With: patient           Outcome Summary: PT: Patient performs supine to sit transfer with max A x 2, sit to/from stand transfers from elevated bed with mod A x 2 and stand pivot transfer from bed to chair with max/dependent x 2 with use of FWW.  Patient with significant difficulty sequencing transfers. Patient with significant L lateral lean in standing with difficulty advancing L LE to the left to complete stand pivot transfer. Patient more alert and cooperative today. Continue to recommend SNF/ECF

## 2021-09-14 NOTE — PLAN OF CARE
Goal Outcome Evaluation:  Plan of Care Reviewed With: patient           Outcome Summary: OT: Pt required max assist for supine to sitting at EOB. Pt able to maintain his sitting balance at EOB with supervision. Pt stood from EOB with mod assist and performed a stand step pivot transfer to a recliner positioned on his left (max assist x 2). Pt with a posterior and left lateral lean when standing and during the transfer. Pt required verbal/tactile cues to initiate steps for the transfer. Pt was pleasantly confused and cooperative today. Plan is for transfer to ECF.

## 2021-09-14 NOTE — PLAN OF CARE
Goal Outcome Evaluation:  Plan of Care Reviewed With: patient        Progress: no change  Outcome Summary: bed alarm in place - room air - calorie count in place - no c/o pain - repositioned - plan to go to [place] on Wednesday - daughter called in to check on pt and see if he is eating and began crying d/t mom not doing well in ICU

## 2021-09-14 NOTE — THERAPY TREATMENT NOTE
Acute Care - Occupational Therapy Treatment Note   Tori Simmons     Patient Name: Nash So  : 1945  MRN: [id number]  Today's Date: 2021             Admit Date: 2021       ICD-10-CM ICD-9-CM   1. COVID-19 virus infection  U07.1 079.89   2. Weakness  R53.1 780.79   3. Elevated troponin  R77.8 790.6   4. Contusion of face, initial encounter  S00.83XA 920     Patient Active Problem List   Diagnosis   • Vitamin D deficiency   • Diabetes mellitus (CMS/HCC)   • Back pain   • Benign prostatic hyperplasia   • Gastroesophageal reflux disease with esophagitis   • Hyperlipidemia   • Hypertension   • Hypogonadism in male   • Sebaceous cyst   • COPD (chronic obstructive pulmonary disease) (CMS/HCC)   • Encounter for screening for malignant neoplasm of colon   • COVID-19 virus infection     Past Medical History:   Diagnosis Date   • Alzheimer disease (CMS/HCC)    • CHF (congestive heart failure) (CMS/HCC)    • COPD (chronic obstructive pulmonary disease) (CMS/HCC)    • Diabetes (CMS/HCC)    • GERD (gastroesophageal reflux disease)    • Hyperlipemia    • Hypertension      Past Surgical History:   Procedure Laterality Date   • ENDOSCOPY N/A 2017    Procedure: ESOPHAGOGASTRODUODENOSCOPY, biopsy;  Surgeon: Natalia Guerrero MD;  Location: [facility];  Service:             OT ASSESSMENT FLOWSHEET (last 12 hours)      OT Evaluation and Treatment     Row Name 21 0910                   OT Time and Intention    Subjective Information  no complaints  -SD        Document Type  therapy note (daily note)  -SD        Mode of Treatment  occupational therapy;co-treatment;physical therapy  -SD        Patient Effort  good  -SD           General Information    Patient/Family/Caregiver Comments/Observations  Pt in bed. Pt agreeable to therapy. Nurse in room.   -SD        Existing Precautions/Restrictions  fall  -SD        Limitations/Impairments  safety/cognitive  -SD        Risks Reviewed  patient:;LOB  -SD         Benefits Reviewed  patient:;improve function  -SD        Barriers to Rehab  cognitive status;previous functional deficit  -SD           Cognition    Affect/Mental Status (Cognitive)  confused;other (see comments) pt cooperative   -SD        Orientation Status (Cognition)  oriented to;person  -SD        Follows Commands (Cognition)  follows one-step commands;75-90% accuracy;delayed response/completion;physical/tactile prompts required;repetition of directions required;verbal cues/prompting required  -SD        Personal Safety Interventions  gait belt;fall prevention program maintained;nonskid shoes/slippers when out of bed;supervised activity  -SD           Pain Scale: Numbers Pre/Post-Treatment    Pre/Posttreatment Pain Comment  no c/o pain  -SD           Bed Mobility    Supine-Sit [place] (Bed Mobility)  maximum assist (25% patient effort);2 person assist  -SD        Bed Mobility, Safety Issues  cognitive deficits limit understanding  -SD        Assistive Device (Bed Mobility)  bed rails;draw sheet;head of bed elevated  -SD           Transfer Assessment/Treatment    Comment (Transfers)  Pt stood from EOB with mod assist and performed a stand step pivot transfer to a recliner positioned on his left (max assist x 2). Pt with a posterior and left lateral lean when standing and during the transfer. Pt required verbal/tactile cues to initiate steps for the transfer.  -SD           Transfers    Sit-Stand [place] (Transfers)  moderate assist (50% patient effort);verbal cues  -SD        Stand-Sit [place] (Transfers)  moderate assist (50% patient effort)  -SD           Sit-Stand Transfer    Assistive Device (Sit-Stand Transfers)  walker, front-wheeled  -SD           Stand-Sit Transfer    Assistive Device (Stand-Sit Transfers)  walker, front-wheeled  -SD           Safety Issues, Functional Mobility    Safety Issues Affecting Function (Mobility)  sequencing abilities;other (see comments) posterior and left  lateral lean when standing/transferring  -SD        Impairments Affecting Function (Mobility)  motor planning;other (see comments) poor initiation  -SD           Motor Skills    Therapeutic Exercise  shoulder;elbow/forearm  -SD           Shoulder (Therapeutic Exercise)    Shoulder (Therapeutic Exercise)  AROM (active range of motion)  -SD        Shoulder AROM (Therapeutic Exercise)  bilateral;flexion;sitting;3 repetitions sitting at EOB  -SD           Elbow/Forearm (Therapeutic Exercise)    Elbow/Forearm (Therapeutic Exercise)  AROM (active range of motion)  -SD        Elbow/Forearm AROM (Therapeutic Exercise)  bilateral;flexion;extension;3 repetitions  -SD           Balance    Comment, Balance  Pt able to maintain sitting balance at EOB with supervision  -SD           BADL Safety/Performance    Impairments, BADL Safety/Performance  cognition;balance  -SD           Wound 09/02/21 1452 Left elbow Skin Tear    Wound - Properties Group Placement Date: 09/02/21  -EF Placement Time: 1452  -EF Present on Hospital Admission: Y  -EF Side: Left  -EF Location: elbow  -EF Primary Wound Type: Skin tear  -EF    Retired Wound - Properties Group Date first assessed: 09/02/21  -EF Time first assessed: 1452  -EF Present on Hospital Admission: Y  -EF Side: Left  -EF Location: elbow  -EF Primary Wound Type: Skin tear  -EF       Wound 09/02/21 1453 Right elbow Skin Tear    Wound - Properties Group Placement Date: 09/02/21  -EF Placement Time: 1453  -EF Present on Hospital Admission: Y  -EF Side: Right  -EF Location: elbow  -EF Primary Wound Type: Skin tear  -EF    Retired Wound - Properties Group Date first assessed: 09/02/21  -EF Time first assessed: 1453  -EF Present on Hospital Admission: Y  -EF Side: Right  -EF Location: elbow  -EF Primary Wound Type: Skin tear  -EF       Plan of Care Review    Plan of Care Reviewed With  patient  -SD        Outcome Summary  OT: Pt required max assist for supine to sitting at EOB. Pt able to  maintain his sitting balance at EOB with supervision. Pt stood from EOB with mod assist and performed a stand step pivot transfer to a recliner positioned on his left (max assist x 2). Pt with a posterior and left lateral lean when standing and during the transfer. Pt required verbal/tactile cues to initiate steps for the transfer. Pt was pleasantly confused and cooperative today. Plan is for transfer to [facility].  -SD           Transfer Goal 1 (OT)    Activity/Assistive Device (Transfer Goal 1, OT)  commode, 3-in-1;walker, rolling  -SD        [place] Level/Cues Needed (Transfer Goal 1, OT)  minimum assist (75% or more patient effort);tactile cues required;verbal cues required  -SD        Time Frame (Transfer Goal 1, OT)  by discharge  -SD        Strategies/Barriers (Transfers Goal 1, OT)  cognitive status  -SD        Progress/Outcome (Transfer Goal 1, OT)  goal ongoing  -SD           ROM Goal 1 (OT)    ROM Goal 1 (OT)  Pt to perform BUE HEP with verbal/tactile cues to improve functional strength/activity tolerance for basic adl's/transfer activity.  -SD        Time Frame (ROM Goal 1, OT)  by discharge  -SD        Progress/Outcome (ROM Goal 1, OT)  goal ongoing;good progress toward goal  -SD           Problem Specific Goal 1 (OT)    Problem Specific Goal 1 (OT)  Pt to maintain static standing balance with min assist using a walker for support to allow for caregiver assist with adl activity.  -SD        Time Frame (Problem Specific Goal 1, OT)  by discharge  -SD        Progress/Outcome (Problem Specific Goal 1, OT)  goal ongoing  -SD           Positioning and Restraints    Pre-Treatment Position  in bed  -SD        Post Treatment Position  chair  -SD        In Chair  reclined;call light within reach;encouraged to call for assist;exit alarm on;with nsg donal pad under pt in chair.  -SD           Progress Summary (OT)    Progress Toward Functional Goals (OT)  progress toward functional goals is fair;prepare for  discharge  -SD        Daily Progress Summary (OT)  continue with plan. Anticipate discharge to [facility] soon  -SD          User Key  (r) = Recorded By, (t) = Taken By, (c) = Cosigned By    Initials Name Effective Dates    SD Wade Pastrana, OTR 06/16/21 -     EF Radha Davila, ANGELICA 09/27/16 -            Occupational Therapy Education                 Title: PT OT SLP Therapies (In Progress)     Topic: Occupational Therapy (In Progress)     Point: ADL training (In Progress)     Description:   Instruct learner(s) on proper safety adaptation and remediation techniques during self care or transfers.   Instruct in proper use of assistive devices.              Learning Progress Summary           Patient Acceptance, E,TB,D, NL by SD at 9/13/2021 1518    Comment: Education regarding OT services, benefits of activity and safety with bed mobility and transfers. Pt is confused. No evidence of learning/carryover of education from day to day.    Acceptance, E,TB, VU,NL by  at 9/9/2021 1408    Comment: pt educated on safety with transfers, bed mobility and balance    Acceptance, E,TB, VU,NR by JJ at 9/8/2021 1300    Comment: pt educated on safety with functional transfers and balance    Acceptance, E,TB, VU,NL by JJ at 9/6/2021 1211    Comment: pt educated on bed mobility and benefits of activity    Acceptance, E, NL by SD at 9/3/2021 1348    Comment: Education regarding OT services, benefits of activity and safety wtih bed mobility/transfers. Pt is confused (no evidence of learning).                   Point: Home exercise program (In Progress)     Description:   Instruct learner(s) on appropriate technique for monitoring, assisting and/or progressing therapeutic exercises/activities.              Learning Progress Summary           Patient Acceptance, E, NR by SD at 9/14/2021 1131    Comment: Education regarding benefits of activity. Pt performed BUE arom while sitting at EOB with verbal/tactile cues. Education with  safety with bed mobility and transfers. Pt pleasantly confused today.                               User Key     Initials Effective Dates Name Provider Type Discipline    SD 06/16/21 -  Wade Pastrana, OTR Occupational Therapist OT    JJOSIAH 06/16/21 -  Luisana Peterson OTR Occupational Therapist OT                  OT Recommendation and Plan  Planned Therapy Interventions (OT): transfer/mobility retraining, strengthening exercise, activity tolerance training, BADL retraining  Therapy Frequency (OT): 3 times/wk  Progress Toward Functional Goals (OT): progress toward functional goals is fair, prepare for discharge  Plan of Care Review  Plan of Care Reviewed With: patient  Outcome Summary: OT: Pt required max assist for supine to sitting at EOB. Pt able to maintain his sitting balance at EOB with supervision. Pt stood from EOB with mod assist and performed a stand step pivot transfer to a recliner positioned on his left (max assist x 2). Pt with a posterior and left lateral lean when standing and during the transfer. Pt required verbal/tactile cues to initiate steps for the transfer. Pt was pleasantly confused and cooperative today. Plan is for transfer to [facility].      Outcome Measures     Row Name 09/14/21 1100 09/13/21 1337 09/13/21 1319       How much help from another person do you currently need...    Turning from your back to your side while in flat bed without using bedrails?  --  --  1  -JW    Moving from lying on back to sitting on the side of a flat bed without bedrails?  --  --  1  -JW    Moving to and from a bed to a chair (including a wheelchair)?  --  --  1  -JW    Standing up from a chair using your arms (e.g., wheelchair, bedside chair)?  --  --  2  -JW    Climbing 3-5 steps with a railing?  --  --  1  -JW    To walk in hospital room?  --  --  1  -JW    AM-PAC 6 Clicks Score (PT)  --  --  7  -JW       How much help from another is currently needed...    Putting on and taking off regular lower body  clothing?  1  -SD  1  -SD  --    Bathing (including washing, rinsing, and drying)  1  -SD  1  -SD  --    Toileting (which includes using toilet bed pan or urinal)  1  -SD  1  -SD  --    Putting on and taking off regular upper body clothing  1  -SD  1  -SD  --    Taking care of personal grooming (such as brushing teeth)  2  -SD  2  -SD  --    Eating meals  2  -SD  2  -SD  --    AM-PAC 6 Clicks Score (OT)  8  -SD  8  -SD  --       Functional Assessment    Outcome Measure Options  AM-PAC 6 Clicks Daily Activity (OT)  -SD  AM-PAC 6 Clicks Daily Activity (OT)  -SD  AM-PAC 6 Clicks Basic Mobility (PT)  -      User Key  (r) = Recorded By, (t) = Taken By, (c) = Cosigned By    Initials Name Provider Type    Wade Wilkerson OTR Occupational Therapist    Patricia Gonzalez, PT Physical Therapist          Time Calculation:   Time Calculation- OT     Row Name 09/14/21 1126             Time Calculation- OT    OT Start Time  0858  -SD         Timed Charges    81344 - OT Therapeutic Activity Minutes  15  -SD         Total Minutes    Timed Charges Total Minutes  15  -SD       Total Minutes  15  -SD        User Key  (r) = Recorded By, (t) = Taken By, (c) = Cosigned By    Initials Name Provider Type    Wade Wilkerson OTR Occupational Therapist        Therapy Charges for Today     Code Description Service Date Service Provider Modifiers Qty    [id number]  OT THERAPEUTIC ACT EA 15 MIN 9/14/2021 Wade Pastrana OTR GO 1               DENIA Lui  9/14/2021

## 2021-09-15 PROBLEM — D89.831 CYTOKINE RELEASE SYNDROME, GRADE 1: Status: ACTIVE | Noted: 2021-09-15

## 2021-09-15 LAB
ANION GAP SERPL CALCULATED.3IONS-SCNC: 8.5 MMOL/L (ref 5–15)
BUN SERPL-MCNC: 17 MG/DL (ref 8–23)
BUN/CREAT SERPL: 28.3 (ref 7–25)
CALCIUM SPEC-SCNC: 8.3 MG/DL (ref 8.6–10.5)
CHLORIDE SERPL-SCNC: 111 MMOL/L (ref 98–107)
CO2 SERPL-SCNC: 24.5 MMOL/L (ref 22–29)
CREAT SERPL-MCNC: 0.6 MG/DL (ref 0.76–1.27)
DEPRECATED RDW RBC AUTO: 49.5 FL (ref 37–54)
ERYTHROCYTE [DISTWIDTH] IN BLOOD BY AUTOMATED COUNT: 14.7 % (ref 12.3–15.4)
GFR SERPL CREATININE-BSD FRML MDRD: 131 ML/MIN/1.73
GLUCOSE BLDC GLUCOMTR-MCNC: 134 MG/DL (ref 70–130)
GLUCOSE BLDC GLUCOMTR-MCNC: 167 MG/DL (ref 70–130)
GLUCOSE BLDC GLUCOMTR-MCNC: 170 MG/DL (ref 70–130)
GLUCOSE BLDC GLUCOMTR-MCNC: 177 MG/DL (ref 70–130)
GLUCOSE SERPL-MCNC: 148 MG/DL (ref 65–99)
HCT VFR BLD AUTO: 40.2 % (ref 37.5–51)
HGB BLD-MCNC: 13.2 G/DL (ref 13–17.7)
MCH RBC QN AUTO: 30.3 PG (ref 26.6–33)
MCHC RBC AUTO-ENTMCNC: 32.8 G/DL (ref 31.5–35.7)
MCV RBC AUTO: 92.2 FL (ref 79–97)
PLATELET # BLD AUTO: 269 10*3/MM3 (ref 140–450)
PMV BLD AUTO: 10.1 FL (ref 6–12)
POTASSIUM SERPL-SCNC: 4 MMOL/L (ref 3.5–5.2)
RBC # BLD AUTO: 4.36 10*6/MM3 (ref 4.14–5.8)
SODIUM SERPL-SCNC: 144 MMOL/L (ref 136–145)
WBC # BLD AUTO: 9.87 10*3/MM3 (ref 3.4–10.8)

## 2021-09-15 PROCEDURE — 97530 THERAPEUTIC ACTIVITIES: CPT

## 2021-09-15 PROCEDURE — 80048 BASIC METABOLIC PNL TOTAL CA: CPT | Performed by: FAMILY MEDICINE

## 2021-09-15 PROCEDURE — 97110 THERAPEUTIC EXERCISES: CPT

## 2021-09-15 PROCEDURE — 25010000002 ENOXAPARIN PER 10 MG: Performed by: FAMILY MEDICINE

## 2021-09-15 PROCEDURE — 85027 COMPLETE CBC AUTOMATED: CPT | Performed by: FAMILY MEDICINE

## 2021-09-15 PROCEDURE — 63710000001 INSULIN ASPART PER 5 UNITS: Performed by: FAMILY MEDICINE

## 2021-09-15 PROCEDURE — 82962 GLUCOSE BLOOD TEST: CPT

## 2021-09-15 PROCEDURE — 63710000001 DEXAMETHASONE PER 0.25 MG: Performed by: FAMILY MEDICINE

## 2021-09-15 RX ADMIN — PIOGLITAZONE: 30 TABLET ORAL at 17:26

## 2021-09-15 RX ADMIN — GUAIFENESIN 1200 MG: 600 TABLET, EXTENDED RELEASE ORAL at 09:01

## 2021-09-15 RX ADMIN — GUAIFENESIN 1200 MG: 600 TABLET, EXTENDED RELEASE ORAL at 20:38

## 2021-09-15 RX ADMIN — INSULIN ASPART 4 UNITS: 100 INJECTION, SOLUTION INTRAVENOUS; SUBCUTANEOUS at 09:03

## 2021-09-15 RX ADMIN — INSULIN ASPART 4 UNITS: 100 INJECTION, SOLUTION INTRAVENOUS; SUBCUTANEOUS at 17:26

## 2021-09-15 RX ADMIN — DEXAMETHASONE 6 MG: 4 TABLET ORAL at 09:01

## 2021-09-15 RX ADMIN — PANTOPRAZOLE SODIUM 40 MG: 40 TABLET, DELAYED RELEASE ORAL at 09:03

## 2021-09-15 RX ADMIN — SENNOSIDES AND DOCUSATE SODIUM 2 TABLET: 50; 8.6 TABLET ORAL at 20:38

## 2021-09-15 RX ADMIN — CARBIDOPA AND LEVODOPA 1.5 TABLET: 25; 100 TABLET ORAL at 16:12

## 2021-09-15 RX ADMIN — GLIPIZIDE 5 MG: 5 TABLET ORAL at 09:03

## 2021-09-15 RX ADMIN — INSULIN ASPART 4 UNITS: 100 INJECTION, SOLUTION INTRAVENOUS; SUBCUTANEOUS at 12:20

## 2021-09-15 RX ADMIN — ENOXAPARIN SODIUM 40 MG: 40 INJECTION SUBCUTANEOUS at 20:42

## 2021-09-15 RX ADMIN — LISINOPRIL 20 MG: 20 TABLET ORAL at 09:02

## 2021-09-15 RX ADMIN — PIOGLITAZONE: 30 TABLET ORAL at 09:02

## 2021-09-15 RX ADMIN — CARBIDOPA AND LEVODOPA 1.5 TABLET: 25; 100 TABLET ORAL at 20:38

## 2021-09-15 RX ADMIN — ATORVASTATIN CALCIUM 40 MG: 40 TABLET, FILM COATED ORAL at 09:03

## 2021-09-15 RX ADMIN — SENNOSIDES AND DOCUSATE SODIUM 2 TABLET: 50; 8.6 TABLET ORAL at 09:01

## 2021-09-15 RX ADMIN — NEBIVOLOL HYDROCHLORIDE 10 MG: 5 TABLET ORAL at 09:03

## 2021-09-15 RX ADMIN — MEGESTROL ACETATE 400 MG: 40 SUSPENSION ORAL at 09:03

## 2021-09-15 RX ADMIN — ASPIRIN 81 MG: 81 TABLET, COATED ORAL at 09:01

## 2021-09-15 RX ADMIN — CARBIDOPA AND LEVODOPA 1.5 TABLET: 25; 100 TABLET ORAL at 09:02

## 2021-09-15 RX ADMIN — LISINOPRIL 20 MG: 20 TABLET ORAL at 20:38

## 2021-09-15 RX ADMIN — OLANZAPINE 2.5 MG: 5 TABLET, FILM COATED ORAL at 20:38

## 2021-09-15 RX ADMIN — POTASSIUM CHLORIDE 20 MEQ: 1500 TABLET, EXTENDED RELEASE ORAL at 09:01

## 2021-09-15 NOTE — PROGRESS NOTES
"Daily Progress Note:      Chief complaint: Follow-up of COVID-19 infection, metabolic encephalopathy, rhabdomyolysis, hyperkalemia, diabetes, hypertension, Parkinson's disease    Subjective: No overnight events noted.  Remains awake alert cooperative.  His oral intake continue be less than optimal slowly improving according nursing staff.     LOS: 13 days     Vital Signs  Temp:  [97.1 °F (36.2 °C)-97.5 °F (36.4 °C)] 97.5 °F (36.4 °C)  Heart Rate:  [54-62] 54  Resp:  [18] 18  BP: (140-157)/(63-67) 157/67  Oxygen Therapy  SpO2: 94 %  Pulse Oximetry Type: Intermittent  Device (Oxygen Therapy): room air  $ High Flow Nasal Cannula Set-Up: yes  Flow (L/min): 40  Oxygen Concentration (%): 30  ETCO2 (mmHg): 27 mmHg  Probe Placed On (Pulse Ox): Left:, finger}  Body mass index is 32.77 kg/m².  Flowsheet Rows      First Filed Value   Admission Height  182.9 cm (72\") Documented at 09/02/2021 1411   Admission Weight  115 kg (254 lb 4.8 oz) Documented at 09/02/2021 1502                   Documented weights    09/02/21 1502 09/02/21 2241 09/06/21 1208 09/07/21 0514   Weight: 115 kg (254 lb 4.8 oz) 108 kg (238 lb 12.8 oz) 107 kg (236 lb 9.6 oz) 109 kg (240 lb 1.6 oz)    09/08/21 0700 09/09/21 0600 09/11/21 0804 09/12/21 0757   Weight: 104 kg (228 lb 8 oz) 105 kg (231 lb 1.6 oz) 105 kg (231 lb 14.4 oz) 105 kg (231 lb 12.8 oz)    09/13/21 0600 09/14/21 0632 09/15/21 0500   Weight: 105 kg (231 lb 3.2 oz) 106 kg (232 lb 9.6 oz) 104 kg (228 lb 6.4 oz)           Patient Vitals for the past 24 hrs:   BP Temp Temp src Pulse Resp SpO2 Weight   09/15/21 0501 157/67 97.5 °F (36.4 °C) Oral 54 18 94 % --   09/15/21 0500 -- -- -- -- -- -- 104 kg (228 lb 6.4 oz)   09/14/21 2010 140/63 97.1 °F (36.2 °C) Axillary 62 18 96 % --   09/14/21 1639 -- -- -- -- -- 94 % --   09/14/21 1600 154/66 97.2 °F (36.2 °C) Axillary 62 18 94 % --       104 kg (228 lb 6.4 oz)    Intake/Output                       09/13/21 0701 - 09/14/21 0700 09/14/21 0701 - 09/15/21 " 0700     [id number] 1901-0700 Total [id number] 6169-7611 Total                 Intake    P.O.  360  -- 360  0  -- 0    Total Intake 360 -- 360 0 -- 0       Output    Urine  200  -- 200  --  -- --    Total Output 200 -- 200 -- -- --           Intake/Output Summary (Last 24 hours) at 9/15/2021 0726  Last data filed at 9/14/2021 0848  Gross per 24 hour   Intake 0 ml   Output --   Net 0 ml        Intake/Output Summary (Last 24 hours) at 9/15/2021 0726  Last data filed at 9/14/2021 0848  Gross per 24 hour   Intake 0 ml   Output --   Net 0 ml        Review of Systems   Constitutional: Positive for appetite change and fatigue. Negative for activity change.   HENT: Negative for congestion.    Respiratory: Negative for cough, chest tightness, shortness of breath and wheezing.    Cardiovascular: Negative for chest pain.   Gastrointestinal: Negative for abdominal distention, abdominal pain, diarrhea, nausea and vomiting.   Endocrine: Negative for polyphagia and polyuria.   Genitourinary: Negative for frequency.   Skin: Negative for rash.   Neurological: Negative for light-headedness.   Psychiatric/Behavioral: Positive for confusion (Improved). Negative for agitation and behavioral problems.       Physical Exam  Vitals and nursing note reviewed.   Constitutional:       General: He is not in acute distress.     Appearance: He is well-developed. He is morbidly obese.   HENT:      Head: Normocephalic.      Right Ear: Decreased hearing noted.      Left Ear: Decreased hearing noted.   Eyes:      Conjunctiva/sclera: Conjunctivae normal.   Neck:      Thyroid: No thyromegaly.      Vascular: No JVD.   Cardiovascular:      Rate and Rhythm: Normal rate and regular rhythm.      Heart sounds: Normal heart sounds. No murmur heard.     Pulmonary:      Effort: Pulmonary effort is normal. No tachypnea, accessory muscle usage or respiratory distress.      Breath sounds: Examination of the right-lower field reveals decreased breath sounds.  Examination of the left-lower field reveals decreased breath sounds. Decreased breath sounds present. No wheezing or rales.   Abdominal:      General: Bowel sounds are normal. There is no distension.      Palpations: Abdomen is soft.      Tenderness: There is no abdominal tenderness. There is no guarding.   Musculoskeletal:      Right lower leg: No edema.      Left lower leg: No edema.   Skin:     General: Skin is warm and dry.      Findings: No rash.   Neurological:      General: No focal deficit present.      Mental Status: He is alert and oriented to person, place, and time. He is confused.      Cranial Nerves: No cranial nerve deficit or facial asymmetry.      Motor: Motor function is intact.      Comments: Oriented person place and situation follows commands cooperative   Psychiatric:         Cognition and Memory: Cognition is impaired. Memory is impaired. He exhibits impaired remote memory.         Medication Review:   I have reviewed the patient's current medication list  Scheduled Meds:aspirin, 81 mg, Oral, Daily  atorvastatin, 40 mg, Oral, Daily  carbidopa-levodopa, 1.5 tablet, Oral, TID  dexamethasone, 6 mg, Oral, Daily With Breakfast  enoxaparin, 40 mg, Subcutaneous, Q24H  glipizide, 5 mg, Oral, QAM AC  guaiFENesin, 1,200 mg, Oral, BID  haloperidol lactate, 1 mg, Intravenous, Once  insulin aspart, 0-24 Units, Subcutaneous, TID AC  lisinopril, 20 mg, Oral, Q12H  megestrol, 400 mg, Oral, Daily  pioglitazone-metFORMIN (ACTOPLUS MET)  mg combo dose, , Oral, BID With Meals  nebivolol, 10 mg, Oral, Daily  OLANZapine, 2.5 mg, Oral, Nightly  pantoprazole, 40 mg, Oral, Daily  potassium chloride, 20 mEq, Oral, Daily  senna-docusate sodium, 2 tablet, Oral, BID      Continuous Infusions:   PRN Meds:.•  acetaminophen  •  albuterol sulfate HFA  •  senna-docusate sodium **AND** polyethylene glycol **AND** bisacodyl **AND** bisacodyl  •  haloperidol lactate  •  hydrALAZINE  •  ondansetron **OR** ondansetron  •   sodium chloride      Labs:  Results from last 7 days   Lab Units 09/15/21  0423 09/14/21  0320 09/13/21  0343 09/12/21  0844 09/12/21  0844 09/11/21  0719 09/11/21  0719 09/10/21  0740 09/10/21  0740 09/09/21  0504 09/09/21  0504   WBC 10*3/mm3 9.87 8.75 9.82  --  9.78  --  8.77  --  9.12  --  9.88   HEMOGLOBIN g/dL 13.2 12.4* 12.5*   < > 13.1   < > 13.5   < > 13.5   < > 13.8   HEMATOCRIT % 40.2 37.5 38.6  --  38.7  --  41.3  --  40.5  --  41.2   PLATELETS 10*3/mm3 269 243 272  --  271  --  294  --  305  --  313    < > = values in this interval not displayed.     Results from last 7 days   Lab Units 09/15/21  0423 09/14/21  0320 09/13/21  0343 09/12/21  0845 09/11/21  0719 09/10/21  0740 09/09/21  0504   SODIUM mmol/L 144 143 145 142 146* 142 146*   POTASSIUM mmol/L 4.0 3.7 3.2* 3.5 3.2* 3.3* 2.8*   CHLORIDE mmol/L 111* 109* 111* 108* 108* 106 107   CO2 mmol/L 24.5 25.3 25.4 22.9 22.7 26.5 30.2*   BUN mg/dL 17 17 17 18 19 21 22   CREATININE mg/dL 0.60* 0.46* 0.49* 0.49* 0.57* 0.61* 0.62*   CALCIUM mg/dL 8.3* 8.3* 8.8 8.6 8.5* 8.3* 8.5*   BILIRUBIN mg/dL  --   --   --   --   --   --  0.8   ALK PHOS U/L  --   --   --   --   --   --  80   ALT (SGPT) U/L  --   --   --   --   --   --  9   AST (SGOT) U/L  --   --   --   --   --   --  26   GLUCOSE mg/dL 148* 126* 153* 217* 155* 159* 120*     Results from last 7 days   Lab Units 09/11/21  0719   MAGNESIUM mg/dL 1.7       Results from last 7 days   Lab Units 09/15/21  0423 09/14/21  0320 09/13/21  0343 09/10/21  0740 09/09/21  0504   LDH U/L  --   --   --   --  379*   AST (SGOT) U/L  --   --   --   --  26   ALT (SGPT) U/L  --   --   --   --  9   FERRITIN ng/mL  --   --   --   --  803.00*   D DIMER QUANT MCGFEU/mL  --   --   --   --  1.34*   PLATELETS 10*3/mm3 269 243 272   < > 313    < > = values in this interval not displayed.         Lab Results (last 24 hours)     Procedure Component Value Units Date/Time    Basic Metabolic Panel [[id number]]  (Abnormal) Collected:  09/15/21 0423    Specimen: Blood Updated: 09/15/21 0528     Glucose 148 mg/dL      BUN 17 mg/dL      Creatinine 0.60 mg/dL      Sodium 144 mmol/L      Potassium 4.0 mmol/L      Chloride 111 mmol/L      CO2 24.5 mmol/L      Calcium 8.3 mg/dL      eGFR Non African Amer 131 mL/min/1.73      BUN/Creatinine Ratio 28.3     Anion Gap 8.5 mmol/L     Narrative:      GFR Normal >60  Chronic Kidney Disease <60  Kidney Failure <15      CBC (No Diff) [[id number]]  (Normal) Collected: 09/15/21 0423    Specimen: Blood Updated: 09/15/21 0452     WBC 9.87 10*3/mm3      RBC 4.36 10*6/mm3      Hemoglobin 13.2 g/dL      Hematocrit 40.2 %      MCV 92.2 fL      MCH 30.3 pg      MCHC 32.8 g/dL      RDW 14.7 %      RDW-SD 49.5 fl      MPV 10.1 fL      Platelets 269 10*3/mm3     POC Glucose Once [[id number]]  (Abnormal) Collected: 09/14/21 2022    Specimen: Blood Updated: 09/14/21 2031     Glucose 185 mg/dL      Comment: Meter: [id number] : [id number] Titi Gonzalez RN (Validator)       POC Glucose Once [[id number]]  (Abnormal) Collected: 09/14/21 1617    Specimen: Blood Updated: 09/14/21 1624     Glucose 185 mg/dL      Comment: Meter: [id number] : [id number] Annie Forbes RN       POC Glucose Once [[id number]]  (Abnormal) Collected: 09/14/21 1148    Specimen: Blood Updated: 09/14/21 1156     Glucose 198 mg/dL      Comment: Meter: [id number] : [id number] Ender GLEASON           Results from last 7 days   Lab Units 09/09/21  0504   D DIMER QUANT MCGFEU/mL 1.34*                 Results from last 7 days   Lab Units 09/11/21  0719   MAGNESIUM mg/dL 1.7                 Glucose   Date/Time Value Ref Range Status   09/14/2021 2022 185 (H) 70 - 130 mg/dL Final     Comment:     Meter: [id number] : [id number] Titi Gonzalez RN (Validator)   09/14/2021 1617 185 (H) 70 - 130 mg/dL Final     Comment:     Meter: [id number] : [id number] Annie Forbes RN   09/14/2021 1148 198 (H) 70 - 130 mg/dL Final     Comment:     Meter: [id number]  : [id number] Ender Gong NA   09/14/2021 0749 126 70 - 130 mg/dL Final     Comment:     Meter: [id number] : [id number] Ender Gong NA   09/13/2021 2033 155 (H) 70 - 130 mg/dL Final     Comment:     Meter: [id number] : [id number] Titi Gonzalez RN (Validator)   09/13/2021 1631 205 (H) 70 - 130 mg/dL Final     Comment:     Meter: [id number] : [id number] Doyle Morocho CNA   09/13/2021 1117 285 (H) 70 - 130 mg/dL Final     Comment:     Meter: [id number] : [id number] Doyle Morocho CNA   09/13/2021 0632 151 (H) 70 - 130 mg/dL Final     Comment:     Meter: [id number] : [id number] Titi Gonzalez RN (Validator)             Results from last 7 days   Lab Units 09/10/21  1138   NITRITE UA  Negative   WBC UA /HPF None Seen   BACTERIA UA /HPF None Seen   SQUAM EPITHEL UA /HPF None Seen             Radiology:  Imaging Results (Last 24 Hours)     ** No results found for the last 24 hours. **          Cardiology:  ECG/EMG Results (last 24 hours)     Procedure Component Value Units Date/Time    ECG 12 Lead [[id number]] Collected: 09/02/21 1348     Updated: 09/02/21 1352     QT Interval 467 ms     Narrative:      HEART RATE= 68  bpm  RR Interval= 880  ms  NE Interval= 211  ms  P Horizontal Axis= 8  deg  P Front Axis= 52  deg  QRSD Interval= 130  ms  QT Interval= 467  ms  QRS Axis= -14  deg  T Wave Axis= -6  deg  - ABNORMAL ECG -  Sinus rhythm  Nonspecific intraventricular conduction delay  Lateral infarct, age indeterminate  Electronically Signed By:   Date and Time of Study: 2021-09-02 13:48:28          I have reviewed recent labs results and consult notes.  Parts of this note may have been copied and pasted but patient was examined and interviewed by me today    Assessment and Plan:    1.  COVID-19 pneumonia generalized weakness . Stable.    2.  Metabolic encephalopathy likely due to COVID-19 improving    3.    Adult-onset diabetes mellitus continues to be hyperglycemic but improving continue  with sliding scale insulin optimize glycemic control continue sliding scale insulin    4.  Hypertension controlled continue present medications    5.    Hypokalemia persistent continue potassium replacement    6.  Parkinson's disease nothing acute continue with home medications     7.    COPD no evidence of exacerbation continue with as needed albuterol    8.  DVT prophylaxis Lovenox    9.  Generalized weakness secondary to COVID-19 pneumonia comorbidities continue to encourage participation with therapies.  Placement been found at [facility] transfer on Thursday    10.  Suboptimal p.o. intake.  Slowly improving continue calorie count       Much of this encounter note is an electronic transcription/translation of spoken language to printed text using Dragon Software

## 2021-09-15 NOTE — THERAPY TREATMENT NOTE
Acute Care - Physical Therapy Treatment Note   Tori Simmons     Patient Name: Nash So  : 1945  MRN: [id number]  Today's Date: 9/15/2021      Visit Dx:     ICD-10-CM ICD-9-CM   1. COVID-19 virus infection  U07.1 079.89   2. Weakness  R53.1 780.79   3. Elevated troponin  R77.8 790.6   4. Contusion of face, initial encounter  S00.83XA 920     Patient Active Problem List   Diagnosis   • Vitamin D deficiency   • Diabetes mellitus (CMS/HCC)   • Back pain   • Benign prostatic hyperplasia   • Gastroesophageal reflux disease with esophagitis   • Hyperlipidemia   • Hypertension   • Hypogonadism in male   • Sebaceous cyst   • COPD (chronic obstructive pulmonary disease) (CMS/HCC)   • Encounter for screening for malignant neoplasm of colon   • COVID-19 virus infection   • Cytokine release syndrome, grade 1     Past Medical History:   Diagnosis Date   • Alzheimer disease (CMS/HCC)    • CHF (congestive heart failure) (CMS/HCC)    • COPD (chronic obstructive pulmonary disease) (CMS/HCC)    • Diabetes (CMS/HCC)    • GERD (gastroesophageal reflux disease)    • Hyperlipemia    • Hypertension      Past Surgical History:   Procedure Laterality Date   • ENDOSCOPY N/A 2017    Procedure: ESOPHAGOGASTRODUODENOSCOPY, biopsy;  Surgeon: Natalia Guerrero MD;  Location: [facility];  Service:         PT Assessment (last 12 hours)      PT Evaluation and Treatment     Row Name 09/15/21 0912          Physical Therapy Time and Intention    Subjective Information  no complaints  -BP     Document Type  therapy note (daily note)  -BP     Mode of Treatment  physical therapy  -BP     Patient Effort  adequate  -BP     Symptoms Noted During/After Treatment  fatigue confusion  -BP     Comment  Patient with increased confusion as compared to yesterday. Taking gown and blankets off. Agreeable to attempt mobility with encouragement. Following transfer to chair, patient places picture frame in his mouth.   -BP     Row Name 09/15/21 0912           General Information    Patient Observations  alert More confused as compared to previous session   -BP     Patient/Family/Caregiver Comments/Observations  Patient supine in bed with HOB elevated. Exit alarm on, fall pads noted on floor. Patient agreeable to PT treatment with encouragement  -BP     Existing Precautions/Restrictions  fall confusion   -BP     Limitations/Impairments  safety/cognitive  -BP     Risks Reviewed  patient:;LOB;increased discomfort  -BP     Benefits Reviewed  patient:;improve function;increase independence;increase strength  -BP     Barriers to Rehab  cognitive status;previous functional deficit  -BP     Row Name 09/15/21 0912          Pain Scale: Numbers Pre/Post-Treatment    Pre/Posttreatment Pain Comment  Patient does not complain of pain   -BP     Row Name 09/15/21 0912          Pain Scale: Word Pre/Post-Treatment    Pain Intervention(s)  Repositioned up in chair   -BP     Row Name 09/15/21 0912          Bed Mobility    Bed Mobility  supine-sit  -BP     Supine-Sit [place] (Bed Mobility)  maximum assist (25% patient effort);2 person assist;verbal cues  -BP     Bed Mobility, Safety Issues  cognitive deficits limit understanding  -BP     Assistive Device (Bed Mobility)  bed rails;draw sheet;head of bed elevated  -BP     Comment (Bed Mobility)  Patient requires cues for sequencing.   -BP     Row Name 09/15/21 0912          Transfers    Transfers  sit-stand transfer;stand-sit transfer  -BP     Comment (Transfers)  Patient requires min A x 2 to transfer to stand from an elevated bed surface. Patient performs stand pivot transfer bed to chair to the right, initially requires mod A x 2 however progresses to max A/dependent x 2 to safely transfer to sit in recliner. Patient with left lateral lean noted. Patient requires significant cues for sequencing.   -BP     Sit-Stand [place] (Transfers)  minimum assist (75% patient effort);2 person assist;verbal cues  -BP     Stand-Sit  [place] (Transfers)  minimum assist (75% patient effort);2 person assist to  bed   -BP     Row Name 09/15/21 0912          Sit-Stand Transfer    Assistive Device (Sit-Stand Transfers)  walker, front-wheeled  -BP     Row Name 09/15/21 0912          Stand-Sit Transfer    Assistive Device (Stand-Sit Transfers)  walker, front-wheeled  -BP     Row Name 09/15/21 0912          Stand Pivot/Stand Step Transfer    Stand Pivot/Stand Step [place] (Transfers)  moderate assist (50% patient effort);maximum assist (25% patient effort);dependent (less than 25% patient effort);2 person assist;verbal cues  -     Assistive Device (Stand Pivot Stand Step Transfer)  walker, front-wheeled  -BP     Row Name 09/15/21 0912          Gait/Stairs (Locomotion)    Comment (Gait/Stairs)  transfers only   -BP     Row Name 09/15/21 0912          Safety Issues, Functional Mobility    Safety Issues Affecting Function (Mobility)  awareness of need for assistance;impulsivity;insight into deficits/self-awareness;judgment;positioning of assistive device;problem-solving;safety precaution awareness;sequencing abilities  -     Comment, Safety Issues/Impairments (Mobility)  Patient with significant difficulty sequencing mobility and motor planning.   -BP     Row Name 09/15/21 0912          Balance    Comment, Balance  SBA/CGA for static sitting balance. Cues for safety in sitting.   -BP     Row Name             Wound 09/02/21 1452 Left elbow Skin Tear    Wound - Properties Group Placement Date: 09/02/21  -EF Placement Time: 1452  -EF Present on Hospital Admission: Y  -EF Side: Left  -EF Location: elbow  -EF Primary Wound Type: Skin tear  -EF    Retired Wound - Properties Group Date first assessed: 09/02/21  -EF Time first assessed: 1452  -EF Present on Hospital Admission: Y  -EF Side: Left  -EF Location: elbow  -EF Primary Wound Type: Skin tear  -EF    Row Name             Wound 09/02/21 1453 Right elbow Skin Tear    Wound - Properties Group  Placement Date: 09/02/21  -EF Placement Time: 1453  -EF Present on Hospital Admission: Y  -EF Side: Right  -EF Location: elbow  -EF Primary Wound Type: Skin tear  -EF    Retired Wound - Properties Group Date first assessed: 09/02/21  -EF Time first assessed: 1453  -EF Present on Hospital Admission: Y  -EF Side: Right  -EF Location: elbow  -EF Primary Wound Type: Skin tear  -EF    Row Name 09/15/21 0912          Plan of Care Review    Plan of Care Reviewed With  patient  -BP     Outcome Summary  PT: Patient performs supine to sit transfer with max A x 2, sit to/from stand transfer from elevated bed with min A x 2 and stand pivot transfer to the right with mod A/maxA/dependent x 2 with use of FWW. Patient with significant L lateral lean during transfer. Patient initially requires mod A x 2 for stand pivot, progresses to max/dependent x 2 due to difficulty sequencing and motor planning. Patient with increased confusion today as compared to yesterday however cooperative. Continue to anticipate discharge to SNF/ECF.   -BP     Row Name 09/15/21 0912          Positioning and Restraints    Pre-Treatment Position  in bed  -BP     Post Treatment Position  chair  -BP     In Chair  reclined;call light within reach;encouraged to call for assist;exit alarm on with CNA  -BP     Row Name 09/15/21 0912          Progress Summary (PT)    Progress Toward Functional Goals (PT)  progress toward functional goals is fair  -BP     Barriers to Overall Progress (PT)  cognition and previous functional deficits   -BP     Row Name 09/15/21 0912          Therapy Plan Review/Discharge Plan (PT)    Therapy Plan Review (PT)  patient;participants included;current/potential barriers reviewed  -BP       User Key  (r) = Recorded By, (t) = Taken By, (c) = Cosigned By    Initials Name Provider Type    Solomon Arce, PT Physical Therapist    Radha Minor, RN Registered Nurse        Physical Therapy Education                 Title: PT  OT SLP Therapies (In Progress)     Topic: Physical Therapy (In Progress)     Point: Mobility training (In Progress)     Learning Progress Summary           Patient Acceptance, E,TB, NR by  at 9/15/2021 1039    Acceptance, E,TB, NR by  at 9/14/2021 1257    Acceptance, E,TB, NR by  at 9/13/2021 1425    Acceptance, E,TB, NR by  at 9/9/2021 1243    Acceptance, E,TB, NR by BP at 9/8/2021 1225    Acceptance, E,TB, VU,NL by J at 9/6/2021 1117    Acceptance, E,TB, NR by  at 9/3/2021 1139                   Point: Home exercise program (Not Started)     Learner Progress:  Not documented in this visit.                      User Key     Initials Effective Dates Name Provider Type Discipline     06/16/21 -  Catrina Graham, PT Physical Therapist PT    BP 06/16/21 -  Geovanna Stack-Mario, PT Physical Therapist PT    JW 06/16/21 -  Patricia Dodson, PT Physical Therapist PT    J 06/16/21 -  Maria Luisa Rdz, PT Physical Therapist PT              PT Recommendation and Plan  Anticipated Discharge Disposition (PT): skilled nursing facility, extended care facility  Planned Therapy Interventions (PT): bed mobility training, gait training, patient/family education, strengthening, transfer training  Therapy Frequency (PT): 5 times/wk  Progress Summary (PT)  Progress Toward Functional Goals (PT): progress toward functional goals is fair  Barriers to Overall Progress (PT): cognition and previous functional deficits   Plan of Care Reviewed With: patient  Outcome Summary: PT: Patient performs supine to sit transfer with max A x 2, sit to/from stand transfer from elevated bed with min A x 2 and stand pivot transfer to the right with mod A/maxA/dependent x 2 with use of FWW. Patient with significant L lateral lean during transfer. Patient initially requires mod A x 2 for stand pivot, progresses to max/dependent x 2 due to difficulty sequencing and motor planning. Patient with increased confusion today as compared to yesterday  however cooperative. Continue to anticipate discharge to SNF/ECF.   Outcome Measures     Row Name 09/15/21 0912 09/14/21 1100 09/14/21 0858       How much help from another person do you currently need...    Turning from your back to your side while in flat bed without using bedrails?  1  -BP  --  1  -BP    Moving from lying on back to sitting on the side of a flat bed without bedrails?  1  -BP  --  1  -BP    Moving to and from a bed to a chair (including a wheelchair)?  1  -BP  --  1  -BP    Standing up from a chair using your arms (e.g., wheelchair, bedside chair)?  1  -BP  --  1  -BP    Climbing 3-5 steps with a railing?  1  -BP  --  1  -BP    To walk in hospital room?  1  -BP  --  1  -BP    AM-PAC 6 Clicks Score (PT)  6  -BP  --  6  -BP       How much help from another is currently needed...    Putting on and taking off regular lower body clothing?  --  1  -SD  --    Bathing (including washing, rinsing, and drying)  --  1  -SD  --    Toileting (which includes using toilet bed pan or urinal)  --  1  -SD  --    Putting on and taking off regular upper body clothing  --  1  -SD  --    Taking care of personal grooming (such as brushing teeth)  --  2  -SD  --    Eating meals  --  2  -SD  --    AM-PAC 6 Clicks Score (OT)  --  8  -SD  --       Functional Assessment    Outcome Measure Options  AM-PAC 6 Clicks Basic Mobility (PT)  -BP  AM-PAC 6 Clicks Daily Activity (OT)  -SD  AM-PAC 6 Clicks Basic Mobility (PT)  -BP    Row Name 09/13/21 1337 09/13/21 1319          How much help from another person do you currently need...    Turning from your back to your side while in flat bed without using bedrails?  --  1  -JW     Moving from lying on back to sitting on the side of a flat bed without bedrails?  --  1  -JW     Moving to and from a bed to a chair (including a wheelchair)?  --  1  -JW     Standing up from a chair using your arms (e.g., wheelchair, bedside chair)?  --  2  -JW     Climbing 3-5 steps with a railing?  --  1   -JW     To walk in hospital room?  --  1  -JW     AM-PAC 6 Clicks Score (PT)  --  7  -JW        How much help from another is currently needed...    Putting on and taking off regular lower body clothing?  1  -SD  --     Bathing (including washing, rinsing, and drying)  1  -SD  --     Toileting (which includes using toilet bed pan or urinal)  1  -SD  --     Putting on and taking off regular upper body clothing  1  -SD  --     Taking care of personal grooming (such as brushing teeth)  2  -SD  --     Eating meals  2  -SD  --     AM-PAC 6 Clicks Score (OT)  8  -SD  --        Functional Assessment    Outcome Measure Options  AM-PAC 6 Clicks Daily Activity (OT)  -SD  AM-PAC 6 Clicks Basic Mobility (PT)  -       User Key  (r) = Recorded By, (t) = Taken By, (c) = Cosigned By    Initials Name Provider Type    Wade Wilkerson, OTR Occupational Therapist    Solomon Arce, PT Physical Therapist    Patricia Gonzalez PT Physical Therapist           Time Calculation:   PT Charges     Row Name 09/15/21 1041             Time Calculation    Start Time  0912  -BP      Stop Time  0935  -BP      Time Calculation (min)  23 min  -BP      PT Received On  09/15/21  -BP      PT - Next Appointment  09/16/21  -BP         Timed Charges    07535 - PT Therapeutic Exercise Minutes  23  -BP         Total Minutes    Timed Charges Total Minutes  23  -BP       Total Minutes  23  -BP        User Key  (r) = Recorded By, (t) = Taken By, (c) = Cosigned By    Initials Name Provider Type    Solomon Arce, PT Physical Therapist        Therapy Charges for Today     Code Description Service Date Service Provider Modifiers Qty    51375063336 HC PT THER PROC EA 15 MIN 9/14/2021 Solomon Stack, PT GP 1    45311941874 HC PT THER PROC EA 15 MIN 9/15/2021 Solomon Stack, PT GP 2          PT G-Codes  Outcome Measure Options: AM-PAC 6 Clicks Basic Mobility (PT)  AM-PAC 6 Clicks Score (PT): 6  AM-PAC 6 Clicks Score (OT): 8    Solomon  Sreekanth, PT  9/15/2021

## 2021-09-15 NOTE — CASE MANAGEMENT/SOCIAL WORK
Continued Stay Note  MIA Beyer     Patient Name: Nash So  MRN: [id number]  Today's Date: 9/15/2021    Admit Date: 9/2/2021    Discharge Plan     Row Name 09/15/21 1318       Plan    Plan Comments  I entered the patients room and found him resting in his bed with his eyes closed.  The patient will discharge tomorrow to [facility]. His wife is agreeable to that plan.  CM will continue to follow.        Discharge Codes    No documentation.             Montserrat Harris RN

## 2021-09-15 NOTE — DISCHARGE SUMMARY
Nash Lelivan  1945  [id number]        Discharge Summary    Date of Admission: 9/2/2021  Date of Discharge:  9/16/2021      Primary Discharge Diagnoses:     COVID-19 pneumonia in a vaccinated patient  Acute hypoxic respiratory failure  Mild rhabdomyolysis secondary to fall at home  Hypokalemia resolved  Metabolic encephalopathy secondary to COVID-19 pneumonia  Generalized weakness immobilization syndrome secondary to COVID-19 infection    Secondary Discharge Diagnoses:   Hypertension  Adult-onset diabetes mellitus non-insulin-dependent  Hyperlipidemia  Osteoarthritis  Parkinson's disease  Mild dementia  COPD  Morbid obesity    PCP  Patient Care Team:  Reno Nassar MD as PCP - General (Family Medicine)    Consults:   Consults     Date and Time Order Name Status Description    9/3/2021  5:57 PM Inpatient Pulmonology Consult Completed             History of Present Illness:    75-year-old white male with history of diabetes, Parkinson's, hypertension, hyperlipidemia, chronic lower extremity edema who was brought in the emergency room by EMS after a fall at home.  Patient could not get up and was found home with his own feces.  Wife apparently tested positive for Covid 6 days ago.  He is somewhat confused but able to give some history denies any fever chills no nausea vomiting.  States he is just weak and cannot get around at home.  Does not remember falling.    Hospital Course     Patient admitted to the telemetry bed.  Patient is CT angiogram not show evidence of pulmonary embolus.  Patient continue beacon clearly confused at times agitated and significantly hypertensive the first 48 hours admission.  He had mild rhabdomyolysis home and was treated with IV fluids with little improvement.  Because of significant lower extremity edema his fluids were stopped initially with several dose of IV diuretics with resolution of his lower extremity edema.  His hypertension was treated with IV  hydralazine.  He also developed episodes of tachypnea without hypoxia.  Pulmonary saw patient consultation and COVID-19 treatment was initiated with Decadron and remdesivir on 9/5/2021.  He had short episode of hypoxia which rapidly resolved within 24 hours he was exertion room air and remained on room air the rest of his admission.    Problems maintaining with IV access and remdesivir was discontinued after 3 days as he had significantly improved..  Patient continued to be combative confused and had severe sundowning although he had no prior history of significant dementia.  Patient was started on IV Haldol and low-dose Zyprexa.    He was significantly hyperglycemic use of IV Decadron and his blood sugars were monitored and she was sliding scale insulin improved with time of discharge.    Patient's confusion gradually improved but still not back to baseline at time of admission.  Because of his confusion he had very very slow progress and participation with therapies but this also gradually improved and he was participating with therapies at time of discharge.  His oral intake continue be very poor discussed with his wife about NG tube and as patient would probably pull out his Dobbhoff and it was decided to continue with supportive care.  He was started on Megace and his oral intake was continue to be monitored and was gradually improved with still suboptimal time of discharge.    His blood pressures were much improved but not optimally controlled and will continue be monitored after discharge.      His functional status much below baseline is being transferred back to rehab for continued short-term rehab.    Operations and Procedures Performed:       CT Head Without Contrast    Result Date: 9/2/2021  Narrative: CT HEAD, NONCONTRAST, 09/20/2021  HISTORY: Fall this morning with trauma to back of head  COMPARISON:    TECHNIQUE: CT examination of the head was performed without IV contrast. Radiation dose reduction  techniques included automated exposure control or exposure modulation based on body size. Radiation audit for CT and nuclear cardiology exams in the last12 months: 0.    FINDINGS: The examination is negative. No evidence of intracranial hemorrhage, mass, mass effect, acute cerebral edema, hydrocephalus or additional abnormality.       Impression: Negative noncontrast head CT examination.  This report was finalized on 9/2/2021 3:54 PM by Dr. Vikash Martin MD.      CT Angiogram Chest With & Without Contrast    Result Date: 9/2/2021  Narrative: CT CHEST WITH CONTRAST, PE PROTOCOL, 9/2/2021 HISTORY: 75-year-old male admitted to the ED after being found down on floor today. Weakness. Positive COVID-19 testing. Elevated d-dimer. TECHNIQUE: CT examination of the chest with IV contrast. CTA MIP multiplanar pulmonary artery images were reformatted with 3-D postprocessing. Radiation dose reduction techniques included automated exposure control. Radiation audit for CT and nuclear cardiology exams in the last 12 months: 1. FINDINGS: No pulmonary embolism is demonstrated. Thoracic aorta is normal in caliber with no aneurysm or dissection. Mild cardiomegaly. No pericardial effusion. Coronary artery atheromatous calcification. Mild infiltrate and atelectasis limited to the posterior lung bases. Currently, there is no scattered pulmonary infiltrates typical for COVID-19 pneumonitis. Note: CT may be negative in the earliest stages of COVID-19. No pleural effusion. No suspicious mass or adenopathy within the chest. No hiatal hernia or thoracic esophageal dilatation. Limited upper abdominal images show no active disease.     Impression: 1.  No evidence of pulmonary embolism or other acute vascular abnormality within the chest. 2.  Minimal infiltrate and atelectasis in the dependent posterior lung bases. See above. Signer Name: Ronnie Rosales MD  Signed: 9/2/2021 8:39 PM  Workstation Name: [email or web address]    XR Chest 1 View    Result Date: 9/3/2021  Narrative: CR Chest 1 Vw INDICATION: Shortness of breath COMPARISON:  Chest x-ray from 9/2/21. FINDINGS: Single portable AP view(s) of the chest. Exam is significantly limited by patient body habitus, positioning and low lung volume. There appear to be some bibasilar opacities that may be slightly increased from prior, although this is somewhat equivocal. No large effusion or acute osseous abnormality.     Impression: Exam is significantly limited by low lung volumes. There appears to be bibasilar opacities, and it is uncertain if these may represent potential pneumonia or be artifactual. Signer Name: Ada Abebe MD  Signed: 9/3/2021 5:41 PM  Workstation Name: [id number]  [facility]    XR Chest 1 View    Result Date: 9/2/2021  Narrative: AP PORTABLE CHEST, 09/02/2021  HISTORY: Shortness of air today  COMPARISON: 12/11/2020  TECHNIQUE: AP portable chest x-ray.  FINDINGS: There are low lung volumes and there may be minimal basilar atelectasis. Otherwise lungs are clear. Heart size is normal.      Impression: Low lung wires volumes minimal basilar atelectasis otherwise no active disease  This report was finalized on 9/2/2021 3:14 PM by Dr. Vikash Martin MD.        Labs:  Results from last 7 days   Lab Units 09/15/21  0423 09/14/21  0320 09/13/21  0343 09/12/21  0844 09/12/21  0844 09/11/21  0719 09/11/21  0719 09/10/21  0740 09/10/21  0740 09/09/21  0504 09/09/21  0504   WBC 10*3/mm3 9.87 8.75 9.82  --  9.78  --  8.77  --  9.12  --  9.88   HEMOGLOBIN g/dL 13.2 12.4* 12.5*   < > 13.1   < > 13.5   < > 13.5   < > 13.8   HEMATOCRIT % 40.2 37.5 38.6  --  38.7  --  41.3  --  40.5  --  41.2   PLATELETS 10*3/mm3 269 243 272  --  271  --  294  --  305  --  313    < > = values in this interval not displayed.     Results from last 7 days   Lab Units 09/15/21  0423 09/14/21  0320 09/13/21  0343 09/12/21  0845 09/11/21  0719 09/10/21  0740 09/09/21  0504    SODIUM mmol/L 144 143 145 142 146* 142 146*   POTASSIUM mmol/L 4.0 3.7 3.2* 3.5 3.2* 3.3* 2.8*   CHLORIDE mmol/L 111* 109* 111* 108* 108* 106 107   CO2 mmol/L 24.5 25.3 25.4 22.9 22.7 26.5 30.2*   BUN mg/dL 17 17 17 18 19 21 22   CREATININE mg/dL 0.60* 0.46* 0.49* 0.49* 0.57* 0.61* 0.62*   CALCIUM mg/dL 8.3* 8.3* 8.8 8.6 8.5* 8.3* 8.5*   BILIRUBIN mg/dL  --   --   --   --   --   --  0.8   ALK PHOS U/L  --   --   --   --   --   --  80   ALT (SGPT) U/L  --   --   --   --   --   --  9   AST (SGOT) U/L  --   --   --   --   --   --  26   GLUCOSE mg/dL 148* 126* 153* 217* 155* 159* 120*     Results from last 7 days   Lab Units 09/11/21  0719   MAGNESIUM mg/dL 1.7       Lab Results (last 24 hours)     Procedure Component Value Units Date/Time    Basic Metabolic Panel [[id number]]  (Abnormal) Collected: 09/15/21 0423    Specimen: Blood Updated: 09/15/21 0528     Glucose 148 mg/dL      BUN 17 mg/dL      Creatinine 0.60 mg/dL      Sodium 144 mmol/L      Potassium 4.0 mmol/L      Chloride 111 mmol/L      CO2 24.5 mmol/L      Calcium 8.3 mg/dL      eGFR Non African Amer 131 mL/min/1.73      BUN/Creatinine Ratio 28.3     Anion Gap 8.5 mmol/L     Narrative:      GFR Normal >60  Chronic Kidney Disease <60  Kidney Failure <15      CBC (No Diff) [[id number]]  (Normal) Collected: 09/15/21 0423    Specimen: Blood Updated: 09/15/21 0452     WBC 9.87 10*3/mm3      RBC 4.36 10*6/mm3      Hemoglobin 13.2 g/dL      Hematocrit 40.2 %      MCV 92.2 fL      MCH 30.3 pg      MCHC 32.8 g/dL      RDW 14.7 %      RDW-SD 49.5 fl      MPV 10.1 fL      Platelets 269 10*3/mm3     POC Glucose Once [[id number]]  (Abnormal) Collected: 09/14/21 2022    Specimen: Blood Updated: 09/14/21 2031     Glucose 185 mg/dL      Comment: Meter: [id number] : [id number] Titi Gonzalez RN (Validator)       POC Glucose Once [[id number]]  (Abnormal) Collected: 09/14/21 1617    Specimen: Blood Updated: 09/14/21 1624     Glucose 185 mg/dL      Comment: Meter: [id number]  : [id number] Annie Forbes RN       POC Glucose Once [[id number]]  (Abnormal) Collected: 09/14/21 1148    Specimen: Blood Updated: 09/14/21 1156     Glucose 198 mg/dL      Comment: Meter: [id number] : [id number] Ender GLEASON           Results from last 7 days   Lab Units 09/09/21  0504   D DIMER QUANT MCGFEU/mL 1.34*                 Results from last 7 days   Lab Units 09/11/21  0719   MAGNESIUM mg/dL 1.7           Invalid input(s): LDLCALC          Glucose   Date/Time Value Ref Range Status   09/14/2021 2022 185 (H) 70 - 130 mg/dL Final     Comment:     Meter: [id number] : [id number] Titi Gonzalez RN (Validator)   09/14/2021 1617 185 (H) 70 - 130 mg/dL Final     Comment:     Meter: [id number] : [id number] Annie Forbes RN   09/14/2021 1148 198 (H) 70 - 130 mg/dL Final     Comment:     Meter: [id number] : [id number] Ender GLEASON   09/14/2021 0749 126 70 - 130 mg/dL Final     Comment:     Meter: [id number] : [id number] Ender GLEASON   09/13/2021 2033 155 (H) 70 - 130 mg/dL Final     Comment:     Meter: [id number] : [id number] Titi Gonzalez RN (Validator)   09/13/2021 1631 205 (H) 70 - 130 mg/dL Final     Comment:     Meter: [id number] : [id number] Doyle Morocho CNA   09/13/2021 1117 285 (H) 70 - 130 mg/dL Final     Comment:     Meter: [id number] : [id number] Doyle Morocho CNA   09/13/2021 0632 151 (H) 70 - 130 mg/dL Final     Comment:     Meter: [id number] : [id number] Titi Gonzalez RN (Validator)             Results from last 7 days   Lab Units 09/10/21  1138   NITRITE UA  Negative   WBC UA /HPF None Seen   BACTERIA UA /HPF None Seen   SQUAM EPITHEL UA /HPF None Seen             Radiology:  Imaging Results (Last 24 Hours)     ** No results found for the last 24 hours. **          PROCEDURES          Allergies:  is allergic to penicillin g and penicillins.      Discharge Medications:     Your medication list      CONTINUE taking these medications       Instructions Last Dose Given Next Dose Due   NovoTwist 32G X 5 MM misc  Generic drug: Insulin Pen Needle           Insulin Pen Needle 31G X 8 MM misc           B-D ULTRAFINE III SHORT PEN 31G X 8 MM misc  Generic drug: Insulin Pen Needle      USE AS DIRECTED TWO TIMES A DAY AND AS NEEDED          ASK your doctor about these medications      Instructions Last Dose Given Next Dose Due   aspirin tablet      Take  by mouth daily.       atorvastatin 40 MG tablet  Commonly known as: LIPITOR      Take 1 tablet by mouth daily.       carbidopa-levodopa  MG per tablet  Commonly known as: SINEMET      Take 1.5 tablets by mouth 3 (Three) Times a Day.       furosemide 20 MG tablet  Commonly known as: LASIX      Take 2 tablets po every morning       glimepiride 4 MG tablet  Commonly known as: AMARYL      Take 2 po daily       glucose blood test strip      Heather Contour Test In Vitro Strip; Patient Sig: Heather Contour Test In Vitro Strip test bid; 1; 6; 14-May-2013; Active       HEATHER CONTOUR TEST VI      Heather Contour Test In Vitro Strip; Patient Sig: Heather Contour Test In Vitro Strip test bid; 1; 6; 14-May-2013; Active       lisinopril 20 MG tablet  Commonly known as: PRINIVIL,ZESTRIL      Take 1 tablet by mouth daily.       metoclopramide 10 MG tablet  Commonly known as: REGLAN      Take i po tid       nabumetone 500 MG tablet  Commonly known as: RELAFEN      Take 1.5 tablets by mouth every 12 (twelve) hours.       nebivolol 10 MG tablet  Commonly known as: Bystolic      Take 1 tablet by mouth daily.       NEURIVA PO      Take  by mouth.       NON FORMULARY           pantoprazole 40 MG EC tablet  Commonly known as: PROTONIX      TAKE ONE TABLET BY MOUTH DAILY       pioglitazone-metFORMIN  MG per tablet  Commonly known as: ACTOPLUS MET      Take one po bid       Ventolin  (90 Base) MCG/ACT inhaler  Generic drug: albuterol sulfate HFA      Ventolin  (90 Base) MCG/ACT Inhalation Aerosol Solution; Patient  Sig: Ventolin  (90 Base) MCG/ACT Inhalation Aerosol Solution INHALE PUFFS  PRN; 0; 01-Nov-2012; Active       Victoza 18 MG/3ML solution pen-injector injection  Generic drug: Liraglutide  Ask about: Which instructions should I use?      Inject 1.8 mg under the skin into the appropriate area as directed Daily.       VITAMIN D BOOSTER PO      Take 1,250 mcg by mouth.              Home medications and discharge medications reconciled with patient      Condition on Discharge: Stable    Discharge Disposition  [facility]      Discharge Diet:      Dietary Orders (From admission, onward)     Start     Ordered    09/12/21 1258  Calorie Count  (Calorie Count)  Until Discontinued     Question:  Duration of Calorie Count  Answer:  3 Days    09/12/21 1258    09/11/21 1208  Diet Soft Texture; Chopped  Diet Effective Now     Question Answer Comment   Diet Texture / Consistency Soft Texture    Select Texture: Chopped        09/11/21 1208    09/08/21 0800  Dietary Nutrition Supplement: Boost Glucose Control (Glucerna Shake)  Daily With Breakfast, Lunch & Dinner     Question:  Select Supplement:  Answer:  Boost Glucose Control (Glucerna Shake)    09/07/21 2001                Activity at Discharge:  Up with assistance      Follow-up Appointments:  Contact information for after-discharge care     [facility]    Service: Skilled Nursing  Contact information:  [address] [facility] [address] [phone]                       Test Results Pending at Discharge       Reno Nassar MD  09/15/21  07:32 EDT    Much of this encounter note is an electronic transcription/translation of spoken language to printed text using Dragon Software

## 2021-09-15 NOTE — THERAPY TREATMENT NOTE
Acute Care - Occupational Therapy Treatment Note   Tori Simmons     Patient Name: Nash So  : 1945  MRN: [id number]  Today's Date: 9/15/2021             Admit Date: 2021       ICD-10-CM ICD-9-CM   1. COVID-19 virus infection  U07.1 079.89   2. Weakness  R53.1 780.79   3. Elevated troponin  R77.8 790.6   4. Contusion of face, initial encounter  S00.83XA 920     Patient Active Problem List   Diagnosis   • Vitamin D deficiency   • Diabetes mellitus (CMS/HCC)   • Back pain   • Benign prostatic hyperplasia   • Gastroesophageal reflux disease with esophagitis   • Hyperlipidemia   • Hypertension   • Hypogonadism in male   • Sebaceous cyst   • COPD (chronic obstructive pulmonary disease) (CMS/HCC)   • Encounter for screening for malignant neoplasm of colon   • COVID-19 virus infection   • Cytokine release syndrome, grade 1     Past Medical History:   Diagnosis Date   • Alzheimer disease (CMS/HCC)    • CHF (congestive heart failure) (CMS/HCC)    • COPD (chronic obstructive pulmonary disease) (CMS/HCC)    • Diabetes (CMS/HCC)    • GERD (gastroesophageal reflux disease)    • Hyperlipemia    • Hypertension      Past Surgical History:   Procedure Laterality Date   • ENDOSCOPY N/A 2017    Procedure: ESOPHAGOGASTRODUODENOSCOPY, biopsy;  Surgeon: Natalia Guerrero MD;  Location: [facility];  Service:             OT ASSESSMENT FLOWSHEET (last 12 hours)      OT Evaluation and Treatment     Row Name 09/15/21 0913                   OT Time and Intention    Subjective Information  no complaints  -JJ        Document Type  therapy note (daily note)  -JJ        Mode of Treatment  occupational therapy  -JJ        Patient Effort  adequate  -JJ        Symptoms Noted During/After Treatment  fatigue confusion  -JJ        Comment  pt with increased confusion today. pt grabbing at the air and pulling gown and blankets off. pt agreeable to get up to chair with encouragement. pt put picture frame in is mouth when seated in  chair.   -           General Information    Patient/Family/Caregiver Comments/Observations  pt supine in bed, agreed to treatment with encouragement  -J        Existing Precautions/Restrictions  fall confusion  -        Limitations/Impairments  safety/cognitive  -        Barriers to Rehab  cognitive status;previous functional deficit  -           Cognition    Personal Safety Interventions  gait belt;nonskid shoes/slippers when out of bed  -           Pain Scale: Numbers Pre/Post-Treatment    Pre/Posttreatment Pain Comment  pt with no co pain  -J           Bed Mobility    Bed Mobility  supine-sit  -JJ        Supine-Sit [place] (Bed Mobility)  maximum assist (25% patient effort);2 person assist;verbal cues  -J        Bed Mobility, Safety Issues  cognitive deficits limit understanding  -        Assistive Device (Bed Mobility)  bed rails;draw sheet;head of bed elevated  -        Comment (Bed Mobility)  pt requires cues for sequencing and safety  -           Transfer Assessment/Treatment    Transfers  sit-stand transfer;stand-sit transfer;stand pivot/stand step transfer  -        Comment (Transfers)  pt requires min assist x 2 for sit to stand transfers from EOB with RW and verbal cues. pt initially required mod assist x 2 for stand pivot transfer to chair however transitioned to max assist x 2 with signficant left lateral lean and signficant cues and assist to advance feet and manage walker.  -JJ           Transfers    Sit-Stand [place] (Transfers)  minimum assist (75% patient effort);2 person assist;verbal cues  -J        Stand-Sit [place] (Transfers)  minimum assist (75% patient effort);2 person assist;verbal cues  -J           Stand-Sit Transfer    Assistive Device (Stand-Sit Transfers)  walker, front-wheeled  -           Stand Pivot/Stand Step Transfer    Stand Pivot/Stand Step [place] (Transfers)  moderate assist (50% patient effort);maximum assist (25% patient effort);2  person assist;verbal cues  -J        Assistive Device (Stand Pivot Stand Step Transfer)  walker, front-wheeled  -           Safety Issues, Functional Mobility    Safety Issues Affecting Function (Mobility)  awareness of need for assistance;impulsivity;insight into deficits/self-awareness;positioning of assistive device;problem-solving;safety precautions follow-through/compliance  -           Balance    Comment, Balance  SBA/CGA for static sitting balance  -           Wound 09/02/21 1452 Left elbow Skin Tear    Wound - Properties Group Placement Date: 09/02/21  -EF Placement Time: 1452  -EF Present on Hospital Admission: Y  -EF Side: Left  -EF Location: elbow  -EF Primary Wound Type: Skin tear  -EF    Retired Wound - Properties Group Date first assessed: 09/02/21  -EF Time first assessed: 1452  -EF Present on Hospital Admission: Y  -EF Side: Left  -EF Location: elbow  -EF Primary Wound Type: Skin tear  -EF       Wound 09/02/21 1453 Right elbow Skin Tear    Wound - Properties Group Placement Date: 09/02/21  -EF Placement Time: 1453  -EF Present on Hospital Admission: Y  -EF Side: Right  -EF Location: elbow  -EF Primary Wound Type: Skin tear  -EF    Retired Wound - Properties Group Date first assessed: 09/02/21  -EF Time first assessed: 1453  -EF Present on Hospital Admission: Y  -EF Side: Right  -EF Location: elbow  -EF Primary Wound Type: Skin tear  -EF       Plan of Care Review    Plan of Care Reviewed With  patient  -JJ        Outcome Summary  OT: pt performs supine to sit transfer to EOB with max assist x 2 and signficant cues. pt required min assist x 2 for sit to stand transfer from elevated bed surface with RW. pt initially required mod assist x2 for stand pivot transfer from bed to chair however transitioned to max assist x2 with signficant cues and assist required for advancing feet and walker management. pt required significant cues and encouragement throughout session for sequencing, safety and  participation with therapy. pt placed framed family photo in mouth when seated in recliner chair. removed frame from mouth and moved it out of patients reach. donal pad and exit alarm in place at end of session  -JJ           Positioning and Restraints    Pre-Treatment Position  in bed  -JJ        Post Treatment Position  chair  -JJ        In Chair  reclined;call light within reach;encouraged to call for assist;exit alarm on with CNA  -JJ           Progress Summary (OT)    Progress Toward Functional Goals (OT)  progress toward functional goals is fair;prepare for discharge  -JJ        Daily Progress Summary (OT)  cont poc  -JJ          User Key  (r) = Recorded By, (t) = Taken By, (c) = Cosigned By    Initials Name Effective Dates    Luisana Yin, OTR 06/16/21 -     Radha Minor, RN 09/27/16 -            Occupational Therapy Education                 Title: PT OT SLP Therapies (In Progress)     Topic: Occupational Therapy (In Progress)     Point: ADL training (Done)     Description:   Instruct learner(s) on proper safety adaptation and remediation techniques during self care or transfers.   Instruct in proper use of assistive devices.              Learning Progress Summary           Patient Acceptance, E,TB, VU,NL by LEWIS at 9/15/2021 1105    Comment: pt educated on safety with functional transfers and mobility    Acceptance, E,TB,D, NL by SD at 9/13/2021 1518    Comment: Education regarding OT services, benefits of activity and safety with bed mobility and transfers. Pt is confused. No evidence of learning/carryover of education from day to day.    Acceptance, E,TB, VU,NL by LEWIS at 9/9/2021 1408    Comment: pt educated on safety with transfers, bed mobility and balance    Acceptance, E,TB, VU,NR by LEWIS at 9/8/2021 1300    Comment: pt educated on safety with functional transfers and balance    Acceptance, E,TB, VU,NL by LEWIS at 9/6/2021 1211    Comment: pt educated on bed mobility and benefits of  activity    Acceptance, E, NL by SD at 9/3/2021 1348    Comment: Education regarding OT services, benefits of activity and safety wtih bed mobility/transfers. Pt is confused (no evidence of learning).                   Point: Home exercise program (In Progress)     Description:   Instruct learner(s) on appropriate technique for monitoring, assisting and/or progressing therapeutic exercises/activities.              Learning Progress Summary           Patient Acceptance, E, NR by SD at 9/14/2021 1131    Comment: Education regarding benefits of activity. Pt performed BUE arom while sitting at EOB with verbal/tactile cues. Education with safety with bed mobility and transfers. Pt pleasantly confused today.                               User Key     Initials Effective Dates Name Provider Type Discipline    SD 06/16/21 -  Wade Pastrana, OTR Occupational Therapist OT    J 06/16/21 -  Luisana Peterson, OTR Occupational Therapist OT                  OT Recommendation and Plan     Progress Toward Functional Goals (OT): progress toward functional goals is fair, prepare for discharge  Plan of Care Review  Plan of Care Reviewed With: patient  Outcome Summary: OT: pt performs supine to sit transfer to EOB with max assist x 2 and signficant cues. pt required min assist x 2 for sit to stand transfer from elevated bed surface with RW. pt initially required mod assist x2 for stand pivot transfer from bed to chair however transitioned to max assist x2 with signficant cues and assist required for advancing feet and walker management. pt required significant cues and encouragement throughout session for sequencing, safety and participation with therapy. pt placed framed family photo in mouth when seated in recliner chair. removed frame from mouth and moved it out of patients reach. donal pad and exit alarm in place at end of session  Plan of Care Reviewed With: patient  Outcome Summary: OT: pt performs supine to sit transfer  to EOB with max assist x 2 and signficant cues. pt required min assist x 2 for sit to stand transfer from elevated bed surface with RW. pt initially required mod assist x2 for stand pivot transfer from bed to chair however transitioned to max assist x2 with signficant cues and assist required for advancing feet and walker management. pt required significant cues and encouragement throughout session for sequencing, safety and participation with therapy. pt placed framed family photo in mouth when seated in recliner chair. removed frame from mouth and moved it out of patients reach. donal pad and exit alarm in place at end of session    Outcome Measures     Row Name 09/15/21 0913 09/15/21 0912 09/14/21 1100       How much help from another person do you currently need...    Turning from your back to your side while in flat bed without using bedrails?  --  1  -BP  --    Moving from lying on back to sitting on the side of a flat bed without bedrails?  --  1  -BP  --    Moving to and from a bed to a chair (including a wheelchair)?  --  1  -BP  --    Standing up from a chair using your arms (e.g., wheelchair, bedside chair)?  --  1  -BP  --    Climbing 3-5 steps with a railing?  --  1  -BP  --    To walk in hospital room?  --  1  -BP  --    AM-PAC 6 Clicks Score (PT)  --  6  -BP  --       How much help from another is currently needed...    Putting on and taking off regular lower body clothing?  1  -JJ  --  1  -SD    Bathing (including washing, rinsing, and drying)  1  -JJ  --  1  -SD    Toileting (which includes using toilet bed pan or urinal)  1  -JJ  --  1  -SD    Putting on and taking off regular upper body clothing  1  -JJ  --  1  -SD    Taking care of personal grooming (such as brushing teeth)  2  -JJ  --  2  -SD    Eating meals  2  -JJ  --  2  -SD    AM-PAC 6 Clicks Score (OT)  8  -JJ  --  8  -SD       Functional Assessment    Outcome Measure Options  --  AM-PAC 6 Clicks Basic Mobility (PT)  -BP  AM-PAC 6 Clicks  Daily Activity (OT)  -SD    Row Name 09/14/21 0858 09/13/21 1337 09/13/21 1319       How much help from another person do you currently need...    Turning from your back to your side while in flat bed without using bedrails?  1  -BP  --  1  -JW    Moving from lying on back to sitting on the side of a flat bed without bedrails?  1  -BP  --  1  -JW    Moving to and from a bed to a chair (including a wheelchair)?  1  -BP  --  1  -JW    Standing up from a chair using your arms (e.g., wheelchair, bedside chair)?  1  -BP  --  2  -JW    Climbing 3-5 steps with a railing?  1  -BP  --  1  -JW    To walk in hospital room?  1  -BP  --  1  -JW    AM-PAC 6 Clicks Score (PT)  6  -BP  --  7  -JW       How much help from another is currently needed...    Putting on and taking off regular lower body clothing?  --  1  -SD  --    Bathing (including washing, rinsing, and drying)  --  1  -SD  --    Toileting (which includes using toilet bed pan or urinal)  --  1  -SD  --    Putting on and taking off regular upper body clothing  --  1  -SD  --    Taking care of personal grooming (such as brushing teeth)  --  2  -SD  --    Eating meals  --  2  -SD  --    AM-PAC 6 Clicks Score (OT)  --  8  -SD  --       Functional Assessment    Outcome Measure Options  AM-PAC 6 Clicks Basic Mobility (PT)  -BP  AM-PAC 6 Clicks Daily Activity (OT)  -SD  AM-[facility] 6 Clicks Basic Mobility (PT)  -      User Key  (r) = Recorded By, (t) = Taken By, (c) = Cosigned By    Initials Name Provider Type    SD Wade Pastrana, OTR Occupational Therapist    Luisana Yin, OTR Occupational Therapist    Solomon Arce, PT Physical Therapist    Patricia Gonzalez, PT Physical Therapist          Time Calculation:   Time Calculation- OT     Row Name 09/15/21 1107             Time Calculation- OT    OT Start Time  0912  -J      OT Stop Time  0934  -      OT Time Calculation (min)  22 min  -        User Key  (r) = Recorded By, (t) = Taken By, (c) =  Cosigned By    Initials Name Provider Type    Luisana Yin, OTR Occupational Therapist        Therapy Charges for Today     Code Description Service Date Service Provider Modifiers Qty    [id number]  OT THERAPEUTIC ACT EA 15 MIN 9/15/2021 Luisana Peterson, DENIA GO 2               Luisana Peterson, DENIA  9/15/2021

## 2021-09-15 NOTE — PLAN OF CARE
Goal Outcome Evaluation:  Plan of Care Reviewed With: patient           Outcome Summary: PT: Patient performs supine to sit transfer with max A x 2, sit to/from stand transfer from elevated bed with min A x 2 and stand pivot transfer to the right with mod A/maxA/dependent x 2 with use of FWW. Patient with significant L lateral lean during transfer. Patient initially requires mod A x 2 for stand pivot, progresses to max/dependent x 2 due to difficulty sequencing and motor planning. Patient with increased confusion today as compared to yesterday however cooperative. Continue to anticipate discharge to SNF/ECF.

## 2021-09-15 NOTE — PLAN OF CARE
Goal Outcome Evaluation:  Plan of Care Reviewed With: patient        Progress: no change  Outcome Summary: Calorie count - meds with applesause/pudding - no c/o pain - room air - possible discharge to [facility] - bed alarm in place

## 2021-09-15 NOTE — PLAN OF CARE
Goal Outcome Evaluation:  Plan of Care Reviewed With: patient        Progress: no change  Outcome Summary: Patient up to chair with donal lift. Frequent repositioning. Maintaining sats on room air. Continue calorie count. Only eating a few bites per meal today. Continue to encourage food and fluids.

## 2021-09-15 NOTE — CASE MANAGEMENT/SOCIAL WORK
I recieved a call from Britney with [facility] who advised that the precert for the patient was completed and they could accept him on Thursday and not Wednesday. CM will continue to follow.

## 2021-09-15 NOTE — PLAN OF CARE
Goal Outcome Evaluation:  Plan of Care Reviewed With: patient           Outcome Summary: OT: pt performs supine to sit transfer to EOB with max assist x 2 and signficant cues. pt required min assist x 2 for sit to stand transfer from elevated bed surface with RW. pt initially required mod assist x2 for stand pivot transfer from bed to chair however transitioned to max assist x2 with signficant cues and assist required for advancing feet and walker management. pt required significant cues and encouragement throughout session for sequencing, safety and participation with therapy. pt placed framed family photo in mouth when seated in recliner chair. removed frame from mouth and moved it out of patients reach. donal pad and exit alarm in place at end of session

## 2021-09-16 VITALS
HEIGHT: 70 IN | TEMPERATURE: 98.1 F | DIASTOLIC BLOOD PRESSURE: 68 MMHG | RESPIRATION RATE: 20 BRPM | BODY MASS INDEX: 32.69 KG/M2 | WEIGHT: 228.3 LBS | HEART RATE: 68 BPM | SYSTOLIC BLOOD PRESSURE: 122 MMHG | OXYGEN SATURATION: 96 %

## 2021-09-16 PROBLEM — R53.1 WEAKNESS: Status: ACTIVE | Noted: 2021-09-16

## 2021-09-16 PROBLEM — S00.83XA CONTUSION OF FACE: Status: ACTIVE | Noted: 2021-09-16

## 2021-09-16 PROBLEM — R79.89 ELEVATED TROPONIN: Status: ACTIVE | Noted: 2021-09-16

## 2021-09-16 PROBLEM — R77.8 ELEVATED TROPONIN: Status: ACTIVE | Noted: 2021-09-16

## 2021-09-16 LAB
ANION GAP SERPL CALCULATED.3IONS-SCNC: 9.1 MMOL/L (ref 5–15)
BUN SERPL-MCNC: 17 MG/DL (ref 8–23)
BUN/CREAT SERPL: 29.8 (ref 7–25)
CALCIUM SPEC-SCNC: 8.4 MG/DL (ref 8.6–10.5)
CHLORIDE SERPL-SCNC: 109 MMOL/L (ref 98–107)
CO2 SERPL-SCNC: 23.9 MMOL/L (ref 22–29)
CREAT SERPL-MCNC: 0.57 MG/DL (ref 0.76–1.27)
DEPRECATED RDW RBC AUTO: 52.6 FL (ref 37–54)
ERYTHROCYTE [DISTWIDTH] IN BLOOD BY AUTOMATED COUNT: 15.2 % (ref 12.3–15.4)
GFR SERPL CREATININE-BSD FRML MDRD: 139 ML/MIN/1.73
GLUCOSE BLDC GLUCOMTR-MCNC: 114 MG/DL (ref 70–130)
GLUCOSE BLDC GLUCOMTR-MCNC: 208 MG/DL (ref 70–130)
GLUCOSE SERPL-MCNC: 113 MG/DL (ref 65–99)
HCT VFR BLD AUTO: 37 % (ref 37.5–51)
HGB BLD-MCNC: 12 G/DL (ref 13–17.7)
MCH RBC QN AUTO: 30.7 PG (ref 26.6–33)
MCHC RBC AUTO-ENTMCNC: 32.4 G/DL (ref 31.5–35.7)
MCV RBC AUTO: 94.6 FL (ref 79–97)
PLATELET # BLD AUTO: 229 10*3/MM3 (ref 140–450)
PMV BLD AUTO: 10.2 FL (ref 6–12)
POTASSIUM SERPL-SCNC: 4.4 MMOL/L (ref 3.5–5.2)
RBC # BLD AUTO: 3.91 10*6/MM3 (ref 4.14–5.8)
SODIUM SERPL-SCNC: 142 MMOL/L (ref 136–145)
WBC # BLD AUTO: 8.04 10*3/MM3 (ref 3.4–10.8)

## 2021-09-16 PROCEDURE — 85027 COMPLETE CBC AUTOMATED: CPT | Performed by: FAMILY MEDICINE

## 2021-09-16 PROCEDURE — 63710000001 DEXAMETHASONE PER 0.25 MG: Performed by: FAMILY MEDICINE

## 2021-09-16 PROCEDURE — 82962 GLUCOSE BLOOD TEST: CPT

## 2021-09-16 PROCEDURE — 80048 BASIC METABOLIC PNL TOTAL CA: CPT | Performed by: FAMILY MEDICINE

## 2021-09-16 PROCEDURE — 99239 HOSP IP/OBS DSCHRG MGMT >30: CPT | Performed by: NURSE PRACTITIONER

## 2021-09-16 PROCEDURE — 63710000001 INSULIN ASPART PER 5 UNITS: Performed by: FAMILY MEDICINE

## 2021-09-16 RX ORDER — BISACODYL 5 MG/1
5 TABLET, DELAYED RELEASE ORAL DAILY PRN
Start: 2021-09-16 | End: 2022-10-26

## 2021-09-16 RX ORDER — AMOXICILLIN 250 MG
2 CAPSULE ORAL 2 TIMES DAILY
Start: 2021-09-16 | End: 2022-10-26

## 2021-09-16 RX ORDER — GUAIFENESIN 600 MG/1
1200 TABLET, EXTENDED RELEASE ORAL 2 TIMES DAILY
Start: 2021-09-16 | End: 2022-10-26

## 2021-09-16 RX ORDER — OLANZAPINE 2.5 MG/1
2.5 TABLET ORAL NIGHTLY
Start: 2021-09-16 | End: 2022-10-26

## 2021-09-16 RX ORDER — POLYETHYLENE GLYCOL 3350 17 G/17G
17 POWDER, FOR SOLUTION ORAL DAILY PRN
Start: 2021-09-16 | End: 2022-10-26

## 2021-09-16 RX ORDER — POTASSIUM CHLORIDE 20 MEQ/1
20 TABLET, EXTENDED RELEASE ORAL DAILY
Start: 2021-09-16 | End: 2022-10-26

## 2021-09-16 RX ORDER — ALBUTEROL SULFATE 1.25 MG/3ML
1 SOLUTION RESPIRATORY (INHALATION) EVERY 6 HOURS PRN
Refills: 12
Start: 2021-09-16

## 2021-09-16 RX ORDER — BISACODYL 10 MG
10 SUPPOSITORY, RECTAL RECTAL DAILY PRN
Start: 2021-09-16 | End: 2022-10-26

## 2021-09-16 RX ADMIN — NEBIVOLOL HYDROCHLORIDE 10 MG: 5 TABLET ORAL at 08:34

## 2021-09-16 RX ADMIN — PIOGLITAZONE: 30 TABLET ORAL at 08:34

## 2021-09-16 RX ADMIN — PANTOPRAZOLE SODIUM 40 MG: 40 TABLET, DELAYED RELEASE ORAL at 08:36

## 2021-09-16 RX ADMIN — ATORVASTATIN CALCIUM 40 MG: 40 TABLET, FILM COATED ORAL at 08:35

## 2021-09-16 RX ADMIN — DEXAMETHASONE 6 MG: 4 TABLET ORAL at 08:35

## 2021-09-16 RX ADMIN — CARBIDOPA AND LEVODOPA 1.5 TABLET: 25; 100 TABLET ORAL at 08:35

## 2021-09-16 RX ADMIN — LISINOPRIL 20 MG: 20 TABLET ORAL at 08:36

## 2021-09-16 RX ADMIN — GLIPIZIDE 5 MG: 5 TABLET ORAL at 08:35

## 2021-09-16 RX ADMIN — ASPIRIN 81 MG: 81 TABLET, COATED ORAL at 08:34

## 2021-09-16 RX ADMIN — GUAIFENESIN 1200 MG: 600 TABLET, EXTENDED RELEASE ORAL at 08:34

## 2021-09-16 RX ADMIN — POTASSIUM CHLORIDE 20 MEQ: 1500 TABLET, EXTENDED RELEASE ORAL at 08:36

## 2021-09-16 RX ADMIN — MEGESTROL ACETATE 400 MG: 40 SUSPENSION ORAL at 08:33

## 2021-09-16 RX ADMIN — INSULIN ASPART 8 UNITS: 100 INJECTION, SOLUTION INTRAVENOUS; SUBCUTANEOUS at 12:56

## 2021-09-16 RX ADMIN — SENNOSIDES AND DOCUSATE SODIUM 2 TABLET: 50; 8.6 TABLET ORAL at 08:35

## 2021-09-16 NOTE — CASE MANAGEMENT/SOCIAL WORK
Continued Stay Note  MIA Beyer     Patient Name: Nash So  MRN: [id number]  Today's Date: 9/16/2021    Admit Date: 9/2/2021    Discharge Plan     Row Name 09/16/21 0927       Plan    Plan Comments  I spoke with Britney from [facility] and she is able to accept the patient today.  CM will continue to follow.        Discharge Codes    No documentation.             Montserrat Harris RN

## 2021-09-16 NOTE — DISCHARGE SUMMARY
Nash Lelivan  1945  [id number]    Discharge Summary    Date of Admission: 9/2/2021  Date of Discharge:  9/16/2021  This note was copied from d/c note of yesterday per Dr. Nassar, patient discharging today to [facility]  Medications and diagnoses already reviewed by Dr. Nassar    Primary Discharge Diagnoses:     COVID-19 pneumonia in a vaccinated patient  Acute hypoxic respiratory failure  Mild rhabdomyolysis secondary to fall at home  Hypokalemia resolved  Metabolic encephalopathy secondary to COVID-19 pneumonia  Generalized weakness immobilization syndrome secondary to COVID-19 infection    Secondary Discharge Diagnoses:   Hypertension  Adult-onset diabetes mellitus non-insulin-dependent  Hyperlipidemia  Osteoarthritis  Parkinson's disease  Mild dementia  COPD  Morbid obesity  GERD  BPH    PCP  Patient Care Team:  Reno Nassar MD as PCP - General (Family Medicine)    Consults:   Consults     Date and Time Order Name Status Description    9/3/2021  5:57 PM Inpatient Pulmonology Consult Completed             History of Present Illness:    75-year-old white male with history of diabetes, Parkinson's, hypertension, hyperlipidemia, chronic lower extremity edema who was brought in the emergency room by EMS after a fall at home.  Patient could not get up and was found home with his own feces.  Wife apparently tested positive for Covid 6 days ago.  He is somewhat confused but able to give some history denies any fever chills no nausea vomiting.  States he is just weak and cannot get around at home.  Does not remember falling.    Hospital Course     Patient admitted to the telemetry bed.  Patient is CT angiogram not show evidence of pulmonary embolus.  Patient continue beacon clearly confused at times agitated and significantly hypertensive the first 48 hours admission.  He had mild rhabdomyolysis home and was treated with IV fluids with little improvement.  Because of  significant lower extremity edema his fluids were stopped initially with several dose of IV diuretics with resolution of his lower extremity edema.  His hypertension was treated with IV hydralazine.  He also developed episodes of tachypnea without hypoxia.  Pulmonary saw patient consultation and COVID-19 treatment was initiated with Decadron and remdesivir on 9/5/2021.  He had short episode of hypoxia which rapidly resolved within 24 hours he was exertion room air and remained on room air the rest of his admission.    Problems maintaining with IV access and remdesivir was discontinued after 3 days as he had significantly improved..  Patient continued to be combative confused and had severe sundowning although he had no prior history of significant dementia.  Patient was started on IV Haldol and low-dose Zyprexa.    He was significantly hyperglycemic use of IV Decadron and his blood sugars were monitored and she was sliding scale insulin improved with time of discharge.    Patient's confusion gradually improved but still not back to baseline at time of admission.  Because of his confusion he had very very slow progress and participation with therapies but this also gradually improved and he was participating with therapies at time of discharge.  His oral intake continue be very poor discussed with his wife about NG tube and as patient would probably pull out his Dobbhoff and it was decided to continue with supportive care.  He was started on Megace and his oral intake was continue to be monitored and was gradually improved with still suboptimal time of discharge.    His blood pressures were much improved but not optimally controlled and will continue be monitored after discharge.      His functional status much below baseline is being transferred back to rehab for continued short-term rehab.    Operations and Procedures Performed:       CT Head Without Contrast    Result Date: 9/2/2021  Narrative: CT HEAD,  NONCONTRAST, 09/20/2021  HISTORY: Fall this morning with trauma to back of head  COMPARISON:    TECHNIQUE: CT examination of the head was performed without IV contrast. Radiation dose reduction techniques included automated exposure control or exposure modulation based on body size. Radiation audit for CT and nuclear cardiology exams in the last12 months: 0.    FINDINGS: The examination is negative. No evidence of intracranial hemorrhage, mass, mass effect, acute cerebral edema, hydrocephalus or additional abnormality.       Impression: Negative noncontrast head CT examination.  This report was finalized on 9/2/2021 3:54 PM by Dr. Vikash Martin MD.      CT Angiogram Chest With & Without Contrast    Result Date: 9/2/2021  Narrative: CT CHEST WITH CONTRAST, PE PROTOCOL, 9/2/2021 HISTORY: 75-year-old male admitted to the ED after being found down on floor today. Weakness. Positive COVID-19 testing. Elevated d-dimer. TECHNIQUE: CT examination of the chest with IV contrast. CTA MIP multiplanar pulmonary artery images were reformatted with 3-D postprocessing. Radiation dose reduction techniques included automated exposure control. Radiation audit for CT and nuclear cardiology exams in the last 12 months: 1. FINDINGS: No pulmonary embolism is demonstrated. Thoracic aorta is normal in caliber with no aneurysm or dissection. Mild cardiomegaly. No pericardial effusion. Coronary artery atheromatous calcification. Mild infiltrate and atelectasis limited to the posterior lung bases. Currently, there is no scattered pulmonary infiltrates typical for COVID-19 pneumonitis. Note: CT may be negative in the earliest stages of COVID-19. No pleural effusion. No suspicious mass or adenopathy within the chest. No hiatal hernia or thoracic esophageal dilatation. Limited upper abdominal images show no active disease.     Impression: 1.  No evidence of pulmonary embolism or other acute vascular abnormality within the chest. 2.  Minimal  infiltrate and atelectasis in the dependent posterior lung bases. See above. Signer Name: Ronnie Rosales MD  Signed: 9/2/2021 8:39 PM  Workstation Name: [id number]  [facility]    XR Chest 1 View    Result Date: 9/3/2021  Narrative: CR Chest 1 Vw INDICATION: Shortness of breath COMPARISON:  Chest x-ray from 9/2/21. FINDINGS: Single portable AP view(s) of the chest. Exam is significantly limited by patient body habitus, positioning and low lung volume. There appear to be some bibasilar opacities that may be slightly increased from prior, although this is somewhat equivocal. No large effusion or acute osseous abnormality.     Impression: Exam is significantly limited by low lung volumes. There appears to be bibasilar opacities, and it is uncertain if these may represent potential pneumonia or be artifactual. Signer Name: Ada Abebe MD  Signed: 9/3/2021 5:41 PM  Workstation Name: [id number]  [facility]    XR Chest 1 View    Result Date: 9/2/2021  Narrative: AP PORTABLE CHEST, 09/02/2021  HISTORY: Shortness of air today  COMPARISON: 12/11/2020  TECHNIQUE: AP portable chest x-ray.  FINDINGS: There are low lung volumes and there may be minimal basilar atelectasis. Otherwise lungs are clear. Heart size is normal.      Impression: Low lung wires volumes minimal basilar atelectasis otherwise no active disease  This report was finalized on 9/2/2021 3:14 PM by Dr. Vikash Martin MD.        Labs:  Results from last 7 days   Lab Units 09/16/21  0357 09/15/21  0423 09/14/21  0320 09/13/21  0343 09/13/21  0343 09/12/21  0844 09/12/21  0844 09/11/21  0719 09/11/21  0719 09/10/21  0740 09/10/21  0740   WBC 10*3/mm3 8.04 9.87 8.75  --  9.82  --  9.78  --  8.77  --  9.12   HEMOGLOBIN g/dL 12.0* 13.2 12.4*   < > 12.5*   < > 13.1   < > 13.5   < > 13.5   HEMATOCRIT % 37.0* 40.2 37.5  --  38.6  --  38.7  --  41.3  --  40.5   PLATELETS 10*3/mm3 229 269 243  --  272  --  271  --  294  --   305    < > = values in this interval not displayed.     Results from last 7 days   Lab Units 09/16/21  0357 09/15/21  0423 09/14/21  0320 09/13/21  0343 09/12/21  0845 09/11/21  0719 09/10/21  0740   SODIUM mmol/L 142 144 143 145 142 146* 142   POTASSIUM mmol/L 4.4 4.0 3.7 3.2* 3.5 3.2* 3.3*   CHLORIDE mmol/L 109* 111* 109* 111* 108* 108* 106   CO2 mmol/L 23.9 24.5 25.3 25.4 22.9 22.7 26.5   BUN mg/dL 17 17 17 17 18 19 21   CREATININE mg/dL 0.57* 0.60* 0.46* 0.49* 0.49* 0.57* 0.61*   CALCIUM mg/dL 8.4* 8.3* 8.3* 8.8 8.6 8.5* 8.3*   GLUCOSE mg/dL 113* 148* 126* 153* 217* 155* 159*     Results from last 7 days   Lab Units 09/11/21  0719   MAGNESIUM mg/dL 1.7       Lab Results (last 24 hours)     Procedure Component Value Units Date/Time    POC Glucose Once [[id number]]  (Normal) Collected: 09/16/21 0709    Specimen: Blood Updated: 09/16/21 0716     Glucose 114 mg/dL      Comment: Meter: [id number] : [id number] Laurynemily Nobles CNA       Basic Metabolic Panel [[id number]]  (Abnormal) Collected: 09/16/21 0357    Specimen: Blood Updated: 09/16/21 0552     Glucose 113 mg/dL      BUN 17 mg/dL      Creatinine 0.57 mg/dL      Sodium 142 mmol/L      Potassium 4.4 mmol/L      Chloride 109 mmol/L      CO2 23.9 mmol/L      Calcium 8.4 mg/dL      eGFR Non African Amer 139 mL/min/1.73      BUN/Creatinine Ratio 29.8     Anion Gap 9.1 mmol/L     Narrative:      GFR Normal >60  Chronic Kidney Disease <60  Kidney Failure <15      CBC (No Diff) [[id number]]  (Abnormal) Collected: 09/16/21 0357    Specimen: Blood Updated: 09/16/21 0505     WBC 8.04 10*3/mm3      RBC 3.91 10*6/mm3      Hemoglobin 12.0 g/dL      Hematocrit 37.0 %      MCV 94.6 fL      MCH 30.7 pg      MCHC 32.4 g/dL      RDW 15.2 %      RDW-SD 52.6 fl      MPV 10.2 fL      Platelets 229 10*3/mm3     POC Glucose Once [[id number]]  (Abnormal) Collected: 09/15/21 1946    Specimen: Blood Updated: 09/15/21 1952     Glucose 134 mg/dL      Comment: Meter: [id number] :  [id number] Mari Joy NA       POC Glucose Once [[id number]]  (Abnormal) Collected: 09/15/21 1629    Specimen: Blood Updated: 09/15/21 1635     Glucose 170 mg/dL      Comment: Meter: [id number] : [id number] Doyle Morocho CNA                           Results from last 7 days   Lab Units 09/11/21  0719   MAGNESIUM mg/dL 1.7           Invalid input(s): LDLCALC          Glucose   Date/Time Value Ref Range Status   09/16/2021 0709 114 70 - 130 mg/dL Final     Comment:     Meter: [id number] : [id number] Lauryn Nobles CNA   09/15/2021 1946 134 (H) 70 - 130 mg/dL Final     Comment:     Meter: [id number] : [id number] Mari Joy NA   09/15/2021 1629 170 (H) 70 - 130 mg/dL Final     Comment:     Meter: [id number] : [id number] Doyle Morocho CNA   09/15/2021 1113 177 (H) 70 - 130 mg/dL Final     Comment:     Meter: [id number] : [id number] Doyle Morocho CNA   09/15/2021 0730 167 (H) 70 - 130 mg/dL Final     Comment:     Meter: [id number] : [id number] Doyle Morocho CNA   09/14/2021 2022 185 (H) 70 - 130 mg/dL Final     Comment:     Meter: [id number] : [id number] Titi Gonzalez RN (Validator)   09/14/2021 1617 185 (H) 70 - 130 mg/dL Final     Comment:     Meter: [id number] : [id number] Annie Forbes RN   09/14/2021 1148 198 (H) 70 - 130 mg/dL Final     Comment:     Meter: [id number] : [id number] Ender GLEASON             Results from last 7 days   Lab Units 09/10/21  1138   NITRITE UA  Negative   WBC UA /HPF None Seen   BACTERIA UA /HPF None Seen   SQUAM EPITHEL UA /HPF None Seen             Radiology:  Imaging Results (Last 24 Hours)     ** No results found for the last 24 hours. **          PROCEDURES: NONE    Physical Exam  Vitals reviewed.   Constitutional:       Comments: Appears older than stated age   HENT:      Head: Normocephalic and atraumatic.      Mouth/Throat:      Mouth: Mucous membranes are moist.   Eyes:      Comments: Pupils bilaterally pinpoint    Cardiovascular:      Rate and Rhythm: Normal rate and regular rhythm.   Pulmonary:      Effort: Pulmonary effort is normal. No respiratory distress.      Breath sounds: Normal breath sounds. No wheezing or rales.   Abdominal:      General: Abdomen is flat. Bowel sounds are normal. There is no distension.      Palpations: Abdomen is soft.      Tenderness: There is no abdominal tenderness. There is no guarding.   Musculoskeletal:         General: No swelling.   Skin:     General: Skin is warm and dry.      Capillary Refill: Capillary refill takes less than 2 seconds.      Findings: No erythema.      Comments: Extensive scabbing, ecchymosis bilateral arms  L LE anterior shin with dark red discoloration, excoriation   Neurological:      General: No focal deficit present.      Mental Status: He is alert.      Comments: Oriented to self only   Psychiatric:      Comments: Calm, cooperative       Allergies:  is allergic to penicillin g and penicillins.      Discharge Medications:     Your medication list      START taking these medications      Instructions Last Dose Given Next Dose Due   albuterol 1.25 MG/3ML nebulizer solution  Commonly known as: ACCUNEB  Replaces: Ventolin  (90 Base) MCG/ACT inhaler      Take 3 mL by nebulization Every 6 (Six) Hours As Needed for Wheezing.       bisacodyl 5 MG EC tablet  Commonly known as: DULCOLAX      Take 1 tablet by mouth Daily As Needed for Constipation (Use if polyethylene glycol is ineffective).       bisacodyl 10 MG suppository  Commonly known as: DULCOLAX      Insert 1 suppository into the rectum Daily As Needed for Constipation (Use if bisacodyl oral is ineffective).       guaiFENesin 600 MG 12 hr tablet  Commonly known as: MUCINEX      Take 2 tablets by mouth 2 (Two) Times a Day.       insulin aspart 100 UNIT/ML injection  Commonly known as: novoLOG      Inject 0-24 Units under the skin into the appropriate area as directed 3 (Three) Times a Day Before Meals.        OLANZapine 2.5 MG tablet  Commonly known as: zyPREXA      Take 1 tablet by mouth Every Night.       polyethylene glycol 17 g packet  Commonly known as: MIRALAX      Take 17 g by mouth Daily As Needed (Use if senna-docusate is ineffective).       potassium chloride 20 MEQ CR tablet  Commonly known as: K-DUR,KLOR-CON      Take 1 tablet by mouth Daily.       sennosides-docusate 8.6-50 MG per tablet  Commonly known as: PERICOLACE      Take 2 tablets by mouth 2 (Two) Times a Day.          CHANGE how you take these medications      Instructions Last Dose Given Next Dose Due   glimepiride 4 MG tablet  Commonly known as: AMARYL  What changed:   · how much to take  · how to take this  · when to take this      Take 2 po daily       lisinopril 20 MG tablet  Commonly known as: PRINIVILZESTRIL  What changed: when to take this      Take 1 tablet by mouth daily.          CONTINUE taking these medications      Instructions Last Dose Given Next Dose Due   aspirin tablet      Take  by mouth daily.       atorvastatin 40 MG tablet  Commonly known as: LIPITOR      Take 1 tablet by mouth daily.       NovoTwist 32G X 5 MM misc  Generic drug: Insulin Pen Needle           Insulin Pen Needle 31G X 8 MM misc           B-D ULTRAFINE III SHORT PEN 31G X 8 MM misc  Generic drug: Insulin Pen Needle      USE AS DIRECTED TWO TIMES A DAY AND AS NEEDED       carbidopa-levodopa  MG per tablet  Commonly known as: SINEMET      Take 1.5 tablets by mouth 3 (Three) Times a Day.       metoclopramide 10 MG tablet  Commonly known as: REGLAN      Take i po tid       nabumetone 500 MG tablet  Commonly known as: RELAFEN      Take 1.5 tablets by mouth every 12 (twelve) hours.       nebivolol 10 MG tablet  Commonly known as: Bystolic      Take 1 tablet by mouth daily.       pantoprazole 40 MG EC tablet  Commonly known as: PROTONIX      TAKE ONE TABLET BY MOUTH DAILY       pioglitazone-metFORMIN  MG per tablet  Commonly known as: ACTOPLUS MET       Take one po bid       Victoza 18 MG/3ML solution pen-injector injection  Generic drug: Liraglutide      Inject 1.8 mg under the skin into the appropriate area as directed Daily.          STOP taking these medications    REYES CONTOUR TEST VI        furosemide 20 MG tablet  Commonly known as: LASIX        glucose blood test strip        NEURIVA PO        NON FORMULARY        Ventolin  (90 Base) MCG/ACT inhaler  Generic drug: albuterol sulfate HFA  Replaced by: albuterol 1.25 MG/3ML nebulizer solution        VITAMIN D BOOSTER PO              Where to Get Your Medications      Information about where to get these medications is not yet available    Ask your nurse or doctor about these medications  · albuterol 1.25 MG/3ML nebulizer solution  · bisacodyl 10 MG suppository  · bisacodyl 5 MG EC tablet  · guaiFENesin 600 MG 12 hr tablet  · insulin aspart 100 UNIT/ML injection  · OLANZapine 2.5 MG tablet  · polyethylene glycol 17 g packet  · potassium chloride 20 MEQ CR tablet  · sennosides-docusate 8.6-50 MG per tablet         Home medications and discharge medications reconciled with patient      Condition on Discharge: Stable    Discharge Disposition  [facility]      Discharge Diet:      Dietary Orders (From admission, onward)     Start     Ordered    09/12/21 1258  Calorie Count  (Calorie Count)  Until Discontinued     Question:  Duration of Calorie Count  Answer:  3 Days    09/12/21 1258    09/11/21 1208  Diet Soft Texture; Chopped  Diet Effective Now     Question Answer Comment   Diet Texture / Consistency Soft Texture    Select Texture: Chopped        09/11/21 1208    09/08/21 0800  Dietary Nutrition Supplement: Boost Glucose Control (Glucerna Shake)  Daily With Breakfast, Lunch & Dinner     Question:  Select Supplement:  Answer:  Boost Glucose Control (Glucerna Shake)    09/07/21 2001                Activity at Discharge:  Up with assistance      Follow-up Appointments:   Contact information for  follow-up providers     eRno Nassar MD .    Specialty: Family Medicine  Contact information:  [address]  [address]  [place] [address] [phone]                   Contact information for after-discharge care     Destination     [facility] .    Service: Skilled Nursing  Contact information:  [address]  [facility] [address] [phone]                             Test Results Pending at Discharge: none       Madelaine Ross, APRN  09/16/21  11:25 EDT    Much of this encounter note is an electronic transcription/translation of spoken language to printed text using Dragon Software  Discharge over 30 minutes:   Secondary to:   Coordination of care/follow up  Medication reconciliation  D/W patient and family

## 2021-09-16 NOTE — PLAN OF CARE
Goal Outcome Evaluation:  Plan of Care Reviewed With: patient        Progress: no change  Outcome Summary: VSS, resting well throughout the night, no complaints of pain, incontinent, possible dc to [facility] today, will continue to monitor

## 2021-09-17 NOTE — CASE MANAGEMENT/SOCIAL WORK
Case Management Discharge Note      Final Note: D/C home         Selected Continued Care - Discharged on 9/16/2021 Admission date: 9/2/2021 - Discharge disposition: Skilled Nursing Facility (DC - External)    Destination Coordination complete.    Service Provider Selected Services Address Phone Fax Patient Preferred    [facility]  Skilled Nursing  [address] [address] [facility] [id number] [phone] --          Durable Medical Equipment    No services have been selected for the patient.              Dialysis/Infusion    No services have been selected for the patient.              Home Medical Care    No services have been selected for the patient.              Therapy    No services have been selected for the patient.              Community Resources    No services have been selected for the patient.              Community & DME    No services have been selected for the patient.                       Final Discharge Disposition Code: 03 - skilled nursing facility (SNF)

## 2021-11-03 DIAGNOSIS — Z01.818 OTHER SPECIFIED PRE-OPERATIVE EXAMINATION: Primary | ICD-10-CM

## 2021-11-09 ENCOUNTER — TELEPHONE (OUTPATIENT)
Dept: GASTROENTEROLOGY | Facility: CLINIC | Age: 76
End: 2021-11-09

## 2021-11-09 NOTE — TELEPHONE ENCOUNTER
Lab just called me this morning to tell me this patient did not show up for covid yesterday.  Surgery is tomorrow.    Nash So  [phone]    Thanks

## 2022-01-17 ENCOUNTER — HOSPITAL ENCOUNTER (OUTPATIENT)
Dept: GENERAL RADIOLOGY | Facility: HOSPITAL | Age: 77
Discharge: HOME | End: 2022-01-17
Admitting: FAMILY MEDICINE

## 2022-01-17 ENCOUNTER — TRANSCRIBE ORDERS (OUTPATIENT)
Dept: ADMINISTRATIVE | Facility: HOSPITAL | Age: 77
End: 2022-01-17

## 2022-01-17 DIAGNOSIS — M25.532 LEFT WRIST PAIN: Primary | ICD-10-CM

## 2022-01-17 DIAGNOSIS — M25.532 LEFT WRIST PAIN: ICD-10-CM

## 2022-01-17 PROCEDURE — 73110 X-RAY EXAM OF WRIST: CPT

## 2022-02-16 ENCOUNTER — HOSPITAL ENCOUNTER (OUTPATIENT)
Dept: GENERAL RADIOLOGY | Facility: HOSPITAL | Age: 77
Discharge: HOME | End: 2022-02-16
Admitting: PODIATRIST

## 2022-02-16 ENCOUNTER — TRANSCRIBE ORDERS (OUTPATIENT)
Dept: ADMINISTRATIVE | Facility: HOSPITAL | Age: 77
End: 2022-02-16

## 2022-02-16 DIAGNOSIS — M86.9: ICD-10-CM

## 2022-02-16 DIAGNOSIS — M86.9: Primary | ICD-10-CM

## 2022-02-16 PROCEDURE — 73630 X-RAY EXAM OF FOOT: CPT

## 2022-02-21 ENCOUNTER — TRANSCRIBE ORDERS (OUTPATIENT)
Dept: ADMINISTRATIVE | Facility: HOSPITAL | Age: 77
End: 2022-02-21

## 2022-02-21 DIAGNOSIS — R13.12 OROPHARYNGEAL DYSPHAGIA: Primary | ICD-10-CM

## 2022-03-30 ENCOUNTER — TRANSCRIBE ORDERS (OUTPATIENT)
Dept: ADMINISTRATIVE | Facility: HOSPITAL | Age: 77
End: 2022-03-30

## 2022-03-30 ENCOUNTER — HOSPITAL ENCOUNTER (OUTPATIENT)
Dept: GENERAL RADIOLOGY | Facility: HOSPITAL | Age: 77
Discharge: HOME | End: 2022-03-30
Admitting: PODIATRIST

## 2022-03-30 DIAGNOSIS — M86.171 OSTEOMYELITIS OF ANKLE OR FOOT, ACUTE, RIGHT: Primary | ICD-10-CM

## 2022-03-30 PROCEDURE — 73630 X-RAY EXAM OF FOOT: CPT

## 2022-04-04 ENCOUNTER — HOSPITAL ENCOUNTER (OUTPATIENT)
Dept: GENERAL RADIOLOGY | Facility: HOSPITAL | Age: 77
Discharge: HOME | End: 2022-04-04
Admitting: FAMILY MEDICINE

## 2022-04-04 DIAGNOSIS — R13.12 OROPHARYNGEAL DYSPHAGIA: ICD-10-CM

## 2022-04-04 PROCEDURE — 92611 MOTION FLUOROSCOPY/SWALLOW: CPT

## 2022-04-04 PROCEDURE — 74230 X-RAY XM SWLNG FUNCJ C+: CPT

## 2022-04-04 NOTE — MBS/VFSS/FEES
Outpatient Speech Language Pathology   Adult Swallow Initial Evaluation   Modified Barium Swallow Study   Tori Simmons     Patient Name: Nash So  : 1945  MRN: [id number]  Today's Date: 2022         Visit Date: 2022   Patient Active Problem List   Diagnosis   • Vitamin D deficiency   • Diabetes mellitus (HCC)   • Back pain   • Benign prostatic hyperplasia   • Gastroesophageal reflux disease with esophagitis   • Hyperlipidemia   • Hypertension   • Hypogonadism in male   • Sebaceous cyst   • COPD (chronic obstructive pulmonary disease) (HCC)   • Encounter for screening for malignant neoplasm of colon   • COVID-19 virus infection   • Cytokine release syndrome, grade 1   • Weakness   • Elevated troponin   • Contusion of face        Past Medical History:   Diagnosis Date   • Alzheimer disease (CMS/HCC)    • CHF (congestive heart failure) (CMS/HCC)    • COPD (chronic obstructive pulmonary disease) (CMS/HCC)    • Diabetes (CMS/HCC)    • GERD (gastroesophageal reflux disease)    • Hyperlipemia    • Hypertension         Past Surgical History:   Procedure Laterality Date   • ENDOSCOPY N/A 2017    Procedure: ESOPHAGOGASTRODUODENOSCOPY, biopsy;  Surgeon: Natalia Guerrero MD;  Location: [facility];  Service:          Visit Dx:     ICD-10-CM ICD-9-CM   1. Oropharyngeal dysphagia  R13.12 787.22            OP SLP Assessment/Plan - 22 1100        SLP Assessment    Functional Problems Swallowing  -AD    Impact on Function: Swallowing Risk of aspiration;Risk of pneumonia  -AD    Clinical Impression: Swallowing Mild:;oropharyngeal phase dysphagia  -AD    Functional Problems Comment Decreased chew due to edentulous status and lingual weakness. Risk of aspiration due to delayed timing and silent nature of aspiration.  -AD    Clinical Impression Comments Pt with improved tolerance of solids per report from family. Pharyngeal deficits r/t to delay of swallow w/silent penetration to vocal cords on thins.  Able to demonstrate improvement w/use of combination of small bolus size and prepping/bolus hold prior to swallowing. Pt able to demonstrate w/o penetration. Pt's son-in-law reports that he was told to cough/clear his throat after about every 4 bites when prior MBS completed at home via the mobile MBS unit.  -AD    Please refer to paper survey for additional self-reported information Yes  -AD    Please refer to items scanned into chart for additional diagnostic informaiton and handouts as provided by clinician Yes   Images in PACS -AD    SLP Diagnosis Mild oropharyngeal dysphagia  -AD    Prognosis Good (comment)   w/family assistance adn further therapy to aid in use of strategies and understanding of need for strategies/disease process -AD    Patient/caregiver participated in establishment of treatment plan and goals Yes  -AD    Patient would benefit from skilled therapy intervention Yes  -AD       SLP Plan    Plan Comments Recommend futher therapy via home health SLP. So-in-law reports he is currently receiving OT/PT via home health.  -AD          User Key  (r) = Recorded By, (t) = Taken By, (c) = Cosigned By    Initials Name Provider Type    AD Savannah Pastrana MS CCC-SLP Speech and Language Pathologist                 SLP Adult Swallow Evaluation     Row Name 04/04/22 1100       Rehab Evaluation    Document Type evaluation  -AD    Subjective Information no complaints  -AD    Patient Observations alert;cooperative;agree to therapy  -AD    Patient/Family/Caregiver Comments/Observations Pt seen for MBS. Son-in-law brought pt in for the evaluation and provided history.  -AD    Patient Effort good  -AD    Symptoms Noted During/After Treatment none  -AD       General Information    Patient Profile Reviewed yes  -AD    Pertinent History Of Current Problem Pt is a 77 y/o male referred for a modified barium swallow by his primary care physician. Pt's son-in-law reports it was ordered to see if pt could take solid  foods as he was discharged from the hospital on a puree diet with thin liquids. Chart reviewed indicates Covid-19 infection with pneumonia in Sept 2021. Pt discharged to SNF at [facility] on 9/16/21 and then transferred to [facility] due to mental status/pysch concerns. He was then transferred to [facility] at [place]. Unknown if this was the last place prior to returning home in January 2022. He currently lives with his family who assists with care. He receives home health OT/PT. Related diagnosis include Parkinson's and Parkinson's type dementia, COPD, Diabetes Mellitus type 2, GERD. Pt did receive dysphagia therapy per report while in the SNF unit.  -AD    Current Method of Nutrition pureed;thin liquids;other (see comments)  some solid foods per son-in-law  -AD    Precautions/Limitations, Vision WFL;for purposes of eval  -AD    Precautions/Limitations, Hearing WFL;for purposes of eval  -AD    Prior Level of Function-Communication cognitive-linguistic impairment;other (see comments)  memory impairments reported; improved since hospitalizations per pt/family  -AD    Prior Level of Function-Swallowing puree;thin liquids  -AD    Plans/Goals Discussed with patient and family;agreed upon  -AD    Barriers to Rehab previous functional deficit;cognitive status  -AD    Patient's Goals for Discharge return to regular diet  -AD    Family Goals for Discharge patient able to return to regular diet  -AD       Pain    Additional Documentation --  no current pain reported  -AD       Oral Motor Structure and Function    Oral Lesions or Structural Abnormalities and/or variants none  -AD    Dentition Assessment edentulous;has dentures but does not use  -AD    Secretion Management WNL/WFL  -AD    Mucosal Quality moist, healthy  -AD    Volitional Swallow WFL  -AD    Volitional Cough WFL  -AD       Oral Musculature and Cranial Nerve Assessment    Oral Motor General Assessment WFL  -AD    Oral Motor, Comment No gross deficits of the  lips, tongue, jaw, palate or larynx. Functional strength and ROM of all. RIGO WFL for tongue.  -AD       General Eating/Swallowing Observations    Respiratory Support Currently in Use room air  -AD    Positioning During Eating upright 90 degree;upright in chair  -AD       Respiratory    Respiratory Status WFL;room air;during swallowing/eating  -AD       MBS/VFSS    Utensils Used spoon;cup  -AD    Consistencies Trialed regular textures;pureed;thin liquids;nectar/syrup-thick liquids;honey-thick liquids  -AD       MBS/VFSS Interpretation    Oral Prep Phase impaired oral phase of swallowing  -AD    Oral Transit Phase impaired  -AD    Oral Residue WFL  -AD    VFSS Summary Pt presents with a mild oropharyngeal dysphagia with decreased bolus prep of solids and decrease bolus control of liquids. Pt using tongue to mash solids against gums and palate. Does not fully mash and swallows solids with small whole pieces as well as mashed. He demonstrates decreased lingual control of solids that results in pooling of material in the valleculae on nectar thick and to the pyriforms on thins. Pt with mild tongue pumping to move the bolus posteriorly on puree and solids noted. Pt with delay of swallow on thins and nectar thick liquids up to 2 seconds in greatest duration. Posterior penetration occurs on thins on 2/6 trials overall (1 from spoon and 1 from cup drinks). This penetration occurs to the level of the vocal cords eliciting NO reflexive response. Pt was able to clear the vocal cords with a cued cough/throat clear response. Bolus hold/3 second prep was used to aid in timing and bolus control prior to the swallow. This was effective on 3/4 trials w/one episode of posterior silent penetration to the cords on a large bolus from the cup. Small bolus size by spoon and cup also appeared to eliminate posterior penetration. Mild vallecular and base of tongue residue noted after the swallow and felt to be due to mildly decreased tongue  base retraction and decreased anterior movement of the hyoid. Pt was able to clear w/spontaneous subsequent swallows. No straw drinks of thins were attempted.  -AD    Oral Phase, Comment Pt with mild oral deficits of decreased mastication of solids, decreased bolus control with spill of thins and nectar into the pharynx and tongue pumping to move solids and puree into the pharynx all indicative of lingual weakness and decreased lingual control.  -AD       Oral Preparatory Phase    Oral Preparatory Phase inadequate manipulation;poor rotary chew  -AD    Inadequate Manipulation regular textures;secondary to reduced lingual strength;other (see comments)  edentulous status  -AD    Poor Rotary Chew other (see comments)  edentulous status  -AD    Oral Preparatory Phase, Comment Pt noted to have mild prep deficits of solids with decreased mastication of solids and mashing with tongue to palate and upper front gums. No rotary chew noted. Minimal solid pieces swallowed whole.  -AD       Oral Transit Phase    Impaired Oral Transit Phase tongue pumping;other (see comments)  spill of thins and nectar into the pharynx  -AD    Tongue Pumping pudding/puree;regular textures;secondary to reduced lingual control  -AD    Oral Transit Phase, Comment Tongue pumping noted on trials of puree and solids to  the bolus into the pharynx. Lingual weakness suspected.  -AD       Initiation of Pharyngeal Swallow    Initiation of Pharyngeal Swallow bolus in valleculae;bolus in pyriform sinuses  -AD    Pharyngeal Phase impaired pharyngeal phase of swallowing  -AD    Penetration During the Swallow thin liquids;secondary to delayed swallow initiation or mistiming;secondary to reduced vestibular closure  -AD    Response to Penetration no response;response with cue only  -AD    Rosenbek's Scale thin:;5--->level 5;nectar:;honey:;pudding/puree:;regular textures:;1--->level 1  -AD    Pharyngeal Residue thin liquids;pudding/puree;regular textures;base  of tongue;valleculae;laryngeal vestibule;secondary to reduced base of tongue retraction;secondary to reduced hyolaryngeal excursion;other (see comments)  thins only in vestibule  -AD    Response to Residue cleared residue with spontaneous subsequent swallow  -AD    Attempted Compensatory Maneuvers bolus size;bolus presentation style;throat clear after swallow;combination of maneuvers (see comments)  combined prep/bolus hold with small bolus size  -AD    Response to Attempted Compensatory Maneuvers prevented penetration;other (see comments)  cued cough cleared vestibule  -AD    Pharyngeal Phase, Comment Pt presents with a mild pharyngeal dysphagia with impaired timing resulting in spill of thins and nectar to the level of the valleculae and pyriforms respectively. Delay of swallow noted on both consistencies as well. Due to pooled material in the pyrifoms on thins, Pt demonstrated posterior penetration to the level of the vocal cords without a cough/throat clear response. Pt was able to clear the vestibule after the initial episode of penetration with a cued cough. Prepping/bolus hold was attempted and appeared successful on spoon size and two small cup drinks. Penetration occurred with prepping on one large cup drink. Reattempted combined prepping with one small cup drink w/o penetration observed. Mild base of tongue and vallecular residue noted after trials of puree and solids. Spontaneously cleared by pt with subsequent swallows.  -AD       Recommendations    SLP Diet Recommendation soft textures;whole;thin liquids;other (see comments)  no straws or large cup drinks and use of strategies; if unable to consistently maintain small drinks w/prepping, recommend nectar thick liquids; no raw vegetables, hard fruits or fibrous foods (ham, steak); soft easily mashable by tongue foods  -AD    Recommended Diagnostics reassess via VFSS (MBS);other (see comments)  if clinical changes noted/signs of aspiration pneumonia  -AD     Recommended Precautions and Strategies upright posture during/after eating;small bites of food and sips of liquid;multiple swallows per bite of food;3 second prep;general aspiration precautions;fatigue precautions  -AD    Oral Care Recommendations Oral Care before breakfast, after meals and PRN;Toothbrush  -AD    SLP Rec. for Method of Medication Administration meds whole;with pudding or applesauce;with thick liquids;as tolerated  -AD    Monitor for Signs of Aspiration no;none - silent aspiration present  -AD    Anticipated Discharge Disposition (SLP) home with home health;anticipate therapy at next level of care  -AD    Demonstrates Need for Referral to Another Service speech therapy;other (see comments)  home health SLP for dysphagia  -AD          User Key  (r) = Recorded By, (t) = Taken By, (c) = Cosigned By    Initials Name Provider Type    AD Savannah Pastrana MS CCC-SLP Speech and Language Pathologist                 OP SLP Education     Row Name 04/04/22 1100       Education    Barriers to Learning Other (comment0  Decreased recall by pt; son-in-law demonstrates no barriers  -AD    Action Taken to Address Barriers Education and recommendations reviewed w/family.  -AD    Education Provided Described results of evaluation;Patient expressed understanding of evaluation;Family/caregivers expressed understanding of evaluation;Patient participated in establishing goals and treatment plan;Family/caregivers participated in establishing goals and treatment plan;Patient demonstrated recommended strategies;Family/caregivers demonstrated recommended strategies;Patient requires further education on strategies, risks;Family/caregivers require further education on strategies, risks  -AD    Assessed Learning needs;Learning motivation;Learning preferences;Learning readiness  -AD    Learning Motivation Strong  Pt and son-in-law  -AD    Learning Method Explanation;Demonstration  -AD    Teaching Response Verbalized  understanding  -AD          User Key  (r) = Recorded By, (t) = Taken By, (c) = Cosigned By    Initials Name Effective Dates    AD Savannah Pastrana, MS CCC-SLP 06/16/21 -                          Time Calculation:   SLP Start Time: 1100  SLP Stop Time: 1145 (plus additional 55 minutes for film markup, interpretation and reporting.)  SLP Time Calculation (min): 45 min  SLP Non-Billable Time (min): 0 min  Total Timed Code Minutes- SLP: 0 minute(s)  Untimed Charges  SLP Eval/Re-eval : ST [facility] Fluoro Eval Swallow - 90847  [id number]-K[facility] Motion Fluoro Eval Swallow Minutes: 100  Total Minutes  Untimed Charges Total Minutes: 100   Total Minutes: 100      Therapy Charges for Today     Code Description Service Date Service Provider Modifiers Qty    [id number]  ST MOTION FLUORO EVAL SWALLOW 7 4/4/2022 Savannah Pastrana MS CCC-SLP GN 1                   Savannah Pastrana MS CCC-SLP  4/4/2022

## 2022-05-04 ENCOUNTER — HOSPITAL ENCOUNTER (OUTPATIENT)
Dept: GENERAL RADIOLOGY | Facility: HOSPITAL | Age: 77
Discharge: HOME | End: 2022-05-04

## 2022-05-04 ENCOUNTER — TRANSCRIBE ORDERS (OUTPATIENT)
Dept: ADMINISTRATIVE | Facility: HOSPITAL | Age: 77
End: 2022-05-04

## 2022-05-04 DIAGNOSIS — L97.519 SKIN ULCER OF RIGHT GREAT TOE, UNSPECIFIED ULCER STAGE: Primary | ICD-10-CM

## 2022-05-04 DIAGNOSIS — L97.519 SKIN ULCER OF RIGHT GREAT TOE, UNSPECIFIED ULCER STAGE: ICD-10-CM

## 2022-05-04 PROCEDURE — 73630 X-RAY EXAM OF FOOT: CPT

## 2022-08-23 ENCOUNTER — HOSPITAL ENCOUNTER (EMERGENCY)
Facility: HOSPITAL | Age: 77
Discharge: HOME | End: 2022-08-24
Attending: EMERGENCY MEDICINE | Admitting: EMERGENCY MEDICINE

## 2022-08-23 ENCOUNTER — APPOINTMENT (OUTPATIENT)
Dept: GENERAL RADIOLOGY | Facility: HOSPITAL | Age: 77
End: 2022-08-23

## 2022-08-23 DIAGNOSIS — I50.9 ACUTE ON CHRONIC CONGESTIVE HEART FAILURE, UNSPECIFIED HEART FAILURE TYPE: Primary | ICD-10-CM

## 2022-08-23 PROCEDURE — 84484 ASSAY OF TROPONIN QUANT: CPT | Performed by: EMERGENCY MEDICINE

## 2022-08-23 PROCEDURE — 99283 EMERGENCY DEPT VISIT LOW MDM: CPT

## 2022-08-23 PROCEDURE — 87636 SARSCOV2 & INF A&B AMP PRB: CPT | Performed by: EMERGENCY MEDICINE

## 2022-08-23 PROCEDURE — 80053 COMPREHEN METABOLIC PANEL: CPT | Performed by: EMERGENCY MEDICINE

## 2022-08-23 PROCEDURE — 93005 ELECTROCARDIOGRAM TRACING: CPT | Performed by: EMERGENCY MEDICINE

## 2022-08-23 PROCEDURE — 83880 ASSAY OF NATRIURETIC PEPTIDE: CPT | Performed by: EMERGENCY MEDICINE

## 2022-08-23 PROCEDURE — 85025 COMPLETE CBC W/AUTO DIFF WBC: CPT | Performed by: EMERGENCY MEDICINE

## 2022-08-23 PROCEDURE — C9803 HOPD COVID-19 SPEC COLLECT: HCPCS

## 2022-08-23 RX ORDER — FUROSEMIDE 20 MG/1
20 TABLET ORAL DAILY
COMMUNITY
End: 2022-08-24 | Stop reason: SDUPTHER

## 2022-08-23 RX ORDER — MEMANTINE HYDROCHLORIDE 10 MG/1
10 TABLET ORAL 2 TIMES DAILY
COMMUNITY
End: 2022-10-26

## 2022-08-23 RX ORDER — DONEPEZIL HYDROCHLORIDE 10 MG/1
10 TABLET, FILM COATED ORAL NIGHTLY
COMMUNITY

## 2022-08-24 ENCOUNTER — APPOINTMENT (OUTPATIENT)
Dept: GENERAL RADIOLOGY | Facility: HOSPITAL | Age: 77
End: 2022-08-24

## 2022-08-24 VITALS
HEIGHT: 70 IN | HEART RATE: 64 BPM | SYSTOLIC BLOOD PRESSURE: 168 MMHG | WEIGHT: 216.4 LBS | OXYGEN SATURATION: 94 % | TEMPERATURE: 98.7 F | DIASTOLIC BLOOD PRESSURE: 80 MMHG | RESPIRATION RATE: 20 BRPM | BODY MASS INDEX: 30.98 KG/M2

## 2022-08-24 LAB
ALBUMIN SERPL-MCNC: 4 G/DL (ref 3.5–5.2)
ALBUMIN/GLOB SERPL: 1.7 G/DL
ALP SERPL-CCNC: 93 U/L (ref 39–117)
ALT SERPL W P-5'-P-CCNC: 7 U/L (ref 1–41)
ANION GAP SERPL CALCULATED.3IONS-SCNC: 10 MMOL/L (ref 5–15)
AST SERPL-CCNC: 14 U/L (ref 1–40)
BASOPHILS # BLD AUTO: 0.03 10*3/MM3 (ref 0–0.2)
BASOPHILS NFR BLD AUTO: 0.4 % (ref 0–1.5)
BILIRUB SERPL-MCNC: 0.5 MG/DL (ref 0–1.2)
BUN SERPL-MCNC: 14 MG/DL (ref 8–23)
BUN/CREAT SERPL: 23.7 (ref 7–25)
CALCIUM SPEC-SCNC: 9.1 MG/DL (ref 8.6–10.5)
CHLORIDE SERPL-SCNC: 104 MMOL/L (ref 98–107)
CO2 SERPL-SCNC: 27 MMOL/L (ref 22–29)
CREAT SERPL-MCNC: 0.59 MG/DL (ref 0.76–1.27)
D DIMER PPP FEU-MCNC: 0.56 MCGFEU/ML (ref 0–0.46)
DEPRECATED RDW RBC AUTO: 52.6 FL (ref 37–54)
EGFRCR SERPLBLD CKD-EPI 2021: 100.6 ML/MIN/1.73
EOSINOPHIL # BLD AUTO: 0.15 10*3/MM3 (ref 0–0.4)
EOSINOPHIL NFR BLD AUTO: 2.1 % (ref 0.3–6.2)
ERYTHROCYTE [DISTWIDTH] IN BLOOD BY AUTOMATED COUNT: 14.8 % (ref 12.3–15.4)
FLUAV RNA RESP QL NAA+PROBE: NOT DETECTED
FLUBV RNA RESP QL NAA+PROBE: NOT DETECTED
GLOBULIN UR ELPH-MCNC: 2.4 GM/DL
GLUCOSE SERPL-MCNC: 139 MG/DL (ref 65–99)
HCT VFR BLD AUTO: 42 % (ref 37.5–51)
HGB BLD-MCNC: 13.7 G/DL (ref 13–17.7)
IMM GRANULOCYTES # BLD AUTO: 0.07 10*3/MM3 (ref 0–0.05)
IMM GRANULOCYTES NFR BLD AUTO: 1 % (ref 0–0.5)
LYMPHOCYTES # BLD AUTO: 1.52 10*3/MM3 (ref 0.7–3.1)
LYMPHOCYTES NFR BLD AUTO: 20.8 % (ref 19.6–45.3)
MCH RBC QN AUTO: 31.1 PG (ref 26.6–33)
MCHC RBC AUTO-ENTMCNC: 32.6 G/DL (ref 31.5–35.7)
MCV RBC AUTO: 95.5 FL (ref 79–97)
MONOCYTES # BLD AUTO: 0.7 10*3/MM3 (ref 0.1–0.9)
MONOCYTES NFR BLD AUTO: 9.6 % (ref 5–12)
NEUTROPHILS NFR BLD AUTO: 4.83 10*3/MM3 (ref 1.7–7)
NEUTROPHILS NFR BLD AUTO: 66.1 % (ref 42.7–76)
NT-PROBNP SERPL-MCNC: 4918 PG/ML (ref 0–1800)
PLATELET # BLD AUTO: 245 10*3/MM3 (ref 140–450)
PMV BLD AUTO: 9.3 FL (ref 6–12)
POTASSIUM SERPL-SCNC: 3.5 MMOL/L (ref 3.5–5.2)
PROT SERPL-MCNC: 6.4 G/DL (ref 6–8.5)
RBC # BLD AUTO: 4.4 10*6/MM3 (ref 4.14–5.8)
SARS-COV-2 RNA RESP QL NAA+PROBE: NOT DETECTED
SODIUM SERPL-SCNC: 141 MMOL/L (ref 136–145)
TROPONIN T SERPL-MCNC: <0.01 NG/ML (ref 0–0.03)
WBC NRBC COR # BLD: 7.3 10*3/MM3 (ref 3.4–10.8)

## 2022-08-24 PROCEDURE — 93010 ELECTROCARDIOGRAM REPORT: CPT | Performed by: INTERNAL MEDICINE

## 2022-08-24 PROCEDURE — 85379 FIBRIN DEGRADATION QUANT: CPT | Performed by: EMERGENCY MEDICINE

## 2022-08-24 PROCEDURE — 25010000002 FUROSEMIDE PER 20 MG: Performed by: EMERGENCY MEDICINE

## 2022-08-24 PROCEDURE — 94664 DEMO&/EVAL PT USE INHALER: CPT

## 2022-08-24 PROCEDURE — 71046 X-RAY EXAM CHEST 2 VIEWS: CPT

## 2022-08-24 PROCEDURE — 94640 AIRWAY INHALATION TREATMENT: CPT

## 2022-08-24 PROCEDURE — 96374 THER/PROPH/DIAG INJ IV PUSH: CPT

## 2022-08-24 PROCEDURE — 94799 UNLISTED PULMONARY SVC/PX: CPT

## 2022-08-24 RX ORDER — IPRATROPIUM BROMIDE AND ALBUTEROL SULFATE 2.5; .5 MG/3ML; MG/3ML
3 SOLUTION RESPIRATORY (INHALATION) ONCE
Status: COMPLETED | OUTPATIENT
Start: 2022-08-24 | End: 2022-08-24

## 2022-08-24 RX ORDER — FUROSEMIDE 10 MG/ML
40 INJECTION INTRAMUSCULAR; INTRAVENOUS ONCE
Status: COMPLETED | OUTPATIENT
Start: 2022-08-24 | End: 2022-08-24

## 2022-08-24 RX ORDER — FUROSEMIDE 20 MG/1
20 TABLET ORAL 2 TIMES DAILY
Qty: 14 TABLET | Refills: 0 | Status: SHIPPED | OUTPATIENT
Start: 2022-08-24 | End: 2023-01-27

## 2022-08-24 RX ADMIN — IPRATROPIUM BROMIDE AND ALBUTEROL SULFATE 3 ML: 2.5; .5 SOLUTION RESPIRATORY (INHALATION) at 00:42

## 2022-08-24 RX ADMIN — FUROSEMIDE 40 MG: 10 INJECTION, SOLUTION INTRAMUSCULAR; INTRAVENOUS at 01:26

## 2022-08-25 LAB — QT INTERVAL: 458 MS

## 2022-09-01 ENCOUNTER — TRANSCRIBE ORDERS (OUTPATIENT)
Dept: CARDIOLOGY | Facility: HOSPITAL | Age: 77
End: 2022-09-01

## 2022-09-01 DIAGNOSIS — I50.21 ACUTE SYSTOLIC CHF (CONGESTIVE HEART FAILURE): Primary | ICD-10-CM

## 2022-09-15 ENCOUNTER — APPOINTMENT (OUTPATIENT)
Dept: CARDIOLOGY | Facility: HOSPITAL | Age: 77
End: 2022-09-15

## 2022-09-22 ENCOUNTER — HOSPITAL ENCOUNTER (OUTPATIENT)
Dept: CARDIOLOGY | Facility: HOSPITAL | Age: 77
Discharge: HOME | End: 2022-09-22
Admitting: FAMILY MEDICINE

## 2022-09-22 VITALS
HEIGHT: 70 IN | WEIGHT: 216 LBS | BODY MASS INDEX: 30.92 KG/M2 | SYSTOLIC BLOOD PRESSURE: 150 MMHG | DIASTOLIC BLOOD PRESSURE: 70 MMHG

## 2022-09-22 DIAGNOSIS — I50.21 ACUTE SYSTOLIC CHF (CONGESTIVE HEART FAILURE): ICD-10-CM

## 2022-09-22 PROCEDURE — 93306 TTE W/DOPPLER COMPLETE: CPT

## 2022-09-22 PROCEDURE — 93306 TTE W/DOPPLER COMPLETE: CPT | Performed by: INTERNAL MEDICINE

## 2022-09-23 LAB
AORTIC DIMENSIONLESS INDEX: 0.6 (DI)
BH CV ECHO MEAS - ACS: 2.09 CM
BH CV ECHO MEAS - AI P1/2T: 521 MSEC
BH CV ECHO MEAS - AO MAX PG: 5.6 MMHG
BH CV ECHO MEAS - AO MEAN PG: 3 MMHG
BH CV ECHO MEAS - AO ROOT DIAM: 3 CM
BH CV ECHO MEAS - AO V2 MAX: 118 CM/SEC
BH CV ECHO MEAS - AO V2 VTI: 27.5 CM
BH CV ECHO MEAS - AVA(I,D): 2.35 CM2
BH CV ECHO MEAS - EDV(CUBED): 287.5 ML
BH CV ECHO MEAS - EDV(MOD-SP2): 147 ML
BH CV ECHO MEAS - EDV(MOD-SP4): 136 ML
BH CV ECHO MEAS - EF(MOD-BP): 42.4 %
BH CV ECHO MEAS - EF(MOD-SP2): 42.2 %
BH CV ECHO MEAS - EF(MOD-SP4): 33.8 %
BH CV ECHO MEAS - ESV(CUBED): 123.1 ML
BH CV ECHO MEAS - ESV(MOD-SP2): 85 ML
BH CV ECHO MEAS - ESV(MOD-SP4): 90 ML
BH CV ECHO MEAS - FS: 24.6 %
BH CV ECHO MEAS - IVS/LVPW: 0.82 CM
BH CV ECHO MEAS - IVSD: 0.9 CM
BH CV ECHO MEAS - LAT PEAK E' VEL: 5.7 CM/SEC
BH CV ECHO MEAS - LV DIASTOLIC VOL/BSA (35-75): 63.1 CM2
BH CV ECHO MEAS - LV MASS(C)D: 290.6 GRAMS
BH CV ECHO MEAS - LV MAX PG: 2.31 MMHG
BH CV ECHO MEAS - LV MEAN PG: 1 MMHG
BH CV ECHO MEAS - LV SYSTOLIC VOL/BSA (12-30): 41.7 CM2
BH CV ECHO MEAS - LV V1 MAX: 76 CM/SEC
BH CV ECHO MEAS - LV V1 VTI: 17.1 CM
BH CV ECHO MEAS - LVIDD: 6.6 CM
BH CV ECHO MEAS - LVIDS: 5 CM
BH CV ECHO MEAS - LVOT AREA: 3.8 CM2
BH CV ECHO MEAS - LVOT DIAM: 2.2 CM
BH CV ECHO MEAS - LVPWD: 1.1 CM
BH CV ECHO MEAS - MED PEAK E' VEL: 3.7 CM/SEC
BH CV ECHO MEAS - MR MAX PG: 121.4 MMHG
BH CV ECHO MEAS - MR MAX VEL: 550.9 CM/SEC
BH CV ECHO MEAS - MR MEAN PG: 67.4 MMHG
BH CV ECHO MEAS - MR MEAN VEL: 372.7 CM/SEC
BH CV ECHO MEAS - MR VTI: 190.3 CM
BH CV ECHO MEAS - MV A MAX VEL: 85.9 CM/SEC
BH CV ECHO MEAS - MV DEC SLOPE: 336.4 CM/SEC2
BH CV ECHO MEAS - MV DEC TIME: 0.34 MSEC
BH CV ECHO MEAS - MV E MAX VEL: 127.9 CM/SEC
BH CV ECHO MEAS - MV E/A: 1.49
BH CV ECHO MEAS - MV MAX PG: 4.9 MMHG
BH CV ECHO MEAS - MV MEAN PG: 2.48 MMHG
BH CV ECHO MEAS - MV P1/2T: 98 MSEC
BH CV ECHO MEAS - MV V2 VTI: 34.2 CM
BH CV ECHO MEAS - MVA(P1/2T): 2.25 CM2
BH CV ECHO MEAS - MVA(VTI): 1.89 CM2
BH CV ECHO MEAS - PA V2 MAX: 81.8 CM/SEC
BH CV ECHO MEAS - QP/QS: 0.68
BH CV ECHO MEAS - RAP SYSTOLE: 3 MMHG
BH CV ECHO MEAS - RV MAX PG: 0.99 MMHG
BH CV ECHO MEAS - RV V1 MAX: 49.8 CM/SEC
BH CV ECHO MEAS - RV V1 VTI: 12.1 CM
BH CV ECHO MEAS - RVOT DIAM: 2.15 CM
BH CV ECHO MEAS - SI(MOD-SP2): 28.7 ML/M2
BH CV ECHO MEAS - SI(MOD-SP4): 21.3 ML/M2
BH CV ECHO MEAS - SV(LVOT): 64.7 ML
BH CV ECHO MEAS - SV(MOD-SP2): 62 ML
BH CV ECHO MEAS - SV(MOD-SP4): 46 ML
BH CV ECHO MEAS - SV(RVOT): 43.9 ML
BH CV ECHO MEASUREMENTS AVERAGE E/E' RATIO: 27.21
BH CV XLRA - TDI S': 11.3 CM/SEC
LEFT ATRIUM VOLUME INDEX: 22 ML/M2
MAXIMAL PREDICTED HEART RATE: 144 BPM
SINUS: 3 CM
STJ: 2.18 CM
STRESS TARGET HR: 122 BPM

## 2022-10-06 ENCOUNTER — TRANSCRIBE ORDERS (OUTPATIENT)
Dept: CARDIOLOGY | Facility: HOSPITAL | Age: 77
End: 2022-10-06

## 2022-10-06 DIAGNOSIS — I50.9 ACUTE HEART FAILURE, UNSPECIFIED HEART FAILURE TYPE: Primary | ICD-10-CM

## 2022-10-14 ENCOUNTER — HOSPITAL ENCOUNTER (OUTPATIENT)
Dept: NUCLEAR MEDICINE | Facility: HOSPITAL | Age: 77
Discharge: HOME | End: 2022-10-14

## 2022-10-14 ENCOUNTER — HOSPITAL ENCOUNTER (OUTPATIENT)
Dept: CARDIOLOGY | Facility: HOSPITAL | Age: 77
Discharge: HOME | End: 2022-10-14

## 2022-10-14 DIAGNOSIS — I50.9 ACUTE HEART FAILURE, UNSPECIFIED HEART FAILURE TYPE: ICD-10-CM

## 2022-10-14 LAB
BH CV REST NUCLEAR ISOTOPE DOSE: 11.2 MCI
BH CV STRESS BP STAGE 1: NORMAL
BH CV STRESS COMMENTS STAGE 1: NORMAL
BH CV STRESS DOSE REGADENOSON STAGE 1: 0.4
BH CV STRESS DURATION MIN STAGE 1: 0
BH CV STRESS DURATION SEC STAGE 1: 30
BH CV STRESS HR STAGE 1: 53
BH CV STRESS NUCLEAR ISOTOPE DOSE: 35.2 MCI
BH CV STRESS O2 STAGE 1: 95
BH CV STRESS PROTOCOL 1: NORMAL
BH CV STRESS RECOVERY BP: NORMAL MMHG
BH CV STRESS RECOVERY HR: 66 BPM
BH CV STRESS RECOVERY O2: 96 %
BH CV STRESS STAGE 1: 1
LV EF NUC BP: 22 %
MAXIMAL PREDICTED HEART RATE: 144 BPM
PERCENT MAX PREDICTED HR: 50.69 %
STRESS BASELINE BP: NORMAL MMHG
STRESS BASELINE HR: 54 BPM
STRESS O2 SAT REST: 96 %
STRESS PERCENT HR: 60 %
STRESS POST ESTIMATED WORKLOAD: 1 METS
STRESS POST EXERCISE DUR SEC: 30 SEC
STRESS POST O2 SAT PEAK: 99 %
STRESS POST PEAK BP: NORMAL MMHG
STRESS POST PEAK HR: 73 BPM
STRESS TARGET HR: 122 BPM

## 2022-10-14 PROCEDURE — A9500 TC99M SESTAMIBI: HCPCS | Performed by: FAMILY MEDICINE

## 2022-10-14 PROCEDURE — 78452 HT MUSCLE IMAGE SPECT MULT: CPT

## 2022-10-14 PROCEDURE — 78452 HT MUSCLE IMAGE SPECT MULT: CPT | Performed by: INTERNAL MEDICINE

## 2022-10-14 PROCEDURE — 25010000002 REGADENOSON 0.4 MG/5ML SOLUTION: Performed by: FAMILY MEDICINE

## 2022-10-14 PROCEDURE — 0 TECHNETIUM SESTAMIBI: Performed by: FAMILY MEDICINE

## 2022-10-14 PROCEDURE — 93018 CV STRESS TEST I&R ONLY: CPT | Performed by: INTERNAL MEDICINE

## 2022-10-14 PROCEDURE — 93017 CV STRESS TEST TRACING ONLY: CPT

## 2022-10-14 PROCEDURE — 93016 CV STRESS TEST SUPVJ ONLY: CPT | Performed by: INTERNAL MEDICINE

## 2022-10-14 RX ADMIN — TECHNETIUM TC 99M SESTAMIBI 1 DOSE: 1 INJECTION INTRAVENOUS at 09:47

## 2022-10-14 RX ADMIN — TECHNETIUM TC 99M SESTAMIBI 1 DOSE: 1 INJECTION INTRAVENOUS at 08:49

## 2022-10-14 RX ADMIN — REGADENOSON 0.4 MG: 0.08 INJECTION, SOLUTION INTRAVENOUS at 09:47

## 2022-10-24 ENCOUNTER — TELEPHONE (OUTPATIENT)
Dept: CARDIOLOGY | Facility: CLINIC | Age: 77
End: 2022-10-24

## 2022-10-24 NOTE — TELEPHONE ENCOUNTER
----- Message from Zoe Cifuentes MD sent at 10/21/2022  3:47 PM EDT -----  Regarding: new pt  See if he can see me next week - tues or wed - at main office

## 2022-10-24 NOTE — TELEPHONE ENCOUNTER
Called and left a message on voicemail to call back to schedule an appointment Tuesday or Wednesday this week.    Lyndsay

## 2022-10-25 NOTE — TELEPHONE ENCOUNTER
[facility]

## 2022-10-26 ENCOUNTER — OFFICE VISIT (OUTPATIENT)
Dept: CARDIOLOGY | Facility: CLINIC | Age: 77
End: 2022-10-26

## 2022-10-26 ENCOUNTER — TRANSCRIBE ORDERS (OUTPATIENT)
Dept: CARDIOLOGY | Facility: CLINIC | Age: 77
End: 2022-10-26

## 2022-10-26 ENCOUNTER — HOSPITAL ENCOUNTER (OUTPATIENT)
Dept: CARDIOLOGY | Facility: HOSPITAL | Age: 77
Discharge: HOME | End: 2022-10-26
Admitting: INTERNAL MEDICINE

## 2022-10-26 VITALS — HEIGHT: 70 IN | BODY MASS INDEX: 28.77 KG/M2 | WEIGHT: 201 LBS

## 2022-10-26 DIAGNOSIS — I10 PRIMARY HYPERTENSION: ICD-10-CM

## 2022-10-26 DIAGNOSIS — I25.5 ISCHEMIC CARDIOMYOPATHY: Primary | ICD-10-CM

## 2022-10-26 DIAGNOSIS — Z13.6 SCREENING FOR ISCHEMIC HEART DISEASE: ICD-10-CM

## 2022-10-26 DIAGNOSIS — Z01.810 PRE-OPERATIVE CARDIOVASCULAR EXAMINATION: ICD-10-CM

## 2022-10-26 DIAGNOSIS — E78.2 MIXED HYPERLIPIDEMIA: ICD-10-CM

## 2022-10-26 DIAGNOSIS — Z01.810 PRE-OPERATIVE CARDIOVASCULAR EXAMINATION: Primary | ICD-10-CM

## 2022-10-26 DIAGNOSIS — R06.09 DYSPNEA ON EXERTION: ICD-10-CM

## 2022-10-26 DIAGNOSIS — E11.9 TYPE 2 DIABETES MELLITUS WITHOUT COMPLICATION, WITHOUT LONG-TERM CURRENT USE OF INSULIN: ICD-10-CM

## 2022-10-26 LAB
ANION GAP SERPL CALCULATED.3IONS-SCNC: 14 MMOL/L (ref 5–15)
BASOPHILS # BLD AUTO: 0.03 10*3/MM3 (ref 0–0.2)
BASOPHILS NFR BLD AUTO: 0.4 % (ref 0–1.5)
BUN SERPL-MCNC: 11 MG/DL (ref 8–23)
BUN/CREAT SERPL: 18 (ref 7–25)
CALCIUM SPEC-SCNC: 8.9 MG/DL (ref 8.6–10.5)
CHLORIDE SERPL-SCNC: 101 MMOL/L (ref 98–107)
CO2 SERPL-SCNC: 28 MMOL/L (ref 22–29)
CREAT SERPL-MCNC: 0.61 MG/DL (ref 0.76–1.27)
DEPRECATED RDW RBC AUTO: 44.1 FL (ref 37–54)
EGFRCR SERPLBLD CKD-EPI 2021: 99.5 ML/MIN/1.73
EOSINOPHIL # BLD AUTO: 0.13 10*3/MM3 (ref 0–0.4)
EOSINOPHIL NFR BLD AUTO: 1.9 % (ref 0.3–6.2)
ERYTHROCYTE [DISTWIDTH] IN BLOOD BY AUTOMATED COUNT: 13.8 % (ref 12.3–15.4)
GLUCOSE SERPL-MCNC: 109 MG/DL (ref 65–99)
HCT VFR BLD AUTO: 40.3 % (ref 37.5–51)
HGB BLD-MCNC: 13.7 G/DL (ref 13–17.7)
IMM GRANULOCYTES # BLD AUTO: 0.08 10*3/MM3 (ref 0–0.05)
IMM GRANULOCYTES NFR BLD AUTO: 1.2 % (ref 0–0.5)
LYMPHOCYTES # BLD AUTO: 1.42 10*3/MM3 (ref 0.7–3.1)
LYMPHOCYTES NFR BLD AUTO: 20.6 % (ref 19.6–45.3)
MCH RBC QN AUTO: 29.9 PG (ref 26.6–33)
MCHC RBC AUTO-ENTMCNC: 34 G/DL (ref 31.5–35.7)
MCV RBC AUTO: 88 FL (ref 79–97)
MONOCYTES # BLD AUTO: 0.54 10*3/MM3 (ref 0.1–0.9)
MONOCYTES NFR BLD AUTO: 7.8 % (ref 5–12)
NEUTROPHILS NFR BLD AUTO: 4.7 10*3/MM3 (ref 1.7–7)
NEUTROPHILS NFR BLD AUTO: 68.1 % (ref 42.7–76)
NRBC BLD AUTO-RTO: 0 /100 WBC (ref 0–0.2)
PLATELET # BLD AUTO: 255 10*3/MM3 (ref 140–450)
PMV BLD AUTO: 9.2 FL (ref 6–12)
POTASSIUM SERPL-SCNC: 3.5 MMOL/L (ref 3.5–5.2)
RBC # BLD AUTO: 4.58 10*6/MM3 (ref 4.14–5.8)
SODIUM SERPL-SCNC: 143 MMOL/L (ref 136–145)
WBC NRBC COR # BLD: 6.9 10*3/MM3 (ref 3.4–10.8)

## 2022-10-26 PROCEDURE — 80048 BASIC METABOLIC PNL TOTAL CA: CPT | Performed by: INTERNAL MEDICINE

## 2022-10-26 PROCEDURE — 93000 ELECTROCARDIOGRAM COMPLETE: CPT | Performed by: INTERNAL MEDICINE

## 2022-10-26 PROCEDURE — 99204 OFFICE O/P NEW MOD 45 MIN: CPT | Performed by: INTERNAL MEDICINE

## 2022-10-26 PROCEDURE — 36415 COLL VENOUS BLD VENIPUNCTURE: CPT

## 2022-10-26 PROCEDURE — 85025 COMPLETE CBC W/AUTO DIFF WBC: CPT | Performed by: INTERNAL MEDICINE

## 2022-10-26 RX ORDER — PROCHLORPERAZINE 25 MG/1
SUPPOSITORY RECTAL
COMMUNITY
Start: 2022-10-19

## 2022-10-26 RX ORDER — ESOMEPRAZOLE MAGNESIUM 40 MG/1
40 CAPSULE, DELAYED RELEASE ORAL 2 TIMES DAILY
COMMUNITY
Start: 2022-08-24 | End: 2023-01-27

## 2022-10-26 RX ORDER — PROCHLORPERAZINE 25 MG/1
SUPPOSITORY RECTAL
COMMUNITY
Start: 2022-09-26

## 2022-10-26 NOTE — PROGRESS NOTES
Date of Office Visit: 10/26/2022  Encounter Provider: Zoe Cifuentes MD  Place of Service: [facility] CARDIOLOGY  Patient Name: Nash So  :1945      Patient ID:  Nash So is a 76 y.o. male is here for dyspnea.           History of Present Illness    Has a history of COPD, hyperlipidemia, hypertension, CHF, GERD.    There is no premature cardiovascular disease in the family but his brother Kavon did have a stent.  He is , has 2 children, is retired, is a former smoker, has 1 Coke per day, no alcohol, no drugs.  He lives with his daughter.    He was in the emergency department 2022 with dyspnea, right lower extremity edema worse on the left belching and reflux.  Troponin was negative, proBNP 4918, glucose 139, otherwise normal CMP normal CBC.  D-dimer was 0.56.  He has CT done 2021 which showed no evidence of pulmonary embolism.  Review of CTA images shows calcification throughout the left main, LAD and proximal circumflex, RCA appears patent.    Echo done 22 showed LVEF 42% and grade 2 diastolic dysfunction and moderate eccentric hypertrophy with moderate left ventricular dilation, moderate mitral insufficiency. Stress nuclear study 10/14/22 showed large inferior and apical infarct.     He had severe COVID beginning 2021 and barely survive this, was in rehab facilities and to recover for about 6 months.  His wife passed away with this.  He is now walking with a walker and lives at home with his daughter.  He likes to stay active there.  He has had no falls or syncope.  He does use a wheelchair at home sometimes.  He has no chest tightness or pressure but does note significant exertional dyspnea which began about 2 months ago before he went to the emergency department.  This was new, he did not have anything like this even after he had COVID.  He has not felt his heart racing or skipping.  He was having orthopnea at  home before he went to the emergency department but with medication adjustments, he has had no further dyspnea.    Past Medical History:   Diagnosis Date   • Alzheimer disease (HCC)    • CHF (congestive heart failure) (HCC)    • COPD (chronic obstructive pulmonary disease) (HCC)    • Diabetes (HCC)    • GERD (gastroesophageal reflux disease)    • Hyperlipemia    • Hypertension          Past Surgical History:   Procedure Laterality Date   • ENDOSCOPY N/A 5/22/2017    Procedure: ESOPHAGOGASTRODUODENOSCOPY, biopsy;  Surgeon: Natalia Guerrero MD;  Location: [facility];  Service:        Current Outpatient Medications on File Prior to Visit   Medication Sig Dispense Refill   • albuterol (ACCUNEB) 1.25 MG/3ML nebulizer solution Take 3 mL by nebulization Every 6 (Six) Hours As Needed for Wheezing.  12   • aspirin 81 MG tablet Take  by mouth daily.     • atorvastatin (LIPITOR) 40 MG tablet Take 1 tablet by mouth daily. 90 tablet 3   • carbidopa-levodopa (SINEMET)  MG per tablet Take 1.5 tablets by mouth 3 (Three) Times a Day.     • Continuous Blood Gluc Sensor (Dexcom G6 Sensor)      • Continuous Blood Gluc Transmit (Dexcom G6 Transmitter) misc      • donepezil (ARICEPT) 10 MG tablet Take 10 mg by mouth Every Night.     • empagliflozin (JARDIANCE) 10 MG tablet tablet Take 1 tablet by mouth Daily.     • esomeprazole (nexIUM) 40 MG capsule 1 capsule 2 (Two) Times a Day.     • lisinopril (PRINIVIL,ZESTRIL) 20 MG tablet Take 1 tablet by mouth daily. (Patient taking differently: Take 1 tablet by mouth 2 (Two) Times a Day.) 90 tablet 2   • metFORMIN (GLUCOPHAGE) 850 MG tablet Take 1 tablet by mouth 2 (Two) Times a Day.     • metoclopramide (REGLAN) 10 MG tablet Take i po tid (Patient taking differently: Take i po tid as needed) 270 tablet 3   • Misc Natural Products (NEURIVA PO) Take 1 tablet by mouth Daily.     • nebivolol (BYSTOLIC) 10 MG tablet Take 1 tablet by mouth daily. 90 tablet 2   • furosemide (LASIX) 20 MG tablet  Take 1 tablet by mouth 2 (Two) Times a Day for 7 days. 14 tablet 0   • insulin aspart (novoLOG) 100 UNIT/ML injection Inject 0-24 Units under the skin into the appropriate area as directed 3 (Three) Times a Day Before Meals.  12   • [DISCONTINUED] B-D ULTRAFINE III SHORT PEN 31G X 8 MM misc USE AS DIRECTED TWO TIMES A DAY AND AS NEEDED 100 each 10   • [DISCONTINUED] bisacodyl (DULCOLAX) 10 MG suppository Insert 1 suppository into the rectum Daily As Needed for Constipation (Use if bisacodyl oral is ineffective).     • [DISCONTINUED] bisacodyl (DULCOLAX) 5 MG EC tablet Take 1 tablet by mouth Daily As Needed for Constipation (Use if polyethylene glycol is ineffective).     • [DISCONTINUED] glimepiride (AMARYL) 4 MG tablet Take 2 po daily (Patient taking differently: Take 4 mg by mouth 2 (Two) Times a Day. Take 2 po daily) 180 tablet 1   • [DISCONTINUED] guaiFENesin (MUCINEX) 600 MG 12 hr tablet Take 2 tablets by mouth 2 (Two) Times a Day.     • [DISCONTINUED] Insulin Pen Needle 31G X 8 MM misc      • [DISCONTINUED] Insulin Pen Needle 32G X 5 MM misc      • [DISCONTINUED] Liraglutide (Victoza) 18 MG/3ML solution pen-injector injection Inject 1.8 mg under the skin into the appropriate area as directed Daily.     • [DISCONTINUED] memantine (NAMENDA) 10 MG tablet Take 10 mg by mouth 2 (Two) Times a Day.     • [DISCONTINUED] nabumetone (RELAFEN) 500 MG tablet Take 1.5 tablets by mouth every 12 (twelve) hours. 180 tablet 3   • [DISCONTINUED] OLANZapine (zyPREXA) 2.5 MG tablet Take 1 tablet by mouth Every Night.     • [DISCONTINUED] pantoprazole (PROTONIX) 40 MG EC tablet TAKE ONE TABLET BY MOUTH DAILY 90 tablet 1   • [DISCONTINUED] pioglitazone-metFORMIN (ACTOPLUS MET)  MG per tablet Take one po bid 180 tablet 2   • [DISCONTINUED] polyethylene glycol (MIRALAX) 17 g packet Take 17 g by mouth Daily As Needed (Use if senna-docusate is ineffective).     • [DISCONTINUED] potassium chloride (K-DUR,KLOR-CON) 20 MEQ CR tablet  "Take 1 tablet by mouth Daily.     • [DISCONTINUED] sennosides-docusate (PERICOLACE) 8.6-50 MG per tablet Take 2 tablets by mouth 2 (Two) Times a Day.       No current facility-administered medications on file prior to visit.       Social History     Socioeconomic History   • Marital status:    • Number of children: 2   Tobacco Use   • Smoking status: Former   • Smokeless tobacco: Never   • Tobacco comments:     quit 25 years ago---caffeine: 1 drink daily   Substance and Sexual Activity   • Alcohol use: No   • Drug use: No   • Sexual activity: Defer           ROS    Procedures    ECG 12 Lead    Date/Time: 10/26/2022 2:25 PM  Performed by: Zoe Cifuentes MD  Authorized by: Zoe Cifuentes MD   Comparison: compared with previous ECG   Similar to previous ECG  Rhythm: sinus rhythm  ST Depression: V4 and V5  T inversion: II, III, aVF and V6    Clinical impression: abnormal EKG                Objective:      Vitals:    10/26/22 1338   Weight: 91.2 kg (201 lb)   Height: 177.8 cm (70\")     Body mass index is 28.84 kg/m².    Vitals reviewed.   Constitutional:       General: Not in acute distress.     Appearance: Well-developed. Not diaphoretic.   Eyes:      General: No scleral icterus.     Conjunctiva/sclera: Conjunctivae normal.   HENT:      Head: Normocephalic and atraumatic.   Neck:      Thyroid: No thyromegaly.      Vascular: No carotid bruit or JVD.      Lymphadenopathy: No cervical adenopathy.   Pulmonary:      Effort: Pulmonary effort is normal. No respiratory distress.      Breath sounds: Normal breath sounds. No wheezing. No rhonchi. No rales.   Chest:      Chest wall: Not tender to palpatation.   Cardiovascular:      Normal rate. Regular rhythm.      Murmurs: There is no murmur.      No gallop.   Pulses:     Intact distal pulses.   Edema:     Peripheral edema absent.   Abdominal:      General: Bowel sounds are normal. There is no distension or abdominal bruit.      Palpations: Abdomen is soft. " There is no abdominal mass.      Tenderness: There is no abdominal tenderness.   Musculoskeletal:         General: No deformity.      Extremities: No clubbing present.     Cervical back: Neck supple. Skin:     General: Skin is warm and dry. There is no cyanosis.      Coloration: Skin is not pale.      Findings: No rash.   Neurological:      Mental Status: Alert and oriented to person, place, and time.      Cranial Nerves: No cranial nerve deficit.   Psychiatric:         Judgment: Judgment normal.         Lab Review:       Assessment:      Diagnosis Plan   1. Ischemic cardiomyopathy  Case Request Cath Lab: Coronary angiography      2. Mixed hyperlipidemia  Case Request Cath Lab: Coronary angiography      3. Primary hypertension        4. Type 2 diabetes mellitus without complication, without long-term current use of insulin (HCC)  Case Request Cath Lab: Coronary angiography      5. Dyspnea on exertion  Case Request Cath Lab: Coronary angiography        1. Ischemic cardiomyopathy, new onset, I think he likely had a myocardial infarction about 2 months ago.  Talked with him about cardiac catheterization to see if there is something that can be intervened on to improve his ejection fraction.  He is willing to do this, risks and benefits explained to the patient including risk of heart attack stroke or death.  We will go ahead and proceed with that.  He is currently on a fairly good medication regimen for ischemic cardiomyopathy.  2. Likely CAD  3. Diabetes mellitus type 2, on metformin and Jardiance, may need more Jardiance but overall doing well right now.  4. Hypertension  5. Hyperlipidemia, on atorvastatin       Plan:       set up cardiac catheterization, no medication changes but in the future might try changing from ACE inhibitor to an ARB or Entresto.

## 2022-11-02 ENCOUNTER — HOSPITAL ENCOUNTER (OUTPATIENT)
Facility: HOSPITAL | Age: 77
Discharge: HOME | End: 2022-11-03
Attending: INTERNAL MEDICINE | Admitting: INTERNAL MEDICINE

## 2022-11-02 DIAGNOSIS — E11.9 TYPE 2 DIABETES MELLITUS WITHOUT COMPLICATION, WITHOUT LONG-TERM CURRENT USE OF INSULIN: ICD-10-CM

## 2022-11-02 DIAGNOSIS — I25.118 CORONARY ARTERY DISEASE OF NATIVE ARTERY OF NATIVE HEART WITH STABLE ANGINA PECTORIS: Primary | ICD-10-CM

## 2022-11-02 DIAGNOSIS — I25.5 ISCHEMIC CARDIOMYOPATHY: ICD-10-CM

## 2022-11-02 DIAGNOSIS — R06.09 DYSPNEA ON EXERTION: ICD-10-CM

## 2022-11-02 DIAGNOSIS — E78.2 MIXED HYPERLIPIDEMIA: ICD-10-CM

## 2022-11-02 LAB
ACT BLD: 265 SECONDS (ref 82–152)
ACT BLD: 289 SECONDS (ref 82–152)
GLUCOSE BLDC GLUCOMTR-MCNC: 145 MG/DL (ref 70–130)

## 2022-11-02 PROCEDURE — 25010000002 HEPARIN (PORCINE) PER 1000 UNITS: Performed by: INTERNAL MEDICINE

## 2022-11-02 PROCEDURE — 85347 COAGULATION TIME ACTIVATED: CPT

## 2022-11-02 PROCEDURE — G0378 HOSPITAL OBSERVATION PER HR: HCPCS

## 2022-11-02 PROCEDURE — C1874 STENT, COATED/COV W/DEL SYS: HCPCS | Performed by: INTERNAL MEDICINE

## 2022-11-02 PROCEDURE — C1887 CATHETER, GUIDING: HCPCS | Performed by: INTERNAL MEDICINE

## 2022-11-02 PROCEDURE — C1725 CATH, TRANSLUMIN NON-LASER: HCPCS | Performed by: INTERNAL MEDICINE

## 2022-11-02 PROCEDURE — 25010000002 MIDAZOLAM PER 1 MG: Performed by: INTERNAL MEDICINE

## 2022-11-02 PROCEDURE — 99152 MOD SED SAME PHYS/QHP 5/>YRS: CPT | Performed by: INTERNAL MEDICINE

## 2022-11-02 PROCEDURE — 25010000002 FENTANYL CITRATE (PF) 50 MCG/ML SOLUTION: Performed by: INTERNAL MEDICINE

## 2022-11-02 PROCEDURE — 93458 L HRT ARTERY/VENTRICLE ANGIO: CPT | Performed by: INTERNAL MEDICINE

## 2022-11-02 PROCEDURE — 82962 GLUCOSE BLOOD TEST: CPT

## 2022-11-02 PROCEDURE — 92928 PRQ TCAT PLMT NTRAC ST 1 LES: CPT | Performed by: INTERNAL MEDICINE

## 2022-11-02 PROCEDURE — C1894 INTRO/SHEATH, NON-LASER: HCPCS | Performed by: INTERNAL MEDICINE

## 2022-11-02 PROCEDURE — 0 IOPAMIDOL PER 1 ML: Performed by: INTERNAL MEDICINE

## 2022-11-02 PROCEDURE — C1769 GUIDE WIRE: HCPCS | Performed by: INTERNAL MEDICINE

## 2022-11-02 PROCEDURE — 99153 MOD SED SAME PHYS/QHP EA: CPT | Performed by: INTERNAL MEDICINE

## 2022-11-02 PROCEDURE — C9600 PERC DRUG-EL COR STENT SING: HCPCS | Performed by: INTERNAL MEDICINE

## 2022-11-02 DEVICE — XIENCE SKYPOINT™ EVEROLIMUS ELUTING CORONARY STENT SYSTEM 3.00 MM X 33 MM / RAPID-EXCHANGE
Type: IMPLANTABLE DEVICE | Site: HEART | Status: FUNCTIONAL
Brand: XIENCE SKYPOINT™

## 2022-11-02 DEVICE — XIENCE SKYPOINT™ EVEROLIMUS ELUTING CORONARY STENT SYSTEM 2.75 MM X 33 MM / RAPID-EXCHANGE
Type: IMPLANTABLE DEVICE | Site: HEART | Status: FUNCTIONAL
Brand: XIENCE SKYPOINT™

## 2022-11-02 RX ORDER — MEMANTINE HYDROCHLORIDE 10 MG/1
10 TABLET ORAL 2 TIMES DAILY
Status: DISCONTINUED | OUTPATIENT
Start: 2022-11-02 | End: 2022-11-03 | Stop reason: HOSPADM

## 2022-11-02 RX ORDER — ASPIRIN 81 MG/1
81 TABLET ORAL DAILY
Status: DISCONTINUED | OUTPATIENT
Start: 2022-11-02 | End: 2022-11-03 | Stop reason: HOSPADM

## 2022-11-02 RX ORDER — NEBIVOLOL 10 MG/1
10 TABLET ORAL DAILY
Status: DISCONTINUED | OUTPATIENT
Start: 2022-11-02 | End: 2022-11-03 | Stop reason: HOSPADM

## 2022-11-02 RX ORDER — CLOPIDOGREL BISULFATE 75 MG/1
75 TABLET ORAL DAILY
Status: DISCONTINUED | OUTPATIENT
Start: 2022-11-03 | End: 2022-11-03 | Stop reason: HOSPADM

## 2022-11-02 RX ORDER — ASPIRIN 81 MG/1
TABLET, CHEWABLE ORAL
Status: DISCONTINUED | OUTPATIENT
Start: 2022-11-02 | End: 2022-11-02 | Stop reason: HOSPADM

## 2022-11-02 RX ORDER — VERAPAMIL HYDROCHLORIDE 2.5 MG/ML
INJECTION, SOLUTION INTRAVENOUS
Status: DISCONTINUED | OUTPATIENT
Start: 2022-11-02 | End: 2022-11-02 | Stop reason: HOSPADM

## 2022-11-02 RX ORDER — MEMANTINE HYDROCHLORIDE 10 MG/1
10 TABLET ORAL 2 TIMES DAILY
COMMUNITY

## 2022-11-02 RX ORDER — ACETAMINOPHEN 325 MG/1
650 TABLET ORAL EVERY 6 HOURS PRN
COMMUNITY

## 2022-11-02 RX ORDER — SODIUM CHLORIDE 9 MG/ML
75 INJECTION, SOLUTION INTRAVENOUS CONTINUOUS
Status: DISCONTINUED | OUTPATIENT
Start: 2022-11-02 | End: 2022-11-03 | Stop reason: HOSPADM

## 2022-11-02 RX ORDER — SODIUM CHLORIDE 9 MG/ML
1 INJECTION, SOLUTION INTRAVENOUS CONTINUOUS
Status: ACTIVE | OUTPATIENT
Start: 2022-11-02 | End: 2022-11-02

## 2022-11-02 RX ORDER — DONEPEZIL HYDROCHLORIDE 10 MG/1
10 TABLET, FILM COATED ORAL NIGHTLY
Status: DISCONTINUED | OUTPATIENT
Start: 2022-11-02 | End: 2022-11-03 | Stop reason: HOSPADM

## 2022-11-02 RX ORDER — ACETAMINOPHEN 325 MG/1
650 TABLET ORAL EVERY 4 HOURS PRN
Status: DISCONTINUED | OUTPATIENT
Start: 2022-11-02 | End: 2022-11-03 | Stop reason: HOSPADM

## 2022-11-02 RX ORDER — FENTANYL CITRATE 50 UG/ML
INJECTION, SOLUTION INTRAMUSCULAR; INTRAVENOUS
Status: DISCONTINUED | OUTPATIENT
Start: 2022-11-02 | End: 2022-11-02 | Stop reason: HOSPADM

## 2022-11-02 RX ORDER — LIDOCAINE HYDROCHLORIDE 20 MG/ML
INJECTION, SOLUTION INFILTRATION; PERINEURAL
Status: DISCONTINUED | OUTPATIENT
Start: 2022-11-02 | End: 2022-11-02 | Stop reason: HOSPADM

## 2022-11-02 RX ORDER — CETIRIZINE HYDROCHLORIDE 5 MG/1
5 TABLET ORAL DAILY
COMMUNITY

## 2022-11-02 RX ORDER — ACETAMINOPHEN 325 MG/1
650 TABLET ORAL EVERY 6 HOURS PRN
Status: DISCONTINUED | OUTPATIENT
Start: 2022-11-02 | End: 2022-11-03 | Stop reason: HOSPADM

## 2022-11-02 RX ORDER — CLOPIDOGREL BISULFATE 75 MG/1
TABLET ORAL
Status: DISCONTINUED | OUTPATIENT
Start: 2022-11-02 | End: 2022-11-02 | Stop reason: HOSPADM

## 2022-11-02 RX ORDER — MIDAZOLAM HYDROCHLORIDE 1 MG/ML
INJECTION INTRAMUSCULAR; INTRAVENOUS
Status: DISCONTINUED | OUTPATIENT
Start: 2022-11-02 | End: 2022-11-02 | Stop reason: HOSPADM

## 2022-11-02 RX ORDER — CETIRIZINE HYDROCHLORIDE 10 MG/1
5 TABLET ORAL DAILY
Status: DISCONTINUED | OUTPATIENT
Start: 2022-11-03 | End: 2022-11-03 | Stop reason: HOSPADM

## 2022-11-02 RX ORDER — HEPARIN SODIUM 1000 [USP'U]/ML
INJECTION, SOLUTION INTRAVENOUS; SUBCUTANEOUS
Status: DISCONTINUED | OUTPATIENT
Start: 2022-11-02 | End: 2022-11-02 | Stop reason: HOSPADM

## 2022-11-02 RX ORDER — ALBUTEROL SULFATE 1.25 MG/3ML
1 SOLUTION RESPIRATORY (INHALATION) EVERY 6 HOURS PRN
Status: DISCONTINUED | OUTPATIENT
Start: 2022-11-02 | End: 2022-11-03 | Stop reason: HOSPADM

## 2022-11-02 RX ORDER — SODIUM CHLORIDE 0.9 % (FLUSH) 0.9 %
10 SYRINGE (ML) INJECTION EVERY 12 HOURS SCHEDULED
Status: DISCONTINUED | OUTPATIENT
Start: 2022-11-02 | End: 2022-11-02 | Stop reason: HOSPADM

## 2022-11-02 RX ORDER — ATORVASTATIN CALCIUM 20 MG/1
40 TABLET, FILM COATED ORAL DAILY
Status: DISCONTINUED | OUTPATIENT
Start: 2022-11-02 | End: 2022-11-03 | Stop reason: HOSPADM

## 2022-11-02 RX ORDER — SODIUM CHLORIDE 0.9 % (FLUSH) 0.9 %
10 SYRINGE (ML) INJECTION AS NEEDED
Status: DISCONTINUED | OUTPATIENT
Start: 2022-11-02 | End: 2022-11-02 | Stop reason: HOSPADM

## 2022-11-02 RX ORDER — PANTOPRAZOLE SODIUM 40 MG/1
40 TABLET, DELAYED RELEASE ORAL EVERY MORNING
Status: DISCONTINUED | OUTPATIENT
Start: 2022-11-03 | End: 2022-11-03 | Stop reason: HOSPADM

## 2022-11-02 RX ADMIN — CARBIDOPA AND LEVODOPA 1.5 TABLET: 25; 100 TABLET ORAL at 16:05

## 2022-11-02 RX ADMIN — ATORVASTATIN CALCIUM 40 MG: 20 TABLET, FILM COATED ORAL at 14:44

## 2022-11-02 RX ADMIN — DONEPEZIL HYDROCHLORIDE 10 MG: 10 TABLET, FILM COATED ORAL at 20:01

## 2022-11-02 RX ADMIN — EMPAGLIFLOZIN 10 MG: 10 TABLET, FILM COATED ORAL at 16:06

## 2022-11-02 RX ADMIN — SODIUM CHLORIDE 75 ML/HR: 9 INJECTION, SOLUTION INTRAVENOUS at 11:50

## 2022-11-02 RX ADMIN — NEBIVOLOL 10 MG: 10 TABLET ORAL at 14:44

## 2022-11-02 RX ADMIN — MEMANTINE HYDROCHLORIDE 10 MG: 10 TABLET, FILM COATED ORAL at 20:01

## 2022-11-02 RX ADMIN — CARBIDOPA AND LEVODOPA 1.5 TABLET: 25; 100 TABLET ORAL at 20:01

## 2022-11-02 RX ADMIN — Medication 10 ML: at 13:42

## 2022-11-03 ENCOUNTER — READMISSION MANAGEMENT (OUTPATIENT)
Dept: CALL CENTER | Facility: HOSPITAL | Age: 77
End: 2022-11-03

## 2022-11-03 VITALS
WEIGHT: 197 LBS | RESPIRATION RATE: 16 BRPM | DIASTOLIC BLOOD PRESSURE: 74 MMHG | HEART RATE: 53 BPM | SYSTOLIC BLOOD PRESSURE: 152 MMHG | HEIGHT: 70 IN | TEMPERATURE: 98.5 F | BODY MASS INDEX: 28.2 KG/M2 | OXYGEN SATURATION: 94 %

## 2022-11-03 PROBLEM — I50.22 CHRONIC SYSTOLIC HEART FAILURE: Status: ACTIVE | Noted: 2022-11-03

## 2022-11-03 LAB
ANION GAP SERPL CALCULATED.3IONS-SCNC: 9 MMOL/L (ref 5–15)
BUN SERPL-MCNC: 10 MG/DL (ref 8–23)
BUN/CREAT SERPL: 16.9 (ref 7–25)
CALCIUM SPEC-SCNC: 8.7 MG/DL (ref 8.6–10.5)
CHLORIDE SERPL-SCNC: 104 MMOL/L (ref 98–107)
CHOLEST SERPL-MCNC: 118 MG/DL (ref 0–200)
CO2 SERPL-SCNC: 28 MMOL/L (ref 22–29)
CREAT SERPL-MCNC: 0.59 MG/DL (ref 0.76–1.27)
DEPRECATED RDW RBC AUTO: 47 FL (ref 37–54)
EGFRCR SERPLBLD CKD-EPI 2021: 100.6 ML/MIN/1.73
ERYTHROCYTE [DISTWIDTH] IN BLOOD BY AUTOMATED COUNT: 14 % (ref 12.3–15.4)
GLUCOSE SERPL-MCNC: 139 MG/DL (ref 65–99)
HBA1C MFR BLD: 6.5 % (ref 4.8–5.6)
HCT VFR BLD AUTO: 39.3 % (ref 37.5–51)
HDLC SERPL-MCNC: 41 MG/DL (ref 40–60)
HGB BLD-MCNC: 13.2 G/DL (ref 13–17.7)
LDLC SERPL CALC-MCNC: 59 MG/DL (ref 0–100)
LDLC/HDLC SERPL: 1.41 {RATIO}
MCH RBC QN AUTO: 30.2 PG (ref 26.6–33)
MCHC RBC AUTO-ENTMCNC: 33.6 G/DL (ref 31.5–35.7)
MCV RBC AUTO: 89.9 FL (ref 79–97)
PLATELET # BLD AUTO: 233 10*3/MM3 (ref 140–450)
PMV BLD AUTO: 9.4 FL (ref 6–12)
POTASSIUM SERPL-SCNC: 2.9 MMOL/L (ref 3.5–5.2)
POTASSIUM SERPL-SCNC: 3.6 MMOL/L (ref 3.5–5.2)
QT INTERVAL: 489 MS
RBC # BLD AUTO: 4.37 10*6/MM3 (ref 4.14–5.8)
SODIUM SERPL-SCNC: 141 MMOL/L (ref 136–145)
TRIGL SERPL-MCNC: 95 MG/DL (ref 0–150)
VLDLC SERPL-MCNC: 18 MG/DL (ref 5–40)
WBC NRBC COR # BLD: 6.71 10*3/MM3 (ref 3.4–10.8)

## 2022-11-03 PROCEDURE — 99217 PR OBSERVATION CARE DISCHARGE MANAGEMENT: CPT | Performed by: NURSE PRACTITIONER

## 2022-11-03 PROCEDURE — 80061 LIPID PANEL: CPT | Performed by: INTERNAL MEDICINE

## 2022-11-03 PROCEDURE — 93010 ELECTROCARDIOGRAM REPORT: CPT | Performed by: INTERNAL MEDICINE

## 2022-11-03 PROCEDURE — 85027 COMPLETE CBC AUTOMATED: CPT | Performed by: INTERNAL MEDICINE

## 2022-11-03 PROCEDURE — 93005 ELECTROCARDIOGRAM TRACING: CPT | Performed by: INTERNAL MEDICINE

## 2022-11-03 PROCEDURE — G0378 HOSPITAL OBSERVATION PER HR: HCPCS

## 2022-11-03 PROCEDURE — 83036 HEMOGLOBIN GLYCOSYLATED A1C: CPT | Performed by: INTERNAL MEDICINE

## 2022-11-03 PROCEDURE — 93798 PHYS/QHP OP CAR RHAB W/ECG: CPT

## 2022-11-03 PROCEDURE — 84132 ASSAY OF SERUM POTASSIUM: CPT | Performed by: NURSE PRACTITIONER

## 2022-11-03 PROCEDURE — 80048 BASIC METABOLIC PNL TOTAL CA: CPT | Performed by: INTERNAL MEDICINE

## 2022-11-03 RX ORDER — CLOPIDOGREL BISULFATE 75 MG/1
75 TABLET ORAL DAILY
Qty: 30 TABLET | Refills: 11 | Status: SHIPPED | OUTPATIENT
Start: 2022-11-04 | End: 2023-01-25 | Stop reason: SDUPTHER

## 2022-11-03 RX ORDER — POTASSIUM CHLORIDE 750 MG/1
20 TABLET, FILM COATED, EXTENDED RELEASE ORAL ONCE
Status: COMPLETED | OUTPATIENT
Start: 2022-11-03 | End: 2022-11-03

## 2022-11-03 RX ORDER — CLOPIDOGREL BISULFATE 75 MG/1
75 TABLET ORAL DAILY
Qty: 30 TABLET | Refills: 11 | Status: SHIPPED | OUTPATIENT
Start: 2022-11-04 | End: 2022-11-03 | Stop reason: SDUPTHER

## 2022-11-03 RX ORDER — POTASSIUM CHLORIDE 750 MG/1
40 TABLET, FILM COATED, EXTENDED RELEASE ORAL ONCE
Status: COMPLETED | OUTPATIENT
Start: 2022-11-03 | End: 2022-11-03

## 2022-11-03 RX ADMIN — POTASSIUM CHLORIDE 20 MEQ: 750 TABLET, EXTENDED RELEASE ORAL at 13:46

## 2022-11-03 RX ADMIN — CETIRIZINE HYDROCHLORIDE 5 MG: 10 TABLET ORAL at 08:23

## 2022-11-03 RX ADMIN — POTASSIUM CHLORIDE 40 MEQ: 750 TABLET, EXTENDED RELEASE ORAL at 06:50

## 2022-11-03 RX ADMIN — EMPAGLIFLOZIN 10 MG: 10 TABLET, FILM COATED ORAL at 08:24

## 2022-11-03 RX ADMIN — POTASSIUM CHLORIDE 20 MEQ: 750 TABLET, EXTENDED RELEASE ORAL at 08:24

## 2022-11-03 RX ADMIN — PANTOPRAZOLE SODIUM 40 MG: 40 TABLET, DELAYED RELEASE ORAL at 06:50

## 2022-11-03 RX ADMIN — ATORVASTATIN CALCIUM 40 MG: 20 TABLET, FILM COATED ORAL at 08:23

## 2022-11-03 RX ADMIN — CLOPIDOGREL 75 MG: 75 TABLET, FILM COATED ORAL at 08:23

## 2022-11-03 RX ADMIN — ASPIRIN 81 MG: 81 TABLET, COATED ORAL at 08:23

## 2022-11-03 RX ADMIN — MEMANTINE HYDROCHLORIDE 10 MG: 10 TABLET, FILM COATED ORAL at 08:24

## 2022-11-03 RX ADMIN — NEBIVOLOL 10 MG: 10 TABLET ORAL at 08:23

## 2022-11-03 RX ADMIN — CARBIDOPA AND LEVODOPA 1.5 TABLET: 25; 100 TABLET ORAL at 08:24

## 2022-11-03 NOTE — DISCHARGE SUMMARY
Patient Name: Nash So  :1945  76 y.o.    Date of Admit: 2022  Date of Discharge:  11/3/2022    Discharge Diagnosis:  Problems Addressed this Visit        Cardiac and Vasculature    Dyspnea on exertion    Relevant Orders    Cardiac Catheterization/Vascular Study (Completed)    Ischemic cardiomyopathy    Relevant Medications    clopidogrel (PLAVIX) 75 MG tablet (Start on 2022)    Other Relevant Orders    Cardiac Catheterization/Vascular Study (Completed)    * (Principal) Mixed hyperlipidemia    Relevant Orders    Cardiac Catheterization/Vascular Study (Completed)       Endocrine and Metabolic    Diabetes mellitus (HCC)    Relevant Orders    Cardiac Catheterization/Vascular Study (Completed)   Other Visit Diagnoses     Coronary artery disease of native artery of native heart with stable angina pectoris (HCC)    -  Primary    Relevant Medications    clopidogrel (PLAVIX) 75 MG tablet (Start on 2022)    Other Relevant Orders    Ambulatory Referral to Cardiac Rehab      Diagnoses       Codes Comments    Coronary artery disease of native artery of native heart with stable angina pectoris (HCC)    -  Primary ICD-10-CM: I25.118  ICD-9-CM: 414.01, 413.9     Mixed hyperlipidemia     ICD-10-CM: E78.2  ICD-9-CM: 272.2     Type 2 diabetes mellitus without complication, without long-term current use of insulin (HCC)     ICD-10-CM: E11.9  ICD-9-CM: 250.00     Dyspnea on exertion     ICD-10-CM: R06.09  ICD-9-CM: 786.09     Ischemic cardiomyopathy     ICD-10-CM: I25.5  ICD-9-CM: 414.8         1.  Newly diagnosed cardiomyopathy  2.  Coronary artery disease status post drug-eluting stent x2 to the RCA  3.  Dyspnea on exertion, anginal equivalent  4.  Chronic diastolic heart failure  5.  Recently abnormal stress test with large inferior and apical infarct  6.  Moderate mitral insufficiency  7.  History of COVID with severe illness 2021  8. Mixed hyperlipidemia  9. Essential hypertension  10.   Hematuria    Hospital Course:   Patient is a 76-year-old male who is a patient of Dr. Cifuentes was having some dyspnea on exertion and underwent a stress test that showed a large inferior and apical infarct.  With reduction in his EF.  He was admitted for diagnostic heart cath was found to have an occlusion of the circumflex at the ostium which was recommended to be medically managed and less he had angina staged intervention could be completed at another time.  Received 2 drug-eluting stents to stenosis of the right coronary.  Post procedure this morning his potassium was low he was given supplement.  He has had a little bit of hematuria which he is going to keep an eye on at home was a very scant amount in the urinal.  But due to procedure yesterday and refused seeming anticoagulants not unexpected.  He has had no previous history of this.  His radial cath site stable without hematoma or thrill.  If his recheck potassium is good today we will let him go home this afternoon.  I told him to call if he has any further blood in his urine.    Procedures Performed  Procedure(s):  Coronary angiography  Left ventriculography  Percutaneous Coronary Intervention  Stent EDIE coronary  Left Heart Cath       Consults     No orders found for last 30 day(s).          Pertinent Test Results:   Results from last 7 days   Lab Units 11/03/22  0317   SODIUM mmol/L 141   POTASSIUM mmol/L 2.9*   CHLORIDE mmol/L 104   CO2 mmol/L 28.0   BUN mg/dL 10   CREATININE mg/dL 0.59*   CALCIUM mg/dL 8.7   GLUCOSE mg/dL 139*         @LABRCNT(bnp)@  Results from last 7 days   Lab Units 11/03/22  0317   WBC 10*3/mm3 6.71   HEMOGLOBIN g/dL 13.2   HEMATOCRIT % 39.3   PLATELETS 10*3/mm3 233             Results from last 7 days   Lab Units 11/03/22  0317   CHOLESTEROL mg/dL 118   TRIGLYCERIDES mg/dL 95   HDL CHOL mg/dL 41   LDL CHOL mg/dL 59       Condition on Discharge: stable    Discharge Medications     Discharge Medications      New Medications       Instructions Start Date   clopidogrel 75 MG tablet  Commonly known as: PLAVIX   75 mg, Oral, Daily   Start Date: November 4, 2022        Changes to Medications      Instructions Start Date   furosemide 20 MG tablet  Commonly known as: LASIX  What changed:   · how much to take  · when to take this  · additional instructions   20 mg, Oral, 2 Times Daily      lisinopril 20 MG tablet  Commonly known as: PRINIVIL,ZESTRIL  What changed: when to take this   20 mg, Oral, Daily      metoclopramide 10 MG tablet  Commonly known as: REGLAN  What changed: additional instructions   Take i po tid         Continue These Medications      Instructions Start Date   acetaminophen 325 MG tablet  Commonly known as: TYLENOL   650 mg, Oral, Every 6 Hours PRN      albuterol 1.25 MG/3ML nebulizer solution  Commonly known as: ACCUNEB   1.25 mg, Nebulization, Every 6 Hours PRN      aspirin tablet   Oral, Daily      atorvastatin 40 MG tablet  Commonly known as: LIPITOR   40 mg, Oral, Daily      carbidopa-levodopa  MG per tablet  Commonly known as: SINEMET   1.5 tablets, Oral, 3 Times Daily      cetirizine 5 MG tablet  Commonly known as: zyrTEC   5 mg, Oral, Daily      Dexcom G6 Sensor   No dose, route, or frequency recorded.      Dexcom G6 Transmitter misc   No dose, route, or frequency recorded.      donepezil 10 MG tablet  Commonly known as: ARICEPT   10 mg, Oral, Nightly      empagliflozin 10 MG tablet tablet  Commonly known as: JARDIANCE   10 mg, Oral, Daily      esomeprazole 40 MG capsule  Commonly known as: nexIUM   40 mg, 2 Times Daily      insulin aspart 100 UNIT/ML injection  Commonly known as: novoLOG   0-24 Units, Subcutaneous, 3 Times Daily Before Meals      memantine 10 MG tablet  Commonly known as: NAMENDA   10 mg, Oral, 2 Times Daily      metFORMIN 850 MG tablet  Commonly known as: GLUCOPHAGE   850 mg, Oral, 2 Times Daily      nebivolol 10 MG tablet  Commonly known as: Bystolic   10 mg, Oral, Daily      NEURIVA PO   1  tablet, Oral, Daily             Discharge Diet:     Activity at Discharge:     Discharge disposition: home    Follow-up Appointments  No future appointments.  Additional Instructions for the Follow-ups that You Need to Schedule     Ambulatory Referral to Cardiac Rehab   As directed            Test Results Pending at Discharge       ANTOINETTE Richardson, [facility]  11/03/22  10:40 EDT    Time: Discharge 40 minutes  Electronically signed by ANTOINETTE Richardson, 11/03/22, 10:40 AM EDT.

## 2022-11-03 NOTE — PLAN OF CARE
Goal Outcome Evaluation:   Pt alert, Ox4, VSS, right radial cath site is clean with no hematoma noted, no complaints during the night.  Pt developed some blood in urine this AM. Pt stated has been happening off and on.     Diuretic in Use: None  Response to Diuretics (Output greater than intake): Output greater  Daily Weight (up or down):   O2 Requirements: RA  Functional Status (Activity level, tolerance and respiratory symptoms): Pt is a x2 with walker to ambulate but can roll in bed well on own.  Discharge Plans: Home with Home caregiver at house.

## 2022-11-03 NOTE — PROGRESS NOTES
Discharge Planning Assessment  [facility]     Patient Name: Nash So  MRN: [id number]  Today's Date: 11/3/2022    Admit Date: 11/2/2022    Plan: Home with family, denies needs/concerns. CCP available should needs arise......Adryan DOMINGUEZ   Discharge Needs Assessment     Row Name 11/03/22 1030       Living Environment    People in Home child(ines), adult    Name(s) of People in Home Daughter Kylie and son-in-law live with patient in his home.    Current Living Arrangements home    Primary Care Provided by self    Provides Primary Care For no one    Family Caregiver if Needed child(ines), adult    Quality of Family Relationships supportive;helpful;involved    Able to Return to Prior Arrangements yes       Resource/Environmental Concerns    Resource/Environmental Concerns none       Transition Planning    Patient/Family Anticipates Transition to home with family    Patient/Family Anticipated Services at Transition none    Transportation Anticipated family or friend will provide       Discharge Needs Assessment    Equipment Currently Used at Home bath bench;walker, rolling;cane, straight;glucometer;shower chair;wheelchair    Concerns to be Addressed denies needs/concerns at this time               Discharge Plan     Row Name 11/03/22 1033       Plan    Plan Home with family, denies needs/concerns. CCP available should needs arise......Adryan DOMINGUEZ    Patient/Family in Agreement with Plan yes    Plan Comments Spoke with patient at bedside, introduced self and role. Pt. verified face sheet information is correct. Daughter Kylie Leyva lives with patient and assists him as needed, pt. states ok to discuss needs/plan with daughter. States daughter and son-in-law moved in with him after his wife passed last year. SS house with 2 steps to enter and pt. mostly IADL's prior to admit, daughter assists with household needs. Pt. uses walker or WC at home and has all equipment needed. Subacute rehab after covid  hospitalization 2021 to [facility]. Unsure of HH hx, denies need for HH services at this time. Pt. anticipates DC today and states daughter will transport via private auto, denies needs/concerns related to DC.....Adryan DOMINGUEZ                  Expected Discharge Date and Time     Expected Discharge Date Expected Discharge Time    Nov 3, 2022               Functional Status     Row Name 11/03/22 1029       Functional Status    Usual Activity Tolerance good    Current Activity Tolerance moderate       Functional Status, IADL    Medications assistive person    Meal Preparation assistive person    Housekeeping assistive person    Laundry assistive person    Shopping assistive person                  Luisana Mak, RN

## 2022-11-03 NOTE — NURSING NOTE
Second page send out to cardiology.    ANTOINETTE Smith orders 40 mEq now and 20 mEq in 2 hours and redraw two ours post last dose.

## 2022-11-03 NOTE — PROGRESS NOTES
[facility] Clinical Pharmacy Services: Pharmacy Education - Antiplatelet - Clopidogrel    Nash So has been ordered clopidogrel s/p EDIE stent placement.     Counseling points included the following:  Clopidogrel's indication, patient's need for the medication, and dosing/frequency of this medication.  Enforced the importance of taking their medication as instructed every day and the reason why the medication is dosed that way.  Explained possible side effects of antiplatelet therapy, including increased risk of bleeding, and s/sx of bleeding. Also talked about ways to control bleeding for minor cuts and scrapes.  Emphasized the importance of going to the emergency room if any of the following occur: Falling and hitting your head; noticing bright red blood in urine or dark/tarry stools; vomiting up blood or vomit has a coffee-ground like texture; coughing up blood.  Discussed the importance of informing any physician or dentist that they have been started on an antiplatelet, in case they need to be taken off for a procedure.  Discussed all important drug interactions, including over-the-counter medications and supplements.  Instructed the patient not to begin or discontinue any medications without informing his/her physician/pharmacist.    Patient and family expressed understanding and had no further questions.      Nataliya Muñiz, Pharm.D., [facility]   Clinical Pharmacist  Phone Extension #[phone]

## 2022-11-03 NOTE — CONSULTS
Met with patient, discussed benefits of cardiac rehab. Provided phase II information along with the contact information for cardiac rehab  at [facility]. Reports that his daughter makes all of his appointments. Requested for referral to be sent.

## 2022-11-03 NOTE — PLAN OF CARE
Goal Outcome Evaluation:  Plan of Care Reviewed With: patient, durable power of         Progress: improving  Outcome Evaluation: VSS, AOx4 upon entry to unit from cath lab. TR band removed at 1805, slight swelling, some bruising. No complaints of pain at this time. Ambulatory with at least 1 with use of walker.good appetite and fluid intake. WCTM.      Potassium replacement today before discharge. Vss, Aox4. Adequate for discharge.

## 2022-11-04 NOTE — OUTREACH NOTE
Prep Survey    Flowsheet Row Responses   [facility] facility patient discharged from? [place]   Is LACE score < 7 ? Yes   Emergency Room discharge w/ pulse ox? No   Eligibility Readm Mgmt   Discharge diagnosis Newly diagnosed cardiomyopathy, CAD, s/p heart cath and stent placement   Does the patient have one of the following disease processes/diagnoses(primary or secondary)? Other   Does the patient have Home health ordered? No   Is there a DME ordered? No   Prep survey completed? Yes          NEYMAR GRANADOS - Registered Nurse

## 2022-11-07 ENCOUNTER — TELEPHONE (OUTPATIENT)
Dept: CARDIAC REHAB | Facility: HOSPITAL | Age: 77
End: 2022-11-07

## 2022-11-10 ENCOUNTER — OFFICE VISIT (OUTPATIENT)
Dept: CARDIOLOGY | Facility: CLINIC | Age: 77
End: 2022-11-10

## 2022-11-10 VITALS
OXYGEN SATURATION: 97 % | DIASTOLIC BLOOD PRESSURE: 60 MMHG | BODY MASS INDEX: 28.2 KG/M2 | WEIGHT: 197 LBS | RESPIRATION RATE: 16 BRPM | SYSTOLIC BLOOD PRESSURE: 130 MMHG | HEIGHT: 70 IN | HEART RATE: 60 BPM

## 2022-11-10 DIAGNOSIS — J43.8 OTHER EMPHYSEMA: ICD-10-CM

## 2022-11-10 DIAGNOSIS — I10 PRIMARY HYPERTENSION: ICD-10-CM

## 2022-11-10 DIAGNOSIS — E78.2 MIXED HYPERLIPIDEMIA: ICD-10-CM

## 2022-11-10 DIAGNOSIS — I25.5 ISCHEMIC CARDIOMYOPATHY: ICD-10-CM

## 2022-11-10 DIAGNOSIS — I50.22 CHRONIC SYSTOLIC HEART FAILURE: Primary | ICD-10-CM

## 2022-11-10 PROCEDURE — 93000 ELECTROCARDIOGRAM COMPLETE: CPT | Performed by: NURSE PRACTITIONER

## 2022-11-10 PROCEDURE — 99214 OFFICE O/P EST MOD 30 MIN: CPT | Performed by: NURSE PRACTITIONER

## 2022-11-10 NOTE — PROGRESS NOTES
Date of Office Visit: 11/10/2022  Encounter Provider: ANTOINETTE Evans  Place of Service: [facility] CARDIOLOGY  Patient Name: Nash So  :1945  Primary Cardiologist: Dr. Cifuentes    CC:  1 week hospital follow up    Dear Dr. Nassar    HPI: Nash So is a pleasant 76 y.o. male who presents 11/10/2022 for cardiac follow up.  He is a new patient to me and I reviewed his past medical records. Has a history of COPD, hyperlipidemia, hypertension, CHF, GERD.     There is no premature cardiovascular disease in the family but his brother Kavon did have a stent.  He is , has 2 children, is retired, is a former smoker, has 1 Coke per day, no alcohol, no drugs.  He lives with his daughter.     He was in the emergency department 2022 with dyspnea, right lower extremity edema worse on the left belching and reflux.  Troponin was negative, proBNP 4918, glucose 139, otherwise normal CMP normal CBC.  D-dimer was 0.56.  He has CT done 2021 which showed no evidence of pulmonary embolism.  Review of CTA images shows calcification throughout the left main, LAD and proximal circumflex, RCA appears patent.     Echo done 22 showed LVEF 42% and grade 2 diastolic dysfunction and moderate eccentric hypertrophy with moderate left ventricular dilation, moderate mitral insufficiency. Stress nuclear study 10/14/22 showed large inferior and apical infarct.      He saw Dr. Cifuentes 10/26/2022.  He had severe COVID beginning 2021 and barely survive this, was in rehab facilities and to recover for about 6 months.  His wife passed away with this.  He is now walking with a walker and lives at home with his daughter.  He likes to stay active there.  He has had no falls or syncope.  He does use a wheelchair at home sometimes.  He has no chest tightness or pressure but does note significant exertional dyspnea which began about 2 months ago before he went to  the emergency department.  This was new, he did not have anything like this even after he had COVID.  He has not felt his heart racing or skipping.  He was having orthopnea at home before he went to the emergency department but with medication adjustments, he has had no further dyspnea.She recommended cardiac cath.    When he saw Dr. Cifuentes he was having some dyspnea on exertion and underwent a stress test that showed a large inferior and apical infarct.  With reduction in his EF.  He was admitted for diagnostic heart cath was found to have an occlusion of the circumflex at the ostium which was recommended to be medically managed unless he had angina, then a staged intervention could be completed at another time.  Received 2 drug-eluting stents to stenosis of the right coronary.  Post procedure  his potassium was low he was given supplement.  He has had a little bit of hematuria which he is going to keep an eye on at home was a very scant amount in the urinal. Due to procedure and use of  DAPT,  not unexpected.  He has had no previous history of this.  His radial cath site stable without hematoma or thrill. He was discharged on 11/3/2022 instructed to call if he has any further blood in his urine.    Cardiac cath performed 11/2/2022  HEMODYNAMICS:  LV: 140/10  LVEDP: 10-12  AORTIC: 140/54   CORONARY ANGIOGRAPHY:  LEFT MAIN: Heavily calcified, large caliber left main coronary artery.  Distal mild stenosis, 30%.  Bifurcates give the LAD and circumflex arteries.  LAD: The LAD is a medium caliber vessel.  Normal in the ostium.  Gives rise to a small caliber first diagonal with has mild luminal irregularities.  That is followed by medium caliber second diagonal which is normal.  The mid LAD after this is a long tubular stenosis of about 50% which is tortuous and calcified.  The remainder of the vessel is medium caliber with mild diffuse luminal irregularities about 30%  RAMUS: Absent  CIRCUMFLEX: The circumflex is a  "medium caliber, heavily calcified vessel which is nondominant.  From the ostium to the mid vessel there is heavily calcified disease, at most 99%.  There is a high branching first OM which is small caliber with ostial 50% disease.  Mid branching, medium caliber second OM which has severe 70 to 80% proximal disease.  The mid circumflex gives rise to a medium caliber OM 2 which has proximal 30 to 40% disease.  RCA: The RCA is a large-caliber, dominant vessel.  The proximal portion is mild 30% disease.  The mid vessel has 99% subtotal stenosis.  The distal vessel has diffuse 70% disease.  Prior to revascularization there is competitive flow in the PDA from left to right collaterals.  After revascularization there is no significant PDA lesion.   CORONARY INTERVENTION FINDINGS:  PCI CORONARY SEGMENT: Mid RCA to proximal PL  PRE-STENOSIS: 99%  POST-STENOSIS: 0 percent  LESION TYPE: C  CAMRYN FLOW PRE/POST: 1, 3  CULPRIT LESION: Yes  DISSECTION:   None     He returns today in follow-up.  He states he has not had any palpitations, shortness of breath, lower extremity edema.  He denies any dizziness, lightheadedness, syncope or presyncopal episodes.  He denies any chest pain, chest pressure or fatigue.  He is take his medications as directed.  Overall he feels quite well.  He is trying to walk at home and \"does laps inside his house\".  He has signed up for cardiac rehab.  His daughter had many questions that were asked and answered.  We did discuss dietary changes.  He has not had any further bleeding in his urine or stool.  He is swallowing his pills without effort.  Past Medical History:   Diagnosis Date   • Alzheimer disease (HCC)    • CHF (congestive heart failure) (HCC)    • COPD (chronic obstructive pulmonary disease) (HCC)    • Diabetes (HCC)    • GERD (gastroesophageal reflux disease)    • Hyperlipemia    • Hypertension    • Parkinson disease (HCC)        Past Surgical History:   Procedure Laterality Date   • CARDIAC " CATHETERIZATION N/A 11/2/2022    Procedure: Coronary angiography;  Surgeon: Ban Geronimo MD;  Location:  [facility];  Service: Cardiovascular;  Laterality: N/A;   • CARDIAC CATHETERIZATION N/A 11/2/2022    Procedure: Left ventriculography;  Surgeon: Ban Geronimo MD;  Location:  [facility];  Service: Cardiovascular;  Laterality: N/A;   • CARDIAC CATHETERIZATION N/A 11/2/2022    Procedure: Percutaneous Coronary Intervention;  Surgeon: Ban Geronimo MD;  Location:  [facility];  Service: Cardiovascular;  Laterality: N/A;   • CARDIAC CATHETERIZATION N/A 11/2/2022    Procedure: Stent EDIE coronary;  Surgeon: Ban Geronimo MD;  Location:  [facility];  Service: Cardiovascular;  Laterality: N/A;   • CARDIAC CATHETERIZATION N/A 11/2/2022    Procedure: Left Heart Cath;  Surgeon: Ban Geronimo MD;  Location:  [facility];  Service: Cardiovascular;  Laterality: N/A;   • ENDOSCOPY N/A 5/22/2017    Procedure: ESOPHAGOGASTRODUODENOSCOPY, biopsy;  Surgeon: Natalia Guerrero MD;  Location: [facility];  Service:        Social History     Socioeconomic History   • Marital status:    • Number of children: 2   Tobacco Use   • Smoking status: Former   • Smokeless tobacco: Never   • Tobacco comments:     quit 25 years ago---caffeine: 1 drink daily   Substance and Sexual Activity   • Alcohol use: No   • Drug use: No   • Sexual activity: Defer       Family History   Problem Relation Age of Onset   • Heart defect Mother    • Diabetes Mother    • Cancer Sister    • Heart disease Brother    • Diabetes Brother    • Stroke Brother        The following portion of the patient's history were reviewed and updated as appropriate: past medical history, past surgical history, past social history, past family history, allergies, current medications, and problem list.    Review of Systems   Constitutional: Negative for diaphoresis, fever and malaise/fatigue.    HENT: Negative for congestion, hearing loss, hoarse voice, nosebleeds and sore throat.    Eyes: Negative for photophobia, vision loss in left eye, vision loss in right eye and visual disturbance.   Cardiovascular: Negative for chest pain, dyspnea on exertion, irregular heartbeat, leg swelling, near-syncope, orthopnea, palpitations, paroxysmal nocturnal dyspnea and syncope.   Respiratory: Negative for cough, hemoptysis, shortness of breath, sleep disturbances due to breathing, snoring, sputum production and wheezing.    Endocrine: Negative for cold intolerance, heat intolerance, polydipsia, polyphagia and polyuria.   Hematologic/Lymphatic: Negative for bleeding problem. Does not bruise/bleed easily.   Skin: Negative for color change, dry skin, poor wound healing, rash and suspicious lesions.   Musculoskeletal: Negative for arthritis, back pain, falls, gout, joint pain, joint swelling, muscle cramps, muscle weakness and myalgias.   Gastrointestinal: Negative for bloating, abdominal pain, constipation, diarrhea, dysphagia, melena, nausea and vomiting.   Neurological: Positive for weakness (uses a walker for ambulation). Negative for excessive daytime sleepiness, dizziness, headaches, light-headedness, loss of balance, numbness, paresthesias, seizures and vertigo.   Psychiatric/Behavioral: Negative for depression, memory loss and substance abuse. The patient is not nervous/anxious.        Allergies   Allergen Reactions   • Penicillin G Unknown - High Severity   • Penicillins          Current Outpatient Medications:   •  acetaminophen (TYLENOL) 325 MG tablet, Take 2 tablets by mouth Every 6 (Six) Hours As Needed for Mild Pain., Disp: , Rfl:   •  albuterol (ACCUNEB) 1.25 MG/3ML nebulizer solution, Take 3 mL by nebulization Every 6 (Six) Hours As Needed for Wheezing., Disp: , Rfl: 12  •  aspirin 81 MG tablet, Take  by mouth daily., Disp: , Rfl:   •  atorvastatin (LIPITOR) 40 MG tablet, Take 1 tablet by mouth daily.,  "Disp: 90 tablet, Rfl: 3  •  carbidopa-levodopa (SINEMET)  MG per tablet, Take 1.5 tablets by mouth 3 (Three) Times a Day., Disp: , Rfl:   •  cetirizine (zyrTEC) 5 MG tablet, Take 1 tablet by mouth Daily., Disp: , Rfl:   •  clopidogrel (PLAVIX) 75 MG tablet, Take 1 tablet by mouth Daily., Disp: 30 tablet, Rfl: 11  •  Continuous Blood Gluc Sensor (Dexcom G6 Sensor), , Disp: , Rfl:   •  Continuous Blood Gluc Transmit (Dexcom G6 Transmitter) misc, , Disp: , Rfl:   •  donepezil (ARICEPT) 10 MG tablet, Take 10 mg by mouth Every Night., Disp: , Rfl:   •  empagliflozin (JARDIANCE) 10 MG tablet tablet, Take 1 tablet by mouth Daily., Disp: , Rfl:   •  esomeprazole (nexIUM) 40 MG capsule, 1 capsule 2 (Two) Times a Day., Disp: , Rfl:   •  furosemide (LASIX) 20 MG tablet, Take 1 tablet by mouth 2 (Two) Times a Day for 7 days. (Patient taking differently: Take 2 tablets by mouth Daily. TAKEN 11/1/22), Disp: 14 tablet, Rfl: 0  •  insulin aspart (novoLOG) 100 UNIT/ML injection, Inject 0-24 Units under the skin into the appropriate area as directed 3 (Three) Times a Day Before Meals., Disp: , Rfl: 12  •  lisinopril (PRINIVIL,ZESTRIL) 20 MG tablet, Take 1 tablet by mouth daily., Disp: 90 tablet, Rfl: 2  •  memantine (NAMENDA) 10 MG tablet, Take 1 tablet by mouth 2 (Two) Times a Day., Disp: , Rfl:   •  metFORMIN (GLUCOPHAGE) 850 MG tablet, Take 1 tablet by mouth 2 (Two) Times a Day., Disp: , Rfl:   •  metoclopramide (REGLAN) 10 MG tablet, Take i po tid (Patient taking differently: Take i po tid as needed), Disp: 270 tablet, Rfl: 3  •  Misc Natural Products (NEURIVA PO), Take 1 tablet by mouth Daily., Disp: , Rfl:   •  nebivolol (BYSTOLIC) 10 MG tablet, Take 1 tablet by mouth daily., Disp: 90 tablet, Rfl: 2        Objective:     Vitals:    11/10/22 1535   BP: 130/60   Pulse: 60   Resp: 16   SpO2: 97%   Weight: 89.4 kg (197 lb)   Height: 177.8 cm (70\")     Body mass index is 28.27 kg/m².      Vitals reviewed.   Constitutional:     "   General: Not in acute distress.     Appearance: Well-developed and not in distress. Chronically ill-appearing.   Eyes:      General:         Right eye: No discharge.         Left eye: No discharge.      Conjunctiva/sclera: Conjunctivae normal.   HENT:      Head: Normocephalic and atraumatic.      Right Ear: External ear normal.      Left Ear: External ear normal.      Nose: Nose normal.   Neck:      Thyroid: No thyromegaly.      Vascular: No JVD.      Trachea: No tracheal deviation.      Lymphadenopathy: No cervical adenopathy.   Pulmonary:      Effort: Pulmonary effort is normal. No respiratory distress.      Breath sounds: Normal breath sounds. No wheezing. No rales.   Chest:      Chest wall: Not tender to palpatation.   Cardiovascular:      Normal rate. Regular rhythm.      No gallop.   Pulses:     Intact distal pulses.   Edema:     Peripheral edema absent.   Abdominal:      General: There is no distension.      Palpations: Abdomen is soft.      Tenderness: There is no abdominal tenderness.   Musculoskeletal: Normal range of motion.         General: No tenderness or deformity.      Cervical back: Normal range of motion and neck supple. Skin:     General: Skin is warm and dry.      Findings: No erythema or rash.   Neurological:      Mental Status: Alert and oriented to person, place, and time.      Coordination: Coordination normal.   Psychiatric:         Behavior: Behavior normal.         Thought Content: Thought content normal.         Cognition and Memory: Cognition normal.         Judgment: Judgment normal.               ECG 12 Lead    Date/Time: 11/10/2022 3:51 PM  Performed by: Delmi Lyons APRN  Authorized by: Delmi Lyons APRN   Comparison: compared with previous ECG from 11/3/2022  Similar to previous ECG  Rhythm: sinus rhythm  Rate: normal  Conduction: non-specific intraventricular conduction delay  Q waves: II, III and aVF    ST Segments: ST segments normal  T inversion: II, III and aVF  QRS axis:  normal  Other findings: left ventricular hypertrophy    Clinical impression: abnormal EKG              Assessment:       Diagnosis Plan   1. Chronic systolic heart failure (CMS/HCC)        2. Primary hypertension        3. Ischemic cardiomyopathy        4. Mixed hyperlipidemia        5. Other emphysema (HCC)               Plan:             1. Ischemic cardiomyopathy, new onset, felt he had a myocardial infarction in August.   He is on GDMT for ischemic cardiomyopathy.  2. CAD - s/p cardiac cath 11/2/2022 with  PCI of the mid RCA, distal RCA, Noted in cath report - Given the diffuse nature of the disease extending into the ostium of the circumflex this is not an ideal vessel for PCI unless angina or heart failure is unable to be treated medically.  He is on DAPT for 1 year.  2. Diabetes mellitus type 2, on metformin and Jardiance, may need more Jardiance but overall doing well right now.  3. Hypertension - controlled  4. Hyperlipidemia, - continue on lipid lowering therapy, currently on atorvastatin    Keep appt with  for December    As always, it has been a pleasure to participate in your patient's care. Thank you.       Sincerely,       ANTOINETTE Evans      Current Outpatient Medications:   •  acetaminophen (TYLENOL) 325 MG tablet, Take 2 tablets by mouth Every 6 (Six) Hours As Needed for Mild Pain., Disp: , Rfl:   •  albuterol (ACCUNEB) 1.25 MG/3ML nebulizer solution, Take 3 mL by nebulization Every 6 (Six) Hours As Needed for Wheezing., Disp: , Rfl: 12  •  aspirin 81 MG tablet, Take  by mouth daily., Disp: , Rfl:   •  atorvastatin (LIPITOR) 40 MG tablet, Take 1 tablet by mouth daily., Disp: 90 tablet, Rfl: 3  •  carbidopa-levodopa (SINEMET)  MG per tablet, Take 1.5 tablets by mouth 3 (Three) Times a Day., Disp: , Rfl:   •  cetirizine (zyrTEC) 5 MG tablet, Take 1 tablet by mouth Daily., Disp: , Rfl:   •  clopidogrel (PLAVIX) 75 MG tablet, Take 1 tablet by mouth Daily., Disp: 30 tablet, Rfl: 11  •   Continuous Blood Gluc Sensor (Dexcom G6 Sensor), , Disp: , Rfl:   •  Continuous Blood Gluc Transmit (Dexcom G6 Transmitter) misc, , Disp: , Rfl:   •  donepezil (ARICEPT) 10 MG tablet, Take 10 mg by mouth Every Night., Disp: , Rfl:   •  empagliflozin (JARDIANCE) 10 MG tablet tablet, Take 1 tablet by mouth Daily., Disp: , Rfl:   •  esomeprazole (nexIUM) 40 MG capsule, 1 capsule 2 (Two) Times a Day., Disp: , Rfl:   •  furosemide (LASIX) 20 MG tablet, Take 1 tablet by mouth 2 (Two) Times a Day for 7 days. (Patient taking differently: Take 2 tablets by mouth Daily. TAKEN 11/1/22), Disp: 14 tablet, Rfl: 0  •  insulin aspart (novoLOG) 100 UNIT/ML injection, Inject 0-24 Units under the skin into the appropriate area as directed 3 (Three) Times a Day Before Meals., Disp: , Rfl: 12  •  lisinopril (PRINIVIL,ZESTRIL) 20 MG tablet, Take 1 tablet by mouth daily., Disp: 90 tablet, Rfl: 2  •  memantine (NAMENDA) 10 MG tablet, Take 1 tablet by mouth 2 (Two) Times a Day., Disp: , Rfl:   •  metFORMIN (GLUCOPHAGE) 850 MG tablet, Take 1 tablet by mouth 2 (Two) Times a Day., Disp: , Rfl:   •  metoclopramide (REGLAN) 10 MG tablet, Take i po tid (Patient taking differently: Take i po tid as needed), Disp: 270 tablet, Rfl: 3  •  Misc Natural Products (NEURIVA PO), Take 1 tablet by mouth Daily., Disp: , Rfl:   •  nebivolol (BYSTOLIC) 10 MG tablet, Take 1 tablet by mouth daily., Disp: 90 tablet, Rfl: 2      Dictated utilizing Dragon dictation

## 2023-01-25 RX ORDER — CLOPIDOGREL BISULFATE 75 MG/1
75 TABLET ORAL DAILY
Qty: 30 TABLET | Refills: 9 | Status: SHIPPED | OUTPATIENT
Start: 2023-01-25

## 2023-01-25 NOTE — TELEPHONE ENCOUNTER
Patient called and requested a refill on the Plavix.  Last RX did not go through.  Resending RX for the remaining fills.    Lyndsay

## 2023-01-27 ENCOUNTER — OFFICE VISIT (OUTPATIENT)
Dept: CARDIOLOGY | Facility: CLINIC | Age: 78
End: 2023-01-27
Payer: MEDICARE

## 2023-01-27 VITALS
DIASTOLIC BLOOD PRESSURE: 72 MMHG | BODY MASS INDEX: 28.06 KG/M2 | WEIGHT: 196 LBS | HEART RATE: 67 BPM | HEIGHT: 70 IN | SYSTOLIC BLOOD PRESSURE: 136 MMHG

## 2023-01-27 DIAGNOSIS — I10 PRIMARY HYPERTENSION: ICD-10-CM

## 2023-01-27 DIAGNOSIS — E11.9 TYPE 2 DIABETES MELLITUS WITHOUT COMPLICATION, WITHOUT LONG-TERM CURRENT USE OF INSULIN: ICD-10-CM

## 2023-01-27 DIAGNOSIS — I50.22 CHRONIC HFREF (HEART FAILURE WITH REDUCED EJECTION FRACTION): Primary | ICD-10-CM

## 2023-01-27 DIAGNOSIS — E78.2 MIXED HYPERLIPIDEMIA: ICD-10-CM

## 2023-01-27 DIAGNOSIS — I25.5 ISCHEMIC CARDIOMYOPATHY: ICD-10-CM

## 2023-01-27 PROCEDURE — 99214 OFFICE O/P EST MOD 30 MIN: CPT | Performed by: INTERNAL MEDICINE

## 2023-01-27 PROCEDURE — 93000 ELECTROCARDIOGRAM COMPLETE: CPT | Performed by: INTERNAL MEDICINE

## 2023-01-27 NOTE — PROGRESS NOTES
Date of Office Visit: 2023  Encounter Provider: Zoe Cifuentes MD  Place of Service: [facility] CARDIOLOGY  Patient Name: Nash So  :1945      Patient ID:  Nash So is a 77 y.o. male is here for  followup for HFrEF and CAD.          History of Present Illness    Has a history of COPD, hyperlipidemia, hypertension, CHF, GERD.     There is no premature cardiovascular disease in the family but his brother Kavon did have a stent.  He is , has 2 children, is retired, is a former smoker, has 1 Coke per day, no alcohol, no drugs.  He lives with his daughter.     He was in the emergency department 2022 with dyspnea, right lower extremity edema worse on the left belching and reflux.  Troponin was negative, proBNP 4918, glucose 139, otherwise normal CMP normal CBC.  D-dimer was 0.56.  He has CT done 2021 which showed no evidence of pulmonary embolism.  Review of CTA images shows calcification throughout the left main, LAD and proximal circumflex, RCA appears patent.     Echo done 22 showed LVEF 42% and grade 2 diastolic dysfunction and moderate eccentric hypertrophy with moderate left ventricular dilation, moderate mitral insufficiency. Stress nuclear study 10/14/22 showed large inferior and apical infarct.      He had severe COVID beginning 2021 and barely survive this, was in rehab facilities and to recover for about 6 months.  His wife passed away with COVID.        I saw him for severe dyspnea 10/26/22.  He had a stress nuclear perfusion study done 10/14/2022 which showed a large inferior wall infarct and inferoapical infarct without ischemia, ejection fraction 22%.  Echocardiogram done 2022 showed ejection fraction 42% with grade 2 diastolic dysfunction, moderate left ventricular dilation, moderate mitral insufficiency.  This showed global hypokinesis with inferolateral akinesis.  Cardiac catheterization done  11/2/2022 showed calcified left main with 30% distal stenosis, 50% mid LAD stenosis, diffuse 30% LAD stenosis, circumflex showed 99% stenosis with a 70 to 80% second OM stenosis, 99% mid RCA stenosis with diffuse 70% distal RCA stenosis.  Patient received stent 2.75 x 33 drug-eluting stent to the distal RCA and 3 x 33 drug-eluting stent to the proximal to mid RCA.    Labs on 11/3/2022 showed potassium 2.9, glucose 139, otherwise normal BMP, hemoglobin A1c 6.5%, total cholesterol 118, HDL 41, LDL 59, VLDL 18, normal CBC.  He has no chest pain or difficulty breathing.  He is starting get his strength back.  He still lives with his daughter and uses a Rollator.  He does not feels heart racing or skipping.  He has had no dizziness syncope or falls.  He has no orthopnea or PND.  His appetite is good and he sleeps fairly well.    Past Medical History:   Diagnosis Date   • Abnormal ECG    • Alzheimer disease (formerly [facility])    • CHF (congestive heart failure) (formerly [facility])    • COPD (chronic obstructive pulmonary disease) (formerly [facility])    • Diabetes (formerly [facility])    • GERD (gastroesophageal reflux disease)    • Heart murmur    • Hyperlipemia    • Hypertension    • Myocardial infarction (formerly [facility])    • Parkinson disease (formerly [facility])          Past Surgical History:   Procedure Laterality Date   • CARDIAC CATHETERIZATION N/A 11/02/2022    Procedure: Coronary angiography;  Surgeon: Ban Geronimo MD;  Location: [facility] CATH INVASIVE LOCATION;  Service: Cardiovascular;  Laterality: N/A;   • CARDIAC CATHETERIZATION N/A 11/02/2022    Procedure: Left ventriculography;  Surgeon: Ban Geronimo MD;  Location: [facility] CATH INVASIVE LOCATION;  Service: Cardiovascular;  Laterality: N/A;   • CARDIAC CATHETERIZATION N/A 11/02/2022    Procedure: Percutaneous Coronary Intervention;  Surgeon: Ban Geronimo MD;  Location: [facility] CATH INVASIVE LOCATION;  Service: Cardiovascular;  Laterality: N/A;   • CARDIAC CATHETERIZATION N/A 11/02/2022    Procedure: Stent EDIE coronary;  Surgeon: Juanpablo  MD Ban;  Location:  [facility] CATH INVASIVE LOCATION;  Service: Cardiovascular;  Laterality: N/A;   • CARDIAC CATHETERIZATION N/A 11/02/2022    Procedure: Left Heart Cath;  Surgeon: Ban Geronimo MD;  Location:  [facility] CATH INVASIVE LOCATION;  Service: Cardiovascular;  Laterality: N/A;   • ENDOSCOPY N/A 05/22/2017    Procedure: ESOPHAGOGASTRODUODENOSCOPY, biopsy;  Surgeon: Natalia Guerrero MD;  Location: [facility] OR;  Service:        Current Outpatient Medications on File Prior to Visit   Medication Sig Dispense Refill   • acetaminophen (TYLENOL) 325 MG tablet Take 2 tablets by mouth Every 6 (Six) Hours As Needed for Mild Pain.     • albuterol (ACCUNEB) 1.25 MG/3ML nebulizer solution Take 3 mL by nebulization Every 6 (Six) Hours As Needed for Wheezing.  12   • aspirin 81 MG tablet Take  by mouth daily.     • atorvastatin (LIPITOR) 40 MG tablet Take 1 tablet by mouth daily. 90 tablet 3   • carbidopa-levodopa (SINEMET)  MG per tablet Take 1.5 tablets by mouth 3 (Three) Times a Day.     • cetirizine (zyrTEC) 5 MG tablet Take 1 tablet by mouth Daily.     • clopidogrel (PLAVIX) 75 MG tablet Take 1 tablet by mouth Daily. 30 tablet 9   • Continuous Blood Gluc Sensor (Dexcom G6 Sensor)      • Continuous Blood Gluc Transmit (Dexcom G6 Transmitter) misc      • donepezil (ARICEPT) 10 MG tablet Take 10 mg by mouth Every Night.     • empagliflozin (JARDIANCE) 10 MG tablet tablet Take 1 tablet by mouth Daily.     • insulin aspart (novoLOG) 100 UNIT/ML injection Inject 0-24 Units under the skin into the appropriate area as directed 3 (Three) Times a Day Before Meals.  12   • lisinopril (PRINIVIL,ZESTRIL) 20 MG tablet Take 1 tablet by mouth daily. 90 tablet 2   • memantine (NAMENDA) 10 MG tablet Take 1 tablet by mouth 2 (Two) Times a Day.     • metFORMIN (GLUCOPHAGE) 850 MG tablet Take 1 tablet by mouth 2 (Two) Times a Day.     • metoclopramide (REGLAN) 10 MG tablet Take i po tid (Patient taking differently: Take i po tid as  "needed) 270 tablet 3   • Misc Natural Products (NEURIVA PO) Take 1 tablet by mouth Daily.     • nebivolol (BYSTOLIC) 10 MG tablet Take 1 tablet by mouth daily. 90 tablet 2   • [DISCONTINUED] esomeprazole (nexIUM) 40 MG capsule 1 capsule 2 (Two) Times a Day.     • [DISCONTINUED] furosemide (LASIX) 20 MG tablet Take 1 tablet by mouth 2 (Two) Times a Day for 7 days. (Patient taking differently: Take 2 tablets by mouth Daily. TAKEN 11/1/22) 14 tablet 0     No current facility-administered medications on file prior to visit.       Social History     Socioeconomic History   • Marital status:    • Number of children: 2   Tobacco Use   • Smoking status: Former     Passive exposure: Past   • Smokeless tobacco: Never   • Tobacco comments:     quit 25 years ago---caffeine: 1 drink daily   Substance and Sexual Activity   • Alcohol use: No   • Drug use: No   • Sexual activity: Defer           ROS    Procedures    ECG 12 Lead    Date/Time: 1/27/2023 2:27 PM  Performed by: Zoe Cifuentes MD  Authorized by: Zoe Cifuentes MD   Comparison: compared with previous ECG   Similar to previous ECG  Rhythm: sinus rhythm  Q waves: II, III and aVF    T inversion: II, III, aVF, V3, V4, V5 and V6  Other findings: left ventricular hypertrophy    Clinical impression: abnormal EKG                Objective:      Vitals:    01/27/23 1354   BP: 136/72   Pulse: 67   Weight: 88.9 kg (196 lb)   Height: 177.8 cm (70\")     Body mass index is 28.12 kg/m².    Vitals reviewed.   Constitutional:       General: Not in acute distress.     Appearance: Well-developed. Not diaphoretic.   Eyes:      General: No scleral icterus.     Conjunctiva/sclera: Conjunctivae normal.   HENT:      Head: Normocephalic and atraumatic.   Neck:      Thyroid: No thyromegaly.      Vascular: No carotid bruit or JVD.      Lymphadenopathy: No cervical adenopathy.   Pulmonary:      Effort: Pulmonary effort is normal. No respiratory distress.      Breath sounds: " Examination of the left-lower field reveals decreased breath sounds. Decreased breath sounds present. No wheezing. No rhonchi. No rales.   Chest:      Chest wall: Not tender to palpatation.   Cardiovascular:      Normal rate. Regular rhythm.      Murmurs: There is no murmur.      No gallop.   Pulses:     Intact distal pulses.   Edema:     Peripheral edema absent.   Abdominal:      General: Bowel sounds are normal. There is no distension or abdominal bruit.      Palpations: Abdomen is soft. There is no abdominal mass.      Tenderness: There is no abdominal tenderness.   Musculoskeletal:         General: No deformity.      Extremities: No clubbing present.     Cervical back: Neck supple. Skin:     General: Skin is warm and dry. There is no cyanosis.      Coloration: Skin is not pale.      Findings: No rash.   Neurological:      Mental Status: Alert and oriented to person, place, and time.      Cranial Nerves: No cranial nerve deficit.   Psychiatric:         Judgment: Judgment normal.         Lab Review:       Assessment:      Diagnosis Plan   1. Chronic HFrEF (heart failure with reduced ejection fraction) ([facility])  Adult Transthoracic Echo Complete W/ Cont if Necessary Per Protocol      2. Primary hypertension  Adult Transthoracic Echo Complete W/ Cont if Necessary Per Protocol      3. Ischemic cardiomyopathy        4. Mixed hyperlipidemia        5. Type 2 diabetes mellitus without complication, without long-term current use of insulin ([facility])          1. Ischemic cardiomyopathy, no CHF.  2. CAD, s/p RCA stents x2.  No angina.   3. Diabetes mellitus type 2, on metformin and Jardiance, may need more Jardiance but overall doing well right now.  4. Hypertension, controlled.   5. Hyperlipidemia, on atorvastatin  6. Severe COVID in 2021.     Plan:       Stop lasix, see jacob in 3 months, set up echo.  Asked him to resume cardiac rehabilitation.

## 2023-02-24 ENCOUNTER — TREATMENT (OUTPATIENT)
Dept: CARDIAC REHAB | Facility: HOSPITAL | Age: 78
End: 2023-02-24
Payer: MEDICARE

## 2023-02-24 DIAGNOSIS — Z95.5 S/P DRUG ELUTING CORONARY STENT PLACEMENT: Primary | ICD-10-CM

## 2023-02-24 PROCEDURE — 93797 PHYS/QHP OP CAR RHAB WO ECG: CPT

## 2023-02-24 PROCEDURE — 93798 PHYS/QHP OP CAR RHAB W/ECG: CPT

## 2023-03-01 ENCOUNTER — TREATMENT (OUTPATIENT)
Dept: CARDIAC REHAB | Facility: HOSPITAL | Age: 78
End: 2023-03-01
Payer: MEDICARE

## 2023-03-01 DIAGNOSIS — Z95.5 S/P DRUG ELUTING CORONARY STENT PLACEMENT: Primary | ICD-10-CM

## 2023-03-01 LAB
GLUCOSE BLDC GLUCOMTR-MCNC: 126 MG/DL (ref 70–130)
GLUCOSE BLDC GLUCOMTR-MCNC: 140 MG/DL (ref 70–130)

## 2023-03-01 PROCEDURE — 82962 GLUCOSE BLOOD TEST: CPT

## 2023-03-01 PROCEDURE — 93798 PHYS/QHP OP CAR RHAB W/ECG: CPT

## 2023-03-03 ENCOUNTER — APPOINTMENT (OUTPATIENT)
Dept: CARDIAC REHAB | Facility: HOSPITAL | Age: 78
End: 2023-03-03
Payer: MEDICARE

## 2023-03-06 ENCOUNTER — TREATMENT (OUTPATIENT)
Dept: CARDIAC REHAB | Facility: HOSPITAL | Age: 78
End: 2023-03-06
Payer: MEDICARE

## 2023-03-06 DIAGNOSIS — Z95.5 S/P DRUG ELUTING CORONARY STENT PLACEMENT: Primary | ICD-10-CM

## 2023-03-06 LAB
GLUCOSE BLDC GLUCOMTR-MCNC: 141 MG/DL (ref 70–130)
GLUCOSE BLDC GLUCOMTR-MCNC: 144 MG/DL (ref 70–130)

## 2023-03-06 PROCEDURE — 82962 GLUCOSE BLOOD TEST: CPT

## 2023-03-06 PROCEDURE — 93798 PHYS/QHP OP CAR RHAB W/ECG: CPT

## 2023-03-08 ENCOUNTER — APPOINTMENT (OUTPATIENT)
Dept: CARDIAC REHAB | Facility: HOSPITAL | Age: 78
End: 2023-03-08
Payer: MEDICARE

## 2023-03-10 ENCOUNTER — TREATMENT (OUTPATIENT)
Dept: CARDIAC REHAB | Facility: HOSPITAL | Age: 78
End: 2023-03-10
Payer: MEDICARE

## 2023-03-10 DIAGNOSIS — Z95.5 S/P DRUG ELUTING CORONARY STENT PLACEMENT: Primary | ICD-10-CM

## 2023-03-10 PROCEDURE — 93798 PHYS/QHP OP CAR RHAB W/ECG: CPT

## 2023-03-13 ENCOUNTER — TREATMENT (OUTPATIENT)
Dept: CARDIAC REHAB | Facility: HOSPITAL | Age: 78
End: 2023-03-13
Payer: MEDICARE

## 2023-03-13 DIAGNOSIS — Z95.5 S/P DRUG ELUTING CORONARY STENT PLACEMENT: Primary | ICD-10-CM

## 2023-03-13 PROCEDURE — 93798 PHYS/QHP OP CAR RHAB W/ECG: CPT

## 2023-03-15 ENCOUNTER — TREATMENT (OUTPATIENT)
Dept: CARDIAC REHAB | Facility: HOSPITAL | Age: 78
End: 2023-03-15
Payer: MEDICARE

## 2023-03-15 DIAGNOSIS — Z95.5 S/P DRUG ELUTING CORONARY STENT PLACEMENT: Primary | ICD-10-CM

## 2023-03-15 PROCEDURE — 93798 PHYS/QHP OP CAR RHAB W/ECG: CPT

## 2023-03-17 ENCOUNTER — TREATMENT (OUTPATIENT)
Dept: CARDIAC REHAB | Facility: HOSPITAL | Age: 78
End: 2023-03-17
Payer: MEDICARE

## 2023-03-17 DIAGNOSIS — Z95.5 S/P DRUG ELUTING CORONARY STENT PLACEMENT: Primary | ICD-10-CM

## 2023-03-17 PROCEDURE — 93798 PHYS/QHP OP CAR RHAB W/ECG: CPT

## 2023-03-20 ENCOUNTER — APPOINTMENT (OUTPATIENT)
Dept: CARDIAC REHAB | Facility: HOSPITAL | Age: 78
End: 2023-03-20
Payer: MEDICARE

## 2023-03-22 ENCOUNTER — TREATMENT (OUTPATIENT)
Dept: CARDIAC REHAB | Facility: HOSPITAL | Age: 78
End: 2023-03-22
Payer: MEDICARE

## 2023-03-22 DIAGNOSIS — Z95.5 S/P DRUG ELUTING CORONARY STENT PLACEMENT: Primary | ICD-10-CM

## 2023-03-22 PROCEDURE — 93798 PHYS/QHP OP CAR RHAB W/ECG: CPT

## 2023-03-24 ENCOUNTER — TREATMENT (OUTPATIENT)
Dept: CARDIAC REHAB | Facility: HOSPITAL | Age: 78
End: 2023-03-24
Payer: MEDICARE

## 2023-03-24 DIAGNOSIS — Z95.5 S/P DRUG ELUTING CORONARY STENT PLACEMENT: Primary | ICD-10-CM

## 2023-03-24 PROCEDURE — 93798 PHYS/QHP OP CAR RHAB W/ECG: CPT

## 2023-03-27 ENCOUNTER — TREATMENT (OUTPATIENT)
Dept: CARDIAC REHAB | Facility: HOSPITAL | Age: 78
End: 2023-03-27
Payer: MEDICARE

## 2023-03-27 DIAGNOSIS — Z95.5 S/P DRUG ELUTING CORONARY STENT PLACEMENT: Primary | ICD-10-CM

## 2023-03-27 PROCEDURE — 93798 PHYS/QHP OP CAR RHAB W/ECG: CPT

## 2023-03-29 ENCOUNTER — TREATMENT (OUTPATIENT)
Dept: CARDIAC REHAB | Facility: HOSPITAL | Age: 78
End: 2023-03-29
Payer: MEDICARE

## 2023-03-29 DIAGNOSIS — Z95.5 S/P DRUG ELUTING CORONARY STENT PLACEMENT: Primary | ICD-10-CM

## 2023-03-29 PROCEDURE — 93798 PHYS/QHP OP CAR RHAB W/ECG: CPT

## 2023-03-31 ENCOUNTER — TREATMENT (OUTPATIENT)
Dept: CARDIAC REHAB | Facility: HOSPITAL | Age: 78
End: 2023-03-31
Payer: MEDICARE

## 2023-03-31 DIAGNOSIS — Z95.5 S/P DRUG ELUTING CORONARY STENT PLACEMENT: Primary | ICD-10-CM

## 2023-03-31 PROCEDURE — 93798 PHYS/QHP OP CAR RHAB W/ECG: CPT

## 2023-04-03 ENCOUNTER — TREATMENT (OUTPATIENT)
Dept: CARDIAC REHAB | Facility: HOSPITAL | Age: 78
End: 2023-04-03
Payer: MEDICARE

## 2023-04-03 DIAGNOSIS — Z95.5 S/P DRUG ELUTING CORONARY STENT PLACEMENT: Primary | ICD-10-CM

## 2023-04-03 PROCEDURE — 93798 PHYS/QHP OP CAR RHAB W/ECG: CPT

## 2023-04-05 ENCOUNTER — TREATMENT (OUTPATIENT)
Dept: CARDIAC REHAB | Facility: HOSPITAL | Age: 78
End: 2023-04-05
Payer: MEDICARE

## 2023-04-05 DIAGNOSIS — Z95.5 S/P DRUG ELUTING CORONARY STENT PLACEMENT: Primary | ICD-10-CM

## 2023-04-05 PROCEDURE — 93798 PHYS/QHP OP CAR RHAB W/ECG: CPT

## 2023-04-07 ENCOUNTER — TREATMENT (OUTPATIENT)
Dept: CARDIAC REHAB | Facility: HOSPITAL | Age: 78
End: 2023-04-07
Payer: MEDICARE

## 2023-04-07 DIAGNOSIS — Z95.5 S/P DRUG ELUTING CORONARY STENT PLACEMENT: Primary | ICD-10-CM

## 2023-04-07 PROCEDURE — 93798 PHYS/QHP OP CAR RHAB W/ECG: CPT

## 2023-04-10 ENCOUNTER — TREATMENT (OUTPATIENT)
Dept: CARDIAC REHAB | Facility: HOSPITAL | Age: 78
End: 2023-04-10
Payer: MEDICARE

## 2023-04-10 DIAGNOSIS — Z95.5 S/P DRUG ELUTING CORONARY STENT PLACEMENT: Primary | ICD-10-CM

## 2023-04-10 PROCEDURE — 93798 PHYS/QHP OP CAR RHAB W/ECG: CPT

## 2023-04-12 ENCOUNTER — OFFICE VISIT (OUTPATIENT)
Dept: CARDIAC REHAB | Facility: HOSPITAL | Age: 78
End: 2023-04-12
Payer: MEDICARE

## 2023-04-12 ENCOUNTER — TREATMENT (OUTPATIENT)
Dept: CARDIAC REHAB | Facility: HOSPITAL | Age: 78
End: 2023-04-12
Payer: MEDICARE

## 2023-04-12 DIAGNOSIS — Z95.5 S/P DRUG ELUTING CORONARY STENT PLACEMENT: Primary | ICD-10-CM

## 2023-04-12 PROCEDURE — 93798 PHYS/QHP OP CAR RHAB W/ECG: CPT

## 2023-04-12 PROCEDURE — 93797 PHYS/QHP OP CAR RHAB WO ECG: CPT

## 2023-04-14 ENCOUNTER — APPOINTMENT (OUTPATIENT)
Dept: CARDIAC REHAB | Facility: HOSPITAL | Age: 78
End: 2023-04-14
Payer: MEDICARE

## 2023-04-17 ENCOUNTER — TREATMENT (OUTPATIENT)
Dept: CARDIAC REHAB | Facility: HOSPITAL | Age: 78
End: 2023-04-17
Payer: MEDICARE

## 2023-04-17 DIAGNOSIS — Z95.5 S/P DRUG ELUTING CORONARY STENT PLACEMENT: Primary | ICD-10-CM

## 2023-04-17 PROCEDURE — 93798 PHYS/QHP OP CAR RHAB W/ECG: CPT

## 2023-04-19 ENCOUNTER — APPOINTMENT (OUTPATIENT)
Dept: CARDIAC REHAB | Facility: HOSPITAL | Age: 78
End: 2023-04-19
Payer: MEDICARE

## 2023-04-21 ENCOUNTER — TREATMENT (OUTPATIENT)
Dept: CARDIAC REHAB | Facility: HOSPITAL | Age: 78
End: 2023-04-21
Payer: MEDICARE

## 2023-04-21 DIAGNOSIS — Z95.5 S/P DRUG ELUTING CORONARY STENT PLACEMENT: Primary | ICD-10-CM

## 2023-04-21 PROCEDURE — 93798 PHYS/QHP OP CAR RHAB W/ECG: CPT

## 2023-04-24 ENCOUNTER — TREATMENT (OUTPATIENT)
Dept: CARDIAC REHAB | Facility: HOSPITAL | Age: 78
End: 2023-04-24
Payer: MEDICARE

## 2023-04-24 DIAGNOSIS — Z95.5 S/P DRUG ELUTING CORONARY STENT PLACEMENT: Primary | ICD-10-CM

## 2023-04-24 PROCEDURE — 93798 PHYS/QHP OP CAR RHAB W/ECG: CPT

## 2023-04-26 ENCOUNTER — APPOINTMENT (OUTPATIENT)
Dept: CARDIAC REHAB | Facility: HOSPITAL | Age: 78
End: 2023-04-26
Payer: MEDICARE

## 2023-04-28 ENCOUNTER — TREATMENT (OUTPATIENT)
Dept: CARDIAC REHAB | Facility: HOSPITAL | Age: 78
End: 2023-04-28
Payer: MEDICARE

## 2023-04-28 DIAGNOSIS — Z95.5 S/P DRUG ELUTING CORONARY STENT PLACEMENT: Primary | ICD-10-CM

## 2023-04-28 PROCEDURE — 93798 PHYS/QHP OP CAR RHAB W/ECG: CPT

## 2023-05-01 ENCOUNTER — APPOINTMENT (OUTPATIENT)
Dept: CARDIAC REHAB | Facility: HOSPITAL | Age: 78
End: 2023-05-01
Payer: MEDICARE

## 2023-05-03 ENCOUNTER — TREATMENT (OUTPATIENT)
Dept: CARDIAC REHAB | Facility: HOSPITAL | Age: 78
End: 2023-05-03
Payer: MEDICARE

## 2023-05-03 DIAGNOSIS — Z95.5 S/P DRUG ELUTING CORONARY STENT PLACEMENT: Primary | ICD-10-CM

## 2023-05-03 PROCEDURE — 93798 PHYS/QHP OP CAR RHAB W/ECG: CPT

## 2023-05-05 ENCOUNTER — TREATMENT (OUTPATIENT)
Dept: CARDIAC REHAB | Facility: HOSPITAL | Age: 78
End: 2023-05-05
Payer: MEDICARE

## 2023-05-05 DIAGNOSIS — Z95.5 S/P DRUG ELUTING CORONARY STENT PLACEMENT: Primary | ICD-10-CM

## 2023-05-05 PROCEDURE — 93798 PHYS/QHP OP CAR RHAB W/ECG: CPT

## 2023-05-08 ENCOUNTER — TREATMENT (OUTPATIENT)
Dept: CARDIAC REHAB | Facility: HOSPITAL | Age: 78
End: 2023-05-08
Payer: MEDICARE

## 2023-05-08 DIAGNOSIS — Z95.5 S/P DRUG ELUTING CORONARY STENT PLACEMENT: Primary | ICD-10-CM

## 2023-05-08 PROCEDURE — 93798 PHYS/QHP OP CAR RHAB W/ECG: CPT

## 2023-05-10 ENCOUNTER — TREATMENT (OUTPATIENT)
Dept: CARDIAC REHAB | Facility: HOSPITAL | Age: 78
End: 2023-05-10
Payer: MEDICARE

## 2023-05-10 DIAGNOSIS — Z95.5 S/P DRUG ELUTING CORONARY STENT PLACEMENT: Primary | ICD-10-CM

## 2023-05-10 PROCEDURE — 93798 PHYS/QHP OP CAR RHAB W/ECG: CPT

## 2023-05-12 ENCOUNTER — TREATMENT (OUTPATIENT)
Dept: CARDIAC REHAB | Facility: HOSPITAL | Age: 78
End: 2023-05-12
Payer: MEDICARE

## 2023-05-12 DIAGNOSIS — Z95.5 S/P DRUG ELUTING CORONARY STENT PLACEMENT: Primary | ICD-10-CM

## 2023-05-12 PROCEDURE — 93798 PHYS/QHP OP CAR RHAB W/ECG: CPT

## 2023-05-15 ENCOUNTER — TREATMENT (OUTPATIENT)
Dept: CARDIAC REHAB | Facility: HOSPITAL | Age: 78
End: 2023-05-15
Payer: MEDICARE

## 2023-05-15 DIAGNOSIS — Z95.5 S/P DRUG ELUTING CORONARY STENT PLACEMENT: Primary | ICD-10-CM

## 2023-05-15 PROCEDURE — 93798 PHYS/QHP OP CAR RHAB W/ECG: CPT

## 2023-05-17 ENCOUNTER — TREATMENT (OUTPATIENT)
Dept: CARDIAC REHAB | Facility: HOSPITAL | Age: 78
End: 2023-05-17
Payer: MEDICARE

## 2023-05-17 DIAGNOSIS — Z95.5 S/P DRUG ELUTING CORONARY STENT PLACEMENT: Primary | ICD-10-CM

## 2023-05-17 PROCEDURE — 93798 PHYS/QHP OP CAR RHAB W/ECG: CPT

## 2023-05-19 ENCOUNTER — TREATMENT (OUTPATIENT)
Dept: CARDIAC REHAB | Facility: HOSPITAL | Age: 78
End: 2023-05-19
Payer: MEDICARE

## 2023-05-19 DIAGNOSIS — Z95.5 S/P DRUG ELUTING CORONARY STENT PLACEMENT: Primary | ICD-10-CM

## 2023-05-19 PROCEDURE — 93798 PHYS/QHP OP CAR RHAB W/ECG: CPT

## 2023-05-22 ENCOUNTER — TREATMENT (OUTPATIENT)
Dept: CARDIAC REHAB | Facility: HOSPITAL | Age: 78
End: 2023-05-22
Payer: MEDICARE

## 2023-05-22 DIAGNOSIS — Z95.5 S/P DRUG ELUTING CORONARY STENT PLACEMENT: Primary | ICD-10-CM

## 2023-05-22 PROCEDURE — 93798 PHYS/QHP OP CAR RHAB W/ECG: CPT

## 2023-05-24 ENCOUNTER — TREATMENT (OUTPATIENT)
Dept: CARDIAC REHAB | Facility: HOSPITAL | Age: 78
End: 2023-05-24
Payer: MEDICARE

## 2023-05-24 DIAGNOSIS — Z95.5 S/P DRUG ELUTING CORONARY STENT PLACEMENT: Primary | ICD-10-CM

## 2023-05-24 PROCEDURE — 93798 PHYS/QHP OP CAR RHAB W/ECG: CPT

## 2023-05-26 ENCOUNTER — TREATMENT (OUTPATIENT)
Dept: CARDIAC REHAB | Facility: HOSPITAL | Age: 78
End: 2023-05-26
Payer: MEDICARE

## 2023-05-26 DIAGNOSIS — Z95.5 S/P DRUG ELUTING CORONARY STENT PLACEMENT: Primary | ICD-10-CM

## 2023-05-26 PROCEDURE — 93798 PHYS/QHP OP CAR RHAB W/ECG: CPT

## 2023-05-31 ENCOUNTER — TREATMENT (OUTPATIENT)
Dept: CARDIAC REHAB | Facility: HOSPITAL | Age: 78
End: 2023-05-31
Payer: MEDICARE

## 2023-05-31 DIAGNOSIS — Z95.5 S/P DRUG ELUTING CORONARY STENT PLACEMENT: Primary | ICD-10-CM

## 2023-05-31 PROCEDURE — 93798 PHYS/QHP OP CAR RHAB W/ECG: CPT

## 2023-06-13 ENCOUNTER — TELEPHONE (OUTPATIENT)
Dept: CARDIOLOGY | Facility: CLINIC | Age: 78
End: 2023-06-13
Payer: MEDICARE

## 2023-06-13 NOTE — TELEPHONE ENCOUNTER
Patient daughter was notified of the above.  She verbalized understanding.     After looking at the patient chart, there is already an appointment with Montserrat and has the echo scheduled for the day before.    Lyndsay

## 2023-06-13 NOTE — TELEPHONE ENCOUNTER
Needs to wait until 11/2023 due to stents unless high concern for cancer or there is bleeding. Pls let them know. Needs appt with jacob - see my last note.

## 2023-06-13 NOTE — TELEPHONE ENCOUNTER
Patient daughter called and stated that the patient is needing a colonoscopy.  She thought that he was told that he needed to wait.  She was calling to see if and when it would be ok for the patient to move forward with scheduling a colonoscopy.  Please advise.    CB: [phone]    Thanks,  Lyndsay

## 2023-06-19 ENCOUNTER — TRANSCRIBE ORDERS (OUTPATIENT)
Dept: CT IMAGING | Facility: HOSPITAL | Age: 78
End: 2023-06-19
Payer: MEDICARE

## 2023-06-19 DIAGNOSIS — R31.0 HEMATURIA, GROSS: Primary | ICD-10-CM

## 2023-07-14 PROBLEM — I25.10 CORONARY ARTERY DISEASE INVOLVING NATIVE CORONARY ARTERY OF NATIVE HEART WITHOUT ANGINA PECTORIS: Status: ACTIVE | Noted: 2023-07-14

## 2023-08-04 ENCOUNTER — TELEPHONE (OUTPATIENT)
Dept: CARDIOLOGY | Facility: CLINIC | Age: 78
End: 2023-08-04
Payer: MEDICARE

## 2023-08-04 NOTE — TELEPHONE ENCOUNTER
Patient CG called and stated that Dr. Price with [facility] advised them to call and find out if there was anything else that the patient could be on instead of Jardiance?  She stated that the patient has Fimosis and it causes frequest UTIs and so does Jardiance.  Patient has recently had blood in urine and Dr. Price would like us to weigh in.  Please advise.    CB: [phone]    Lyndsay

## 2023-08-07 NOTE — TELEPHONE ENCOUNTER
Called and spoke with patient daughter.  Told her the above.  She stated that they only check his BP in the morning before meds.  She stated that she would send the log in via [facility].  She also wanted to let you know that the patient has an appointment with PCP tomorrow to discuss med mgmt for diabetes.    Lyndsay

## 2023-08-07 NOTE — TELEPHONE ENCOUNTER
Can you call to see what his BP has been? We could stop Jardiance and try 12.5 mg spironolactone instead. We would need him to get labs in 2 weeks after this change.     Thanks!  ANTOINETTE Boone

## 2023-08-08 NOTE — TELEPHONE ENCOUNTER
Yes, but if bleeding resumes, he needs to stop aspirin. Also, have him stop the Jardiance for now.     Thanks!  ANTOINETTE Boone

## 2023-08-08 NOTE — TELEPHONE ENCOUNTER
Patient daughter called and stated that they had issues with the telehealth visit with PCP.  She has had to reschedule that appointment.  She stated that she would send the BP log later.      In the meantime, she is wanting to know if the patient could restart the ASA being there hasn't been any blood in his urine?  Please advise.    CB: [phone]    Lyndsay

## 2023-08-11 RX ORDER — SPIRONOLACTONE 25 MG/1
12.5 TABLET ORAL DAILY
Qty: 45 TABLET | Refills: 0 | Status: SHIPPED | OUTPATIENT
Start: 2023-08-11

## 2023-08-11 NOTE — TELEPHONE ENCOUNTER
Called and spoke with the patient daughter and told her the above.  She verbalized understanding.    Lyndsay

## 2023-08-14 ENCOUNTER — TELEPHONE (OUTPATIENT)
Dept: CARDIOLOGY | Facility: CLINIC | Age: 78
End: 2023-08-14
Payer: MEDICARE

## 2023-08-29 ENCOUNTER — PATIENT MESSAGE (OUTPATIENT)
Dept: CARDIOLOGY | Facility: CLINIC | Age: 78
End: 2023-08-29
Payer: MEDICARE

## 2023-09-01 NOTE — TELEPHONE ENCOUNTER
We did receive some labs that were collected on 8/29/23.  I have placed these in your in box at the main office

## 2023-09-12 ENCOUNTER — TELEPHONE (OUTPATIENT)
Dept: CARDIOLOGY | Facility: CLINIC | Age: 78
End: 2023-09-12
Payer: MEDICARE

## 2023-09-20 ENCOUNTER — TELEPHONE (OUTPATIENT)
Dept: CARDIOLOGY | Facility: CLINIC | Age: 78
End: 2023-09-20
Payer: MEDICARE

## 2023-09-20 NOTE — TELEPHONE ENCOUNTER
Patient wanted to follow up on the [employer] message.  I have sent it to [facility] for her review.    Lyndsay

## 2023-10-04 RX ORDER — SPIRONOLACTONE 25 MG/1
25 TABLET ORAL DAILY
Qty: 90 TABLET | Refills: 3 | Status: SHIPPED | OUTPATIENT
Start: 2023-10-04

## 2023-10-10 RX ORDER — SPIRONOLACTONE 25 MG/1
12.5 TABLET ORAL DAILY
Qty: 45 TABLET | OUTPATIENT
Start: 2023-10-10

## 2023-11-09 RX ORDER — SPIRONOLACTONE 25 MG/1
12.5 TABLET ORAL DAILY
Qty: 45 TABLET | Refills: 1 | Status: SHIPPED | OUTPATIENT
Start: 2023-11-09

## 2023-11-09 NOTE — TELEPHONE ENCOUNTER
Last OV 7/14/23  Next OV 1/26/24  Labs 8/29/23      Does not meet protocol  Please advise    [facility]

## 2023-11-28 NOTE — PLAN OF CARE
Goal Outcome Evaluation:  Plan of Care Reviewed With: patient        Progress: no change  Outcome Summary: Pt alert but confused. Pt not combative but will not follow commands. Poor intake today, few bites with breakfast , refused to eat lunch. Family updated. Pt's son brought [employer] for Mr So, but patient refused to eat, refused meds. nurse assisted with phone call to son. Multiple wet briefs this shift.   Patient contacted

## 2024-01-26 ENCOUNTER — LAB (OUTPATIENT)
Dept: LAB | Facility: HOSPITAL | Age: 79
End: 2024-01-26
Payer: MEDICARE

## 2024-01-26 ENCOUNTER — OFFICE VISIT (OUTPATIENT)
Dept: CARDIOLOGY | Facility: CLINIC | Age: 79
End: 2024-01-26
Payer: MEDICARE

## 2024-01-26 VITALS
HEART RATE: 59 BPM | WEIGHT: 213 LBS | HEIGHT: 70 IN | OXYGEN SATURATION: 97 % | SYSTOLIC BLOOD PRESSURE: 122 MMHG | DIASTOLIC BLOOD PRESSURE: 62 MMHG | BODY MASS INDEX: 30.49 KG/M2

## 2024-01-26 DIAGNOSIS — I25.10 CORONARY ARTERY DISEASE INVOLVING NATIVE CORONARY ARTERY OF NATIVE HEART WITHOUT ANGINA PECTORIS: Primary | ICD-10-CM

## 2024-01-26 DIAGNOSIS — I25.5 ISCHEMIC CARDIOMYOPATHY: ICD-10-CM

## 2024-01-26 DIAGNOSIS — I25.10 CORONARY ARTERY DISEASE INVOLVING NATIVE CORONARY ARTERY OF NATIVE HEART WITHOUT ANGINA PECTORIS: ICD-10-CM

## 2024-01-26 DIAGNOSIS — R06.02 SHORTNESS OF BREATH: ICD-10-CM

## 2024-01-26 DIAGNOSIS — E11.9 TYPE 2 DIABETES MELLITUS WITHOUT COMPLICATION, WITHOUT LONG-TERM CURRENT USE OF INSULIN: ICD-10-CM

## 2024-01-26 DIAGNOSIS — I10 PRIMARY HYPERTENSION: ICD-10-CM

## 2024-01-26 DIAGNOSIS — E78.2 MIXED HYPERLIPIDEMIA: ICD-10-CM

## 2024-01-26 DIAGNOSIS — I50.22 CHRONIC HFREF (HEART FAILURE WITH REDUCED EJECTION FRACTION): ICD-10-CM

## 2024-01-26 LAB
ALBUMIN SERPL-MCNC: 4.1 G/DL (ref 3.5–5.2)
ALBUMIN/GLOB SERPL: 1.6 G/DL
ALP SERPL-CCNC: 73 U/L (ref 39–117)
ALT SERPL W P-5'-P-CCNC: 12 U/L (ref 1–41)
ANION GAP SERPL CALCULATED.3IONS-SCNC: 16.7 MMOL/L (ref 5–15)
AST SERPL-CCNC: 14 U/L (ref 1–40)
BILIRUB SERPL-MCNC: 0.5 MG/DL (ref 0–1.2)
BUN SERPL-MCNC: 15 MG/DL (ref 8–23)
BUN/CREAT SERPL: 22.1 (ref 7–25)
CALCIUM SPEC-SCNC: 9.6 MG/DL (ref 8.6–10.5)
CHLORIDE SERPL-SCNC: 103 MMOL/L (ref 98–107)
CO2 SERPL-SCNC: 20.3 MMOL/L (ref 22–29)
CREAT SERPL-MCNC: 0.68 MG/DL (ref 0.76–1.27)
DEPRECATED RDW RBC AUTO: 42.7 FL (ref 37–54)
EGFRCR SERPLBLD CKD-EPI 2021: 95.1 ML/MIN/1.73
ERYTHROCYTE [DISTWIDTH] IN BLOOD BY AUTOMATED COUNT: 12.9 % (ref 12.3–15.4)
GLOBULIN UR ELPH-MCNC: 2.5 GM/DL
GLUCOSE SERPL-MCNC: 225 MG/DL (ref 65–99)
HCT VFR BLD AUTO: 39.2 % (ref 37.5–51)
HGB BLD-MCNC: 13.7 G/DL (ref 13–17.7)
MAGNESIUM SERPL-MCNC: 1.8 MG/DL (ref 1.6–2.4)
MCH RBC QN AUTO: 31.9 PG (ref 26.6–33)
MCHC RBC AUTO-ENTMCNC: 34.9 G/DL (ref 31.5–35.7)
MCV RBC AUTO: 91.2 FL (ref 79–97)
NT-PROBNP SERPL-MCNC: 667 PG/ML (ref 0–1800)
PLATELET # BLD AUTO: 268 10*3/MM3 (ref 140–450)
PMV BLD AUTO: 9.2 FL (ref 6–12)
POTASSIUM SERPL-SCNC: 4.6 MMOL/L (ref 3.5–5.2)
PROT SERPL-MCNC: 6.6 G/DL (ref 6–8.5)
RBC # BLD AUTO: 4.3 10*6/MM3 (ref 4.14–5.8)
SODIUM SERPL-SCNC: 140 MMOL/L (ref 136–145)
TSH SERPL DL<=0.05 MIU/L-ACNC: 0.88 UIU/ML (ref 0.27–4.2)
WBC NRBC COR # BLD AUTO: 8.91 10*3/MM3 (ref 3.4–10.8)

## 2024-01-26 PROCEDURE — 85027 COMPLETE CBC AUTOMATED: CPT

## 2024-01-26 PROCEDURE — 80053 COMPREHEN METABOLIC PANEL: CPT

## 2024-01-26 PROCEDURE — 36415 COLL VENOUS BLD VENIPUNCTURE: CPT

## 2024-01-26 PROCEDURE — 83735 ASSAY OF MAGNESIUM: CPT

## 2024-01-26 PROCEDURE — 83880 ASSAY OF NATRIURETIC PEPTIDE: CPT

## 2024-01-26 PROCEDURE — 84443 ASSAY THYROID STIM HORMONE: CPT

## 2024-01-26 RX ORDER — ESOMEPRAZOLE MAGNESIUM 40 MG/1
CAPSULE, DELAYED RELEASE ORAL
COMMUNITY
Start: 2023-10-10

## 2024-01-26 RX ORDER — ASPIRIN 81 MG/1
81 TABLET ORAL DAILY
Qty: 90 TABLET | Refills: 3 | Status: SHIPPED | OUTPATIENT
Start: 2024-01-26

## 2024-01-26 NOTE — PROGRESS NOTES
Date of Office Visit: 2023  Encounter Provider: Zoe Cifuentes MD  [facility] of Service[facility]  Patient Name: Nash So  :1945      Patient ID:  Nash So is a 78 y.o. male is here for  followup for HFrEF and CAD.          History of Present Illness    He has a history of COPD, hyperlipidemia, hypertension, CHF, GERD, CAD -s/p RCA stents 2022, mild ischemic cardiomyopathy.     There is no premature cardiovascular disease in the family but his brother Kavon did have a stent.  He is , has 2 children, is retired, is a former smoker, has 1 Coke per day, no alcohol, no drugs.  He lives with his daughter.     He was in the emergency department 2022 with dyspnea, right lower extremity edema worse on the left belching and reflux.  Troponin was negative, proBNP 4918, glucose 139, otherwise normal CMP normal CBC.  D-dimer was 0.56.  He has CT done 2021 which showed no evidence of pulmonary embolism.  Review of CTA images shows calcification throughout the left main, LAD and proximal circumflex, RCA appears patent.     Echo done 22 showed LVEF 42% and grade 2 diastolic dysfunction and moderate eccentric hypertrophy with moderate left ventricular dilation, moderate mitral insufficiency. Stress nuclear study 10/14/22 showed large inferior and apical infarct.      He had severe COVID beginning 2021 and barely survive this, was in rehab facilities and to recover for about 6 months.  His wife passed away with COVID.        I saw him for severe dyspnea 10/26/22.  He had a stress nuclear perfusion study done 10/14/2022 which showed a large inferior wall infarct and inferoapical infarct without ischemia, ejection fraction 22%.  Echocardiogram done 2022 showed ejection fraction 42% with grade 2 diastolic dysfunction, moderate left ventricular dilation, moderate mitral insufficiency.  This showed global hypokinesis  with inferolateral akinesis.  Cardiac catheterization done 11/2/2022 showed calcified left main with 30% distal stenosis, 50% mid LAD stenosis, diffuse 30% LAD stenosis, circumflex showed 99% stenosis with a 70 to 80% second OM stenosis, 99% mid RCA stenosis with diffuse 70% distal RCA stenosis.  Patient received stent 2.75 x 33 drug-eluting stent to the distal RCA and 3 x 33 drug-eluting stent to the proximal to mid RCA.    In June 2023, he began having hematuria.  His aspirin was stopped by urology.  CT abdomen pelvis showed thick-walled urinary bladder consistent for cystitis but otherwise unremarkable.  Echocardiogram done 7/13/2023 showed ejection fraction 46% with grade 1 diastolic dysfunction, normal RVSP, mild aortic insufficiency.    He was taken off Jardiance due to concern for recurrent bladder infections.  Since then, he has gained about 20 pounds.  He has no lower extremity edema.  He has mild chronic short windedness but nothing is acute.  He has no chest pain or pressure.  He does not feels heart racing or skipping.  He has had no dizziness or syncope.  He uses a rollator.  He lives with his daughter.  He is taking aspirin.    Past Medical History:   Diagnosis Date    Abnormal ECG     Alzheimer disease     CHF (congestive heart failure)     COPD (chronic obstructive pulmonary disease)     Diabetes     GERD (gastroesophageal reflux disease)     Heart murmur     Hyperlipemia     Hypertension     Myocardial infarction     Parkinson disease          Past Surgical History:   Procedure Laterality Date    CARDIAC CATHETERIZATION N/A 11/02/2022    Procedure: Coronary angiography;  Surgeon: Ban Geronimo MD;  Location: [facility] CATH INVASIVE LOCATION;  Service: Cardiovascular;  Laterality: N/A;    CARDIAC CATHETERIZATION N/A 11/02/2022    Procedure: Left ventriculography;  Surgeon: Ban Geronimo MD;  Location: [facility] CATH INVASIVE LOCATION;  Service: Cardiovascular;  Laterality: N/A;    CARDIAC CATHETERIZATION  N/A 11/02/2022    Procedure: Percutaneous Coronary Intervention;  Surgeon: Ban Geronimo MD;  Location:  [facility] CATH INVASIVE LOCATION;  Service: Cardiovascular;  Laterality: N/A;    CARDIAC CATHETERIZATION N/A 11/02/2022    Procedure: Stent EDIE coronary;  Surgeon: Ban Geronimo MD;  Location:  [facility] CATH INVASIVE LOCATION;  Service: Cardiovascular;  Laterality: N/A;    CARDIAC CATHETERIZATION N/A 11/02/2022    Procedure: Left Heart Cath;  Surgeon: Ban Geronimo MD;  Location:  [facility] CATH INVASIVE LOCATION;  Service: Cardiovascular;  Laterality: N/A;    ENDOSCOPY N/A 05/22/2017    Procedure: ESOPHAGOGASTRODUODENOSCOPY, biopsy;  Surgeon: Natalia Guerrero MD;  Location:  [facility] OR;  Service:        Current Outpatient Medications on File Prior to Visit   Medication Sig Dispense Refill    acetaminophen (TYLENOL) 325 MG tablet Take 2 tablets by mouth Every 6 (Six) Hours As Needed for Mild Pain.      albuterol (ACCUNEB) 1.25 MG/3ML nebulizer solution Take 3 mL by nebulization Every 6 (Six) Hours As Needed for Wheezing.  12    atorvastatin (LIPITOR) 40 MG tablet Take 1 tablet by mouth daily. 90 tablet 3    carbidopa-levodopa (SINEMET)  MG per tablet Take 1.5 tablets by mouth 3 (Three) Times a Day.      cetirizine (zyrTEC) 5 MG tablet Take 1 tablet by mouth Daily.      Continuous Blood Gluc Sensor (Dexcom G6 Sensor)       Continuous Blood Gluc Transmit (Dexcom G6 Transmitter) misc       donepezil (ARICEPT) 10 MG tablet Take 1 tablet by mouth Every Night.      esomeprazole (nexIUM) 40 MG capsule Every Morning Before Breakfast.      glimepiride (AMARYL) 2 MG tablet       insulin aspart (novoLOG) 100 UNIT/ML injection Inject 0-24 Units under the skin into the appropriate area as directed 3 (Three) Times a Day Before Meals.  12    lisinopril (PRINIVIL,ZESTRIL) 20 MG tablet Take 1 tablet by mouth daily. 90 tablet 2    memantine (NAMENDA) 10 MG tablet Take 1 tablet by mouth 2 (Two) Times a Day.      metFORMIN (GLUCOPHAGE)  "850 MG tablet Take 1 tablet by mouth 2 (Two) Times a Day.      metoclopramide (REGLAN) 10 MG tablet Take i po tid (Patient taking differently: Take i po tid as needed) 270 tablet 3    Misc Natural Products (NEURIVA PO) Take 1 tablet by mouth Daily.      nebivolol (BYSTOLIC) 10 MG tablet Take 1 tablet by mouth daily. 90 tablet 2    spironolactone (ALDACTONE) 25 MG tablet TAKE 1/2 TABLET BY MOUTH DAILY 45 tablet 1    [DISCONTINUED] clopidogrel (PLAVIX) 75 MG tablet Take 1 tablet by mouth Daily. 30 tablet 9     No current facility-administered medications on file prior to visit.       Social History     Socioeconomic History    Marital status:     Number of children: 2   Tobacco Use    Smoking status: Former     Passive exposure: Past    Smokeless tobacco: Never    Tobacco comments:     quit 25 years ago---caffeine: 1 drink daily   Vaping Use    Vaping Use: Never used   Substance and Sexual Activity    Alcohol use: No    Drug use: No    Sexual activity: Defer           ROS    Procedures    ECG 12 Lead    Date/Time: 1/26/2024 12:52 PM  Performed by: Zoe Cifuentes MD    Authorized by: Zoe Cifuentes MD  Comparison: compared with previous ECG   Similar to previous ECG  Rhythm: sinus rhythm  T inversion: V6, V5, V4, II, III, aVF, V3 and I    Clinical impression: abnormal EKG              Objective:      Vitals:    01/26/24 1246   BP: 122/62   BP Location: Left arm   Patient Position: Sitting   Cuff Size: Adult   Pulse: 59   SpO2: 97%   Weight: 96.6 kg (213 lb)   Height: 177.8 cm (70\")       Body mass index is 30.56 kg/m².    Vitals reviewed.   Constitutional:       General: Not in acute distress.     Appearance: Well-developed. Not diaphoretic.   Eyes:      General: No scleral icterus.     Conjunctiva/sclera: Conjunctivae normal.   HENT:      Head: Normocephalic and atraumatic.   Neck:      Thyroid: No thyromegaly.      Vascular: No carotid bruit or JVD.      Lymphadenopathy: No cervical adenopathy. "   Pulmonary:      Effort: Pulmonary effort is normal. No respiratory distress.      Breath sounds: Examination of the left-lower field reveals decreased breath sounds. Decreased breath sounds present. No wheezing. No rhonchi. No rales.   Chest:      Chest wall: Not tender to palpatation.   Cardiovascular:      Normal rate. Regular rhythm.      Murmurs: There is no murmur.      No gallop.    Pulses:     Intact distal pulses.   Edema:     Peripheral edema absent.   Abdominal:      General: Bowel sounds are normal. There is no distension or abdominal bruit.      Palpations: Abdomen is soft. There is no abdominal mass.      Tenderness: There is no abdominal tenderness.   Musculoskeletal:         General: No deformity.      Extremities: No clubbing present.     Cervical back: Neck supple. Skin:     General: Skin is warm and dry. There is no cyanosis.      Coloration: Skin is not pale.      Findings: No rash.   Neurological:      Mental Status: Alert and oriented to person, place, and time.      Cranial Nerves: No cranial nerve deficit.   Psychiatric:         Judgment: Judgment normal.         Lab Review:       Assessment:      Diagnosis Plan   1. Coronary artery disease involving native coronary artery of native heart without angina pectoris  proBNP    Magnesium    Comprehensive Metabolic Panel    TSH    CBC (No Diff)      2. Ischemic cardiomyopathy  proBNP    Magnesium    Comprehensive Metabolic Panel    TSH    CBC (No Diff)      3. Primary hypertension  proBNP    Magnesium    Comprehensive Metabolic Panel    TSH    CBC (No Diff)      4. Mixed hyperlipidemia        5. Chronic HFrEF (heart failure with reduced ejection fraction)        6. Type 2 diabetes mellitus without complication, without long-term current use of insulin        7. Shortness of breath  proBNP        Ischemic cardiomyopathy, no CHF.  Has had weight gain.  His daughter is concerned that baby has fluid in his abdomen.  He has mild chronic short  windedness.  Check laboratory values today.  CAD, s/p RCA stents x2.  No angina.   Diabetes mellitus type 2, on metformin and Jardiance, may need more Jardiance but overall doing well right now.  Hypertension, controlled.   Hyperlipidemia, on atorvastatin  Severe COVID in 2021.     Plan:       See Montserrat in 6 months, no medication changes today.  Check labs

## 2024-01-28 ENCOUNTER — TELEPHONE (OUTPATIENT)
Dept: CARDIOLOGY | Facility: CLINIC | Age: 79
End: 2024-01-28
Payer: MEDICARE

## 2024-01-29 NOTE — TELEPHONE ENCOUNTER
"I spoke with Nash So's daughter, Leo (okay per ZENY) and updated her on results/recommendations from provider.  She verbalized understanding and has no further questions at this time.    Leo said that over he's doing \"okay\".  His breathing is \"fine\".  Pt has a routine appt this week with his PCP.    Thank you,    Shanita CALIX RN  Triage [facility]  01/29/24 09:34 EST    "

## 2024-02-26 ENCOUNTER — TELEPHONE (OUTPATIENT)
Dept: CARDIOLOGY | Facility: CLINIC | Age: 79
End: 2024-02-26
Payer: MEDICARE

## 2024-02-26 NOTE — TELEPHONE ENCOUNTER
Patient PCP has prescribed trajenta and wanted to know if that would be ok for the patient to take?  Please advise.    CB: [phone]/    Lyndsay

## 2024-04-02 NOTE — PROGRESS NOTES
Addendum  created 04/02/24 0813 by Susan Quiles, DO    Pend clinical note       Adult Nutrition  Assessment/PES    Patient Name:  Nash So  YOB: 1945  MRN: [id number]  Admit Date:  9/2/2021    Assessment Date:  9/15/2021    Comments:  155 ave kcal/meal ( 25%) and 7 ave gm/meal (25%) pro     Rn reports pt doesn't like merissa & wouldn't drink vanilla today. Strawberry to be sent Boost Glucose to see if will take this.    Reason for Assessment     Row Name 09/15/21 1348          Reason for Assessment    Reason For Assessment  calorie count order         Nutrition/Diet History     Row Name 09/15/21 1350          Nutrition/Diet History    Typical Food/Fluid Intake  Visited w pt this am during breakfast, pt ate bites and drank some. Noted plan to [facility]. Noted isolation released.               Diet: Soft Chopped, Boost Glucose Control with meals  Estimated needs: 1833 kcal, 87 gm pro        Day 1:  300 kcal , 9 gm pro ( 2 meals recorded)   Day 2: 476 kcal, 26 gm pro ( 3 meals recorded-one 0%)              Electronically signed by:  Nan García RD  09/15/21 13:50 EDT

## 2024-07-26 ENCOUNTER — OFFICE VISIT (OUTPATIENT)
Dept: CARDIOLOGY | Facility: CLINIC | Age: 79
End: 2024-07-26
Payer: MEDICARE

## 2024-07-26 VITALS
BODY MASS INDEX: 29.78 KG/M2 | SYSTOLIC BLOOD PRESSURE: 112 MMHG | HEART RATE: 97 BPM | HEIGHT: 70 IN | DIASTOLIC BLOOD PRESSURE: 60 MMHG | WEIGHT: 208 LBS

## 2024-07-26 DIAGNOSIS — I25.10 CORONARY ARTERY DISEASE INVOLVING NATIVE CORONARY ARTERY OF NATIVE HEART WITHOUT ANGINA PECTORIS: ICD-10-CM

## 2024-07-26 DIAGNOSIS — I10 PRIMARY HYPERTENSION: ICD-10-CM

## 2024-07-26 DIAGNOSIS — E11.9 TYPE 2 DIABETES MELLITUS WITHOUT COMPLICATION, WITHOUT LONG-TERM CURRENT USE OF INSULIN: ICD-10-CM

## 2024-07-26 DIAGNOSIS — E78.2 MIXED HYPERLIPIDEMIA: ICD-10-CM

## 2024-07-26 DIAGNOSIS — I25.5 ISCHEMIC CARDIOMYOPATHY: Primary | ICD-10-CM

## 2024-07-26 NOTE — PROGRESS NOTES
Date of Office Visit: 2024  Encounter Provider: ANTOINETTE Dunne  Place of Service: [facility]  Established cardiologist: Zoe Cifuentes MD  Patient Name: Nash So  :1945      Patient ID:  Nash So is a 78 y.o. male is here for  followup   With a pertinent medical history of COPD, hyperlipidemia, hypertension, CHF, GERD, CAD status post RCA stenting 2022, mild ischemic cardiomyopathy  Other with a stent  He is   He has 2 children  He is retired, former smoker      History of Present Illness  Nash was last seen by Dr. Cifuentes 2024.  At that time he had been stable and no medication changes were made.  Lab work was ordered, his blood glucose was elevated otherwise his labs were unremarkable.  Today he tells me he has been feeling well.  His daughter her  and his teenage granddaughter live with him.  His daughter will be picking him up after this appointment today.  He is using a rollator walker to get around.  Tells me sometimes he does some laps in his home for exercise and he tolerates this well without ever experiencing any shortness of breath, chest pain, lightheadedness, dizziness, palpitations, presyncope/syncope.  He does not think he has had any swelling.  He is not having any PND.  His home blood pressures have been very controlled.    Prior cardiovascular history & testing:   He was in the emergency department 2022 with dyspnea, right lower extremity edema worse on the left belching and reflux.  Troponin was negative, proBNP 4918, glucose 139, otherwise normal CMP normal CBC.  D-dimer was 0.56.      He has CT done 2021   which showed no evidence of pulmonary embolism.  Review of CTA images shows calcification throughout the left main, LAD and proximal circumflex, RCA appears patent.     Echo done 22   showed LVEF 42% and grade 2 diastolic dysfunction and moderate eccentric  hypertrophy with moderate left ventricular dilation, moderate mitral insufficiency. Stress nuclear study 10/14/22 showed large inferior and apical infarct.      He had severe COVID beginning October/November 2021 and barely survive this, was in rehab facilities and to recover for about 6 months.  His wife passed away with COVID.         Dr. Cifuentes saw him for severe dyspnea 10/26/22.      He had a stress nuclear perfusion study done 10/14/2022   which showed a large inferior wall infarct and inferoapical infarct without ischemia, ejection fraction 22%.      Echocardiogram done 9/22/2022   showed ejection fraction 42% with grade 2 diastolic dysfunction, moderate left ventricular dilation, moderate mitral insufficiency.  This showed global hypokinesis with inferolateral akinesis.      Cardiac catheterization done 11/2/2022   showed calcified left main with 30% distal stenosis, 50% mid LAD stenosis, diffuse 30% LAD stenosis, circumflex showed 99% stenosis with a 70 to 80% second OM stenosis, 99% mid RCA stenosis with diffuse 70% distal RCA stenosis.  Patient received stent 2.75 x 33 drug-eluting stent to the distal RCA and 3 x 33 drug-eluting stent to the proximal to mid RCA.     In June 2023, he began having hematuria.  His aspirin was stopped by urology.  CT abdomen pelvis showed thick-walled urinary bladder consistent for cystitis but otherwise unremarkable.      Echocardiogram done 7/13/2023   showed ejection fraction 46% with grade 1 diastolic dysfunction, normal RVSP, mild aortic insufficiency.       Past Medical History:   Diagnosis Date    Abnormal ECG     Alzheimer disease     CHF (congestive heart failure)     COPD (chronic obstructive pulmonary disease)     Diabetes     GERD (gastroesophageal reflux disease)     Heart murmur     Hyperlipemia     Hypertension     Myocardial infarction     Parkinson disease          Past Surgical History:   Procedure Laterality Date    CARDIAC CATHETERIZATION N/A 11/02/2022     Procedure: Coronary angiography;  Surgeon: Ban Geronimo MD;  Location:  [facility];  Service: Cardiovascular;  Laterality: N/A;    CARDIAC CATHETERIZATION N/A 11/02/2022    Procedure: Left ventriculography;  Surgeon: Ban Geronimo MD;  Location:  [facility];  Service: Cardiovascular;  Laterality: N/A;    CARDIAC CATHETERIZATION N/A 11/02/2022    Procedure: Percutaneous Coronary Intervention;  Surgeon: Ban Geronimo MD;  Location:  [facility];  Service: Cardiovascular;  Laterality: N/A;    CARDIAC CATHETERIZATION N/A 11/02/2022    Procedure: Stent EDIE coronary;  Surgeon: Ban Geronimo MD;  Location:  [facility];  Service: Cardiovascular;  Laterality: N/A;    CARDIAC CATHETERIZATION N/A 11/02/2022    Procedure: Left Heart Cath;  Surgeon: Ban Geronimo MD;  Location:  [facility];  Service: Cardiovascular;  Laterality: N/A;    ENDOSCOPY N/A 05/22/2017    Procedure: ESOPHAGOGASTRODUODENOSCOPY, biopsy;  Surgeon: Natalia Guerrero MD;  [facility];  Service:        Current Outpatient Medications on File Prior to Visit   Medication Sig Dispense Refill    acetaminophen (TYLENOL) 325 MG tablet Take 2 tablets by mouth Every 6 (Six) Hours As Needed for Mild Pain.      albuterol (ACCUNEB) 1.25 MG/3ML nebulizer solution Take 3 mL by nebulization Every 6 (Six) Hours As Needed for Wheezing.  12    aspirin 81 MG EC tablet Take 1 tablet by mouth Daily. 90 tablet 3    atorvastatin (LIPITOR) 40 MG tablet Take 1 tablet by mouth daily. 90 tablet 3    carbidopa-levodopa (SINEMET)  MG per tablet Take 1.5 tablets by mouth 3 (Three) Times a Day.      cetirizine (zyrTEC) 5 MG tablet Take 1 tablet by mouth Daily.      Continuous Blood Gluc Sensor (Dexcom G6 Sensor)       Continuous Blood Gluc Transmit (Dexcom G6 Transmitter) misc       donepezil (ARICEPT) 10 MG tablet Take 1 tablet by mouth Every Night.      esomeprazole (nexIUM) 40 MG  "capsule Every Morning Before Breakfast.      glimepiride (AMARYL) 2 MG tablet       insulin aspart (novoLOG) 100 UNIT/ML injection Inject 0-24 Units under the skin into the appropriate area as directed 3 (Three) Times a Day Before Meals.  12    lisinopril (PRINIVIL,ZESTRIL) 20 MG tablet Take 1 tablet by mouth daily. 90 tablet 2    memantine (NAMENDA) 10 MG tablet Take 1 tablet by mouth 2 (Two) Times a Day.      metFORMIN (GLUCOPHAGE) 850 MG tablet Take 1 tablet by mouth 2 (Two) Times a Day.      metoclopramide (REGLAN) 10 MG tablet Take i po tid (Patient taking differently: Take i po tid as needed) 270 tablet 3    Misc Natural Products (NEURIVA PO) Take 1 tablet by mouth Daily.      nebivolol (BYSTOLIC) 10 MG tablet Take 1 tablet by mouth daily. 90 tablet 2    spironolactone (ALDACTONE) 25 MG tablet TAKE 1/2 TABLET BY MOUTH DAILY 45 tablet 1     No current facility-administered medications on file prior to visit.       Social History     Socioeconomic History    Marital status:     Number of children: 2   Tobacco Use    Smoking status: Former     Passive exposure: Past    Smokeless tobacco: Never    Tobacco comments:     quit 25 years ago---caffeine: 1 drink daily   Vaping Use    Vaping status: Never Used   Substance and Sexual Activity    Alcohol use: No    Drug use: No    Sexual activity: Defer       Procedures    ECG 12 Lead    Date/Time: 7/26/2024 3:25 PM  Performed by: Adelia Magaña APRN    Authorized by: Adelia Magaña APRN  Comparison: compared with previous ECG from 1/26/2024  Rhythm: sinus rhythm  Rate: normal  BPM: 97  Conduction: non-specific intraventricular conduction delay  Other findings: left ventricular hypertrophy    Clinical impression: abnormal EKG              Objective:      Vitals:    07/26/24 1410   BP: 112/60   Pulse: 97   Weight: 94.3 kg (208 lb)   Height: 177.8 cm (70\")     Body mass index is 29.84 kg/m².  Wt Readings from Last 3 Encounters:   07/26/24 94.3 kg (208 lb) "   01/26/24 96.6 kg (213 lb)   07/14/23 87.1 kg (192 lb)         Constitutional:       Appearance: Not in distress. Frail.   Neck:      Vascular: No JVD.   Pulmonary:      Effort: Pulmonary effort is normal.      Breath sounds: No wheezing. No rhonchi. No rales.      Comments: Bilateral lower lobe lung sounds are slightly diminished  Cardiovascular:      Normal rate. Regular rhythm.      Murmurs: There is no murmur.   Pulses:     Radial: 2+ bilaterally.     Dorsalis pedis: 1+ bilaterally.  Edema:     Peripheral edema absent.   Skin:     General: Skin is warm and dry.   Neurological:      Mental Status: Alert and oriented to person, place and time.         Lab Review:      Lab Results   Component Value Date    TSH 0.878 01/26/2024       Lab Results   Component Value Date    CHOL 118 11/03/2022    CHLPL 158 05/02/2016    CHLPL 142 06/10/2015    CHLPL 177 03/02/2015     Lab Results   Component Value Date    TRIG 95 11/03/2022    TRIG 145 05/02/2016    TRIG 146 06/10/2015     Lab Results   Component Value Date    HDL 41 11/03/2022    HDL 39 (L) 05/02/2016     Lab Results   Component Value Date    LDL 59 11/03/2022    LDL 90 05/02/2016       Lab Results   Component Value Date    WBC 8.91 01/26/2024    HGB 13.7 01/26/2024    HCT 39.2 01/26/2024    MCV 91.2 01/26/2024     01/26/2024       Lab Results   Component Value Date    GLUCOSE 225 (H) 01/26/2024    BUN 15 01/26/2024    CREATININE 0.68 (L) 01/26/2024    EGFR 95.1 01/26/2024    BCR 22.1 01/26/2024    K 4.6 01/26/2024    CO2 20.3 (L) 01/26/2024    CALCIUM 9.6 01/26/2024    PROTENTOTREF 6.6 05/02/2016    ALBUMIN 4.1 01/26/2024    BILITOT 0.5 01/26/2024    AST 14 01/26/2024    ALT 12 01/26/2024       Lab Results   Component Value Date    HGBA1C 6.50 (H) 11/03/2022       Assessment:   Diagnoses and all orders for this visit:    1. Ischemic cardiomyopathy (Primary)  Overview:  Added automatically from request for surgery [id number]      2. Coronary artery disease  involving native coronary artery of native heart without angina pectoris    3. Mixed hyperlipidemia    4. Primary hypertension  -     Basic Metabolic Panel; Future    5. Type 2 diabetes mellitus without complication, without long-term current use of insulin    Other orders  -     ECG 12 Lead      Ischemic cardiomyopathy, no CHF.    CAD status post RCA stents x 2.  Non ischemic ECG, no angina.  Hyperlipidemia on atorvastatin  Hypertension that is controlled on present regimen  Diabetes-managed with PCP  Severe COVID in 2021      Plan:   No medication changes were made  He is stable  Follow up with Dr. Cifuentes in 6 months        Thank you for allowing me to participate in this patient's care. Please call with any questions or concerns. Return in about 6 months (around 1/26/2025) for RM .         Dragon dictation device was utilized in this note.

## 2024-11-13 RX ORDER — ASPIRIN 81 MG/1
81 TABLET, COATED ORAL DAILY
Qty: 90 TABLET | Refills: 3 | Status: SHIPPED | OUTPATIENT
Start: 2024-11-13

## 2024-11-15 RX ORDER — SPIRONOLACTONE 25 MG/1
12.5 TABLET ORAL DAILY
Qty: 45 TABLET | Refills: 3 | Status: SHIPPED | OUTPATIENT
Start: 2024-11-15

## 2024-11-15 NOTE — TELEPHONE ENCOUNTER
Patient called and stated that she needs a refill on the Spironolactone to the [facility] in [place].    NOV-02/28/25-RM  LOV-07/26/24-EE    Ischemic cardiomyopathy, no CHF.    CAD status post RCA stents x 2.  Non ischemic ECG, no angina.  Hyperlipidemia on atorvastatin  Hypertension that is controlled on present regimen  Diabetes-managed with PCP  Severe COVID in 2021        Plan:   No medication changes were made  He is stable  Follow up with Dr. Cifuentes in 6 months

## 2025-02-10 ENCOUNTER — OFFICE VISIT (OUTPATIENT)
Dept: CARDIOLOGY | Facility: CLINIC | Age: 80
End: 2025-02-10
Payer: MEDICARE

## 2025-02-10 VITALS
BODY MASS INDEX: 27.77 KG/M2 | HEIGHT: 70 IN | WEIGHT: 194 LBS | DIASTOLIC BLOOD PRESSURE: 58 MMHG | HEART RATE: 82 BPM | SYSTOLIC BLOOD PRESSURE: 118 MMHG

## 2025-02-10 DIAGNOSIS — E11.9 TYPE 2 DIABETES MELLITUS WITHOUT COMPLICATION, WITHOUT LONG-TERM CURRENT USE OF INSULIN: ICD-10-CM

## 2025-02-10 DIAGNOSIS — E78.2 MIXED HYPERLIPIDEMIA: ICD-10-CM

## 2025-02-10 DIAGNOSIS — I10 PRIMARY HYPERTENSION: ICD-10-CM

## 2025-02-10 DIAGNOSIS — I50.22 CHRONIC HFREF (HEART FAILURE WITH REDUCED EJECTION FRACTION): ICD-10-CM

## 2025-02-10 DIAGNOSIS — I25.5 ISCHEMIC CARDIOMYOPATHY: ICD-10-CM

## 2025-02-10 DIAGNOSIS — I25.10 CORONARY ARTERY DISEASE INVOLVING NATIVE CORONARY ARTERY OF NATIVE HEART WITHOUT ANGINA PECTORIS: Primary | ICD-10-CM

## 2025-02-10 RX ORDER — SEMAGLUTIDE 1.34 MG/ML
1 INJECTION, SOLUTION SUBCUTANEOUS WEEKLY
COMMUNITY
Start: 2025-01-07

## 2025-02-10 RX ORDER — ATORVASTATIN CALCIUM 40 MG/1
40 TABLET, FILM COATED ORAL NIGHTLY
Qty: 90 TABLET | Refills: 3 | Status: SHIPPED | OUTPATIENT
Start: 2025-02-10

## 2025-02-10 RX ORDER — NEBIVOLOL 10 MG/1
10 TABLET ORAL NIGHTLY
Qty: 90 TABLET | Refills: 3 | Status: SHIPPED | OUTPATIENT
Start: 2025-02-10

## 2025-02-10 RX ORDER — LISINOPRIL 10 MG/1
10 TABLET ORAL NIGHTLY
Qty: 90 TABLET | Refills: 3 | Status: SHIPPED | OUTPATIENT
Start: 2025-02-10

## 2025-02-10 RX ORDER — GLIMEPIRIDE 4 MG/1
4 TABLET ORAL
COMMUNITY
Start: 2024-12-08

## 2025-02-10 NOTE — PROGRESS NOTES
Date of Office Visit: 02/10/2025  Encounter Provider: Zoe Cifuentes MD  Place of Service: [facility] CARDIOLOG[facility]  Patient Name: Nash So  :1945      Patient ID:  Nash So is a 79 y.o. male is here for  followup for CAD        History of Present Illness    He has a history of COPD, hyperlipidemia, hypertension, CHF, GERD, CAD -s/p RCA stents 2022, mild ischemic cardiomyopathy.     There is no premature cardiovascular disease in the family but his brother Kavon did have a stent.  He is , has 2 children, is retired, is a former smoker, has 1 Coke per day, no alcohol, no drugs. He lives with his daughter.     He was in the emergency department 2022 with dyspnea, right lower extremity edema worse on the left belching and reflux.  Troponin was negative, proBNP 4918, glucose 139, otherwise normal CMP normal CBC.  D-dimer was 0.56.  He has CT done 2021 which showed no evidence of pulmonary embolism.  Review of CTA images shows calcification throughout the left main, LAD and proximal circumflex, RCA appears patent.     Echo done 22 showed LVEF 42% and grade 2 diastolic dysfunction and moderate eccentric hypertrophy with moderate left ventricular dilation, moderate mitral insufficiency. Stress nuclear study 10/14/22 showed large inferior and apical infarct.      He had severe COVID beginning 2021 and barely survive this, was in rehab facilities and to recover for about 6 months.  His wife passed away with COVID.         I saw him for severe dyspnea 10/26/22.  He had a stress nuclear perfusion study done 10/14/2022 which showed a large inferior wall infarct and inferoapical infarct without ischemia, ejection fraction 22%.  Echocardiogram done 2022 showed ejection fraction 42% with grade 2 diastolic dysfunction, moderate left ventricular dilation, moderate mitral insufficiency.  This showed global hypokinesis with  inferolateral akinesis.  Cardiac catheterization done 11/2/2022 showed calcified left main with 30% distal stenosis, 50% mid LAD stenosis, diffuse 30% LAD stenosis, circumflex showed 99% stenosis with a 70 to 80% second OM stenosis, 99% mid RCA stenosis with diffuse 70% distal RCA stenosis.  Patient received stent 2.75 x 33 drug-eluting stent to the distal RCA and 3 x 33 drug-eluting stent to the proximal to mid RCA.     In June 2023, he began having hematuria.  His aspirin was stopped by urology.  CT abdomen pelvis showed thick-walled urinary bladder consistent for cystitis but otherwise unremarkable.  Echocardiogram done 7/13/2023 showed ejection fraction 46% with grade 1 diastolic dysfunction, normal RVSP, mild aortic insufficiency.     He was taken off Jardiance due to concern for recurrent bladder infections.      Labs on 1/26/2024 show glucose 225 with otherwise normal CMP, normal proBNP, normal TSH, normal CBC.  He has lost 15 pounds in 6 months.  He is on Ozempic.  He has had some low blood sugars at night and high blood sugars during the day.  He has no dizziness or syncope.  He has no chest pain or pressure.  He has no dyspnea.  He is very sedentary and sits in the chair most the day and does use a walker.  He lives with his daughter and son-in-law.  He has minimal lower extremity edema.  He has no orthopnea or PND.  He does not feels heart racing or skipping.  He generally falls asleep around 9 PM but then wakes up and watches TV until about 2 in the morning and then goes to bed.  His appetite has been good.    Past Medical History:   Diagnosis Date    Abnormal ECG     Alzheimer disease     CHF (congestive heart failure)     COPD (chronic obstructive pulmonary disease)     Diabetes     GERD (gastroesophageal reflux disease)     Heart murmur     Hyperlipemia     Hypertension     Myocardial infarction     Parkinson disease          Past Surgical History:   Procedure Laterality Date    CARDIAC  CATHETERIZATION N/A 11/02/2022    Procedure: Coronary angiography;  Surgeon: Ban Geronimo MD;  Location:  [facility];  Service: Cardiovascular;  Laterality: N/A;    CARDIAC CATHETERIZATION N/A 11/02/2022    Procedure: Left ventriculography;  Surgeon: Ban Geronimo MD;  Location:  [facility];  Service: Cardiovascular;  Laterality: N/A;    CARDIAC CATHETERIZATION N/A 11/02/2022    Procedure: Percutaneous Coronary Intervention;  Surgeon: Ban Geronimo MD;  Location:  [facility];  Service: Cardiovascular;  Laterality: N/A;    CARDIAC CATHETERIZATION N/A 11/02/2022    Procedure: Stent EDIE coronary;  Surgeon: Ban Geronimo MD;  Location:  [facility];  Service: Cardiovascular;  Laterality: N/A;    CARDIAC CATHETERIZATION N/A 11/02/2022    Procedure: Left Heart Cath;  Surgeon: Ban Geronimo MD;  Location:  [facility];  Service: Cardiovascular;  Laterality: N/A;    ENDOSCOPY N/A 05/22/2017    Procedure: ESOPHAGOGASTRODUODENOSCOPY, biopsy;  Surgeon: Natalia Guerrero MD;  Location: [facility];  Service:        Current Outpatient Medications on File Prior to Visit   Medication Sig Dispense Refill    acetaminophen (TYLENOL) 325 MG tablet Take 2 tablets by mouth Every 6 (Six) Hours As Needed for Mild Pain.      albuterol (ACCUNEB) 1.25 MG/3ML nebulizer solution Take 3 mL by nebulization Every 6 (Six) Hours As Needed for Wheezing.  12    Aspirin Low Dose 81 MG EC tablet TAKE 1 TABLET BY MOUTH DAILY 90 tablet 3    carbidopa-levodopa (SINEMET)  MG per tablet Take 1.5 tablets by mouth 3 (Three) Times a Day.      cetirizine (zyrTEC) 5 MG tablet Take 1 tablet by mouth Daily.      Continuous Blood Gluc Sensor (Dexcom G6 Sensor)       Continuous Blood Gluc Transmit (Dexcom G6 Transmitter) misc       donepezil (ARICEPT) 10 MG tablet Take 1 tablet by mouth Every Night.      esomeprazole (nexIUM) 40 MG capsule Every Morning Before Breakfast.       glimepiride (AMARYL) 4 MG tablet Take 1 tablet by mouth Every Morning Before Breakfast.      Insulin Lispro (HUMALOG IJ) Inject  as directed As Needed.      memantine (NAMENDA) 10 MG tablet Take 1 tablet by mouth 2 (Two) Times a Day.      metFORMIN (GLUCOPHAGE) 850 MG tablet Take 1 tablet by mouth 2 (Two) Times a Day.      metoclopramide (REGLAN) 10 MG tablet Take i po tid (Patient taking differently: Take i po tid as needed) 270 tablet 3    Misc Natural Products (NEURIVA PO) Take 1 tablet by mouth Daily.      Ozempic, 1 MG/DOSE, 4 MG/3ML solution pen-injector Inject 1 mg under the skin into the appropriate area as directed 1 (One) Time Per Week.      spironolactone (ALDACTONE) 25 MG tablet Take 0.5 tablets by mouth Daily. 45 tablet 3    VITAMIN D PO Take  by mouth 1 (One) Time Per Week.      [DISCONTINUED] atorvastatin (LIPITOR) 40 MG tablet Take 1 tablet by mouth daily. 90 tablet 3    [DISCONTINUED] glimepiride (AMARYL) 2 MG tablet       [DISCONTINUED] lisinopril (PRINIVIL,ZESTRIL) 20 MG tablet Take 1 tablet by mouth daily. 90 tablet 2    [DISCONTINUED] nebivolol (BYSTOLIC) 10 MG tablet Take 1 tablet by mouth daily. 90 tablet 2    [DISCONTINUED] insulin aspart (novoLOG) 100 UNIT/ML injection Inject 0-24 Units under the skin into the appropriate area as directed 3 (Three) Times a Day Before Meals. (Patient not taking: Reported on 2/10/2025)  12     No current facility-administered medications on file prior to visit.       Social History     Socioeconomic History    Marital status:     Number of children: 2   Tobacco Use    Smoking status: Former     Passive exposure: Past    Smokeless tobacco: Never    Tobacco comments:     quit 25 years ago---caffeine: 1 drink daily   Vaping Use    Vaping status: Never Used   Substance and Sexual Activity    Alcohol use: No    Drug use: No    Sexual activity: Defer             Procedures    ECG 12 Lead    Date/Time: 2/10/2025 10:11 AM  Performed by: Zoe Cifuentes  "MD    Authorized by: Zoe Cifuentes MD  Comparison: compared with previous ECG   Similar to previous ECG  Rhythm: sinus rhythm  T inversion: all  Other findings: left ventricular hypertrophy    Clinical impression: abnormal EKG              Objective:      Vitals:    02/10/25 1005   BP: 118/58   Pulse: 82   Weight: 88 kg (194 lb)   Height: 177.8 cm (70\")     Body mass index is 27.84 kg/m².    Vitals reviewed.   Constitutional:       General: Not in acute distress.     Appearance: Not diaphoretic.   Neck:      Vascular: No carotid bruit or JVD.   Pulmonary:      Effort: Pulmonary effort is normal.      Breath sounds: Normal breath sounds.   Cardiovascular:      Normal rate. Regular rhythm.      Murmurs: There is no murmur.      No gallop.  No rub.   Pulses:     Intact distal pulses.      Carotid: 2+ bilaterally.     Radial: 2+ bilaterally.     Dorsalis pedis: 2+ bilaterally.     Posterior tibial: 2+ bilaterally.  Edema:     Peripheral edema absent.   Neurological:      Cranial Nerves: No cranial nerve deficit.         Lab Review:       Assessment:      Diagnosis Plan   1. Coronary artery disease involving native coronary artery of native heart without angina pectoris  Adult Transthoracic Echo Complete W/ Cont if Necessary Per Protocol      2. Ischemic cardiomyopathy  Adult Transthoracic Echo Complete W/ Cont if Necessary Per Protocol      3. Type 2 diabetes mellitus without complication, without long-term current use of insulin  Adult Transthoracic Echo Complete W/ Cont if Necessary Per Protocol      4. Chronic HFrEF (heart failure with reduced ejection fraction)  Adult Transthoracic Echo Complete W/ Cont if Necessary Per Protocol      5. Mixed hyperlipidemia  Adult Transthoracic Echo Complete W/ Cont if Necessary Per Protocol      6. Primary hypertension            Ischemic cardiomyopathy.  No decompensated CHF at this time  CAD, s/p RCA stents x2.  No angina.   Diabetes mellitus type 2, on metformin and " Jardiance, may need more Jardiance but overall doing well right now.  Hypertension, controlled.  Blood pressure is actually little on the low side and his daughter says he sleeps a lot during the day  Hyperlipidemia, on atorvastatin  Severe COVID in 2021.  Weight loss of about 15 pounds  Chronically abnormal ECG     Plan:       See Adelia in 6 months with an echocardiogram the week prior to the visit.  Decrease lisinopril to 10 mg nightly as his blood pressure is a little bit low and he is having daytime fatigue, take Bystolic at night, take atorvastatin at night.  Overall he is stable but his blood pressure is little bit low likely due to his weight loss.  Advise that he follow-up with Dr. Nassar because he has had high blood sugars   during the day and low blood sugars at night.    Will get labs from Dr. Nassar

## 2025-04-28 ENCOUNTER — HOSPITAL ENCOUNTER (EMERGENCY)
Facility: HOSPITAL | Age: 80
Discharge: HOME | End: 2025-04-28
Attending: EMERGENCY MEDICINE
Payer: MEDICARE

## 2025-04-28 ENCOUNTER — APPOINTMENT (OUTPATIENT)
Dept: CT IMAGING | Facility: HOSPITAL | Age: 80
End: 2025-04-28
Payer: MEDICARE

## 2025-04-28 ENCOUNTER — HOSPITAL ENCOUNTER (EMERGENCY)
Dept: ULTRASOUND IMAGING | Facility: HOSPITAL | Age: 80
Discharge: HOME | End: 2025-04-28
Payer: MEDICARE

## 2025-04-28 VITALS
SYSTOLIC BLOOD PRESSURE: 178 MMHG | RESPIRATION RATE: 16 BRPM | TEMPERATURE: 98.4 F | HEIGHT: 70 IN | HEART RATE: 81 BPM | BODY MASS INDEX: 27.93 KG/M2 | WEIGHT: 195.1 LBS | DIASTOLIC BLOOD PRESSURE: 68 MMHG | OXYGEN SATURATION: 94 %

## 2025-04-28 DIAGNOSIS — J18.9 PNEUMONIA OF RIGHT LOWER LOBE DUE TO INFECTIOUS ORGANISM: ICD-10-CM

## 2025-04-28 DIAGNOSIS — N30.01 ACUTE CYSTITIS WITH HEMATURIA: Primary | ICD-10-CM

## 2025-04-28 DIAGNOSIS — R91.8 RIGHT LOWER LOBE LUNG MASS: ICD-10-CM

## 2025-04-28 DIAGNOSIS — R60.0 PERIPHERAL EDEMA: ICD-10-CM

## 2025-04-28 LAB
ALBUMIN SERPL-MCNC: 3.9 G/DL (ref 3.5–5.2)
ALBUMIN/GLOB SERPL: 1.4 G/DL
ALP SERPL-CCNC: 95 U/L (ref 39–117)
ALT SERPL W P-5'-P-CCNC: 5 U/L (ref 1–41)
ANION GAP SERPL CALCULATED.3IONS-SCNC: 15.1 MMOL/L (ref 5–15)
AST SERPL-CCNC: 15 U/L (ref 1–40)
BACTERIA UR QL AUTO: ABNORMAL /HPF
BASOPHILS # BLD AUTO: 0.05 10*3/MM3 (ref 0–0.2)
BASOPHILS NFR BLD AUTO: 0.4 % (ref 0–1.5)
BILIRUB SERPL-MCNC: 0.7 MG/DL (ref 0–1.2)
BILIRUB UR QL STRIP: ABNORMAL
BUN SERPL-MCNC: 10 MG/DL (ref 8–23)
BUN/CREAT SERPL: 17.2 (ref 7–25)
CALCIUM SPEC-SCNC: 9.3 MG/DL (ref 8.6–10.5)
CHLORIDE SERPL-SCNC: 102 MMOL/L (ref 98–107)
CLARITY UR: ABNORMAL
CO2 SERPL-SCNC: 22.9 MMOL/L (ref 22–29)
COLOR UR: ABNORMAL
CREAT SERPL-MCNC: 0.58 MG/DL (ref 0.76–1.27)
D DIMER PPP FEU-MCNC: 1.27 MCGFEU/ML (ref 0–0.79)
D-LACTATE SERPL-SCNC: 1.9 MMOL/L (ref 0.5–2)
D-LACTATE SERPL-SCNC: 3.3 MMOL/L (ref 0.5–2)
DEPRECATED RDW RBC AUTO: 50.4 FL (ref 37–54)
EGFRCR SERPLBLD CKD-EPI 2021: 99.2 ML/MIN/1.73
EOSINOPHIL # BLD AUTO: 0.18 10*3/MM3 (ref 0–0.4)
EOSINOPHIL NFR BLD AUTO: 1.6 % (ref 0.3–6.2)
ERYTHROCYTE [DISTWIDTH] IN BLOOD BY AUTOMATED COUNT: 14.5 % (ref 12.3–15.4)
GLOBULIN UR ELPH-MCNC: 2.7 GM/DL
GLUCOSE SERPL-MCNC: 130 MG/DL (ref 65–99)
GLUCOSE UR STRIP-MCNC: NEGATIVE MG/DL
HCT VFR BLD AUTO: 41.9 % (ref 37.5–51)
HGB BLD-MCNC: 14 G/DL (ref 13–17.7)
HGB UR QL STRIP.AUTO: NEGATIVE
HOLD SPECIMEN: NORMAL
HYALINE CASTS UR QL AUTO: ABNORMAL /LPF
IMM GRANULOCYTES # BLD AUTO: 0.11 10*3/MM3 (ref 0–0.05)
IMM GRANULOCYTES NFR BLD AUTO: 1 % (ref 0–0.5)
KETONES UR QL STRIP: ABNORMAL
LEUKOCYTE ESTERASE UR QL STRIP.AUTO: ABNORMAL
LIPASE SERPL-CCNC: 12 U/L (ref 13–60)
LYMPHOCYTES # BLD AUTO: 1.41 10*3/MM3 (ref 0.7–3.1)
LYMPHOCYTES NFR BLD AUTO: 12.2 % (ref 19.6–45.3)
MCH RBC QN AUTO: 31.4 PG (ref 26.6–33)
MCHC RBC AUTO-ENTMCNC: 33.4 G/DL (ref 31.5–35.7)
MCV RBC AUTO: 93.9 FL (ref 79–97)
MONOCYTES # BLD AUTO: 1.2 10*3/MM3 (ref 0.1–0.9)
MONOCYTES NFR BLD AUTO: 10.4 % (ref 5–12)
NEUTROPHILS NFR BLD AUTO: 74.4 % (ref 42.7–76)
NEUTROPHILS NFR BLD AUTO: 8.57 10*3/MM3 (ref 1.7–7)
NITRITE UR QL STRIP: NEGATIVE
NRBC BLD AUTO-RTO: 0 /100 WBC (ref 0–0.2)
NT-PROBNP SERPL-MCNC: 1586 PG/ML (ref 0–1800)
PH UR STRIP.AUTO: 5.5 [PH] (ref 4.5–8)
PLATELET # BLD AUTO: 289 10*3/MM3 (ref 140–450)
PMV BLD AUTO: 9.6 FL (ref 6–12)
POTASSIUM SERPL-SCNC: 3.9 MMOL/L (ref 3.5–5.2)
PROT SERPL-MCNC: 6.6 G/DL (ref 6–8.5)
PROT UR QL STRIP: ABNORMAL
RBC # BLD AUTO: 4.46 10*6/MM3 (ref 4.14–5.8)
RBC # UR STRIP: ABNORMAL /HPF
REF LAB TEST METHOD: ABNORMAL
SODIUM SERPL-SCNC: 140 MMOL/L (ref 136–145)
SP GR UR STRIP: 1.03 (ref 1–1.03)
SQUAMOUS #/AREA URNS HPF: ABNORMAL /HPF
TROPONIN T SERPL HS-MCNC: 9 NG/L
UROBILINOGEN UR QL STRIP: ABNORMAL
WAXY CASTS #/AREA URNS LPF: ABNORMAL /LPF
WBC # UR STRIP: ABNORMAL /HPF
WBC NRBC COR # BLD AUTO: 11.52 10*3/MM3 (ref 3.4–10.8)
WHOLE BLOOD HOLD COAG: NORMAL
WHOLE BLOOD HOLD SPECIMEN: NORMAL

## 2025-04-28 PROCEDURE — 81001 URINALYSIS AUTO W/SCOPE: CPT | Performed by: EMERGENCY MEDICINE

## 2025-04-28 PROCEDURE — 25010000002 CEFTRIAXONE PER 250 MG: Performed by: EMERGENCY MEDICINE

## 2025-04-28 PROCEDURE — 83690 ASSAY OF LIPASE: CPT | Performed by: EMERGENCY MEDICINE

## 2025-04-28 PROCEDURE — 84484 ASSAY OF TROPONIN QUANT: CPT | Performed by: EMERGENCY MEDICINE

## 2025-04-28 PROCEDURE — 85025 COMPLETE CBC W/AUTO DIFF WBC: CPT | Performed by: EMERGENCY MEDICINE

## 2025-04-28 PROCEDURE — 85379 FIBRIN DEGRADATION QUANT: CPT | Performed by: EMERGENCY MEDICINE

## 2025-04-28 PROCEDURE — 93970 EXTREMITY STUDY: CPT

## 2025-04-28 PROCEDURE — 36415 COLL VENOUS BLD VENIPUNCTURE: CPT

## 2025-04-28 PROCEDURE — 71275 CT ANGIOGRAPHY CHEST: CPT

## 2025-04-28 PROCEDURE — 25510000001 IOPAMIDOL PER 1 ML: Performed by: EMERGENCY MEDICINE

## 2025-04-28 PROCEDURE — 96365 THER/PROPH/DIAG IV INF INIT: CPT

## 2025-04-28 PROCEDURE — 80053 COMPREHEN METABOLIC PANEL: CPT | Performed by: EMERGENCY MEDICINE

## 2025-04-28 PROCEDURE — 99285 EMERGENCY DEPT VISIT HI MDM: CPT | Performed by: EMERGENCY MEDICINE

## 2025-04-28 PROCEDURE — 83880 ASSAY OF NATRIURETIC PEPTIDE: CPT | Performed by: EMERGENCY MEDICINE

## 2025-04-28 PROCEDURE — 83605 ASSAY OF LACTIC ACID: CPT | Performed by: EMERGENCY MEDICINE

## 2025-04-28 PROCEDURE — 25810000003 SODIUM CHLORIDE 0.9 % SOLUTION: Performed by: EMERGENCY MEDICINE

## 2025-04-28 RX ORDER — LEVOFLOXACIN 750 MG/1
750 TABLET, FILM COATED ORAL DAILY
Qty: 5 TABLET | Refills: 0 | Status: SHIPPED | OUTPATIENT
Start: 2025-04-28 | End: 2025-05-03

## 2025-04-28 RX ORDER — IOPAMIDOL 755 MG/ML
100 INJECTION, SOLUTION INTRAVASCULAR
Status: COMPLETED | OUTPATIENT
Start: 2025-04-28 | End: 2025-04-28

## 2025-04-28 RX ORDER — LEVOFLOXACIN 750 MG/1
750 TABLET, FILM COATED ORAL DAILY
Qty: 5 TABLET | Refills: 0 | Status: SHIPPED | OUTPATIENT
Start: 2025-04-28 | End: 2025-04-28

## 2025-04-28 RX ADMIN — SODIUM CHLORIDE 1000 ML: 9 INJECTION, SOLUTION INTRAVENOUS at 02:46

## 2025-04-28 RX ADMIN — IOPAMIDOL 100 ML: 755 INJECTION, SOLUTION INTRAVENOUS at 03:20

## 2025-04-28 RX ADMIN — CEFTRIAXONE SODIUM 1000 MG: 1 INJECTION, POWDER, FOR SOLUTION INTRAMUSCULAR; INTRAVENOUS at 02:46

## 2025-04-28 NOTE — ED PROVIDER NOTES
Subjective   History of Present Illness  Patient presents complaining of 2 separate complaints.  Patient's initial complaint was his right foot and leg is very painful.  Patient reports that he has had increased swelling of his legs over the last month.  Patient says sometimes will get fluid blisters in the pop and then scab over.  Additionally patient is also having some pain in his right posterolateral abdomen.  Patient denies any recent trauma or injury no recent heavy lifting or straining.  Nothing seems to make it better or worse.  Patient is able to eat and drink without difficulty.  No therapy taken prior to arrival.  Patient denies any recent fever, cough, vomiting, or diarrhea.  No recent travel, sick contacts, bad food exposure, or trauma.      Review of Systems   All other systems reviewed and are negative.      Past Medical History:   Diagnosis Date    Abnormal ECG     Alzheimer disease     CHF (congestive heart failure)     COPD (chronic obstructive pulmonary disease)     Diabetes     GERD (gastroesophageal reflux disease)     Heart murmur     Hyperlipemia     Hypertension     Myocardial infarction     Parkinson disease        Allergies   Allergen Reactions    Penicillin G Unknown - High Severity       Past Surgical History:   Procedure Laterality Date    CARDIAC CATHETERIZATION N/A 11/02/2022    Procedure: Coronary angiography;  Surgeon: Ban Geronimo MD;  Location: [facility] CATH INVASIVE LOCATION;  Service: Cardiovascular;  Laterality: N/A;    CARDIAC CATHETERIZATION N/A 11/02/2022    Procedure: Left ventriculography;  Surgeon: Ban Geronimo MD;  Location: [facility] CATH INVASIVE LOCATION;  Service: Cardiovascular;  Laterality: N/A;    CARDIAC CATHETERIZATION N/A 11/02/2022    Procedure: Percutaneous Coronary Intervention;  Surgeon: Ban Geronimo MD;  Location: [facility] CATH INVASIVE LOCATION;  Service: Cardiovascular;  Laterality: N/A;    CARDIAC CATHETERIZATION N/A 11/02/2022    Procedure: Stent EDIE  coronary;  Surgeon: Ban Geronimo MD;  Location:  [facility] CATH INVASIVE LOCATION;  Service: Cardiovascular;  Laterality: N/A;    CARDIAC CATHETERIZATION N/A 11/02/2022    Procedure: Left Heart Cath;  Surgeon: Ban Geronimo MD;  Location:  [facility] CATH INVASIVE LOCATION;  Service: Cardiovascular;  Laterality: N/A;    ENDOSCOPY N/A 05/22/2017    Procedure: ESOPHAGOGASTRODUODENOSCOPY, biopsy;  Surgeon: Natalia Guerrero MD;  Location:  [facility] OR;  Service:        Family History   Problem Relation Age of Onset    Heart defect Mother     Diabetes Mother     Cancer Sister     Heart disease Brother     Diabetes Brother     Stroke Brother        Social History     Socioeconomic History    Marital status:     Number of children: 2   Tobacco Use    Smoking status: Former     Passive exposure: Past    Smokeless tobacco: Never    Tobacco comments:     quit 25 years ago---caffeine: 1 drink daily   Vaping Use    Vaping status: Never Used   Substance and Sexual Activity    Alcohol use: No    Drug use: No    Sexual activity: Defer           Objective   Physical Exam  Vitals and nursing note reviewed.   Constitutional:       Comments: Patient lying in bed comfortably, talkative, friendly, no signs of distress.  Cooperative with exam.   HENT:      Head: Normocephalic.   Cardiovascular:      Rate and Rhythm: Normal rate and regular rhythm.      Heart sounds: Normal heart sounds.   Pulmonary:      Effort: Pulmonary effort is normal.      Breath sounds: Normal breath sounds.   Abdominal:      General: Abdomen is protuberant. There is no distension.      Palpations: Abdomen is soft.      Tenderness: There is no abdominal tenderness. There is no right CVA tenderness, left CVA tenderness or guarding.   Skin:     General: Skin is warm and dry.      Capillary Refill: Capillary refill takes 2 to 3 seconds.   Neurological:      Mental Status: He is alert and oriented to person, place, and time.   Psychiatric:         Mood and Affect: Mood  normal.         Behavior: Behavior normal.         Procedures           ED Course  ED Course as of 04/28/25 0457   Mon Apr 28, 2025   0245 Pt found to havea urinary tract infection.  Patient receiving antibiotics.  Patient found to have elevated lactic acid; Could be related to the infection but patient also appears to have some chronic venous stasis which can elevate lactic acid as well.  Patient does have elevated D-dimer so we will be getting a CT scan of his chest.  Depending on the outcome of tonight patient may go home on antibiotics and follow-up tomorrow for ultrasound of his legs to rule out DVT. [AW]      ED Course User Index  [AW] Arnold Quintana MD                                                       Medical Decision Making  Ddx     CT Angiogram Chest  Result Date: 4/28/2025  1.No pulmonary embolism or aortic dissection. 2.Emphysema. 3.Bibasilar scarring or atelectasis. There is a more confluent area of consolidation in the right lower lobe measuring up to 4.1 cm. This may reflect an area of rounded atelectasis. True mass is not excluded, nor is a focal pneumonia in the right lower lobe. 3-month follow-up chest CT recommended. 4.Cholelithiasis. Electronically Signed: Shyam Vasquez MD  4/28/2025 4:04 AM EDT  Workstation ID: [id number]    Labs Reviewed  LACTIC ACID, PLASMA - Abnormal; Notable for the following components:     Lactate                       3.3 (*)             All other components within normal limits  URINALYSIS W/ MICROSCOPIC IF INDICATED (NO CULTURE) - Abnormal; Notable for the following components:     Color, UA                     Dark Yellow (*)               Appearance, UA                  (*)                  Ketones, UA                   Trace (*)               Bilirubin, UA                 Small (1+) (*)               Protein, UA                     (*)                  Leuk Esterase, UA             Small (1+) (*)               Urobilinogen, UA              2.0 E.U./dL (*)       "      All other components within normal limits  URINALYSIS, MICROSCOPIC ONLY - Abnormal; Notable for the following components:     RBC, UA                       3-5 (*)                WBC, UA                       6-10 (*)               Bacteria, UA                  Trace (*)               Squamous Epithelial Cells, UA   3-6 (*)             All other components within normal limits  D-DIMER, QUANTITATIVE - Abnormal; Notable for the following components:     D-Dimer, Quantitative         1.27 (*)            All other components within normal limits         Narrative: According to the assay 's published package insert, a normal (<0.50 MCGFEU/mL) D-dimer result in conjunction with a non-high clinical probability assessment, excludes deep vein thrombosis (DVT) and pulmonary embolism (PE) with high sensitivity.                                    D-dimer values increase with age and this can make VTE exclusion of an older population difficult. To address this, the [employer], based on best available evidence and recent guidelines, recommends that clinicians use age-adjusted D-dimer thresholds in patients greater than 50 years of age with: a) a low probability of PE who do not meet all Pulmonary Embolism Rule Out Criteria, or b) in those with intermediate probability of PE.                   The formula for an age-adjusted D-dimer cut-off is \"age/100\".                  For example, a 60 year old patient would have an age-adjusted cut-off of 0.60 MCGFEU/mL and an 80 year old 0.80 MCGFEU/mL.  COMPREHENSIVE METABOLIC PANEL - Abnormal; Notable for the following components:     Glucose                       130 (*)                Creatinine                    0.58 (*)               Anion Gap                     15.1 (*)            All other components within normal limits         Narrative: GFR Categories in Chronic Kidney Disease (CKD)                                      GFR Category          " GFR (mL/min/1.73)    Interpretation                  G1                     90 or greater         Normal or high (1)                  G2                      60-89                Mild decrease (1)                  G3a                   45-59                Mild to moderate decrease                  G3b                   30-44                Moderate to severe decrease                  G4                    15-29                Severe decrease                  G5                    14 or less           Kidney failure                                          (1)In the absence of evidence of kidney disease, neither GFR category G1 or G2 fulfill the criteria for CKD.                                    eGFR calculation 2021 CKD-EPI creatinine equation, which does not include race as a factor  LIPASE - Abnormal; Notable for the following components:     Lipase                        12 (*)              All other components within normal limits  CBC WITH AUTO DIFFERENTIAL - Abnormal; Notable for the following components:     WBC                           11.52 (*)               Lymphocyte %                  12.2 (*)               Immature Grans %              1.0 (*)                Neutrophils, Absolute         8.57 (*)               Monocytes, Absolute           1.20 (*)               Immature Grans, Absolute      0.11 (*)            All other components within normal limits  LACTIC ACID, REFLEX - Normal  BNP (IN-HOUSE) - Normal         Narrative: This assay is used as an aid in the diagnosis of individuals suspected of having heart failure. It can be used as an aid in the diagnosis of acute decompensated heart failure (ADHF) in patients presenting with signs and symptoms of ADHF to the emergency department (ED). In addition, NT-proBNP of <300 pg/mL indicates ADHF is not likely.                                    Age Range Result Interpretation  NT-proBNP Concentration (pg/mL:                                                       <50             Positive            >450                                   Gray                 300-450                                    Negative             <300                                    50-75           Positive            >900                                  [place]                300-900                                  Negative            <300                                                      >75             Positive            >1800                                  [place]                300-1800                                  Negative            <300  TROPONIN - Normal         Narrative: High Sensitive Troponin T Reference Range:                  <14.0 ng/L- Negative Female for AMI                  <22.0 ng/L- Negative Male for AMI                  >=14 - Abnormal Female indicating possible myocardial injury.                  >=22 - Abnormal Male indicating possible myocardial injury.                   Clinicians would have to utilize clinical acumen, EKG, Troponin, and serial changes to determine if it is an Acute Myocardial Infarction or myocardial injury due to an underlying chronic condition.                                       RAINBOW DRAW         Narrative: The following orders were created for panel order Turner Draw.                  Procedure                               Abnormality         Status                                     ---------                               -----------         ------                                     Green Top (Gel)[[id number]]                                  Final result                               Lavender Top[[id number]]                                     Final result                               Gold Top - SST[[id number]]                                   Final result                               Light Blue Top[[id number]]                                   Final result                                                 Please view results for  these tests on the individual orders.  RAINBOW URINE CULTURE TUBE  GREEN TOP  LAVENDER TOP  GOLD TOP - SST  LIGHT BLUE TOP  CBC AND DIFFERENTIAL    0445 Pt seen again prior to d/c.  Labs/Imaging reviewed and are remarkable UTI and pneumonia versus lung mass.  Symptoms improved and pt feels better, vitals stable and pt. in NAD. Non-toxic. Comfortable. Ambulating without difficulty.  Tolerating po.  Relaxed breathing.  All questions personally answered at the bedside and all d/c instructions personally reviewed with pt.  Discussed the importance of close outpt. f/u and pt. understands this and agrees to do so.  Pt agrees to return to ED immediately for any new, persistent, or worsening symptoms.  Patient's family member at bedside for entire evaluation and discharge instructions.    EMR Dragon/Transcription disclaimer:  Much of this encounter note is an electronic transcription/translation of spoken language to printed text using the Dragon Dictation System       Problems Addressed:  Acute cystitis with hematuria: complicated acute illness or injury  Peripheral edema: complicated acute illness or injury  Pneumonia of right lower lobe due to infectious organism: complicated acute illness or injury  Right lower lobe lung mass: complicated acute illness or injury    Amount and/or Complexity of Data Reviewed  Labs: ordered.  Radiology: ordered.    Risk  Prescription drug management.        Final diagnoses:   Acute cystitis with hematuria   Pneumonia of right lower lobe due to infectious organism   Right lower lobe lung mass   Peripheral edema       ED Disposition  ED Disposition       ED Disposition   Discharge    Condition   Stable    Comment   --               Reno Nassar MD  [address]  [place] [address] [phone]    In 1 week           Medication List        New Prescriptions      levoFLOXacin 750 MG tablet  Commonly known as: LEVAQUIN  Take 1 tablet by mouth Daily for 5 days.             Changed      metoclopramide 10 MG tablet  Commonly known as: REGLAN  Take i po tid  What changed: additional instructions               Where to Get Your Medications        These medications were sent to [facility] [id number] - [facility] [place] - [facility] [address] [facility] - [phone]  - [phone]   [address] [facility] [place] [address]      Phone: [phone]   levoFLOXacin 750 MG tablet            Arnold Quintana MD  04/28/25 6675

## 2025-04-28 NOTE — ED PROVIDER NOTES
Informed patient regarding negative ultrasound.    Recommended patient wear compression stockings and elevate leg as much as possible.     Simon Salamanca MD  04/28/25 [id number]

## 2025-04-28 NOTE — DISCHARGE INSTRUCTIONS
It is advised to have a repeat chest CT scan within 3 months to evaluate resolution of the pneumonia and/or reevaluation of possible lung mass.    Stay well-hydrated throughout the day while taking antibiotics.  Avoid any exertional activity while taking the antibiotics.

## 2025-05-09 ENCOUNTER — HOSPITAL ENCOUNTER (OUTPATIENT)
Dept: ULTRASOUND IMAGING | Facility: HOSPITAL | Age: 80
Discharge: HOME | End: 2025-05-09
Payer: MEDICARE

## 2025-05-09 ENCOUNTER — TRANSCRIBE ORDERS (OUTPATIENT)
Dept: ADMINISTRATIVE | Facility: HOSPITAL | Age: 80
End: 2025-05-09
Payer: MEDICARE

## 2025-05-09 DIAGNOSIS — M79.89 CALF SWELLING: Primary | ICD-10-CM

## 2025-05-09 DIAGNOSIS — M79.89 CALF SWELLING: ICD-10-CM

## 2025-05-09 PROCEDURE — 93971 EXTREMITY STUDY: CPT

## 2025-06-01 ENCOUNTER — APPOINTMENT (OUTPATIENT)
Dept: GENERAL RADIOLOGY | Facility: HOSPITAL | Age: 80
End: 2025-06-01
Payer: MEDICARE

## 2025-06-01 ENCOUNTER — HOSPITAL ENCOUNTER (EMERGENCY)
Facility: HOSPITAL | Age: 80
Discharge: HOME | End: 2025-06-01
Attending: EMERGENCY MEDICINE | Admitting: EMERGENCY MEDICINE
Payer: MEDICARE

## 2025-06-01 VITALS
BODY MASS INDEX: 17.9 KG/M2 | RESPIRATION RATE: 18 BRPM | SYSTOLIC BLOOD PRESSURE: 160 MMHG | DIASTOLIC BLOOD PRESSURE: 40 MMHG | WEIGHT: 125 LBS | HEART RATE: 61 BPM | TEMPERATURE: 97.2 F | OXYGEN SATURATION: 98 % | HEIGHT: 70 IN
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
## 2025-06-01 DIAGNOSIS — S33.5XXA LUMBAR SPRAIN, INITIAL ENCOUNTER: ICD-10-CM

## 2025-06-01 PROCEDURE — 72100 X-RAY EXAM L-S SPINE 2/3 VWS: CPT

## 2025-06-01 PROCEDURE — 99283 EMERGENCY DEPT VISIT LOW MDM: CPT | Performed by: EMERGENCY MEDICINE

## 2025-06-01 NOTE — ED NOTES
Pt and his family exited the emergency department without receiving discharge paperwork or discharge instructions from this RN. Patient and family exited the emergency department with no difficulty.

## 2025-06-01 NOTE — ED PROVIDER NOTES
Subjective   History of Present Illness    Chief complaint: Back pain    Location: Low back    Quality/Severity: Mild    Timing/Onset/Duration: A few days ago    Modifying Factors: Hurts to move    Associated Symptoms: No loss of consciousness.  No neck pain.  No abdominal pain.  No numbness, tingling, weakness, or change in color or temperature of the extremities other than some chronic erythema on the anterior shins that the patient is seeing a wound care specialist for.    Narrative: This 79-year-old had a fall a couple days ago.  Patient is complaining of low back pain.  Patient denies hitting his back on anything.  No numbness, tingling, weakness, or change in bladder bowel function.  Always mechanical fall.    PCP:Reno Nassar MD      Review of Systems   Respiratory:  Negative for shortness of breath.    Cardiovascular:  Negative for chest pain.   Gastrointestinal:  Negative for abdominal distention, nausea and vomiting.   Genitourinary:  Negative for difficulty urinating.   Musculoskeletal:  Positive for back pain. Negative for neck pain.   Neurological:  Negative for weakness, numbness and headaches.       Past Medical History:   Diagnosis Date    Abnormal ECG     Alzheimer disease     CHF (congestive heart failure)     COPD (chronic obstructive pulmonary disease)     Diabetes     GERD (gastroesophageal reflux disease)     Heart murmur     Hyperlipemia     Hypertension     Myocardial infarction     Parkinson disease        Allergies   Allergen Reactions    Penicillin G Unknown - High Severity       Past Surgical History:   Procedure Laterality Date    CARDIAC CATHETERIZATION N/A 11/02/2022    Procedure: Coronary angiography;  Surgeon: Ban Geronimo MD;  Location: [facility] CATH INVASIVE LOCATION;  Service: Cardiovascular;  Laterality: N/A;    CARDIAC CATHETERIZATION N/A 11/02/2022    Procedure: Left ventriculography;  Surgeon: Ban Geronimo MD;  Location: [facility] INVASIVE LOCATION;  Service:  Cardiovascular;  Laterality: N/A;    CARDIAC CATHETERIZATION N/A 11/02/2022    Procedure: Percutaneous Coronary Intervention;  Surgeon: Ban Geronimo MD;  Location:  [facility] CATH INVASIVE LOCATION;  Service: Cardiovascular;  Laterality: N/A;    CARDIAC CATHETERIZATION N/A 11/02/2022    Procedure: Stent EDIE coronary;  Surgeon: Ban Geronimo MD;  Location:  [facility] CATH INVASIVE LOCATION;  Service: Cardiovascular;  Laterality: N/A;    CARDIAC CATHETERIZATION N/A 11/02/2022    Procedure: Left Heart Cath;  Surgeon: Ban Geronimo MD;  Location:  [facility] CATH INVASIVE LOCATION;  Service: Cardiovascular;  Laterality: N/A;    ENDOSCOPY N/A 05/22/2017    Procedure: ESOPHAGOGASTRODUODENOSCOPY, biopsy;  Surgeon: Natalia Guerrero MD;  Location:  [facility] OR;  Service:        Family History   Problem Relation Age of Onset    Heart defect Mother     Diabetes Mother     Cancer Sister     Heart disease Brother     Diabetes Brother     Stroke Brother        Social History     Socioeconomic History    Marital status:     Number of children: 2   Tobacco Use    Smoking status: Former     Passive exposure: Past    Smokeless tobacco: Never    Tobacco comments:     quit 25 years ago---caffeine: 1 drink daily   Vaping Use    Vaping status: Never Used   Substance and Sexual Activity    Alcohol use: No    Drug use: No    Sexual activity: Defer           Objective   Physical Exam  Vitals (The temperature is 97.2 °F, pulse 61, respirations 18, /40, room air pulse ox 98%.) and nursing note reviewed.   Constitutional:       Appearance: Normal appearance.   HENT:      Head: Normocephalic and atraumatic.      Mouth/Throat:      Mouth: Mucous membranes are moist.   Neck:      Comments: Is no tenderness, deformity, or bony step-offs upon palpation of cervical, and thoracic spine.  There is no tenderness, bony step-off or deformity upon palpation of the lumbar spine.  Cardiovascular:      Rate and Rhythm: Normal rate and regular rhythm.       Pulses: Normal pulses.      Heart sounds: Normal heart sounds.   Pulmonary:      Effort: Pulmonary effort is normal.      Breath sounds: Normal breath sounds.   Abdominal:      General: Abdomen is flat. Bowel sounds are normal. There is no distension.      Palpations: Abdomen is soft. There is no mass.      Tenderness: There is no abdominal tenderness. There is no right CVA tenderness, left CVA tenderness, guarding or rebound.      Hernia: No hernia is present.   Musculoskeletal:         General: Tenderness present. No swelling. Normal range of motion.   Skin:     General: Skin is warm and dry.      Findings: Rash (Anterior shins, chronic, being followed by wound care specialist, no worse according to the son) present.   Neurological:      General: No focal deficit present.      Mental Status: He is alert and oriented to person, place, and time.      Sensory: No sensory deficit.      Motor: Weakness (Generalized, chronic) present.         Procedures           ED Course                                                       Medical Decision Making  Amount and/or Complexity of Data Reviewed  Radiology: ordered.        Final diagnoses:   Fall, initial encounter   Lumbar sprain, initial encounter       ED Disposition  ED Disposition       None            No follow-up provider specified.       Medication List      No changes were made to your prescriptions during this visit.       XR Spine Lumbar 2 or 3 View   Final Result   Impression:   1.No radiographic findings of acute osseous lumbar spine abnormality.   2.Mild to moderate multilevel disc degeneration and mild facet arthropathy.               Electronically Signed: Audi Javier     6/1/2025 2:59 PM EDT     Workstation ID: [id number]        Labs Reviewed - No data to display  XR Spine Lumbar 2 or 3 View  Result Date: 6/1/2025  Narrative: XR SPINE LUMBAR 2 OR 3 VW Date of Exam: 6/1/2025 2:34 PM EDT Indication: pt had a fall and landed on his back, pt did not hit his back  on anything Comparison: 7/17/2019. CT of the abdomen and pelvis 6/22/2023 Findings: There appear to be 5 lumbar-type vertebrae. Alignment appears within normal limits. Vertebral body heights appear grossly unchanged with mild chronic anterior wedging of L1. No visualized acute displaced fracture. There is disc degeneration with mild to moderate multilevel disc height loss in the lower thoracic and lumbar spine. There appears to be mild facet arthropathy. Calcific atherosclerosis of the aorta.     Impression: Impression: 1.No radiographic findings of acute osseous lumbar spine abnormality. 2.Mild to moderate multilevel disc degeneration and mild facet arthropathy. Electronically Signed: Audi Javier  6/1/2025 2:59 PM EDT  Workstation ID: [id number]    US Venous Doppler Lower Extremity Right (duplex)  Result Date: 5/9/2025  Narrative: US VENOUS DOPPLER LOWER EXTREMITY RIGHT (DUPLEX) Date of Exam: 5/9/2025 3:28 PM EDT Indication: Calf swelling. Comparison: Bilateral lower extremity venous Doppler performed on April 28, 2025 Technique:  Routine gray scale, color flow and spectral Doppler analysis of the right lower extremity. A complete venous study was performed with image documentation obtained per protocol. Findings: The veins of the right lower extremity including the common femoral, proximal greater saphenous, superficial femoral, proximal deep femoral, popliteal and veins visualized in the calf  demonstrate normal [id number] color Doppler flow and exhibit no evidence of luminal echogenic thrombus. There is normal response to compression and augmentation.     Impression: Impression: No sonographic evidence of right lower extremity deep venous thrombosis. Electronically Signed: Samm Jackson MD  5/9/2025 4:10 PM EDT  Workstation ID: [id number]      Final diagnoses:   None         ED Medications:  Medications - No data to display    New Medications:     Medication List        CONTINUE taking these medications       Dexcom G6 Sensor     Dexcom G6 Transmitter misc            ASK your doctor about these medications      acetaminophen 325 MG tablet  Commonly known as: TYLENOL     albuterol 1.25 MG/3ML nebulizer solution  Commonly known as: ACCUNEB  Take 3 mL by nebulization Every 6 (Six) Hours As Needed for Wheezing.     Aspirin Low Dose 81 MG EC tablet  Generic drug: aspirin  TAKE 1 TABLET BY MOUTH DAILY     atorvastatin 40 MG tablet  Commonly known as: LIPITOR  Take 1 tablet by mouth Every Night.     carbidopa-levodopa  MG per tablet  Commonly known as: SINEMET     cetirizine 5 MG tablet  Commonly known as: zyrTEC     donepezil 10 MG tablet  Commonly known as: ARICEPT     esomeprazole 40 MG capsule  Commonly known as: nexIUM     glimepiride 4 MG tablet  Commonly known as: AMARYL     HUMALOG IJ     lisinopril 10 MG tablet  Commonly known as: PRINIVIL,ZESTRIL  Take 1 tablet by mouth Every Night.     memantine 10 MG tablet  Commonly known as: NAMENDA     metFORMIN 850 MG tablet  Commonly known as: GLUCOPHAGE     metoclopramide 10 MG tablet  Commonly known as: REGLAN  Take i po tid     nebivolol 10 MG tablet  Commonly known as: Bystolic  Take 1 tablet by mouth Every Night.     NEURIVA PO     Ozempic (1 MG/DOSE) 4 MG/3ML solution pen-injector  Generic drug: Semaglutide (1 MG/DOSE)     spironolactone 25 MG tablet  Commonly known as: ALDACTONE  Take 0.5 tablets by mouth Daily.     VITAMIN D PO              Stopped Medications:     Medication List        CONTINUE taking these medications      Dexcom G6 Sensor     Dexcom G6 Transmitter misc            ASK your doctor about these medications      acetaminophen 325 MG tablet  Commonly known as: TYLENOL     albuterol 1.25 MG/3ML nebulizer solution  Commonly known as: ACCUNEB  Take 3 mL by nebulization Every 6 (Six) Hours As Needed for Wheezing.     Aspirin Low Dose 81 MG EC tablet  Generic drug: aspirin  TAKE 1 TABLET BY MOUTH DAILY     atorvastatin 40 MG tablet  Commonly known  as: LIPITOR  Take 1 tablet by mouth Every Night.     carbidopa-levodopa  MG per tablet  Commonly known as: SINEMET     cetirizine 5 MG tablet  Commonly known as: zyrTEC     donepezil 10 MG tablet  Commonly known as: ARICEPT     esomeprazole 40 MG capsule  Commonly known as: nexIUM     glimepiride 4 MG tablet  Commonly known as: AMARYL     HUMALOG IJ     lisinopril 10 MG tablet  Commonly known as: PRINIVIL,ZESTRIL  Take 1 tablet by mouth Every Night.     memantine 10 MG tablet  Commonly known as: NAMENDA     metFORMIN 850 MG tablet  Commonly known as: GLUCOPHAGE     metoclopramide 10 MG tablet  Commonly known as: REGLAN  Take i po tid     nebivolol 10 MG tablet  Commonly known as: Bystolic  Take 1 tablet by mouth Every Night.     NEURIVA PO     Ozempic (1 MG/DOSE) 4 MG/3ML solution pen-injector  Generic drug: Semaglutide (1 MG/DOSE)     spironolactone 25 MG tablet  Commonly known as: ALDACTONE  Take 0.5 tablets by mouth Daily.     VITAMIN D PO                   Jericho Das MD  06/01/25 [id number]

## 2025-06-01 NOTE — DISCHARGE INSTRUCTIONS
Take Tylenol as needed as directed for pain.  Rest.  Call Dr. Nassar Monday for follow-up appointment within 1 week.  Return to the emergency department as worsening pain, numbness, tingling, weakness, change in bladder or bowel function, worse in any way at all.

## 2025-06-10 ENCOUNTER — APPOINTMENT (OUTPATIENT)
Dept: WOUND CARE | Facility: HOSPITAL | Age: 80
End: 2025-06-10
Payer: MEDICARE

## 2025-06-10 PROCEDURE — G0463 HOSPITAL OUTPT CLINIC VISIT: HCPCS

## 2025-07-15 ENCOUNTER — TRANSCRIBE ORDERS (OUTPATIENT)
Dept: CARDIOLOGY | Facility: HOSPITAL | Age: 80
End: 2025-07-15
Payer: MEDICARE

## 2025-07-15 ENCOUNTER — APPOINTMENT (OUTPATIENT)
Dept: WOUND CARE | Facility: HOSPITAL | Age: 80
End: 2025-07-15
Payer: MEDICARE

## 2025-07-15 DIAGNOSIS — I25.10 ATHEROSCLEROSIS OF NATIVE CORONARY ARTERY WITHOUT ANGINA PECTORIS, UNSPECIFIED WHETHER NATIVE OR TRANSPLANTED HEART: Primary | ICD-10-CM

## 2025-07-15 PROCEDURE — G0463 HOSPITAL OUTPT CLINIC VISIT: HCPCS

## 2025-07-22 RX ORDER — SPIRONOLACTONE 25 MG/1
12.5 TABLET ORAL DAILY
Qty: 45 TABLET | Refills: 3 | Status: SHIPPED | OUTPATIENT
Start: 2025-07-22

## 2025-07-22 NOTE — TELEPHONE ENCOUNTER
Refill Protocol Failed, Requesting Spironolactone 25 mg     LOV 02/10/2025 w/ RM    FU 08/11/2025 w/ EE    Last Labs-    CBC 04/28/25    CMP 04/28/25    TSH 01/26/2024    LIPID 11/03/22     Please review and sign.    Last refill 11/15/2024

## 2025-08-07 ENCOUNTER — HOSPITAL ENCOUNTER (OUTPATIENT)
Dept: CARDIOLOGY | Facility: HOSPITAL | Age: 80
Discharge: HOME | End: 2025-08-07
Admitting: INTERNAL MEDICINE
Payer: MEDICARE

## 2025-08-07 VITALS
HEIGHT: 70 IN | DIASTOLIC BLOOD PRESSURE: 80 MMHG | HEART RATE: 65 BPM | SYSTOLIC BLOOD PRESSURE: 177 MMHG | BODY MASS INDEX: 17.67 KG/M2 | WEIGHT: 123.46 LBS

## 2025-08-07 DIAGNOSIS — I25.10 CORONARY ARTERY DISEASE INVOLVING NATIVE CORONARY ARTERY OF NATIVE HEART WITHOUT ANGINA PECTORIS: ICD-10-CM

## 2025-08-07 DIAGNOSIS — E11.9 TYPE 2 DIABETES MELLITUS WITHOUT COMPLICATION, WITHOUT LONG-TERM CURRENT USE OF INSULIN: ICD-10-CM

## 2025-08-07 DIAGNOSIS — I50.22 CHRONIC HFREF (HEART FAILURE WITH REDUCED EJECTION FRACTION): ICD-10-CM

## 2025-08-07 DIAGNOSIS — E78.2 MIXED HYPERLIPIDEMIA: ICD-10-CM

## 2025-08-07 DIAGNOSIS — I25.5 ISCHEMIC CARDIOMYOPATHY: ICD-10-CM

## 2025-08-07 LAB
AORTIC DIMENSIONLESS INDEX: 0.7 (DI)
AV MEAN PRESS GRAD SYS DOP V1V2: 3 MMHG
AV VMAX SYS DOP: 125 CM/SEC
BH CV ECHO MEAS - ACS: 1.99 CM
BH CV ECHO MEAS - AI P1/2T: 349.4 MSEC
BH CV ECHO MEAS - AO MAX PG: 6.3 MMHG
BH CV ECHO MEAS - AO ROOT DIAM: 2.8 CM
BH CV ECHO MEAS - AO V2 VTI: 26.4 CM
BH CV ECHO MEAS - AVA(I,D): 2.5 CM2
BH CV ECHO MEAS - EDV(CUBED): 160.2 ML
BH CV ECHO MEAS - EDV(MOD-SP2): 97 ML
BH CV ECHO MEAS - EDV(MOD-SP4): 113 ML
BH CV ECHO MEAS - EF(MOD-SP2): 63.9 %
BH CV ECHO MEAS - EF(MOD-SP4): 54 %
BH CV ECHO MEAS - ESV(CUBED): 50.6 ML
BH CV ECHO MEAS - ESV(MOD-SP2): 35 ML
BH CV ECHO MEAS - ESV(MOD-SP4): 52 ML
BH CV ECHO MEAS - FS: 31.9 %
BH CV ECHO MEAS - IVS/LVPW: 1.02 CM
BH CV ECHO MEAS - IVSD: 1.19 CM
BH CV ECHO MEAS - LAT PEAK E' VEL: 8.5 CM/SEC
BH CV ECHO MEAS - LV DIASTOLIC VOL/BSA (35-75): 66.7 CM2
BH CV ECHO MEAS - LV MASS(C)D: 261.5 GRAMS
BH CV ECHO MEAS - LV MAX PG: 2.6 MMHG
BH CV ECHO MEAS - LV MEAN PG: 1 MMHG
BH CV ECHO MEAS - LV SYSTOLIC VOL/BSA (12-30): 30.7 CM2
BH CV ECHO MEAS - LV V1 MAX: 81.3 CM/SEC
BH CV ECHO MEAS - LV V1 VTI: 18.6 CM
BH CV ECHO MEAS - LVIDD: 5.4 CM
BH CV ECHO MEAS - LVIDS: 3.7 CM
BH CV ECHO MEAS - LVOT AREA: 3.6 CM2
BH CV ECHO MEAS - LVOT DIAM: 2.13 CM
BH CV ECHO MEAS - LVPWD: 1.17 CM
BH CV ECHO MEAS - MED PEAK E' VEL: 6 CM/SEC
BH CV ECHO MEAS - MV A MAX VEL: 115 CM/SEC
BH CV ECHO MEAS - MV DEC SLOPE: 592.3 CM/SEC2
BH CV ECHO MEAS - MV DEC TIME: 0.27 SEC
BH CV ECHO MEAS - MV E MAX VEL: 68.7 CM/SEC
BH CV ECHO MEAS - MV E/A: 0.6
BH CV ECHO MEAS - MV MAX PG: 6.5 MMHG
BH CV ECHO MEAS - MV MEAN PG: 2.01 MMHG
BH CV ECHO MEAS - MV P1/2T: 48.8 MSEC
BH CV ECHO MEAS - MV V2 VTI: 30 CM
BH CV ECHO MEAS - MVA(P1/2T): 4.5 CM2
BH CV ECHO MEAS - MVA(VTI): 2.22 CM2
BH CV ECHO MEAS - PA ACC TIME: 0.08 SEC
BH CV ECHO MEAS - PA V2 MAX: 119 CM/SEC
BH CV ECHO MEAS - QP/QS: 1.52
BH CV ECHO MEAS - RV MAX PG: 2.25 MMHG
BH CV ECHO MEAS - RV V1 MAX: 74.9 CM/SEC
BH CV ECHO MEAS - RV V1 VTI: 16.6 CM
BH CV ECHO MEAS - RVOT DIAM: 2.8 CM
BH CV ECHO MEAS - SV(LVOT): 66.5 ML
BH CV ECHO MEAS - SV(MOD-SP2): 62 ML
BH CV ECHO MEAS - SV(MOD-SP4): 61 ML
BH CV ECHO MEAS - SV(RVOT): 101.3 ML
BH CV ECHO MEAS - SVI(LVOT): 39.2 ML/M2
BH CV ECHO MEAS - SVI(MOD-SP2): 36.6 ML/M2
BH CV ECHO MEAS - SVI(MOD-SP4): 36 ML/M2
BH CV ECHO MEAS - TAPSE (>1.6): 3.3 CM
BH CV ECHO MEASUREMENTS AVERAGE E/E' RATIO: 9.48
BH CV XLRA - RV BASE: 3.1 CM
BH CV XLRA - RV LENGTH: 8.3 CM
BH CV XLRA - RV MID: 2.5 CM
BH CV XLRA - TDI S': 15.2 CM/SEC
LEFT ATRIUM VOLUME INDEX: 26.6 ML/M2
LV EF BIPLANE MOD: 58.5 %
SINUS: 3.3 CM

## 2025-08-07 PROCEDURE — 93306 TTE W/DOPPLER COMPLETE: CPT

## 2025-08-07 PROCEDURE — 25510000001 PERFLUTREN 6.52 MG/ML SUSPENSION 2 ML VIAL: Performed by: FAMILY MEDICINE

## 2025-08-07 RX ADMIN — SODIUM CHLORIDE 2 ML: 9 INJECTION INTRAMUSCULAR; INTRAVENOUS; SUBCUTANEOUS at 16:21

## 2025-08-08 ENCOUNTER — RESULTS FOLLOW-UP (OUTPATIENT)
Dept: CARDIOLOGY | Facility: CLINIC | Age: 80
End: 2025-08-08
Payer: MEDICARE

## (undated) DEVICE — GW EMR FIX EXCHG J STD .035 3MM 260CM

## (undated) DEVICE — GOWN ISOL W/THUMB UNIV BLU BX/15

## (undated) DEVICE — PK CATH CARD 40

## (undated) DEVICE — SYR LUER SLPTP 50ML

## (undated) DEVICE — THE BITE BLOCK MAXI, LATEX FREE STRAP IS USED TO PROTECT THE ENDOSCOPE INSERTION TUBE FROM BEING BITTEN BY THE PATIENT.

## (undated) DEVICE — 6F .070 JR 4 100CM: Brand: CORDIS

## (undated) DEVICE — TR BAND RADIAL ARTERY COMPRESSION DEVICE: Brand: TR BAND

## (undated) DEVICE — MEDI-VAC YANK SUCT HNDL W/TPRD BULBOUS TIP: Brand: CARDINAL HEALTH

## (undated) DEVICE — Device

## (undated) DEVICE — CATH DIAG IMPULSE FL3.5 5F 100CM

## (undated) DEVICE — TREK CORONARY DILATATION CATHETER 3.0 MM X 15 MM / RAPID-EXCHANGE: Brand: TREK

## (undated) DEVICE — 6F .070 AR 2 100CM: Brand: VISTA BRITE TIP

## (undated) DEVICE — VIAL FORMALIN CAP 10P 40ML

## (undated) DEVICE — KT MANIFLD CARDIAC

## (undated) DEVICE — CATH DIAG IMPULSE FR4 5F 100CM

## (undated) DEVICE — SYR LL 3CC

## (undated) DEVICE — SUCTION CANISTER, 3000CC,SAFELINER: Brand: DEROYAL

## (undated) DEVICE — JACKT LAB KNIT COLR LG BLU

## (undated) DEVICE — GLIDESHEATH SLENDER STAINLESS STEEL KIT: Brand: GLIDESHEATH SLENDER

## (undated) DEVICE — GLV SURG SENSICARE MICRO PF LF 6 STRL

## (undated) DEVICE — RUNTHROUGH NS EXTRA FLOPPY PTCA GUIDEWIRE: Brand: RUNTHROUGH

## (undated) DEVICE — NC TREK CORONARY DILATATION CATHETER 3.0 MM X 25 MM / RAPID-EXCHANGE: Brand: NC TREK

## (undated) DEVICE — SUCTION CANISTER, 1000CC,SAFELINER: Brand: DEROYAL

## (undated) DEVICE — BW-412T DISP COMBO CLEANING BRUSH: Brand: SINGLE USE COMBINATION CLEANING BRUSH

## (undated) DEVICE — FRCP BX RADJAW4 NDL 2.8 240CM LG OG BX40

## (undated) DEVICE — TREK CORONARY DILATATION CATHETER 2.50 MM X 12 MM / RAPID-EXCHANGE: Brand: TREK

## (undated) DEVICE — SPNG GZ WOVN 4X4IN 12PLY 10/BX STRL

## (undated) DEVICE — MASK,FACE,SHIELD,BLUE,ANTI FOG,TIES: Brand: MEDLINE

## (undated) DEVICE — Device: Brand: DEFENDO AIR/WATER/SUCTION AND BIOPSY VALVE

## (undated) DEVICE — DEV INDEFLATOR P/N 580289